# Patient Record
Sex: MALE | Race: BLACK OR AFRICAN AMERICAN | NOT HISPANIC OR LATINO | Employment: OTHER | URBAN - METROPOLITAN AREA
[De-identification: names, ages, dates, MRNs, and addresses within clinical notes are randomized per-mention and may not be internally consistent; named-entity substitution may affect disease eponyms.]

---

## 2017-09-09 ENCOUNTER — HOSPITAL ENCOUNTER (INPATIENT)
Facility: HOSPITAL | Age: 72
LOS: 3 days | Discharge: HOME | DRG: 690 | End: 2017-09-13
Attending: SURGERY | Admitting: FAMILY MEDICINE
Payer: MEDICARE

## 2017-09-09 DIAGNOSIS — R10.9 ABDOMINAL PAIN: ICD-10-CM

## 2017-09-09 DIAGNOSIS — N12 PYELONEPHRITIS: Primary | ICD-10-CM

## 2017-09-09 LAB
ALBUMIN SERPL BCP-MCNC: 3 G/DL
ALP SERPL-CCNC: 73 U/L
ALT SERPL W/O P-5'-P-CCNC: 8 U/L
ANION GAP SERPL CALC-SCNC: 9 MMOL/L
AST SERPL-CCNC: 10 U/L
BACTERIA #/AREA URNS HPF: ABNORMAL /HPF
BASOPHILS # BLD AUTO: 0.02 K/UL
BASOPHILS NFR BLD: 0.2 %
BILIRUB SERPL-MCNC: 0.5 MG/DL
BILIRUB UR QL STRIP: NEGATIVE
BNP SERPL-MCNC: 29 PG/ML
BUN SERPL-MCNC: 11 MG/DL
CALCIUM SERPL-MCNC: 8.6 MG/DL
CHLORIDE SERPL-SCNC: 101 MMOL/L
CK MB SERPL-MCNC: 0.3 NG/ML
CK MB SERPL-RTO: 0.5 %
CK SERPL-CCNC: 64 U/L
CK SERPL-CCNC: 64 U/L
CLARITY UR: ABNORMAL
CO2 SERPL-SCNC: 27 MMOL/L
COLOR UR: YELLOW
CREAT SERPL-MCNC: 1.1 MG/DL
DIFFERENTIAL METHOD: ABNORMAL
EOSINOPHIL # BLD AUTO: 0 K/UL
EOSINOPHIL NFR BLD: 0.1 %
ERYTHROCYTE [DISTWIDTH] IN BLOOD BY AUTOMATED COUNT: 14.9 %
EST. GFR  (AFRICAN AMERICAN): >60 ML/MIN/1.73 M^2
EST. GFR  (NON AFRICAN AMERICAN): >60 ML/MIN/1.73 M^2
GLUCOSE SERPL-MCNC: 136 MG/DL
GLUCOSE UR QL STRIP: NEGATIVE
HCT VFR BLD AUTO: 41.5 %
HGB BLD-MCNC: 13.8 G/DL
HGB UR QL STRIP: ABNORMAL
HYALINE CASTS #/AREA URNS LPF: 0 /LPF
KETONES UR QL STRIP: NEGATIVE
LACTATE SERPL-SCNC: 1.6 MMOL/L
LEUKOCYTE ESTERASE UR QL STRIP: ABNORMAL
LIPASE SERPL-CCNC: 30 U/L
LYMPHOCYTES # BLD AUTO: 1 K/UL
LYMPHOCYTES NFR BLD: 9.1 %
MCH RBC QN AUTO: 27.1 PG
MCHC RBC AUTO-ENTMCNC: 33.3 G/DL
MCV RBC AUTO: 81 FL
MICROSCOPIC COMMENT: ABNORMAL
MONOCYTES # BLD AUTO: 1.1 K/UL
MONOCYTES NFR BLD: 9.5 %
NEUTROPHILS # BLD AUTO: 9.3 K/UL
NEUTROPHILS NFR BLD: 81.1 %
NITRITE UR QL STRIP: POSITIVE
PH UR STRIP: 6 [PH] (ref 5–8)
PLATELET # BLD AUTO: 170 K/UL
PMV BLD AUTO: 8.9 FL
POTASSIUM SERPL-SCNC: 4.2 MMOL/L
PROT SERPL-MCNC: 7.1 G/DL
PROT UR QL STRIP: ABNORMAL
RBC # BLD AUTO: 5.1 M/UL
RBC #/AREA URNS HPF: 9 /HPF (ref 0–4)
SODIUM SERPL-SCNC: 137 MMOL/L
SP GR UR STRIP: 1.02 (ref 1–1.03)
SQUAMOUS #/AREA URNS HPF: 1 /HPF
TROPONIN I SERPL DL<=0.01 NG/ML-MCNC: <0.006 NG/ML
URN SPEC COLLECT METH UR: ABNORMAL
UROBILINOGEN UR STRIP-ACNC: 1 EU/DL
WBC # BLD AUTO: 11.48 K/UL
WBC #/AREA URNS HPF: >100 /HPF (ref 0–5)

## 2017-09-09 PROCEDURE — 25000003 PHARM REV CODE 250: Performed by: SURGERY

## 2017-09-09 PROCEDURE — 63600175 PHARM REV CODE 636 W HCPCS: Performed by: SURGERY

## 2017-09-09 PROCEDURE — 84484 ASSAY OF TROPONIN QUANT: CPT

## 2017-09-09 PROCEDURE — G0378 HOSPITAL OBSERVATION PER HR: HCPCS

## 2017-09-09 PROCEDURE — 83605 ASSAY OF LACTIC ACID: CPT

## 2017-09-09 PROCEDURE — 25500020 PHARM REV CODE 255: Performed by: SURGERY

## 2017-09-09 PROCEDURE — 99285 EMERGENCY DEPT VISIT HI MDM: CPT | Mod: 25

## 2017-09-09 PROCEDURE — 80053 COMPREHEN METABOLIC PANEL: CPT

## 2017-09-09 PROCEDURE — 96361 HYDRATE IV INFUSION ADD-ON: CPT

## 2017-09-09 PROCEDURE — 81000 URINALYSIS NONAUTO W/SCOPE: CPT

## 2017-09-09 PROCEDURE — 82553 CREATINE MB FRACTION: CPT

## 2017-09-09 PROCEDURE — 27000339 *HC DAILY SUPPLY KIT

## 2017-09-09 PROCEDURE — 36415 COLL VENOUS BLD VENIPUNCTURE: CPT

## 2017-09-09 PROCEDURE — 93010 ELECTROCARDIOGRAM REPORT: CPT | Mod: ,,, | Performed by: INTERNAL MEDICINE

## 2017-09-09 PROCEDURE — 93005 ELECTROCARDIOGRAM TRACING: CPT

## 2017-09-09 PROCEDURE — 83690 ASSAY OF LIPASE: CPT

## 2017-09-09 PROCEDURE — 87040 BLOOD CULTURE FOR BACTERIA: CPT | Mod: 59

## 2017-09-09 PROCEDURE — 83880 ASSAY OF NATRIURETIC PEPTIDE: CPT

## 2017-09-09 PROCEDURE — 96365 THER/PROPH/DIAG IV INF INIT: CPT

## 2017-09-09 PROCEDURE — 85025 COMPLETE CBC W/AUTO DIFF WBC: CPT

## 2017-09-09 RX ORDER — IBUPROFEN 800 MG/1
800 TABLET ORAL
Status: COMPLETED | OUTPATIENT
Start: 2017-09-09 | End: 2017-09-09

## 2017-09-09 RX ORDER — PHENYTOIN SODIUM 100 MG/1
100 CAPSULE, EXTENDED RELEASE ORAL 2 TIMES DAILY
Status: DISCONTINUED | OUTPATIENT
Start: 2017-09-10 | End: 2017-09-10

## 2017-09-09 RX ORDER — ONDANSETRON 2 MG/ML
4 INJECTION INTRAMUSCULAR; INTRAVENOUS EVERY 8 HOURS PRN
Status: DISCONTINUED | OUTPATIENT
Start: 2017-09-09 | End: 2017-09-13 | Stop reason: HOSPADM

## 2017-09-09 RX ORDER — DONEPEZIL HYDROCHLORIDE 5 MG/1
10 TABLET, FILM COATED ORAL NIGHTLY
Status: DISCONTINUED | OUTPATIENT
Start: 2017-09-10 | End: 2017-09-10

## 2017-09-09 RX ORDER — CIPROFLOXACIN 2 MG/ML
400 INJECTION, SOLUTION INTRAVENOUS
Status: DISCONTINUED | OUTPATIENT
Start: 2017-09-10 | End: 2017-09-10

## 2017-09-09 RX ORDER — ACETAMINOPHEN 500 MG
1000 TABLET ORAL EVERY 6 HOURS PRN
Status: DISCONTINUED | OUTPATIENT
Start: 2017-09-09 | End: 2017-09-10

## 2017-09-09 RX ORDER — PANTOPRAZOLE SODIUM 40 MG/1
40 TABLET, DELAYED RELEASE ORAL DAILY
Status: DISCONTINUED | OUTPATIENT
Start: 2017-09-10 | End: 2017-09-13 | Stop reason: HOSPADM

## 2017-09-09 RX ORDER — ACETAMINOPHEN 500 MG
1000 TABLET ORAL
Status: COMPLETED | OUTPATIENT
Start: 2017-09-09 | End: 2017-09-09

## 2017-09-09 RX ORDER — VALSARTAN 40 MG/1
40 TABLET ORAL DAILY
Status: DISCONTINUED | OUTPATIENT
Start: 2017-09-10 | End: 2017-09-10

## 2017-09-09 RX ORDER — RISPERIDONE 3 MG/1
3 TABLET ORAL NIGHTLY
Status: DISCONTINUED | OUTPATIENT
Start: 2017-09-10 | End: 2017-09-10

## 2017-09-09 RX ORDER — CIPROFLOXACIN 2 MG/ML
400 INJECTION, SOLUTION INTRAVENOUS
Status: COMPLETED | OUTPATIENT
Start: 2017-09-09 | End: 2017-09-10

## 2017-09-09 RX ORDER — DIVALPROEX SODIUM 250 MG/1
250 TABLET, DELAYED RELEASE ORAL 3 TIMES DAILY
Status: DISCONTINUED | OUTPATIENT
Start: 2017-09-10 | End: 2017-09-10

## 2017-09-09 RX ORDER — TAMSULOSIN HYDROCHLORIDE 0.4 MG/1
1 CAPSULE ORAL DAILY
Status: DISCONTINUED | OUTPATIENT
Start: 2017-09-10 | End: 2017-09-10

## 2017-09-09 RX ORDER — SUCRALFATE 1 G/1
1 TABLET ORAL 4 TIMES DAILY
Status: DISCONTINUED | OUTPATIENT
Start: 2017-09-10 | End: 2017-09-13 | Stop reason: HOSPADM

## 2017-09-09 RX ADMIN — CIPROFLOXACIN 400 MG: 2 INJECTION, SOLUTION INTRAVENOUS at 11:09

## 2017-09-09 RX ADMIN — SODIUM CHLORIDE 500 ML: 0.9 INJECTION, SOLUTION INTRAVENOUS at 08:09

## 2017-09-09 RX ADMIN — IBUPROFEN 800 MG: 800 TABLET ORAL at 08:09

## 2017-09-09 RX ADMIN — IOHEXOL 30 ML: 350 INJECTION, SOLUTION INTRAVENOUS at 09:09

## 2017-09-09 RX ADMIN — IOHEXOL 75 ML: 350 INJECTION, SOLUTION INTRAVENOUS at 10:09

## 2017-09-09 RX ADMIN — ACETAMINOPHEN 1000 MG: 500 TABLET ORAL at 08:09

## 2017-09-10 PROBLEM — I47.20 VENTRICULAR TACHYCARDIA: Status: ACTIVE | Noted: 2017-09-10

## 2017-09-10 PROBLEM — R19.7 DIARRHEA OF PRESUMED INFECTIOUS ORIGIN: Status: ACTIVE | Noted: 2017-09-10

## 2017-09-10 LAB
ALBUMIN SERPL BCP-MCNC: 2.6 G/DL
ALP SERPL-CCNC: 59 U/L
ALT SERPL W/O P-5'-P-CCNC: 8 U/L
ANION GAP SERPL CALC-SCNC: 8 MMOL/L
AST SERPL-CCNC: 10 U/L
BASOPHILS # BLD AUTO: 0.02 K/UL
BASOPHILS NFR BLD: 0.2 %
BILIRUB SERPL-MCNC: 0.6 MG/DL
BUN SERPL-MCNC: 12 MG/DL
CALCIUM SERPL-MCNC: 8 MG/DL
CHLORIDE SERPL-SCNC: 99 MMOL/L
CO2 SERPL-SCNC: 27 MMOL/L
CREAT SERPL-MCNC: 1.2 MG/DL
DIFFERENTIAL METHOD: ABNORMAL
EOSINOPHIL # BLD AUTO: 0.2 K/UL
EOSINOPHIL NFR BLD: 1.9 %
ERYTHROCYTE [DISTWIDTH] IN BLOOD BY AUTOMATED COUNT: 14.6 %
EST. GFR  (AFRICAN AMERICAN): >60 ML/MIN/1.73 M^2
EST. GFR  (NON AFRICAN AMERICAN): >60 ML/MIN/1.73 M^2
GLUCOSE SERPL-MCNC: 95 MG/DL
HCT VFR BLD AUTO: 36.4 %
HGB BLD-MCNC: 12 G/DL
LYMPHOCYTES # BLD AUTO: 1.5 K/UL
LYMPHOCYTES NFR BLD: 14.5 %
MAGNESIUM SERPL-MCNC: 1.7 MG/DL
MCH RBC QN AUTO: 27 PG
MCHC RBC AUTO-ENTMCNC: 33 G/DL
MCV RBC AUTO: 82 FL
MONOCYTES # BLD AUTO: 1 K/UL
MONOCYTES NFR BLD: 9.5 %
NEUTROPHILS # BLD AUTO: 7.4 K/UL
NEUTROPHILS NFR BLD: 73.9 %
PLATELET # BLD AUTO: 161 K/UL
PMV BLD AUTO: 9.1 FL
POTASSIUM SERPL-SCNC: 3.6 MMOL/L
PROT SERPL-MCNC: 6.2 G/DL
RBC # BLD AUTO: 4.44 M/UL
SODIUM SERPL-SCNC: 134 MMOL/L
WBC # BLD AUTO: 10.03 K/UL

## 2017-09-10 PROCEDURE — 25000003 PHARM REV CODE 250: Performed by: SURGERY

## 2017-09-10 PROCEDURE — 20000000 HC ICU ROOM

## 2017-09-10 PROCEDURE — 27200120 HC KIT IV START (RUSH ONLY)

## 2017-09-10 PROCEDURE — 80053 COMPREHEN METABOLIC PANEL: CPT

## 2017-09-10 PROCEDURE — S5010 5% DEXTROSE AND 0.45% SALINE: HCPCS | Performed by: FAMILY MEDICINE

## 2017-09-10 PROCEDURE — 63600175 PHARM REV CODE 636 W HCPCS: Performed by: FAMILY MEDICINE

## 2017-09-10 PROCEDURE — 87186 SC STD MICRODIL/AGAR DIL: CPT

## 2017-09-10 PROCEDURE — 96367 TX/PROPH/DG ADDL SEQ IV INF: CPT

## 2017-09-10 PROCEDURE — 85025 COMPLETE CBC W/AUTO DIFF WBC: CPT

## 2017-09-10 PROCEDURE — 99220 PR INITIAL OBSERVATION CARE,LEVL III: CPT | Mod: ,,, | Performed by: FAMILY MEDICINE

## 2017-09-10 PROCEDURE — 87077 CULTURE AEROBIC IDENTIFY: CPT

## 2017-09-10 PROCEDURE — 96361 HYDRATE IV INFUSION ADD-ON: CPT

## 2017-09-10 PROCEDURE — 36415 COLL VENOUS BLD VENIPUNCTURE: CPT

## 2017-09-10 PROCEDURE — 87088 URINE BACTERIA CULTURE: CPT

## 2017-09-10 PROCEDURE — 25000003 PHARM REV CODE 250: Performed by: FAMILY MEDICINE

## 2017-09-10 PROCEDURE — 87086 URINE CULTURE/COLONY COUNT: CPT

## 2017-09-10 PROCEDURE — 25000003 PHARM REV CODE 250: Performed by: NURSE PRACTITIONER

## 2017-09-10 PROCEDURE — 96366 THER/PROPH/DIAG IV INF ADDON: CPT

## 2017-09-10 PROCEDURE — 63600175 PHARM REV CODE 636 W HCPCS: Performed by: NURSE PRACTITIONER

## 2017-09-10 PROCEDURE — 83735 ASSAY OF MAGNESIUM: CPT

## 2017-09-10 PROCEDURE — 94761 N-INVAS EAR/PLS OXIMETRY MLT: CPT

## 2017-09-10 PROCEDURE — 27000339 *HC DAILY SUPPLY KIT

## 2017-09-10 RX ORDER — PHENYTOIN SODIUM 100 MG/1
100 CAPSULE, EXTENDED RELEASE ORAL 2 TIMES DAILY
Status: DISCONTINUED | OUTPATIENT
Start: 2017-09-10 | End: 2017-09-13 | Stop reason: HOSPADM

## 2017-09-10 RX ORDER — DIVALPROEX SODIUM 250 MG/1
250 TABLET, DELAYED RELEASE ORAL 3 TIMES DAILY
Status: DISCONTINUED | OUTPATIENT
Start: 2017-09-10 | End: 2017-09-10

## 2017-09-10 RX ORDER — ACETAMINOPHEN 500 MG
1000 TABLET ORAL EVERY 6 HOURS PRN
Status: DISCONTINUED | OUTPATIENT
Start: 2017-09-10 | End: 2017-09-13 | Stop reason: HOSPADM

## 2017-09-10 RX ORDER — METOPROLOL TARTRATE 25 MG/1
25 TABLET, FILM COATED ORAL 2 TIMES DAILY
Status: DISCONTINUED | OUTPATIENT
Start: 2017-09-10 | End: 2017-09-10

## 2017-09-10 RX ORDER — RISPERIDONE 3 MG/1
3 TABLET ORAL NIGHTLY
Status: DISCONTINUED | OUTPATIENT
Start: 2017-09-10 | End: 2017-09-13 | Stop reason: HOSPADM

## 2017-09-10 RX ORDER — POTASSIUM CHLORIDE 20 MEQ/1
40 TABLET, EXTENDED RELEASE ORAL ONCE
Status: COMPLETED | OUTPATIENT
Start: 2017-09-10 | End: 2017-09-10

## 2017-09-10 RX ORDER — CIPROFLOXACIN 2 MG/ML
400 INJECTION, SOLUTION INTRAVENOUS
Status: DISCONTINUED | OUTPATIENT
Start: 2017-09-10 | End: 2017-09-13 | Stop reason: HOSPADM

## 2017-09-10 RX ORDER — DIVALPROEX SODIUM 250 MG/1
250 TABLET, DELAYED RELEASE ORAL 3 TIMES DAILY
Status: DISCONTINUED | OUTPATIENT
Start: 2017-09-10 | End: 2017-09-13 | Stop reason: HOSPADM

## 2017-09-10 RX ORDER — DEXTROSE MONOHYDRATE AND SODIUM CHLORIDE 5; .45 G/100ML; G/100ML
INJECTION, SOLUTION INTRAVENOUS CONTINUOUS
Status: DISCONTINUED | OUTPATIENT
Start: 2017-09-10 | End: 2017-09-11

## 2017-09-10 RX ORDER — DONEPEZIL HYDROCHLORIDE 5 MG/1
10 TABLET, FILM COATED ORAL NIGHTLY
Status: DISCONTINUED | OUTPATIENT
Start: 2017-09-10 | End: 2017-09-13 | Stop reason: HOSPADM

## 2017-09-10 RX ADMIN — PHENYTOIN SODIUM 100 MG: 100 CAPSULE ORAL at 09:09

## 2017-09-10 RX ADMIN — SUCRALFATE 1 G: 1 TABLET ORAL at 06:09

## 2017-09-10 RX ADMIN — PHENYTOIN SODIUM 100 MG: 100 CAPSULE ORAL at 12:09

## 2017-09-10 RX ADMIN — DIVALPROEX SODIUM 250 MG: 250 TABLET, DELAYED RELEASE ORAL at 09:09

## 2017-09-10 RX ADMIN — RISPERIDONE 3 MG: 3 TABLET, FILM COATED ORAL at 09:09

## 2017-09-10 RX ADMIN — ACETAMINOPHEN 1000 MG: 500 TABLET ORAL at 02:09

## 2017-09-10 RX ADMIN — POTASSIUM CHLORIDE 40 MEQ: 1500 TABLET, EXTENDED RELEASE ORAL at 04:09

## 2017-09-10 RX ADMIN — CIPROFLOXACIN 400 MG: 2 INJECTION, SOLUTION INTRAVENOUS at 12:09

## 2017-09-10 RX ADMIN — SUCRALFATE 1 G: 1 TABLET ORAL at 12:09

## 2017-09-10 RX ADMIN — DONEPEZIL HYDROCHLORIDE 10 MG: 5 TABLET, FILM COATED ORAL at 12:09

## 2017-09-10 RX ADMIN — VALSARTAN 40 MG: 40 TABLET ORAL at 09:09

## 2017-09-10 RX ADMIN — DIVALPROEX SODIUM 250 MG: 250 TABLET, DELAYED RELEASE ORAL at 02:09

## 2017-09-10 RX ADMIN — METOPROLOL TARTRATE 25 MG: 25 TABLET ORAL at 01:09

## 2017-09-10 RX ADMIN — CIPROFLOXACIN 400 MG: 2 INJECTION, SOLUTION INTRAVENOUS at 10:09

## 2017-09-10 RX ADMIN — SODIUM CHLORIDE 500 ML: 0.9 INJECTION, SOLUTION INTRAVENOUS at 08:09

## 2017-09-10 RX ADMIN — DIVALPROEX SODIUM 250 MG: 250 TABLET, DELAYED RELEASE ORAL at 12:09

## 2017-09-10 RX ADMIN — PANTOPRAZOLE SODIUM 40 MG: 40 TABLET, DELAYED RELEASE ORAL at 09:09

## 2017-09-10 RX ADMIN — TAMSULOSIN HYDROCHLORIDE 0.4 MG: 0.4 CAPSULE ORAL at 09:09

## 2017-09-10 RX ADMIN — MAGNESIUM SULFATE HEPTAHYDRATE 3 G: 500 INJECTION, SOLUTION INTRAMUSCULAR; INTRAVENOUS at 04:09

## 2017-09-10 RX ADMIN — RISPERIDONE 3 MG: 3 TABLET, FILM COATED ORAL at 12:09

## 2017-09-10 RX ADMIN — SUCRALFATE 1 G: 1 TABLET ORAL at 11:09

## 2017-09-10 RX ADMIN — DEXTROSE AND SODIUM CHLORIDE: 5; .45 INJECTION, SOLUTION INTRAVENOUS at 02:09

## 2017-09-10 NOTE — NURSING
Bedside report received from Sneha.  Pt resting in bed.  Daughter at bedside.  Call bell in reach.  Will continue to monitor.  Bed alarm on.  telemtry box placed on pt.
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
## 2017-09-10 NOTE — PROGRESS NOTES
CIS and Dr. Nova notified of BP of 74/42. IV fluids rate changed to 150 mL/hr. Considering transferring patient to another hospital. Will continue to monitor BP and patient.

## 2017-09-10 NOTE — ASSESSMENT & PLAN NOTE
[facility] consulted  CIS feels this is abberancy  Got IV metoprolol night of 9/10 and became hypotensive. Sent to ICU  BP improved off meds  No more IVF  No more BB

## 2017-09-10 NOTE — HOSPITAL COURSE
73 y/o W M admitted for an occult infection, noted on CT to be pyleonephritis; he is a resident of [facility]; hx of prostate Ca; his daughter says he has been having some loose stools, so I will get C Diff on her diarrhea. Since admit, he has had several bouts of V tach and [facility] has been consulted(distancely involved only) and made some recs; he flakita been persistenly hypotensive, so will be transferred at approx 8:00pm to ICU for closer monitoring and IV bolusing IV Mag    9/11- concern for VT overnight. Given metoprolol and became hypotensive after IV metoprolol. Transferred to ICU. Started on IVF. BP improved. Still with low grade fevers. No leukocytosis. Remains on cipro.     9/12/17  Presently in icu . Still on IVF Orders done more ectopy ; presently in sinus rhythm.  Afebrile . Urine culture growing e coli ; presently on IV cipro     9/13  Pressure is stable, no arrhythmias afebrile, urine sensitive to cipro
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
## 2017-09-10 NOTE — PLAN OF CARE
Problem: Patient Care Overview  Goal: Plan of Care Review  Outcome: Ongoing (interventions implemented as appropriate)  Pt agrees with plan of care.  No complaints of pain noted.  Pt incontinent at times of stool.  Pt has diarrhea times 2 this shift.  Call bell in reach.  Bed alarm on.  Will continue to monitor.

## 2017-09-10 NOTE — PROGRESS NOTES
MD notified of possible 11-beat run of V-tach. Cardiology consulted. Patient stable; denies any chest pain or changes.

## 2017-09-10 NOTE — ED TRIAGE NOTES
Pt c/o LLQ abd pain starting about 2 weeks ago. Daughter states dx last week with UTI & diverticulitis

## 2017-09-10 NOTE — ASSESSMENT & PLAN NOTE
C diff titer and precautions, since he has been on mx ABX friom Dr Diallo and also some Rx for his kidney infection

## 2017-09-10 NOTE — HPI
73 y/o W M admitted for a pyelonephritis infection from [facility], who has become medically unstable with some V Tach runs and hypotension bouts.

## 2017-09-10 NOTE — ED PROVIDER NOTES
[facility]                                     September 9, 2017                   Chief Complaint  72 y.o. male with Abdominal Pain    History of Present Illness  Anjel Soria presents to the emergency room with dysuria and abdominal cramps  Patient has had abdominal pain and burning with urination for the last 2-3 days now  The nursing home states that the patient has had fever the last 24 hours prior to arrival  Pt is a poor historian due to his dementia and stroke history with debility chronically  Patient on CT has evidence of pyelonephritis, urinalysis correlates with that diagnosis    The history is provided by the patient    Past Medical History   -- Bipolar 1 disorder    -- BPH (benign prostatic hyperplasia)    -- Cancer    -- Convulsion    -- Dementia    -- Esophageal reflux    -- Glaucoma    -- Hypertension    -- Macular degeneration    -- Reflux    -- Seizures    -- Stroke      Past Surgical History   -- HERNIA REPAIR     -- PROSTATE SURGERY     -- SPINE SURGERY     -- STOMACH SURGERY        ALLERGIES: Tubersol  No family history on file.    Review of Systems and Physical Exam     Review of Systems  -- Constitution - fever, denies fatigue, no weakness, no chills  -- Eyes - no tearing or redness, no visual disturbance  -- Ear, Nose - no tinnitus or earache, no nasal congestion or discharge  -- Mouth,Throat - no sore throat, no toothache, normal voice, normal swallowing  -- Respiratory - denies cough and congestion, no shortness of breath, no HASSAN  -- Cardiovascular - denies chest pain, no palpitations, denies claudication  -- Gastrointestinal - denies abdominal pain, nausea, vomiting, or diarrhea  -- Genitourinary - dysuria, flank pain, no hematuria or frequency   -- Musculoskeletal - denies back pain, negative for myalgias and arthralgias   -- Neurological - no headache, denies weakness or seizure; no LOC  -- Skin - denies pallor, rash, or changes in skin. no hives or welts  noted    Vital Signs  -- Temperature is 99.8 °F (37.7 °C).   -- His blood pressure is 133/71 and his pulse is 83.   -- His respiration is 20 and oxygen saturation is 93%     Physical Exam  -- Nursing note and vitals reviewed  -- Head: Atraumatic. Normocephalic. No obvious abnormality  -- Eyes: Pupils are equal and reactive to light. Normal conjunctiva and lids  -- Cardiac: Normal rate, regular rhythm and normal heart sounds  -- Pulmonary: Normal respiratory effort, breath sounds clear to auscultation  -- Abdominal: Soft, no tenderness. Normal bowel sounds. Normal liver edge  -- Genitourinary: no flank pain on exam, no suprapubic pain by palpation   -- Musculoskeletal: Normal range of motion, no effusions. Joints stable   -- Neurological: No focal deficits. Showed good interaction with staff  -- Vascular: Posterior tibial, dorsalis pedis and radial pulses 2+ bilaterally      Emergency Room Course     Treatment and Evaluation  -- The EKG findings today were without concerning findings from baseline   -- CT stone study shows pyelonephritis  -- Blood cultures have also been drawn, results are pending   -- The urine today has been sent for lab culture, results pending   -- Saline lock started in the ER per protocol  --  ml NS given in today in the ER  --  mg Cipro given today in the ER    Urinalysis  -- Appearance, UA Cloudy (*)   -- Protein, UA 2+ (*)   -- Occult Blood UA 2+ (*)   -- Nitrite, UA Positive (*)   -- Leukocytes, UA 2+ (*)   -- RBC, UA 9 (*)   -- WBC, UA >100 (*)   -- Bacteria, UA Many (*)     Lab Results   --     -- K 4.2    --     -- CO2 27    -- BUN 11    -- CREATININE 1.1    --  (H)    -- ALKPHOS 73    -- AST 10    -- ALT 8 (L)    -- BILITOT 0.5    -- ALBUMIN 3.0 (L)    -- PROT 7.1    -- WBC 11.48    -- HGB 13.8 (L)    -- HCT 41.5    --     -- CPK 64    -- CPK 64    -- CPKMB 0.3    -- TROPONINI <0.006    -- BNP 29    -- LACTATE 1.6      Diagnosis  -- The primary  encounter diagnosis was Pyelonephritis.   -- A diagnosis of Abdominal pain was also pertinent to this visit.    Disposition and Plan  -- Disposition: observation  -- Condition: stable  -- Telemetry monitoring  -- Morning labs  -- SCD hoses  -- Home medications  -- Nausea medication when necessary  -- Pain medication when necessary  -- Hep-Lock IV  -- Routine monitoring  -- Bed rest until otherwise stated  -- Low salt diet  -- Protonix for GERD prophylaxis  -- IV antibiotics  -- Blood cultures pending  -- Urine culture pending    This note is dictated on Dragon Natural Speaking word recognition program.  There are word recognition mistakes that are occasionally missed on review.           Girma Li MD  09/09/17 6916

## 2017-09-10 NOTE — ED NOTES
Pt admitted to room 305. Pt transferred via wheel chair by nurse in no distress. VSS. Bedside report given to KENNY Baker.

## 2017-09-11 PROBLEM — R19.7 DIARRHEA OF PRESUMED INFECTIOUS ORIGIN: Status: RESOLVED | Noted: 2017-09-10 | Resolved: 2017-09-11

## 2017-09-11 PROBLEM — F03.918 DEMENTIA WITH BEHAVIORAL DISTURBANCE: Status: ACTIVE | Noted: 2017-09-11

## 2017-09-11 LAB
ANION GAP SERPL CALC-SCNC: 6 MMOL/L
BASOPHILS # BLD AUTO: 0.01 K/UL
BASOPHILS NFR BLD: 0.1 %
BUN SERPL-MCNC: 8 MG/DL
CALCIUM SERPL-MCNC: 8 MG/DL
CHLORIDE SERPL-SCNC: 101 MMOL/L
CO2 SERPL-SCNC: 25 MMOL/L
CREAT SERPL-MCNC: 0.9 MG/DL
DIFFERENTIAL METHOD: ABNORMAL
EOSINOPHIL # BLD AUTO: 0.1 K/UL
EOSINOPHIL NFR BLD: 0.7 %
ERYTHROCYTE [DISTWIDTH] IN BLOOD BY AUTOMATED COUNT: 14.4 %
EST. GFR  (AFRICAN AMERICAN): >60 ML/MIN/1.73 M^2
EST. GFR  (NON AFRICAN AMERICAN): >60 ML/MIN/1.73 M^2
GLUCOSE SERPL-MCNC: 137 MG/DL
HCT VFR BLD AUTO: 37.3 %
HGB BLD-MCNC: 12.7 G/DL
LYMPHOCYTES # BLD AUTO: 1.2 K/UL
LYMPHOCYTES NFR BLD: 14.5 %
MAGNESIUM SERPL-MCNC: 2.4 MG/DL
MCH RBC QN AUTO: 27 PG
MCHC RBC AUTO-ENTMCNC: 34 G/DL
MCV RBC AUTO: 79 FL
MONOCYTES # BLD AUTO: 1 K/UL
MONOCYTES NFR BLD: 12.8 %
NEUTROPHILS # BLD AUTO: 5.9 K/UL
NEUTROPHILS NFR BLD: 71.9 %
PLATELET # BLD AUTO: 146 K/UL
PMV BLD AUTO: 9.2 FL
POTASSIUM SERPL-SCNC: 4.2 MMOL/L
RBC # BLD AUTO: 4.71 M/UL
SODIUM SERPL-SCNC: 132 MMOL/L
WBC # BLD AUTO: 8.14 K/UL

## 2017-09-11 PROCEDURE — 85025 COMPLETE CBC W/AUTO DIFF WBC: CPT

## 2017-09-11 PROCEDURE — 97530 THERAPEUTIC ACTIVITIES: CPT

## 2017-09-11 PROCEDURE — 36415 COLL VENOUS BLD VENIPUNCTURE: CPT

## 2017-09-11 PROCEDURE — 94761 N-INVAS EAR/PLS OXIMETRY MLT: CPT

## 2017-09-11 PROCEDURE — 25000003 PHARM REV CODE 250: Performed by: INTERNAL MEDICINE

## 2017-09-11 PROCEDURE — G8979 MOBILITY GOAL STATUS: HCPCS | Mod: CK

## 2017-09-11 PROCEDURE — 20000000 HC ICU ROOM

## 2017-09-11 PROCEDURE — 25000003 PHARM REV CODE 250: Performed by: SURGERY

## 2017-09-11 PROCEDURE — 94760 N-INVAS EAR/PLS OXIMETRY 1: CPT

## 2017-09-11 PROCEDURE — 63600175 PHARM REV CODE 636 W HCPCS: Performed by: FAMILY MEDICINE

## 2017-09-11 PROCEDURE — 27000339 *HC DAILY SUPPLY KIT

## 2017-09-11 PROCEDURE — 97161 PT EVAL LOW COMPLEX 20 MIN: CPT

## 2017-09-11 PROCEDURE — 27200120 HC KIT IV START (RUSH ONLY)

## 2017-09-11 PROCEDURE — 25000003 PHARM REV CODE 250: Performed by: FAMILY MEDICINE

## 2017-09-11 PROCEDURE — 99233 SBSQ HOSP IP/OBS HIGH 50: CPT | Mod: ,,, | Performed by: INTERNAL MEDICINE

## 2017-09-11 PROCEDURE — G8978 MOBILITY CURRENT STATUS: HCPCS | Mod: CL

## 2017-09-11 PROCEDURE — 83735 ASSAY OF MAGNESIUM: CPT

## 2017-09-11 PROCEDURE — 80048 BASIC METABOLIC PNL TOTAL CA: CPT

## 2017-09-11 RX ORDER — FLUOXETINE HYDROCHLORIDE 20 MG/1
20 CAPSULE ORAL DAILY
Status: DISCONTINUED | OUTPATIENT
Start: 2017-09-11 | End: 2017-09-13 | Stop reason: HOSPADM

## 2017-09-11 RX ORDER — ASPIRIN 81 MG/1
81 TABLET ORAL DAILY
Status: DISCONTINUED | OUTPATIENT
Start: 2017-09-11 | End: 2017-09-13 | Stop reason: HOSPADM

## 2017-09-11 RX ORDER — TAMSULOSIN HYDROCHLORIDE 0.4 MG/1
0.4 CAPSULE ORAL DAILY
Status: DISCONTINUED | OUTPATIENT
Start: 2017-09-11 | End: 2017-09-13 | Stop reason: HOSPADM

## 2017-09-11 RX ORDER — ATORVASTATIN CALCIUM 20 MG/1
20 TABLET, FILM COATED ORAL DAILY
Status: DISCONTINUED | OUTPATIENT
Start: 2017-09-11 | End: 2017-09-13 | Stop reason: HOSPADM

## 2017-09-11 RX ORDER — SODIUM CHLORIDE 9 MG/ML
INJECTION, SOLUTION INTRAVENOUS CONTINUOUS
Status: DISCONTINUED | OUTPATIENT
Start: 2017-09-11 | End: 2017-09-13 | Stop reason: HOSPADM

## 2017-09-11 RX ORDER — CLOPIDOGREL BISULFATE 75 MG/1
75 TABLET ORAL DAILY
Status: DISCONTINUED | OUTPATIENT
Start: 2017-09-11 | End: 2017-09-13 | Stop reason: HOSPADM

## 2017-09-11 RX ORDER — METOPROLOL TARTRATE 25 MG/1
12.5 TABLET ORAL 2 TIMES DAILY
Status: DISCONTINUED | OUTPATIENT
Start: 2017-09-11 | End: 2017-09-13 | Stop reason: HOSPADM

## 2017-09-11 RX ORDER — SIMETHICONE 80 MG
1 TABLET,CHEWABLE ORAL EVERY 8 HOURS PRN
Status: DISCONTINUED | OUTPATIENT
Start: 2017-09-11 | End: 2017-09-13 | Stop reason: HOSPADM

## 2017-09-11 RX ADMIN — TAMSULOSIN HYDROCHLORIDE 0.4 MG: 0.4 CAPSULE ORAL at 08:09

## 2017-09-11 RX ADMIN — SODIUM CHLORIDE: 0.9 INJECTION, SOLUTION INTRAVENOUS at 11:09

## 2017-09-11 RX ADMIN — PHENYTOIN SODIUM 100 MG: 100 CAPSULE ORAL at 08:09

## 2017-09-11 RX ADMIN — DIVALPROEX SODIUM 250 MG: 250 TABLET, DELAYED RELEASE ORAL at 09:09

## 2017-09-11 RX ADMIN — CLOPIDOGREL BISULFATE 75 MG: 75 TABLET ORAL at 08:09

## 2017-09-11 RX ADMIN — PHENYTOIN SODIUM 100 MG: 100 CAPSULE ORAL at 09:09

## 2017-09-11 RX ADMIN — SUCRALFATE 1 G: 1 TABLET ORAL at 05:09

## 2017-09-11 RX ADMIN — ATORVASTATIN CALCIUM 20 MG: 20 TABLET, FILM COATED ORAL at 08:09

## 2017-09-11 RX ADMIN — SIMETHICONE CHEW TAB 80 MG 80 MG: 80 TABLET ORAL at 12:09

## 2017-09-11 RX ADMIN — PANTOPRAZOLE SODIUM 40 MG: 40 TABLET, DELAYED RELEASE ORAL at 08:09

## 2017-09-11 RX ADMIN — DIVALPROEX SODIUM 250 MG: 250 TABLET, DELAYED RELEASE ORAL at 02:09

## 2017-09-11 RX ADMIN — DONEPEZIL HYDROCHLORIDE 10 MG: 5 TABLET, FILM COATED ORAL at 09:09

## 2017-09-11 RX ADMIN — CIPROFLOXACIN 400 MG: 2 INJECTION, SOLUTION INTRAVENOUS at 11:09

## 2017-09-11 RX ADMIN — CIPROFLOXACIN 400 MG: 2 INJECTION, SOLUTION INTRAVENOUS at 10:09

## 2017-09-11 RX ADMIN — FLUOXETINE 20 MG: 20 CAPSULE ORAL at 08:09

## 2017-09-11 RX ADMIN — RISPERIDONE 3 MG: 3 TABLET, FILM COATED ORAL at 09:09

## 2017-09-11 RX ADMIN — ASPIRIN 81 MG: 81 TABLET, COATED ORAL at 08:09

## 2017-09-11 RX ADMIN — SUCRALFATE 1 G: 1 TABLET ORAL at 11:09

## 2017-09-11 RX ADMIN — DIVALPROEX SODIUM 250 MG: 250 TABLET, DELAYED RELEASE ORAL at 05:09

## 2017-09-11 NOTE — SUBJECTIVE & OBJECTIVE
Interval History: no acute events. BP has stabilized s/p IV metoprolol    Review of Systems   Constitutional: Negative for activity change, fatigue, fever and unexpected weight change.   HENT: Negative for congestion, ear pain, hearing loss, rhinorrhea and sore throat.    Eyes: Positive for visual disturbance (chronic). Negative for pain and redness.   Respiratory: Negative for cough, shortness of breath and wheezing.    Cardiovascular: Negative for chest pain, palpitations and leg swelling.   Gastrointestinal: Negative for abdominal pain, constipation, diarrhea, nausea and vomiting.   Genitourinary: Negative for decreased urine volume, dysuria, frequency and urgency.   Musculoskeletal: Negative for back pain, joint swelling and neck pain.   Skin: Negative for color change, rash and wound.   Neurological: Negative for dizziness, tremors, weakness, light-headedness and headaches.     Objective:     Vital Signs (Most Recent):  Temp: (!) 100.5 °F (38.1 °C) (09/11/17 0739)  Pulse: 65 (09/11/17 0600)  Resp: 18 (09/11/17 0600)  BP: (!) 115/57 (09/11/17 0600)  SpO2: 95 % (09/11/17 0600) Vital Signs (24h Range):  Temp:  [96.7 °F (35.9 °C)-100.5 °F (38.1 °C)] 100.5 °F (38.1 °C)  Pulse:  [56-73] 65  Resp:  [16-20] 18  SpO2:  [92 %-97 %] 95 %  BP: ()/(42-84) 115/57     Weight: 89.4 kg (197 lb)  Body mass index is 29.95 kg/m².    Intake/Output Summary (Last 24 hours) at 09/11/17 0800  Last data filed at 09/11/17 0050   Gross per 24 hour   Intake          1587.71 ml   Output              750 ml   Net           837.71 ml      Physical Exam   Constitutional: He is oriented to person, place, and time. He appears well-developed and well-nourished. No distress.   HENT:   Head: Normocephalic and atraumatic.   Right Ear: External ear normal.   Left Ear: External ear normal.   Eyes: Conjunctivae and EOM are normal. Pupils are equal, round, and reactive to light. Right eye exhibits no discharge. Left eye exhibits no discharge.    Neck: Neck supple. No tracheal deviation present.   Cardiovascular: Normal rate and regular rhythm.  Exam reveals no gallop and no friction rub.    No murmur heard.  Pulmonary/Chest: Effort normal and breath sounds normal. No respiratory distress. He has no wheezes. He has no rales.   Abdominal: Soft. Bowel sounds are normal. He exhibits no distension. There is no tenderness. There is no rebound and no guarding.   Musculoskeletal: He exhibits no edema.   Neurological: He is alert and oriented to person, place, and time. No cranial nerve deficit.   Skin: Skin is warm and dry.   Psychiatric: He has a normal mood and affect. His behavior is normal.   Nursing note and vitals reviewed.      Significant Labs:   Blood Culture:   Recent Labs  Lab 09/09/17 2000   LABBLOO No Growth to date  No Growth to date  No Growth to date  No Growth to date     BMP:   Recent Labs  Lab 09/11/17 0447   *   *   K 4.2      CO2 25   BUN 8   CREATININE 0.9   CALCIUM 8.0*   MG 2.4     CBC:   Recent Labs  Lab 09/09/17  2000 09/10/17  0438 09/11/17 0447   WBC 11.48 10.03 8.14   HGB 13.8* 12.0* 12.7*   HCT 41.5 36.4* 37.3*    161 146*     Urine Culture: No results for input(s): LABURIN in the last 48 hours.  Urine Studies:   Recent Labs  Lab 09/09/17  2143   COLORU Yellow   APPEARANCEUA Cloudy*   PHUR 6.0   SPECGRAV 1.020   PROTEINUA 2+*   GLUCUA Negative   KETONESU Negative   BILIRUBINUA Negative   OCCULTUA 2+*   NITRITE Positive*   UROBILINOGEN 1.0   LEUKOCYTESUR 2+*   RBCUA 9*   WBCUA >100*   BACTERIA Many*   SQUAMEPITHEL 1   HYALINECASTS 0     All pertinent labs within the past 24 hours have been reviewed.    Significant Imaging: I have reviewed all pertinent imaging results/findings within the past 24 hours.

## 2017-09-11 NOTE — PT/OT/SLP PROGRESS
Physical Therapy      Anjel Soria  MRN: [id number]    Patient not seen today secondary to Patient unwilling to participate. NSG just put pt back in bed secondary to c/o not feeling well with stomach pain. Pt refused to attempt to get OOB again. Will follow-up 09/12/2017.    Moriah Ny, PT

## 2017-09-11 NOTE — PROGRESS NOTES
Pt resting in bed comfortably. No acute distress noted. IV fluids maintained at 100 ML/hr. Vitals stable. Pt easily aroused to voiced. Pt disoriented to place, but is easily reoriented. Please see flow sheet for head to toe assessment.

## 2017-09-11 NOTE — PLAN OF CARE
Problem: Patient Care Overview  Goal: Plan of Care Review  Outcome: Ongoing (interventions implemented as appropriate)  Pt admitted 9/9/17 from ED for Pyelonephritis, transered from Med/Surg at the beginning of my shift for BP of 97/55 and a HR of 57.  Pt was asymptomatic throughout my shift with stable VS, latest /56 and HR 68.  Pt did not sleep much this evening and changed position often.  Antibiotics administered per MD order as well as D5 0.45% NS infusing at 100 mL/hr.  Pt voids per urinal typically but had one episode of incontinence as he could not make it to the urinal in time.  Also, pt is on contact plus precautions for suspected C. Diff.  There is an outstanding order for a stool sample to confirm but patient did not have a BM during my shift. 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  Pt safety maintained throughout shift, isolation precautions maintained.  Will continue to monitor through end of shift.

## 2017-09-11 NOTE — PROGRESS NOTES
Dr. Nova notified of BP of 100s/60s; IV fluids reduced to 100 mL/hr. Dinamap cycling every 15 minutes. Patient remains asymptomatic.

## 2017-09-11 NOTE — PLAN OF CARE
09/11/17 1057   Discharge Assessment   Assessment Type Discharge Planning Assessment   Confirmed/corrected address and phone number on facesheet? Yes   Assessment information obtained from? Patient;Caregiver;Medical Record   Expected Length of Stay (days) 2   Communicated expected length of stay with patient/caregiver yes   Prior to hospitilization cognitive status: Unable to Assess   Prior to hospitalization functional status: Partially Dependent   Current cognitive status: Unable to Assess   Current Functional Status: Needs Assistance;Assistive Equipment;Partially Dependent   Facility Arrived From: [facility]    Lives With facility resident   Able to Return to Prior Arrangements yes   Is patient able to care for self after discharge? No   Who are your caregiver(s) and their phone number(s)? Harriet Stone [phone]   Patient's perception of discharge disposition nursing home   Readmission Within The Last 30 Days no previous admission in last 30 days   Patient currently being followed by outpatient case management? No   Patient currently receives any other outside agency services? No   Equipment Currently Used at Home cane, straight   Do you have any problems affording any of your prescribed medications? No   Is the patient taking medications as prescribed? yes   Does the patient have transportation home? Yes   Does the patient receive services at [facility]? No   Discharge Plan A Return to nursing home   Discharge Plan B Return to Nursing Home   Patient/Family In Agreement With Plan yes     The pt is a 72 year old male who was admitted with pyelonephritis. The pt's family stated that the pt is a resident at [facility]. The pt will be transported back to [facility] at d/c. The pt has vision problems and uses a cane. No other SW needs noted at this time. SW will continue to follow and offer support as needed.    Loco Dubois LMSW

## 2017-09-11 NOTE — PROGRESS NOTES
Mag=1.7 reported to JOSE RAMON Perez. 3g magnesium sulfate rider and 40 mEq K PO ordered.     Patient not eating; MD notified. IV fluids and full liquid diet ordered.

## 2017-09-11 NOTE — PLAN OF CARE
Problem: Patient Care Overview  Goal: Plan of Care Review  Outcome: Outcome(s) achieved Date Met: 09/11/17  Vitals remain stable throughout the day. No acute distress noted. Pt Lung sounds remain clear and equal bilaterally. NSR on cardiac monitor. Pt encouraged to turn every 2 hours to prevent breakdown. PT is now working with pt and got pt to sit in the chair for a short period of time today. Pt has slept on and off throughout the day, and seem tired. Normal saline maintained at 100 ML/hr to a 22G right hand. Cipro maintained Q12 HR. Urine cultures growing E-Coli, MD aware. Pt states he has some abdominal discomfort, like gas pains, simethicone ordered and given 1 time this shift. Urinal at bedside, and pt assisted with urinal as needed. Voiding adequately. Daughter was at bedside most of the morning. Fall precautions maintained. Bed locked and low, call bell in reach, non-skid socks utilized. Pt and family in agreement with plan of care, questions answered.

## 2017-09-11 NOTE — PLAN OF CARE
09/11/17 1108   GODWIN Message   Medicare Outpatient and Observation Notification regarding financial responsibility Given to patient/caregiver;Explained to patient/caregiver;Signed/date by patient/caregiver   Date GODWIN was signed 09/11/17   Time GODWIN was signed 1104

## 2017-09-11 NOTE — PLAN OF CARE
Problem: Patient Care Overview  Goal: Plan of Care Review  Outcome: Ongoing (interventions implemented as appropriate)  Patient had possible run of V-tach today; [facility] notified; metoprolol started, causing BP to drop. With IV fluids, BP remains 90s-110s/50s-60s; Patient remains asymptomatic. HR also dropped from 60s-70s to 50s-60s with metoprolol. Other vitals stable. Complained of back pain; mostly relieved with Tylenol. Voiding spontaneously; up to bathroom or BSC. Watery diarrhea; C. diff precautions initiated. Fall precautions maintained; up with assistance. Patient reminded to change position frequently. IV antibiotics continued. Patient complained of no appetite; barely ate or drank; IV fluids initiated. Plan of care reviewed with patient and daughter; voiced understanding. Will continue to monitor.

## 2017-09-11 NOTE — PROGRESS NOTES
Upon making bedside rounds with KENNY Baker, BP ranging from 90s/50s to 110s/60s. IV is infiltrated; IV fluids and mag rider paused. JOSE RAMON Perez and Dr. Nova notified. Patient to be transferred to ICU. 500 mL bolus ordered and initiated with new IV. Patient remains asymptomatic and oriented, but BP continuing to drop to 90s/50s. Daughter notified of transfer.

## 2017-09-11 NOTE — PROGRESS NOTES
Lopressor re-ordered orally. MD notified that Pressure is stable at 117/64 and heart rate is in the 60's sometimes dipping into the 50's. She state to hold this dose. Will continue to monitor.
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
## 2017-09-11 NOTE — PROGRESS NOTES
JOSE RAMON Perez from [facility] called with new orders. Metoprolol 25 mg BID to be given. Mag level to be drawn.

## 2017-09-11 NOTE — ASSESSMENT & PLAN NOTE
Admitted for a kidney infection (seen on imaging) and is on Cipro at this time  No leukocytosis  Still with low grade fevers  UA + infection  Urine culture pending

## 2017-09-11 NOTE — PROGRESS NOTES
[facility] Hospital Medicine  Progress Note    Patient Name: Anjel Soria  MRN: [id number]  Patient Class: IP- Inpatient   Admission Date: 9/9/2017  Length of Stay: 1 days  Attending Physician: Eren Nova MD  Primary Care Provider: REID Blanco MD        Subjective:     Principal Problem:Pyelonephritis    HPI:  71 y/o W M admitted for a pyelonephritis infection from [facility], who has become medically unstable with some V Tach runs and hypotension bouts.    Hospital Course:  71 y/o W M admitted for an occult infection, noted on CT to be pyleonephritis; he is a resident of [facility]; hx of prostate Ca; his daughter says he has been having some loose stools, so I will get C Diff on her diarrhea. Since admit, he has had several bouts of V tach and [facility] has been consulted(distancely involved only) and made some recs; he flakita been persistenly hypotensive, so will be transferred at approx 8:00pm to ICU for closer monitoring and IV bolusing IV Mag    9/11- concern for VT overnight. Given metoprolol and became hypotensive after IV metoprolol. Transferred to ICU. Started on IVF. BP improved. Still with low grade fevers. No leukocytosis. Remains on cipro.     Interval History: no acute events. BP has stabilized s/p IV metoprolol    Review of Systems   Constitutional: Negative for activity change, fatigue, fever and unexpected weight change.   HENT: Negative for congestion, ear pain, hearing loss, rhinorrhea and sore throat.    Eyes: Positive for visual disturbance (chronic). Negative for pain and redness.   Respiratory: Negative for cough, shortness of breath and wheezing.    Cardiovascular: Negative for chest pain, palpitations and leg swelling.   Gastrointestinal: Negative for abdominal pain, constipation, diarrhea, nausea and vomiting.   Genitourinary: Negative for decreased urine volume, dysuria, frequency and urgency.   Musculoskeletal: Negative for back pain, joint swelling and neck  pain.   Skin: Negative for color change, rash and wound.   Neurological: Negative for dizziness, tremors, weakness, light-headedness and headaches.     Objective:     Vital Signs (Most Recent):  Temp: (!) 100.5 °F (38.1 °C) (09/11/17 0739)  Pulse: 65 (09/11/17 0600)  Resp: 18 (09/11/17 0600)  BP: (!) 115/57 (09/11/17 0600)  SpO2: 95 % (09/11/17 0600) Vital Signs (24h Range):  Temp:  [96.7 °F (35.9 °C)-100.5 °F (38.1 °C)] 100.5 °F (38.1 °C)  Pulse:  [56-73] 65  Resp:  [16-20] 18  SpO2:  [92 %-97 %] 95 %  BP: ()/(42-84) 115/57     Weight: 89.4 kg (197 lb)  Body mass index is 29.95 kg/m².    Intake/Output Summary (Last 24 hours) at 09/11/17 0800  Last data filed at 09/11/17 0050   Gross per 24 hour   Intake          1587.71 ml   Output              750 ml   Net           837.71 ml      Physical Exam   Constitutional: He is oriented to person, place, and time. He appears well-developed and well-nourished. No distress.   HENT:   Head: Normocephalic and atraumatic.   Right Ear: External ear normal.   Left Ear: External ear normal.   Eyes: Conjunctivae and EOM are normal. Pupils are equal, round, and reactive to light. Right eye exhibits no discharge. Left eye exhibits no discharge.   Neck: Neck supple. No tracheal deviation present.   Cardiovascular: Normal rate and regular rhythm.  Exam reveals no gallop and no friction rub.    No murmur heard.  Pulmonary/Chest: Effort normal and breath sounds normal. No respiratory distress. He has no wheezes. He has no rales.   Abdominal: Soft. Bowel sounds are normal. He exhibits no distension. There is no tenderness. There is no rebound and no guarding.   Musculoskeletal: He exhibits no edema.   Neurological: He is alert and oriented to person, place, and time. No cranial nerve deficit.   Skin: Skin is warm and dry.   Psychiatric: He has a normal mood and affect. His behavior is normal.   Nursing note and vitals reviewed.      Significant Labs:   Blood Culture:   Recent  Labs  Lab 09/09/17 2000   LABBLOO No Growth to date  No Growth to date  No Growth to date  No Growth to date     BMP:   Recent Labs  Lab 09/11/17  0447   *   *   K 4.2      CO2 25   BUN 8   CREATININE 0.9   CALCIUM 8.0*   MG 2.4     CBC:   Recent Labs  Lab 09/09/17  2000 09/10/17  0438 09/11/17  0447   WBC 11.48 10.03 8.14   HGB 13.8* 12.0* 12.7*   HCT 41.5 36.4* 37.3*    161 146*     Urine Culture: No results for input(s): LABURIN in the last 48 hours.  Urine Studies:   Recent Labs  Lab 09/09/17  2143   COLORU Yellow   APPEARANCEUA Cloudy*   PHUR 6.0   SPECGRAV 1.020   PROTEINUA 2+*   GLUCUA Negative   KETONESU Negative   BILIRUBINUA Negative   OCCULTUA 2+*   NITRITE Positive*   UROBILINOGEN 1.0   LEUKOCYTESUR 2+*   RBCUA 9*   WBCUA >100*   BACTERIA Many*   SQUAMEPITHEL 1   HYALINECASTS 0     All pertinent labs within the past 24 hours have been reviewed.    Significant Imaging: I have reviewed all pertinent imaging results/findings within the past 24 hours.    Assessment/Plan:      * Pyelonephritis    Admitted for a kidney infection (seen on imaging) and is on Cipro at this time  No leukocytosis  Still with low grade fevers  UA + infection  Urine culture pending            Dementia with behavioral disturbance    Cont aricept and risperdal from nursing home  Mental status at baseline          Ventricular tachycardia    [facility] consulted  [facility] feels this is abberancy  Got IV metoprolol night of 9/10 and became hypotensive. Sent to ICU  BP improved off meds  No more IVF  No more BB          BPH (benign prostatic hyperplasia)    Cont flomax          CAD (coronary artery disease)    Cont home ASA, plavix and atorvastatin          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    Cont dilantin and depakote from nursing home          HTN (hypertension)    On valsartan and Imdur at nursing home. On hold for now  BP is stable  Had hypotension evening of 9/10 s/p IV metoprolol; better  now            VTE Risk Mitigation         Ordered     Medium Risk of VTE  Once      09/09/17 5895          Critical care time spent on the evaluation and treatment of severe organ dysfunction, review of pertinent labs and imaging studies, discussions with consulting providers and discussions with patient/family: 32 minutes.    Lyn Stallworth MD  Department of Hospital Medicine   [facility]

## 2017-09-11 NOTE — ASSESSMENT & PLAN NOTE
On valsartan and Imdur at nursing home. On hold for now  BP is stable  Had hypotension evening of 9/10 s/p IV metoprolol; better now

## 2017-09-11 NOTE — PROGRESS NOTES
Called to room, family concerned about BP because it has slowly decreased since IB fluids were D/Vic. Current BP is 91/46 with HR at 68. Pt is stable otherwise, with no distress noted. MD notified, and new orders for Normal Saline at 100ML hr initiated.

## 2017-09-11 NOTE — PLAN OF CARE
Pt arrived in bed from Med/Surg unit with KENNY Baker and [facility] house supervisor.  Pt in NAD, resp e/u, VS stable.  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  Oriented patient, daughter, Nicolasa, and granddaughter to unit and room.  Also discussed and educated family and patient regarding eICU which seems to alleviate some of his daughter's concern for the patient overnight in the ICU.  Pt is on contact plus precautions for suspected C. Diff. There is an outstanding order for a stool sample to confirm which we will collect when pt has a bowel movement.  Will monitor closely throughout shift.

## 2017-09-11 NOTE — PLAN OF CARE
09/11/17 1020   Medicare Message   Important Message from Medicare regarding Discharge Appeal Rights Given to patient/caregiver;Explained to patient/caregiver;Signed/date by patient/caregiver   Date IMM was signed 09/11/17   Time IMM was signed 1020

## 2017-09-11 NOTE — PROGRESS NOTES
Pt states he feels bloated. Abdomen is slightly distended and slightly firm. MD notified, new order for simethicone Q8hr PRN initiated. MD also notified that urine culture is growing gram negative rods, no new orders noted at this time.
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
## 2017-09-11 NOTE — PROGRESS NOTES
Nursing Notes  Report received from KENNY Baker. Patient sleeping; stable. Telemetry in place. Will continue to monitor.

## 2017-09-12 LAB
ANION GAP SERPL CALC-SCNC: 7 MMOL/L
BACTERIA UR CULT: NORMAL
BASOPHILS # BLD AUTO: 0.02 K/UL
BASOPHILS NFR BLD: 0.3 %
BUN SERPL-MCNC: 6 MG/DL
CALCIUM SERPL-MCNC: 8.4 MG/DL
CHLORIDE SERPL-SCNC: 110 MMOL/L
CO2 SERPL-SCNC: 25 MMOL/L
CREAT SERPL-MCNC: 0.9 MG/DL
DIFFERENTIAL METHOD: ABNORMAL
EOSINOPHIL # BLD AUTO: 0.2 K/UL
EOSINOPHIL NFR BLD: 2.6 %
ERYTHROCYTE [DISTWIDTH] IN BLOOD BY AUTOMATED COUNT: 14.6 %
EST. GFR  (AFRICAN AMERICAN): >60 ML/MIN/1.73 M^2
EST. GFR  (NON AFRICAN AMERICAN): >60 ML/MIN/1.73 M^2
GLUCOSE SERPL-MCNC: 101 MG/DL
HCT VFR BLD AUTO: 37.6 %
HGB BLD-MCNC: 12.8 G/DL
LYMPHOCYTES # BLD AUTO: 1.8 K/UL
LYMPHOCYTES NFR BLD: 25.8 %
MAGNESIUM SERPL-MCNC: 2.5 MG/DL
MCH RBC QN AUTO: 27 PG
MCHC RBC AUTO-ENTMCNC: 34 G/DL
MCV RBC AUTO: 79 FL
MONOCYTES # BLD AUTO: 1.1 K/UL
MONOCYTES NFR BLD: 16.1 %
NEUTROPHILS # BLD AUTO: 3.9 K/UL
NEUTROPHILS NFR BLD: 55.2 %
PLATELET # BLD AUTO: 170 K/UL
PMV BLD AUTO: 8.9 FL
POTASSIUM SERPL-SCNC: 4.1 MMOL/L
RBC # BLD AUTO: 4.74 M/UL
SODIUM SERPL-SCNC: 142 MMOL/L
WBC # BLD AUTO: 6.97 K/UL

## 2017-09-12 PROCEDURE — 63600175 PHARM REV CODE 636 W HCPCS: Performed by: INTERNAL MEDICINE

## 2017-09-12 PROCEDURE — 27000339 *HC DAILY SUPPLY KIT

## 2017-09-12 PROCEDURE — 25000003 PHARM REV CODE 250: Performed by: SURGERY

## 2017-09-12 PROCEDURE — 63600175 PHARM REV CODE 636 W HCPCS: Performed by: FAMILY MEDICINE

## 2017-09-12 PROCEDURE — 97110 THERAPEUTIC EXERCISES: CPT

## 2017-09-12 PROCEDURE — 94761 N-INVAS EAR/PLS OXIMETRY MLT: CPT

## 2017-09-12 PROCEDURE — 83735 ASSAY OF MAGNESIUM: CPT

## 2017-09-12 PROCEDURE — 85025 COMPLETE CBC W/AUTO DIFF WBC: CPT

## 2017-09-12 PROCEDURE — 11000001 HC ACUTE MED/SURG PRIVATE ROOM

## 2017-09-12 PROCEDURE — 99233 SBSQ HOSP IP/OBS HIGH 50: CPT | Mod: ,,, | Performed by: INTERNAL MEDICINE

## 2017-09-12 PROCEDURE — 25000003 PHARM REV CODE 250: Performed by: FAMILY MEDICINE

## 2017-09-12 PROCEDURE — 25000003 PHARM REV CODE 250: Performed by: INTERNAL MEDICINE

## 2017-09-12 PROCEDURE — 80048 BASIC METABOLIC PNL TOTAL CA: CPT

## 2017-09-12 PROCEDURE — 25000003 PHARM REV CODE 250: Performed by: NURSE PRACTITIONER

## 2017-09-12 PROCEDURE — 36415 COLL VENOUS BLD VENIPUNCTURE: CPT

## 2017-09-12 PROCEDURE — 94760 N-INVAS EAR/PLS OXIMETRY 1: CPT

## 2017-09-12 RX ORDER — ENOXAPARIN SODIUM 100 MG/ML
30 INJECTION SUBCUTANEOUS EVERY 24 HOURS
Status: DISCONTINUED | OUTPATIENT
Start: 2017-09-12 | End: 2017-09-13 | Stop reason: HOSPADM

## 2017-09-12 RX ADMIN — PANTOPRAZOLE SODIUM 40 MG: 40 TABLET, DELAYED RELEASE ORAL at 08:09

## 2017-09-12 RX ADMIN — CIPROFLOXACIN 400 MG: 2 INJECTION, SOLUTION INTRAVENOUS at 11:09

## 2017-09-12 RX ADMIN — ASPIRIN 81 MG: 81 TABLET, COATED ORAL at 08:09

## 2017-09-12 RX ADMIN — PHENYTOIN SODIUM 100 MG: 100 CAPSULE ORAL at 09:09

## 2017-09-12 RX ADMIN — CLOPIDOGREL BISULFATE 75 MG: 75 TABLET ORAL at 08:09

## 2017-09-12 RX ADMIN — TAMSULOSIN HYDROCHLORIDE 0.4 MG: 0.4 CAPSULE ORAL at 08:09

## 2017-09-12 RX ADMIN — Medication 12.5 MG: at 09:09

## 2017-09-12 RX ADMIN — DIVALPROEX SODIUM 250 MG: 250 TABLET, DELAYED RELEASE ORAL at 09:09

## 2017-09-12 RX ADMIN — RISPERIDONE 3 MG: 3 TABLET, FILM COATED ORAL at 09:09

## 2017-09-12 RX ADMIN — SUCRALFATE 1 G: 1 TABLET ORAL at 12:09

## 2017-09-12 RX ADMIN — SODIUM CHLORIDE: 0.9 INJECTION, SOLUTION INTRAVENOUS at 12:09

## 2017-09-12 RX ADMIN — SUCRALFATE 1 G: 1 TABLET ORAL at 05:09

## 2017-09-12 RX ADMIN — ENOXAPARIN SODIUM 30 MG: 100 INJECTION SUBCUTANEOUS at 05:09

## 2017-09-12 RX ADMIN — FLUOXETINE 20 MG: 20 CAPSULE ORAL at 09:09

## 2017-09-12 RX ADMIN — DIVALPROEX SODIUM 250 MG: 250 TABLET, DELAYED RELEASE ORAL at 01:09

## 2017-09-12 RX ADMIN — DONEPEZIL HYDROCHLORIDE 10 MG: 5 TABLET, FILM COATED ORAL at 09:09

## 2017-09-12 RX ADMIN — DIVALPROEX SODIUM 250 MG: 250 TABLET, DELAYED RELEASE ORAL at 05:09

## 2017-09-12 RX ADMIN — ATORVASTATIN CALCIUM 20 MG: 20 TABLET, FILM COATED ORAL at 08:09

## 2017-09-12 RX ADMIN — CIPROFLOXACIN 400 MG: 2 INJECTION, SOLUTION INTRAVENOUS at 10:09

## 2017-09-12 NOTE — NURSING TRANSFER
Nursing Transfer Note      9/12/2017     Transfer To: Room 308 Med Surg from CCU 1     Transfer via bed    Transfer with cardiac monitoring    Transported by KENNY Rueda     Medicines sent: yes     Chart send with patient: Yes    Notified: family     Upon arrival to floor: cardiac monitor applied, patient oriented to room, call bell in reach and bed in lowest position    Patient transferred as noted above. Vitals stable. Patient appears in no distress and denies pain. Family aware of status. Belongings including cane, dentures, and clothing with patient to room. Report to KENNY Perez

## 2017-09-12 NOTE — PT/OT/SLP PROGRESS
Physical Therapy      Anjel Soria  MRN: [id number]    Patient not seen 9/12/2017 p.m. secondary to Nursing care. Will follow-up 9/13/2017.    Nicholas Finley, PT

## 2017-09-12 NOTE — PROGRESS NOTES
[facility]  Cardiology  Progress Note    Patient Name: Anjel Soria  MRN: [id number]  Admission Date: 9/9/2017  Hospital Length of Stay: 2 days  Code Status: Full Code   Attending Physician: Eren Nova MD   Primary Care Physician: REID Blanco MD  Expected Discharge Date:   Principal Problem:Pyelonephritis    Subjective:     Hospital Course: admit with pyelonephritis     Interval History: had WCT treated with beta blockers.  Developed hypotension.  Improving on meds.       ROS   Constitutional: Negative  HENT: Negative for congestion, ear pain, hearing loss, rhinorrhea and sore throat.    Respiratory: Negative for cough, shortness of breath and wheezing.    Cardiovascular: Negative for chest pain, palpitations and leg swelling.   Gastrointestinal: Negative   Genitourinary: NegativeMusculoskeletal: Negative for back pain, joint swelling and neck pain.   Skin: Negative for color change, rash and wound.   Neurological: Negative for dizziness, tremors, weakness, light-headedness and headaches.  Objective:     Vital Signs (Most Recent):  Temp: 100 °F (37.8 °C) (09/12/17 0715)  Pulse: 66 (09/12/17 0800)  Resp: 18 (09/12/17 0800)  BP: 125/62 (09/12/17 0800)  SpO2: (!) 93 % (09/12/17 0800) Vital Signs (24h Range):  Temp:  [98.2 °F (36.8 °C)-100 °F (37.8 °C)] 100 °F (37.8 °C)  Pulse:  [58-87] 66  Resp:  [14-24] 18  SpO2:  [91 %-96 %] 93 %  BP: ()/(46-82) 125/62     Weight: 89.4 kg (197 lb)  Body mass index is 29.95 kg/m².    SpO2: (!) 93 %  O2 Device (Oxygen Therapy): room air      Intake/Output Summary (Last 24 hours) at 09/12/17 0907  Last data filed at 09/12/17 0618   Gross per 24 hour   Intake          2298.33 ml   Output             3700 ml   Net         -1401.67 ml       Lines/Drains/Airways     Peripheral Intravenous Line                 Peripheral IV - Single Lumen 09/11/17 0015 Right Hand 1 day                Physical Exam  Constitutional: He is oriented to person, place, and  time. He appears well-developed and well-nourished. No distress.   Eyes: EOMI  Neck: Neck supple. No JVD.   Cardiovascular: Normal rate and regular rhythm. No murmur  Pulmonary/Chest: Effort normal and breath sounds normal. No respiratory distress. He has no wheezes. He has no rales.   Abdominal: Soft.   Musculoskeletal: He exhibits no edema.   Neurological: He is alert and oriented to person, place, and time. No cranial nerve deficit.   Skin: Skin is warm and dry.   Psychiatric: He has a normal mood and affect. His behavior is normal.   Nursing note and vitals reviewed.     Significant Labs:   BMP:   Recent Labs  Lab 09/11/17 0447 09/12/17  0430   * 101   * 142   K 4.2 4.1    110   CO2 25 25   BUN 8 6*   CREATININE 0.9 0.9   CALCIUM 8.0* 8.4*   MG 2.4 2.5   , CBC   Recent Labs  Lab 09/11/17 0447 09/12/17  0430   WBC 8.14 6.97   HGB 12.7* 12.8*   HCT 37.3* 37.6*   * 170    and Troponin No results for input(s): TROPONINI in the last 48 hours.    Significant Imaging: EKG NSRTTE 02/24/16 [facility] EF 60%.  pulmonary artery systolic pressure 41 mmHg  Assessment and Plan:     Brief HPI:     Pyelonephritis with no CV complaint  WCT-likely abberantly conducted ST, VT unlikely, non sustained, LVEF 60% 2/16  No stress ischemia 2014  HHD  Hyperlipidemia  Carotid artery disease  H/o CVA  H/o seziure  UTI  H/o prostate CA s/p prostatectomy        Plan:No further arrhythmia  Tele-stable.  NSR on EKG.  No ST/T wave for ischemia.  Repeat 12 lead in am  Ok for transfer.     Continue ASA, plavix, statin  Start low dose BB-watch for bradycardia and hypotension  Continue to hold other antihypertensives until BP improves/stablizes.     Active Diagnoses:    Diagnosis Date Noted POA    PRINCIPAL PROBLEM:  Pyelonephritis [N12] 09/09/2017 Yes    Dementia with behavioral disturbance [F03.91] 09/11/2017 Yes    Ventricular tachycardia [I47.2] 09/10/2017 No    CAD (coronary artery disease) [I25.10] 05/09/2016 Yes     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 [I69.398, G40.909] 05/09/2016 Not Applicable    BPH (benign prostatic hyperplasia) [N40.0] 05/09/2016 Yes    HTN (hypertension) [I10] 04/20/2015 Yes      Problems Resolved During this Admission:    Diagnosis Date Noted Date Resolved POA    Diarrhea of presumed infectious origin [A09] 09/10/2017 09/11/2017 Yes       VTE Risk Mitigation         Ordered     enoxaparin injection 30 mg  Daily     Route:  Subcutaneous        09/12/17 0722     Medium Risk of VTE  Once      09/09/17 7067          Grayc Jeffery NP  Cardiology  [facility]    I attest that I have personally seen and examined this patient. I have reviewed and discussed the management in detail as outlined above.

## 2017-09-12 NOTE — ASSESSMENT & PLAN NOTE
Admitted for a kidney infection (seen on CT scan ) and is on  IV Cipro at this time  No leukocytosis  Still with low grade fevers  UA + infection  Urine culture growing E coli ;sensitivity pending     Will transfer to floor on telemetry

## 2017-09-12 NOTE — NURSING TRANSFER
Nursing Transfer Note      9/12/2017     Transfer To: 308    Transfer via bed    Transfer with fluids infusing    Transported by KENNY Dorsey    Medicines sent: yes    Chart send with patient: Yes    Notified: NA    Patient reassessed at: 9/12/2017 at 1310    Upon arrival to floor: cardiac monitor applied, patient oriented to room, call bell in reach and bed in lowest position

## 2017-09-12 NOTE — PLAN OF CARE
Problem: Patient Care Overview  Goal: Plan of Care Review  Outcome: Ongoing (interventions implemented as appropriate)   Vitals stable, afebrile, no signs of distress noted. Pt denies any pain since being admitted to floor for CCU. Lung sounds are clear. IV fluids and ABX remain. Pt encouraged to turn every 2 hours to prevent breakdown. Pt is incontinent of urine at times due to urgency so diapers are worn and incontinence care done as needed. Pt uses urinal as well. Family involved in care, and was here with pt for a little while today. Lovenox initiated daily. Pt and family in agreement with plan of care, questions answered.

## 2017-09-12 NOTE — ASSESSMENT & PLAN NOTE
CIS consulted  CIS feels this is abberancy  Got IV metoprolol night of 9/10 and became hypotensive. Sent to ICU  BP improved off meds  Continue low dose betablocker

## 2017-09-12 NOTE — PHYSICIAN QUERY
PT Name: Anjel Soria  MR #: [id number]     Physician Query Form - Documentation Clarification      CDS/: MYRIAM North, RN, CDI               Contact information: [phone]    This form is a permanent document in the medical record.     Query Date: September 12, 2017    By submitting this query, we are merely seeking further clarification of documentation. Please utilize your independent clinical judgment when addressing the question(s) below.    The Medical record reflects the following:    Supporting Clinical Findings Location in Medical Record     Pyelonephritis    admitted for an occult infection, noted on CT to be pyleonephritis; he is a resident of [facility]; hx of prostate Ca    Pyelonephritis     Admitted for a kidney infection (seen on CT scan ) and is on  IV Cipro at this time  No leukocytosis  Still with low grade fevers  UA + infection  Urine culture growing E coli ;sensitivity pending             H & P: 9/10    Progress Note: 9/11          Progress Note: 9/12                                                                                      Doctor, Please specify diagnosis or diagnoses associated with above clinical findings.    Pyelonephritis:  [ x ] Acute  [  ] Chronic  [  ] Other: _____________________  [  ] Clinically undetermined

## 2017-09-12 NOTE — PLAN OF CARE
Problem: Patient Care Overview  Goal: Plan of Care Review  Outcome: Ongoing (interventions implemented as appropriate)  Pt admitted 9/9/17 from ED for Pyelonephritis, transered from Med/Surg to ICU yesterday.  Urine culture positive for E. Coli, awaiting C & S at this time.  Pt was asymptomatic throughout my shift with stable VS, afebrile.  Pt appeared to sleep well and changed position often.  Antibiotics administered per MD order as well as NS infusing at 100 mL/hr.  Pt voids per urinal, urine output as of now is 1800 mL. 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  Pt safety maintained throughout shift.  Will continue to monitor through end of shift.

## 2017-09-12 NOTE — NURSING
Received report from KENNY Hendricks. Patient laying in bed, appears asleep but arouses easily to verbal stimuli. Appears in no distress. Cardiac monitoring/eICU monitoring in progress. Oriented x 3. Vitals stable. Legally blind. Normal saline infusing at 100 mL/hr continuous per pump without difficulty, site healthy, wrapped with coban. Denies needs or pain. Urinal/call bell and personal belongings within reach. Patient easily visible from nurses station. Safety precautions maintained. Will continue to monitor

## 2017-09-12 NOTE — SUBJECTIVE & OBJECTIVE
Review of Systems   Constitutional: Negative for activity change, fatigue, fever and unexpected weight change.   HENT: Negative for congestion, ear pain, hearing loss, rhinorrhea and sore throat.    Eyes: Positive for visual disturbance (chronic). Negative for pain and redness.   Respiratory: Negative for cough, shortness of breath and wheezing.    Cardiovascular: Negative for chest pain, palpitations and leg swelling.   Gastrointestinal: Negative for abdominal pain, constipation, diarrhea, nausea and vomiting.   Genitourinary: Negative for decreased urine volume, dysuria, frequency and urgency.   Musculoskeletal: Negative for back pain, joint swelling and neck pain.   Skin: Negative for color change, rash and wound.   Neurological: Negative for dizziness, tremors, weakness, light-headedness and headaches.     Objective:     Vital Signs (Most Recent):  Temp: 98.6 °F (37 °C) (09/12/17 0400)  Pulse: 87 (09/12/17 0600)  Resp: 16 (09/12/17 0600)  BP: 120/82 (09/12/17 0600)  SpO2: 96 % (09/12/17 0600) Vital Signs (24h Range):  Temp:  [98.2 °F (36.8 °C)-100.5 °F (38.1 °C)] 98.6 °F (37 °C)  Pulse:  [58-87] 87  Resp:  [14-25] 16  SpO2:  [91 %-96 %] 96 %  BP: ()/(46-82) 120/82     Weight: 89.4 kg (197 lb)  Body mass index is 29.95 kg/m².    Intake/Output Summary (Last 24 hours) at 09/12/17 0713  Last data filed at 09/12/17 0618   Gross per 24 hour   Intake          3883.33 ml   Output             3800 ml   Net            83.33 ml      Physical Exam   Constitutional: He is oriented to person, place, and time. He appears well-developed and well-nourished. No distress.   HENT:   Head: Normocephalic and atraumatic.   Right Ear: External ear normal.   Left Ear: External ear normal.   Eyes: Conjunctivae and EOM are normal. Pupils are equal, round, and reactive to light. Right eye exhibits no discharge. Left eye exhibits no discharge.   Neck: Neck supple. No tracheal deviation present.   Cardiovascular: Normal rate and  regular rhythm.  Exam reveals no gallop and no friction rub.    No murmur heard.  Pulmonary/Chest: Effort normal and breath sounds normal. No respiratory distress. He has no wheezes. He has no rales.   Abdominal: Soft. Bowel sounds are normal. He exhibits no distension. There is no tenderness. There is no rebound and no guarding.   Musculoskeletal: He exhibits no edema.   Neurological: He is alert and oriented to person, place, and time. No cranial nerve deficit.   Skin: Skin is warm and dry.   Psychiatric: He has a normal mood and affect. His behavior is normal.   Nursing note and vitals reviewed.      Significant Labs:   CBC:   Recent Labs  Lab 09/11/17 0447 09/12/17  0430   WBC 8.14 6.97   HGB 12.7* 12.8*   HCT 37.3* 37.6*   * 170     CMP:   Recent Labs  Lab 09/11/17 0447 09/12/17  0430   * 142   K 4.2 4.1    110   CO2 25 25   * 101   BUN 8 6*   CREATININE 0.9 0.9   CALCIUM 8.0* 8.4*   ANIONGAP 6* 7*   EGFRNONAA >60 >60       Significant Imaging: CT: I have reviewed all pertinent results/findings within the past 24 hours and my personal findings are:  see below     Prior prostatectomy. Thickening of the bladder wall may be secondary to cystitis. There is mild enlargement and inflammatory changes of the left kidney with pyelonephritis suspected. No stone or hydronephrosis is noted. Mild bibasal atelectasis.

## 2017-09-12 NOTE — PT/OT/SLP PROGRESS
Physical Therapy  Treatment    Anjel Soria   MRN: [id number]   Admitting Diagnosis: Pyelonephritis    PT Received On: 09/12/17  PT Start Time: 1102     PT Stop Time: 1120    PT Total Time (min): 18 min       Billable Minutes:  Therapeutic Exercise 18 minutes    Treatment Type: Treatment  PT/PTA: PT             General Precautions: Standard, fall, vision impaired  Orthopedic Precautions: N/A   Braces: N/A         Subjective:  Communicated with patient prior to session.  Pt. requested no OOB activity.    Pain/Comfort  Pain Rating 1: 0/10  Pain Rating Post-Intervention 1: 0/10    Objective:   Patient found with: blood pressure cuff, peripheral IV, telemetry    Functional Mobility:  Bed Mobility:   Rolling/Turning to Left: Minimum assistance  Rolling/Turning Right: Minimum assistance  Scooting/Bridging: Minimum Assistance    Transfers:  Sit <> Stand Assistance: Activity did not occur  Bed <> Chair Assistance: Activity did not occur         Therapeutic Activities and Exercises:  There. Ex.:  Pt. Performed gentle A/A exercises of BLE's while supine in bed, for N.M. Tone and strength and fluid dynamics. 1-on-1 BLE HC stretches performed with pt.  BLE: ankle pumps, heel slides, hip abd/add (3x10) performed with pt.  Bed mobility exercises performed, with assistance as noted, to improve pt. function.     AM-PAC 6 CLICK MOBILITY  How much help from another person does this patient currently need?   1 = Unable, Total/Dependent Assistance  2 = A lot, Maximum/Moderate Assistance  3 = A little, Minimum/Contact Guard/Supervision  4 = None, Modified [place]/Independent    Turning over in bed (including adjusting bedclothes, sheets and blankets)?: 3  Sitting down on and standing up from a chair with arms (e.g., wheelchair, bedside commode, etc.): 3  Moving from lying on back to sitting on the side of the bed?: 3  Moving to and from a bed to a chair (including a wheelchair)?: 3  Need to walk in hospital room?: 1  Climbing 3-5  steps with a railing?: 1  Total Score: 14    AM-PAC Raw Score CMS G-Code Modifier Level of Impairment Assistance   6 % Total / Unable   7 - 9 CM 80 - 100% Maximal Assist   10 - 14 CL 60 - 80% Moderate Assist   15 - 19 CK 40 - 60% Moderate Assist   20 - 22 CJ 20 - 40% Minimal Assist   23 CI 1-20% SBA / CGA   24 CH 0% Independent/ Mod I     Patient left HOB elevated with all lines intact, call button in reach and RN notified.    Assessment:  Anjel Soria is a 72 y.o. male with a medical diagnosis of Pyelonephritis and presents with decreased endurance.  Pt. Is progressing towards PT POC goals.    Rehab identified problem list/impairments: Rehab identified problem list/impairments: weakness, impaired endurance, gait instability, impaired self care skills, impaired functional mobilty, decreased lower extremity function, impaired balance    Rehab potential is fair.    Activity tolerance: Fair    Discharge recommendations: Discharge Facility/Level Of Care Needs: nursing facility, basic     Barriers to discharge: Barriers to Discharge: None    Equipment recommendations: Equipment Needed After Discharge: none     GOALS:    Physical Therapy Goals        Problem: Physical Therapy Goal    Goal Priority Disciplines Outcome Goal Variances Interventions   Physical Therapy Goal     PT/OT, PT      Description:  Goals to be met by: upon D/C from facility     Patient will increase functional independence with mobility by performin. Supine to sit with Stand-by Assistance  2. Sit to supine with Stand-by Assistance  3. Sit to stand transfer with Stand-by Assistance  4. Bed to chair transfer with Stand-by Assistance using Quad Cane  5. Gait  x 100 feet with Contact Guard Assistance using Quad Cane.                       PLAN:    Patient to be seen  (1-2x/day(M-F); PRN(Sat.,Sun.))  to address the above listed problems via gait training, therapeutic activities, therapeutic exercises  Plan of Care expires:  (Upon d/c from  facility)  Plan of Care reviewed with: patient, family         Nicholas Finley, PT  09/12/2017

## 2017-09-12 NOTE — PROGRESS NOTES
[facility] Hospital Medicine  Progress Note    Patient Name: Anjel Soria  MRN: [id number]  Patient Class: IP- Inpatient   Admission Date: 9/9/2017  Length of Stay: 2 days  Attending Physician: Eren Nova MD  Primary Care Provider: REID Blanco MD        Subjective:     Principal Problem:Pyelonephritis    HPI:  73 y/o W M admitted for a pyelonephritis infection from [facility], who has become medically unstable with some V Tach runs and hypotension bouts.    Hospital Course:  73 y/o W M admitted for an occult infection, noted on CT to be pyleonephritis; he is a resident of [facility]; hx of prostate Ca; his daughter says he has been having some loose stools, so I will get C Diff on her diarrhea. Since admit, he has had several bouts of V tach and [facility] has been consulted(distancely involved only) and made some recs; he flakita been persistenly hypotensive, so will be transferred at approx 8:00pm to ICU for closer monitoring and IV bolusing IV Mag    9/11- concern for VT overnight. Given metoprolol and became hypotensive after IV metoprolol. Transferred to ICU. Started on IVF. BP improved. Still with low grade fevers. No leukocytosis. Remains on cipro.     9/12/17  Presently in icu . Still on IVF Orders done more ectopy ; presently in sinus rhythm.  Afebrile . Urine culture growing e coli ; presently on IV cipro           Review of Systems   Constitutional: Negative for activity change, fatigue, fever and unexpected weight change.   HENT: Negative for congestion, ear pain, hearing loss, rhinorrhea and sore throat.    Eyes: Positive for visual disturbance (chronic). Negative for pain and redness.   Respiratory: Negative for cough, shortness of breath and wheezing.    Cardiovascular: Negative for chest pain, palpitations and leg swelling.   Gastrointestinal: Negative for abdominal pain, constipation, diarrhea, nausea and vomiting.   Genitourinary: Negative for decreased urine volume,  dysuria, frequency and urgency.   Musculoskeletal: Negative for back pain, joint swelling and neck pain.   Skin: Negative for color change, rash and wound.   Neurological: Negative for dizziness, tremors, weakness, light-headedness and headaches.     Objective:     Vital Signs (Most Recent):  Temp: 98.6 °F (37 °C) (09/12/17 0400)  Pulse: 87 (09/12/17 0600)  Resp: 16 (09/12/17 0600)  BP: 120/82 (09/12/17 0600)  SpO2: 96 % (09/12/17 0600) Vital Signs (24h Range):  Temp:  [98.2 °F (36.8 °C)-100.5 °F (38.1 °C)] 98.6 °F (37 °C)  Pulse:  [58-87] 87  Resp:  [14-25] 16  SpO2:  [91 %-96 %] 96 %  BP: ()/(46-82) 120/82     Weight: 89.4 kg (197 lb)  Body mass index is 29.95 kg/m².    Intake/Output Summary (Last 24 hours) at 09/12/17 0713  Last data filed at 09/12/17 0618   Gross per 24 hour   Intake          3883.33 ml   Output             3800 ml   Net            83.33 ml      Physical Exam   Constitutional: He is oriented to person, place, and time. He appears well-developed and well-nourished. No distress.   HENT:   Head: Normocephalic and atraumatic.   Right Ear: External ear normal.   Left Ear: External ear normal.   Eyes: Conjunctivae and EOM are normal. Pupils are equal, round, and reactive to light. Right eye exhibits no discharge. Left eye exhibits no discharge.   Neck: Neck supple. No tracheal deviation present.   Cardiovascular: Normal rate and regular rhythm.  Exam reveals no gallop and no friction rub.    No murmur heard.  Pulmonary/Chest: Effort normal and breath sounds normal. No respiratory distress. He has no wheezes. He has no rales.   Abdominal: Soft. Bowel sounds are normal. He exhibits no distension. There is no tenderness. There is no rebound and no guarding.   Musculoskeletal: He exhibits no edema.   Neurological: He is alert and oriented to person, place, and time. No cranial nerve deficit.   Skin: Skin is warm and dry.   Psychiatric: He has a normal mood and affect. His behavior is normal.    Nursing note and vitals reviewed.      Significant Labs:   CBC:   Recent Labs  Lab 09/11/17  0447 09/12/17  0430   WBC 8.14 6.97   HGB 12.7* 12.8*   HCT 37.3* 37.6*   * 170     CMP:   Recent Labs  Lab 09/11/17  0447 09/12/17  0430   * 142   K 4.2 4.1    110   CO2 25 25   * 101   BUN 8 6*   CREATININE 0.9 0.9   CALCIUM 8.0* 8.4*   ANIONGAP 6* 7*   EGFRNONAA >60 >60       Significant Imaging: CT: I have reviewed all pertinent results/findings within the past 24 hours and my personal findings are:  see below     Prior prostatectomy. Thickening of the bladder wall may be secondary to cystitis. There is mild enlargement and inflammatory changes of the left kidney with pyelonephritis suspected. No stone or hydronephrosis is noted. Mild bibasal atelectasis.    Assessment/Plan:      * Pyelonephritis    Admitted for a kidney infection (seen on CT scan ) and is on  IV Cipro at this time  No leukocytosis  Still with low grade fevers  UA + infection  Urine culture growing E coli ;sensitivity pending     Will transfer to floor on telemetry           Dementia with behavioral disturbance    Cont aricept and risperdal from nursing home  Mental status at baseline          Ventricular tachycardia    [facility] consulted  CIS feels this is abberancy  Got IV metoprolol night of 9/10 and became hypotensive. Sent to ICU  BP improved off meds  Continue low dose betablocker        BPH (benign prostatic hyperplasia)    Continue  flomax          CAD (coronary artery disease)    Cont home ASA, plavix and atorvastatin          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    Continue dilantin and depakote from nursing home          HTN (hypertension)    On valsartan and Imdur at nursing home. On hold for now  BP is stable  Still on IVF         UTI (urinary tract infection)                VTE Risk Mitigation         Ordered     Medium Risk of VTE  Once      09/09/17 3107          Critical care time spent on the  evaluation and treatment of severe organ dysfunction, review of pertinent labs and imaging studies, discussions with consulting providers and discussions with patient/family: 20 minutes.    Leigh Padgett MD  Department of Hospital Medicine   [facility]

## 2017-09-13 VITALS
DIASTOLIC BLOOD PRESSURE: 69 MMHG | HEART RATE: 56 BPM | WEIGHT: 197 LBS | BODY MASS INDEX: 29.86 KG/M2 | SYSTOLIC BLOOD PRESSURE: 131 MMHG | RESPIRATION RATE: 16 BRPM | TEMPERATURE: 99 F | OXYGEN SATURATION: 94 % | HEIGHT: 68 IN

## 2017-09-13 LAB
ANION GAP SERPL CALC-SCNC: 8 MMOL/L
BASOPHILS # BLD AUTO: 0.03 K/UL
BASOPHILS # BLD AUTO: 0.03 K/UL
BASOPHILS NFR BLD: 0.4 %
BASOPHILS NFR BLD: 0.4 %
BUN SERPL-MCNC: 6 MG/DL
CALCIUM SERPL-MCNC: 8.6 MG/DL
CHLORIDE SERPL-SCNC: 111 MMOL/L
CO2 SERPL-SCNC: 24 MMOL/L
CREAT SERPL-MCNC: 0.8 MG/DL
DIFFERENTIAL METHOD: ABNORMAL
DIFFERENTIAL METHOD: ABNORMAL
EOSINOPHIL # BLD AUTO: 0.4 K/UL
EOSINOPHIL # BLD AUTO: 0.4 K/UL
EOSINOPHIL NFR BLD: 5.4 %
EOSINOPHIL NFR BLD: 5.4 %
ERYTHROCYTE [DISTWIDTH] IN BLOOD BY AUTOMATED COUNT: 14.8 %
ERYTHROCYTE [DISTWIDTH] IN BLOOD BY AUTOMATED COUNT: 14.8 %
EST. GFR  (AFRICAN AMERICAN): >60 ML/MIN/1.73 M^2
EST. GFR  (NON AFRICAN AMERICAN): >60 ML/MIN/1.73 M^2
GLUCOSE SERPL-MCNC: 86 MG/DL
HCT VFR BLD AUTO: 38.8 %
HCT VFR BLD AUTO: 38.8 %
HGB BLD-MCNC: 13.3 G/DL
HGB BLD-MCNC: 13.3 G/DL
LYMPHOCYTES # BLD AUTO: 1.8 K/UL
LYMPHOCYTES # BLD AUTO: 1.8 K/UL
LYMPHOCYTES NFR BLD: 22.6 %
LYMPHOCYTES NFR BLD: 22.6 %
MAGNESIUM SERPL-MCNC: 2.4 MG/DL
MCH RBC QN AUTO: 27.3 PG
MCH RBC QN AUTO: 27.3 PG
MCHC RBC AUTO-ENTMCNC: 34.3 G/DL
MCHC RBC AUTO-ENTMCNC: 34.3 G/DL
MCV RBC AUTO: 80 FL
MCV RBC AUTO: 80 FL
MONOCYTES # BLD AUTO: 0.9 K/UL
MONOCYTES # BLD AUTO: 0.9 K/UL
MONOCYTES NFR BLD: 11.2 %
MONOCYTES NFR BLD: 11.2 %
NEUTROPHILS # BLD AUTO: 4.9 K/UL
NEUTROPHILS # BLD AUTO: 4.9 K/UL
NEUTROPHILS NFR BLD: 60.4 %
NEUTROPHILS NFR BLD: 60.4 %
PLATELET # BLD AUTO: 147 K/UL
PLATELET # BLD AUTO: 147 K/UL
PMV BLD AUTO: 9.4 FL
PMV BLD AUTO: 9.4 FL
POTASSIUM SERPL-SCNC: 4.4 MMOL/L
RBC # BLD AUTO: 4.88 M/UL
RBC # BLD AUTO: 4.88 M/UL
SODIUM SERPL-SCNC: 143 MMOL/L
WBC # BLD AUTO: 8.13 K/UL
WBC # BLD AUTO: 8.13 K/UL

## 2017-09-13 PROCEDURE — 99239 HOSP IP/OBS DSCHRG MGMT >30: CPT | Mod: ,,, | Performed by: FAMILY MEDICINE

## 2017-09-13 PROCEDURE — 36415 COLL VENOUS BLD VENIPUNCTURE: CPT

## 2017-09-13 PROCEDURE — G8979 MOBILITY GOAL STATUS: HCPCS | Mod: CK

## 2017-09-13 PROCEDURE — 83735 ASSAY OF MAGNESIUM: CPT

## 2017-09-13 PROCEDURE — G8980 MOBILITY D/C STATUS: HCPCS | Mod: CL

## 2017-09-13 PROCEDURE — 80048 BASIC METABOLIC PNL TOTAL CA: CPT

## 2017-09-13 PROCEDURE — 25000003 PHARM REV CODE 250: Performed by: FAMILY MEDICINE

## 2017-09-13 PROCEDURE — 85025 COMPLETE CBC W/AUTO DIFF WBC: CPT

## 2017-09-13 PROCEDURE — 25000003 PHARM REV CODE 250: Performed by: SURGERY

## 2017-09-13 PROCEDURE — 25000003 PHARM REV CODE 250: Performed by: INTERNAL MEDICINE

## 2017-09-13 RX ORDER — METOPROLOL TARTRATE 25 MG/1
12.5 TABLET, FILM COATED ORAL 2 TIMES DAILY
Qty: 30 TABLET | Refills: 11 | Status: ON HOLD | OUTPATIENT
Start: 2017-09-13 | End: 2020-06-08 | Stop reason: HOSPADM

## 2017-09-13 RX ORDER — VALSARTAN 80 MG/1
80 TABLET ORAL DAILY
Status: DISCONTINUED | OUTPATIENT
Start: 2017-09-13 | End: 2017-09-13 | Stop reason: HOSPADM

## 2017-09-13 RX ORDER — CIPROFLOXACIN 500 MG/1
500 TABLET ORAL 2 TIMES DAILY
Qty: 14 TABLET | Refills: 0 | Status: SHIPPED | OUTPATIENT
Start: 2017-09-13 | End: 2017-09-20

## 2017-09-13 RX ADMIN — PHENYTOIN SODIUM 100 MG: 100 CAPSULE ORAL at 08:09

## 2017-09-13 RX ADMIN — ASPIRIN 81 MG: 81 TABLET, COATED ORAL at 08:09

## 2017-09-13 RX ADMIN — CLOPIDOGREL BISULFATE 75 MG: 75 TABLET ORAL at 08:09

## 2017-09-13 RX ADMIN — TAMSULOSIN HYDROCHLORIDE 0.4 MG: 0.4 CAPSULE ORAL at 08:09

## 2017-09-13 RX ADMIN — DIVALPROEX SODIUM 250 MG: 250 TABLET, DELAYED RELEASE ORAL at 01:09

## 2017-09-13 RX ADMIN — FLUOXETINE 20 MG: 20 CAPSULE ORAL at 08:09

## 2017-09-13 RX ADMIN — SUCRALFATE 1 G: 1 TABLET ORAL at 01:09

## 2017-09-13 RX ADMIN — SUCRALFATE 1 G: 1 TABLET ORAL at 08:09

## 2017-09-13 RX ADMIN — DIVALPROEX SODIUM 250 MG: 250 TABLET, DELAYED RELEASE ORAL at 08:09

## 2017-09-13 RX ADMIN — ATORVASTATIN CALCIUM 20 MG: 20 TABLET, FILM COATED ORAL at 08:09

## 2017-09-13 RX ADMIN — PANTOPRAZOLE SODIUM 40 MG: 40 TABLET, DELAYED RELEASE ORAL at 08:09

## 2017-09-13 NOTE — PLAN OF CARE
09/13/17 1033   Medicare Message   Important Message from Medicare regarding Discharge Appeal Rights Given to patient/caregiver;Explained to patient/caregiver;Signed/date by patient/caregiver   Date IMM was signed 09/13/17   Time IMM was signed 1033

## 2017-09-13 NOTE — PLAN OF CARE
Problem: Patient Care Overview  Goal: Plan of Care Review  Outcome: Outcome(s) achieved Date Met: 09/13/17  BP slightly elevated; bradycardic; metoprolol held. Other vitals stable. No complaints of pain. Voiding spontaneously; up to bathroom, urinal; diarrhea. Fall precautions maintained; up with standby assistance and cane. Reminded to reposition frequently. IV fluids and antibiotics continued until IV infiltrated; IV not restarted per MD. Discharge instructions reviewed with patient, daughter, and [facility] nurse; voiced understanding. Discharged to [facility] via wheelchair; transported by daughter. Will follow up with PCP.

## 2017-09-13 NOTE — PROGRESS NOTES
Nursing Notes  Report received from KENNY Fay. Patient resting in bed; stable. IV fluids infusing. Telemetry in place. Denies needs at this time.

## 2017-09-13 NOTE — ASSESSMENT & PLAN NOTE
Admitted for a kidney infection (seen on CT scan ) and is on  IV Cipro at this time  No leukocytosis  afebrileUA + infection  Urine culture growing E coli ;sensitivity to cipro     Will transfer to nursing home, skilled

## 2017-09-13 NOTE — PROGRESS NOTES
[facility] Hospital Medicine  Progress Note    Patient Name: Anjel Soria  MRN: [id number]  Patient Class: IP- Inpatient   Admission Date: 9/9/2017  Length of Stay: 3 days  Attending Physician: Eren Nova MD  Primary Care Provider: REID Blanco MD        Subjective:     Principal Problem:Pyelonephritis    HPI:  71 y/o W M admitted for a pyelonephritis infection from [facility], who has become medically unstable with some V Tach runs and hypotension bouts.    Hospital Course:  71 y/o W M admitted for an occult infection, noted on CT to be pyleonephritis; he is a resident of [facility]; hx of prostate Ca; his daughter says he has been having some loose stools, so I will get C Diff on her diarrhea. Since admit, he has had several bouts of V tach and [facility] has been consulted(distancely involved only) and made some recs; he flakita been persistenly hypotensive, so will be transferred at approx 8:00pm to ICU for closer monitoring and IV bolusing IV Mag    9/11- concern for VT overnight. Given metoprolol and became hypotensive after IV metoprolol. Transferred to ICU. Started on IVF. BP improved. Still with low grade fevers. No leukocytosis. Remains on cipro.     9/12/17  Presently in icu . Still on IVF Orders done more ectopy ; presently in sinus rhythm.  Afebrile . Urine culture growing e coli ; presently on IV cipro     9/13  Pressure is stable, no arrhythmias afebrile, urine sensitive to cipro     Interval History: see HC     Review of Systems   Constitutional: Negative for activity change, fatigue, fever and unexpected weight change.   HENT: Negative for congestion, ear pain, hearing loss, rhinorrhea and sore throat.    Eyes: Positive for visual disturbance (chronic). Negative for pain and redness.   Respiratory: Negative for cough, shortness of breath and wheezing.    Cardiovascular: Negative for chest pain, palpitations and leg swelling.   Gastrointestinal: Negative for abdominal pain,  constipation, diarrhea, nausea and vomiting.   Genitourinary: Negative for decreased urine volume, dysuria, frequency and urgency.   Musculoskeletal: Negative for back pain, joint swelling and neck pain.   Skin: Negative for color change, rash and wound.   Neurological: Negative for dizziness, tremors, weakness, light-headedness and headaches.     Objective:     Vital Signs (Most Recent):  Temp: 98.9 °F (37.2 °C) (09/13/17 0806)  Pulse: 64 (09/13/17 1004)  Resp: 16 (09/13/17 0806)  BP: (!) 168/82 (09/13/17 0806)  SpO2: 96 % (09/13/17 0806) Vital Signs (24h Range):  Temp:  [96.4 °F (35.8 °C)-98.9 °F (37.2 °C)] 98.9 °F (37.2 °C)  Pulse:  [53-65] 64  Resp:  [14-18] 16  SpO2:  [94 %-97 %] 96 %  BP: (115-168)/(57-85) 168/82     Weight: 89.4 kg (197 lb)  Body mass index is 29.95 kg/m².    Intake/Output Summary (Last 24 hours) at 09/13/17 1043  Last data filed at 09/13/17 0620   Gross per 24 hour   Intake              440 ml   Output             1626 ml   Net            -1186 ml      Physical Exam   Constitutional: He is oriented to person, place, and time. He appears well-developed and well-nourished. No distress.   HENT:   Head: Normocephalic and atraumatic.   Right Ear: External ear normal.   Left Ear: External ear normal.   Eyes: Conjunctivae are normal. Right eye exhibits no discharge. Left eye exhibits no discharge.   Neck: Neck supple. No tracheal deviation present.   Cardiovascular: Normal rate and regular rhythm.  Exam reveals no gallop and no friction rub.    No murmur heard.  Pulmonary/Chest: Effort normal and breath sounds normal. No respiratory distress. He has no wheezes. He has no rales.   Abdominal: Soft. Bowel sounds are normal. He exhibits no distension. There is no tenderness. There is no rebound and no guarding.   Musculoskeletal: He exhibits no edema.   Right hand swelling/IV infiltration    Neurological: He is alert and oriented to person, place, and time. No cranial nerve deficit.   Skin: Skin is  warm and dry.   Psychiatric: He has a normal mood and affect. His behavior is normal.   Nursing note and vitals reviewed.      Significant Labs: All pertinent labs within the past 24 hours have been reviewed.    Significant Imaging: I have reviewed and interpreted all pertinent imaging results/findings within the past 24 hours.    Assessment/Plan:      * Pyelonephritis    Admitted for a kidney infection (seen on CT scan ) and is on  IV Cipro at this time  No leukocytosis  afebrileUA + infection  Urine culture growing E coli ;sensitivity to cipro     Will transfer to nursing home, skilled         Dementia with behavioral disturbance    Cont aricept and risperdal from nursing home  Mental status at baseline          Ventricular tachycardia    [facility]IS consulted  CIS feels this is abberancy  Got IV metoprolol night of 9/10 and became hypotensive. Sent to ICU  BP improved off meds  Continue low dose betablocker        BPH (benign prostatic hyperplasia)    Continue  flomax          CAD (coronary artery disease)    Cont home ASA, plavix and atorvastatin          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    Continue dilantin and depakote from nursing home          HTN (hypertension)    On valsartan and Imdur at nursing home. On hold for now  BP is stable  Still on IVF         UTI (urinary tract infection)                VTE Risk Mitigation         Ordered     enoxaparin injection 30 mg  Daily     Route:  Subcutaneous        09/12/17 0722     Medium Risk of VTE  Once      09/09/17 6106              Jaswinder Lomeli MD  Department of Hospital Medicine   [facility]

## 2017-09-13 NOTE — PROGRESS NOTES
[facility]  Cardiology  Progress Note    Patient Name: Anjel Soria  MRN: [id number]  Admission Date: 9/9/2017  Hospital Length of Stay: 3 days  Code Status: Full Code   Attending Physician: Eren Nova MD   Primary Care Physician: REID Blanco MD  Expected Discharge Date:   Principal Problem:Pyelonephritis    Subjective:     Hospital Course: pyelonephritis.   Had WCT-consistent with abberancy -unlikely VT    Interval History: Improving.       ROS   Constitutional: Negative  HENT: Negative for congestion, ear pain, hearing loss, rhinorrhea and sore throat.    Respiratory: Negative for cough, shortness of breath and wheezing.    Cardiovascular: Negative for chest pain, palpitations and leg swelling.   Gastrointestinal: Negative   Genitourinary: NegativeMusculoskeletal: Negative for back pain, joint swelling and neck pain.   Skin: Negative for color change, rash and wound.   Neurological: Negative for dizziness, tremors, weakness, light-headedness and headaches      Objective:     Vital Signs (Most Recent):  Temp: 98.9 °F (37.2 °C) (09/13/17 0806)  Pulse: (!) 55 (09/13/17 0806)  Resp: 16 (09/13/17 0806)  BP: (!) 168/82 (09/13/17 0806)  SpO2: 96 % (09/13/17 0806) Vital Signs (24h Range):  Temp:  [96.4 °F (35.8 °C)-98.9 °F (37.2 °C)] 98.9 °F (37.2 °C)  Pulse:  [53-65] 55  Resp:  [14-19] 16  SpO2:  [92 %-97 %] 96 %  BP: (106-168)/(53-85) 168/82     Weight: 89.4 kg (197 lb)  Body mass index is 29.95 kg/m².    SpO2: 96 %  O2 Device (Oxygen Therapy): room air      Intake/Output Summary (Last 24 hours) at 09/13/17 0917  Last data filed at 09/13/17 0620   Gross per 24 hour   Intake              540 ml   Output             1626 ml   Net            -1086 ml       Lines/Drains/Airways     Peripheral Intravenous Line                 Peripheral IV - Single Lumen 09/11/17 0015 Right Hand 2 days                Physical Exam  Constitutional: He is oriented to person, place, and time. He appears  well-developed and well-nourished. No distress.   Eyes: EOMI  Neck: Neck supple. No JVD.   Cardiovascular: Normal rate and regular rhythm. No murmur  Pulmonary/Chest: Effort normal and breath sounds normal. No respiratory distress. He has no wheezes. He has no rales.   Abdominal: Soft.   Musculoskeletal: He exhibits no edema.   Neurological: He is alert and oriented to person, place, and time. No cranial nerve deficit.   Skin: Skin is warm and dry.   Psychiatric: He has a normal mood and affect. His behavior is normal.   Nursing note and vitals reviewed  Significant Labs:   BMP:   Recent Labs  Lab 09/12/17  0430 09/13/17  0815    86    143   K 4.1 4.4    111*   CO2 25 24   BUN 6* 6*   CREATININE 0.9 0.8   CALCIUM 8.4* 8.6*   MG 2.5 2.4    and CBC   Recent Labs  Lab 09/12/17 0430 09/13/17  0815   WBC 6.97 8.13  8.13   HGB 12.8* 13.3*  13.3*   HCT 37.6* 38.8*  38.8*    147*  147*       Significant Imaging: reviewed  Assessment and Plan:     Brief HPI:   Pyelonephritis with no CV complaint  WCT-likely abberantly conducted ST, VT unlikely, non sustained, LVEF 60% 2/16  No stress ischemia 2014  HHD  Hyperlipidemia  Carotid artery disease  H/o CVA  H/o seziure  UTI  H/o prostate CA s/p prostatectomy        Plan:No further arrhythmia  Tele-stable.  NSR on EKG.  No ST/T wave for ischemia.  Repeat 12 lead in am  Ok for transfer.     Continue ASA, plavix, statin  Start low dose BB-watch for bradycardia and hypotension  Restart valsartan 80 and check bmp  .  Current Facility-Administered Medications   Medication    0.9%  NaCl infusion    acetaminophen tablet 1,000 mg    aspirin EC tablet 81 mg    atorvastatin tablet 20 mg    ciprofloxacin (CIPRO)400mg/200ml D5W IVPB 400 mg    clopidogrel tablet 75 mg    divalproex EC tablet 250 mg    donepezil tablet 10 mg    enoxaparin injection 30 mg    fluoxetine capsule 20 mg    metoprolol tartrate (LOPRESSOR) split tablet 12.5 mg    ondansetron  injection 4 mg    pantoprazole EC tablet 40 mg    phenytoin (DILANTIN) ER capsule 100 mg    risperidone tablet 3 mg    simethicone chewable tablet 80 mg    sucralfate tablet 1 g    tamsulosin 24 hr capsule 0.4 mg       Active Diagnoses:    Diagnosis Date Noted POA    PRINCIPAL PROBLEM:  Pyelonephritis [N12] 09/09/2017 Yes    Dementia with behavioral disturbance [F03.91] 09/11/2017 Yes    Ventricular tachycardia [I47.2] 09/10/2017 No    CAD (coronary artery disease) [I25.10] 05/09/2016 Yes    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 [I69.398, G40.909] 05/09/2016 Not Applicable    BPH (benign prostatic hyperplasia) [N40.0] 05/09/2016 Yes    HTN (hypertension) [I10] 04/20/2015 Yes      Problems Resolved During this Admission:    Diagnosis Date Noted Date Resolved POA    Diarrhea of presumed infectious origin [A09] 09/10/2017 09/11/2017 Yes       VTE Risk Mitigation         Ordered     enoxaparin injection 30 mg  Daily     Route:  Subcutaneous        09/12/17 0722     Medium Risk of VTE  Once      09/09/17 2322          Gracy Jeffery NP  Cardiology  [facility]    I attest that I have personally seen and examined this patient. I have reviewed and discussed the management in detail as outlined above.

## 2017-09-13 NOTE — SUBJECTIVE & OBJECTIVE
Interval History: see      Review of Systems   Constitutional: Negative for activity change, fatigue, fever and unexpected weight change.   HENT: Negative for congestion, ear pain, hearing loss, rhinorrhea and sore throat.    Eyes: Positive for visual disturbance (chronic). Negative for pain and redness.   Respiratory: Negative for cough, shortness of breath and wheezing.    Cardiovascular: Negative for chest pain, palpitations and leg swelling.   Gastrointestinal: Negative for abdominal pain, constipation, diarrhea, nausea and vomiting.   Genitourinary: Negative for decreased urine volume, dysuria, frequency and urgency.   Musculoskeletal: Negative for back pain, joint swelling and neck pain.   Skin: Negative for color change, rash and wound.   Neurological: Negative for dizziness, tremors, weakness, light-headedness and headaches.     Objective:     Vital Signs (Most Recent):  Temp: 98.9 °F (37.2 °C) (09/13/17 0806)  Pulse: 64 (09/13/17 1004)  Resp: 16 (09/13/17 0806)  BP: (!) 168/82 (09/13/17 0806)  SpO2: 96 % (09/13/17 0806) Vital Signs (24h Range):  Temp:  [96.4 °F (35.8 °C)-98.9 °F (37.2 °C)] 98.9 °F (37.2 °C)  Pulse:  [53-65] 64  Resp:  [14-18] 16  SpO2:  [94 %-97 %] 96 %  BP: (115-168)/(57-85) 168/82     Weight: 89.4 kg (197 lb)  Body mass index is 29.95 kg/m².    Intake/Output Summary (Last 24 hours) at 09/13/17 1043  Last data filed at 09/13/17 0620   Gross per 24 hour   Intake              440 ml   Output             1626 ml   Net            -1186 ml      Physical Exam   Constitutional: He is oriented to person, place, and time. He appears well-developed and well-nourished. No distress.   HENT:   Head: Normocephalic and atraumatic.   Right Ear: External ear normal.   Left Ear: External ear normal.   Eyes: Conjunctivae are normal. Right eye exhibits no discharge. Left eye exhibits no discharge.   Neck: Neck supple. No tracheal deviation present.   Cardiovascular: Normal rate and regular rhythm.  Exam  reveals no gallop and no friction rub.    No murmur heard.  Pulmonary/Chest: Effort normal and breath sounds normal. No respiratory distress. He has no wheezes. He has no rales.   Abdominal: Soft. Bowel sounds are normal. He exhibits no distension. There is no tenderness. There is no rebound and no guarding.   Musculoskeletal: He exhibits no edema.   Right hand swelling/IV infiltration    Neurological: He is alert and oriented to person, place, and time. No cranial nerve deficit.   Skin: Skin is warm and dry.   Psychiatric: He has a normal mood and affect. His behavior is normal.   Nursing note and vitals reviewed.      Significant Labs: All pertinent labs within the past 24 hours have been reviewed.    Significant Imaging: I have reviewed and interpreted all pertinent imaging results/findings within the past 24 hours.

## 2017-09-13 NOTE — DISCHARGE SUMMARY
[facility] Hospital Medicine  Discharge Summary      Patient Name: Anjel Soria  MRN: [id number]  Admission Date: 9/9/2017  Hospital Length of Stay: 3 days  Discharge Date and Time:  09/13/2017 10:54 AM  Attending Physician: Eren Cheung MD   Discharging Provider: Ofe Rodriguez MD  Primary Care Provider: REID Blanco MD      HPI:   73 y/o W M admitted for a pyelonephritis infection from [facility], who has become medically unstable with some V Tach runs and hypotension bouts.    * No surgery found *      Indwelling Lines/Drains at time of discharge:   Lines/Drains/Airways          No matching active lines, drains, or airways        Hospital Course:   73 y/o W M admitted for an occult infection, noted on CT to be pyleonephritis; he is a resident of [facility]; hx of prostate Ca; his daughter says he has been having some loose stools, so I will get C Diff on her diarrhea. Since admit, he has had several bouts of V tach and [facility] has been consulted(distancely involved only) and made some recs; he flakita been persistenly hypotensive, so will be transferred at approx 8:00pm to ICU for closer monitoring and IV bolusing IV Mag    9/11- concern for VT overnight. Given metoprolol and became hypotensive after IV metoprolol. Transferred to ICU. Started on IVF. BP improved. Still with low grade fevers. No leukocytosis. Remains on cipro.     9/12/17  Presently in icu . Still on IVF Orders done more ectopy ; presently in sinus rhythm.  Afebrile . Urine culture growing e coli ; presently on IV cipro     9/13  Pressure is stable, no arrhythmias afebrile, urine sensitive to cipro      Consults:   Consults         Status Ordering Provider     Inpatient consult to Cardiology-[facility]  Once     Provider:  Raymundo Vaughan MD    Completed EREN CHEUNG     Inpatient consult to Dietary  Once     Provider:  (Not yet assigned)    Acknowledged OFE RODRIGUEZ          Significant Diagnostic Studies:  Labs:   CMP   Recent Labs  Lab 09/12/17  0430 09/13/17  0815    143   K 4.1 4.4    111*   CO2 25 24    86   BUN 6* 6*   CREATININE 0.9 0.8   CALCIUM 8.4* 8.6*   ANIONGAP 7* 8   ESTGFRAFRICA >60 >60   EGFRNONAA >60 >60    and CBC   Recent Labs  Lab 09/12/17  0430 09/13/17  0815   WBC 6.97 8.13  8.13   HGB 12.8* 13.3*  13.3*   HCT 37.6* 38.8*  38.8*    147*  147*       Pending Diagnostic Studies:     None        Final Active Diagnoses:    Diagnosis Date Noted POA    PRINCIPAL PROBLEM:  Pyelonephritis [N12] 09/09/2017 Yes    Dementia with behavioral disturbance [F03.91] 09/11/2017 Yes    Ventricular tachycardia [I47.2] 09/10/2017 No    CAD (coronary artery disease) [I25.10] 05/09/2016 Yes    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 [I69.398, G40.909] 05/09/2016 Not Applicable    BPH (benign prostatic hyperplasia) [N40.0] 05/09/2016 Yes    HTN (hypertension) [I10] 04/20/2015 Yes      Problems Resolved During this Admission:    Diagnosis Date Noted Date Resolved POA    Diarrhea of presumed infectious origin [A09] 09/10/2017 09/11/2017 Yes      * Pyelonephritis    Admitted for a kidney infection (seen on CT scan ) and is on  IV Cipro at this time  No leukocytosis  afebrileUA + infection  Urine culture growing E coli ;sensitivity to cipro     Will transfer to nursing home, skilled         Dementia with behavioral disturbance    Cont aricept and risperdal from nursing home  Mental status at baseline          Ventricular tachycardia    [facility] consulted  [facility] feels this is abberancy  Got IV metoprolol night of 9/10 and became hypotensive. Sent to ICU  BP improved off meds  Continue low dose betablocker        BPH (benign prostatic hyperplasia)    Continue  flomax          CAD (coronary artery disease)    Cont home ASA, plavix and atorvastatin          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    Continue dilantin and depakote from nursing home          HTN  (hypertension)    On valsartan and Imdur at nursing home. On hold for now  BP is stable  Still on IVF         UTI (urinary tract infection)-resolved as of 9/13/2017                  Discharged Condition: good    Disposition: Admit to SNF, gene     Follow Up:  Follow-up Information     REID Blanco MD.    Specialty:  General Surgery  Why:  outpatient services  Contact information:  [place] [id number] [phone]                 Patient Instructions:     Diet general     Activity as tolerated       Medications:  Reconciled Home Medications:   Current Discharge Medication List      START taking these medications    Details   ciprofloxacin HCl (CIPRO) 500 MG tablet Take 1 tablet (500 mg total) by mouth 2 (two) times daily.  Qty: 14 tablet, Refills: 0      metoprolol tartrate (LOPRESSOR) 25 MG tablet Take 0.5 tablets (12.5 mg total) by mouth 2 (two) times daily.  Qty: 30 tablet, Refills: 11         CONTINUE these medications which have NOT CHANGED    Details   divalproex (DEPAKOTE) 250 MG EC tablet Take 500 mg by mouth nightly.       aspirin (ECOTRIN) 81 MG EC tablet Take 1 tablet (81 mg total) by mouth once daily.  Refills: 0      atorvastatin (LIPITOR) 20 MG tablet Take 20 mg by mouth every evening.      clopidogrel (PLAVIX) 75 mg tablet Take 1 tablet (75 mg total) by mouth once daily.  Qty: 30 tablet, Refills: 11      cyanocobalamin (VITAMIN B-12) 1,000 mcg/mL injection 1000mcg IM daily for 7 days, then weekly for 1 month, then monthly. Dispense 27 gauge half inch needles and 3 cc syringes. Dispense Quantity Sufficient.  Qty: 1 mL, Refills: 12      donepezil (ARICEPT) 10 MG tablet Take 1 tablet by mouth Daily.      dorzolamide (TRUSOPT) 2 % ophthalmic solution Place 1 drop into both eyes 2 (two) times daily.      fluoxetine (PROZAC) 20 MG capsule Take 20 mg by mouth once daily.      isosorbide mononitrate (IMDUR) 60 MG 24 hr tablet Take 1 tablet (60 mg total) by mouth once daily.  Qty: 30  tablet, Refills: 11      latanoprost 0.005 % ophthalmic solution Place 1 drop into both eyes Daily.      omeprazole (PRILOSEC) 20 MG capsule Take 1 capsule by mouth Daily.      phenytoin (DILANTIN) 100 MG ER capsule Take 1 capsule by mouth 2 (two) times daily.       risperidone (RISPERDAL) 3 MG Tab Take 3 mg by mouth every evening.      sucralfate (CARAFATE) 1 gram tablet Take 1 g by mouth 4 (four) times daily.      tamsulosin (FLOMAX) 0.4 mg Cp24 Take 1 capsule by mouth Daily.      trazodone (DESYREL) 100 MG tablet Take 100 mg by mouth every evening.      valsartan (DIOVAN) 40 MG tablet Take 40 mg by mouth once daily.           Time spent on the discharge of patient: 35 minutes    HOS POC IP DISCHARGE SUMMARY    Jaswinder Lomlei MD  Department of Hospital Medicine  [facility]

## 2017-09-13 NOTE — NURSING
Report received. Patient in bed. Stable. Addressed needs/concerns. Bed locked and low. Call bell within reach. Patient instructed to call with needs/concerns.

## 2017-09-13 NOTE — PT/OT/SLP DISCHARGE
Physical Therapy Discharge Summary    Anjel Soria  MRN: [id number]   Pyelonephritis   Patient Discharged from acute Physical Therapy on 2017.  Please refer to prior PT noted date on 2017 for functional status.     Assessment:   Patient has not met goals.  GOALS:    Physical Therapy Goals     Not on file          Multidisciplinary Problems (Resolved)        Problem: Physical Therapy Goal    Goal Priority Disciplines Outcome Goal Variances Interventions   Physical Therapy Goal   (Resolved)     PT/OT, PT Outcome(s) achieved     Description:  Goals to be met by: upon D/C from facility     Patient will increase functional independence with mobility by performin. Supine to sit with Stand-by Assistance -- NOT MET  2. Sit to supine with Stand-by Assistance -- NOT MET  3. Sit to stand transfer with Stand-by Assistance -- NOT MET  4. Bed to chair transfer with Stand-by Assistance using Quad Cane -- NOT MET  5. Gait  x 100 feet with Contact Guard Assistance using Quad Cane. -- NOT MET                     Reasons for Discontinuation of Therapy Services  Transfer to alternate level of care.      Plan:  Patient Discharged to: [facility].

## 2017-09-14 NOTE — PLAN OF CARE
09/14/17 1118   Final Note   Assessment Type Final Discharge Note   Discharge Disposition SNF   What phone number can be called within the next 1-3 days to see how you are doing after discharge? [phone]   Hospital Follow Up  Appt(s) scheduled? Yes   Discharge plans and expectations educations in teach back method with documentation complete? Yes   Right Care Referral Info   Post Acute Recommendation Home-care   Referral Type SNF   [place] [address]

## 2017-09-15 LAB
BACTERIA BLD CULT: NORMAL
BACTERIA BLD CULT: NORMAL

## 2018-03-12 PROBLEM — H40.153: Status: ACTIVE | Noted: 2018-03-12

## 2018-07-18 ENCOUNTER — HOSPITAL ENCOUNTER (EMERGENCY)
Facility: HOSPITAL | Age: 73
Discharge: HOME | End: 2018-07-18
Attending: SURGERY
Payer: MEDICARE

## 2018-07-18 VITALS
SYSTOLIC BLOOD PRESSURE: 141 MMHG | RESPIRATION RATE: 18 BRPM | HEART RATE: 52 BPM | TEMPERATURE: 98 F | BODY MASS INDEX: 28 KG/M2 | WEIGHT: 200 LBS | HEIGHT: 71 IN | OXYGEN SATURATION: 97 % | DIASTOLIC BLOOD PRESSURE: 72 MMHG

## 2018-07-18 DIAGNOSIS — R07.9 CHEST PAIN: ICD-10-CM

## 2018-07-18 DIAGNOSIS — I21.9 ACUTE MYOCARDIAL INFARCTION, UNSPECIFIED MI TYPE, UNSPECIFIED ARTERY: Primary | ICD-10-CM

## 2018-07-18 LAB
ALBUMIN SERPL BCP-MCNC: 3.3 G/DL
ALP SERPL-CCNC: 74 U/L
ALT SERPL W/O P-5'-P-CCNC: 10 U/L
ANION GAP SERPL CALC-SCNC: 7 MMOL/L
APTT BLDCRRT: 31.2 SEC
AST SERPL-CCNC: 22 U/L
BASOPHILS # BLD AUTO: 0.02 K/UL
BASOPHILS NFR BLD: 0.2 %
BILIRUB SERPL-MCNC: 0.4 MG/DL
BUN SERPL-MCNC: 8 MG/DL
CALCIUM SERPL-MCNC: 9.2 MG/DL
CHLORIDE SERPL-SCNC: 103 MMOL/L
CK MB SERPL-MCNC: 27.2 NG/ML
CK MB SERPL-RTO: 12 %
CK SERPL-CCNC: 226 U/L
CO2 SERPL-SCNC: 31 MMOL/L
CREAT SERPL-MCNC: 0.9 MG/DL
DIFFERENTIAL METHOD: ABNORMAL
EOSINOPHIL # BLD AUTO: 0.1 K/UL
EOSINOPHIL NFR BLD: 1.6 %
ERYTHROCYTE [DISTWIDTH] IN BLOOD BY AUTOMATED COUNT: 14.6 %
EST. GFR  (AFRICAN AMERICAN): >60 ML/MIN/1.73 M^2
EST. GFR  (NON AFRICAN AMERICAN): >60 ML/MIN/1.73 M^2
GLUCOSE SERPL-MCNC: 104 MG/DL
HCT VFR BLD AUTO: 44.3 %
HGB BLD-MCNC: 14.5 G/DL
INR PPP: 1.1
LYMPHOCYTES # BLD AUTO: 2 K/UL
LYMPHOCYTES NFR BLD: 23.3 %
MCH RBC QN AUTO: 27.2 PG
MCHC RBC AUTO-ENTMCNC: 32.7 G/DL
MCV RBC AUTO: 83 FL
MONOCYTES # BLD AUTO: 0.5 K/UL
MONOCYTES NFR BLD: 6.1 %
NEUTROPHILS # BLD AUTO: 5.8 K/UL
NEUTROPHILS NFR BLD: 68.8 %
PLATELET # BLD AUTO: 178 K/UL
PMV BLD AUTO: 9.2 FL
POTASSIUM SERPL-SCNC: 4.4 MMOL/L
PROT SERPL-MCNC: 7.2 G/DL
PROTHROMBIN TIME: 11.4 SEC
RBC # BLD AUTO: 5.34 M/UL
SODIUM SERPL-SCNC: 141 MMOL/L
TROPONIN I SERPL DL<=0.01 NG/ML-MCNC: 2.61 NG/ML
WBC # BLD AUTO: 8.36 K/UL

## 2018-07-18 PROCEDURE — 82553 CREATINE MB FRACTION: CPT

## 2018-07-18 PROCEDURE — 84484 ASSAY OF TROPONIN QUANT: CPT

## 2018-07-18 PROCEDURE — 63600175 PHARM REV CODE 636 W HCPCS: Performed by: NURSE PRACTITIONER

## 2018-07-18 PROCEDURE — 25000003 PHARM REV CODE 250: Performed by: SURGERY

## 2018-07-18 PROCEDURE — 82550 ASSAY OF CK (CPK): CPT

## 2018-07-18 PROCEDURE — 93005 ELECTROCARDIOGRAM TRACING: CPT

## 2018-07-18 PROCEDURE — 85610 PROTHROMBIN TIME: CPT

## 2018-07-18 PROCEDURE — 25000003 PHARM REV CODE 250: Performed by: NURSE PRACTITIONER

## 2018-07-18 PROCEDURE — 36415 COLL VENOUS BLD VENIPUNCTURE: CPT

## 2018-07-18 PROCEDURE — 99285 EMERGENCY DEPT VISIT HI MDM: CPT | Mod: 25

## 2018-07-18 PROCEDURE — 85730 THROMBOPLASTIN TIME PARTIAL: CPT

## 2018-07-18 PROCEDURE — 93010 ELECTROCARDIOGRAM REPORT: CPT | Mod: ,,, | Performed by: INTERNAL MEDICINE

## 2018-07-18 PROCEDURE — 96374 THER/PROPH/DIAG INJ IV PUSH: CPT

## 2018-07-18 PROCEDURE — 85025 COMPLETE CBC W/AUTO DIFF WBC: CPT

## 2018-07-18 PROCEDURE — 80053 COMPREHEN METABOLIC PANEL: CPT

## 2018-07-18 PROCEDURE — 63600175 PHARM REV CODE 636 W HCPCS: Performed by: SURGERY

## 2018-07-18 RX ORDER — HEPARIN SODIUM 1000 [USP'U]/ML
3000 INJECTION, SOLUTION INTRAVENOUS; SUBCUTANEOUS ONCE
Status: COMPLETED | OUTPATIENT
Start: 2018-07-18 | End: 2018-07-18

## 2018-07-18 RX ORDER — HYDROCODONE BITARTRATE AND ACETAMINOPHEN 5; 325 MG/1; MG/1
1 TABLET ORAL EVERY 8 HOURS PRN
Status: ON HOLD | COMMUNITY
End: 2020-06-08 | Stop reason: HOSPADM

## 2018-07-18 RX ORDER — HEPARIN SODIUM,PORCINE/D5W 25000/250
17 INTRAVENOUS SOLUTION INTRAVENOUS CONTINUOUS
Status: DISCONTINUED | OUTPATIENT
Start: 2018-07-18 | End: 2018-07-18 | Stop reason: HOSPADM

## 2018-07-18 RX ORDER — NAPROXEN SODIUM 220 MG/1
81 TABLET, FILM COATED ORAL
Status: DISCONTINUED | OUTPATIENT
Start: 2018-07-18 | End: 2018-07-18 | Stop reason: HOSPADM

## 2018-07-18 RX ORDER — HEPARIN SODIUM 10000 [USP'U]/100ML
800 INJECTION, SOLUTION INTRAVENOUS CONTINUOUS
Status: DISCONTINUED | OUTPATIENT
Start: 2018-07-18 | End: 2018-07-18 | Stop reason: SDUPTHER

## 2018-07-18 RX ORDER — HEPARIN SODIUM 10000 [USP'U]/100ML
800 INJECTION, SOLUTION INTRAVENOUS CONTINUOUS
Status: DISCONTINUED | OUTPATIENT
Start: 2018-07-18 | End: 2018-07-18 | Stop reason: HOSPADM

## 2018-07-18 RX ADMIN — HEPARIN SODIUM 800 UNITS: 10000 INJECTION, SOLUTION INTRAVENOUS at 10:07

## 2018-07-18 RX ADMIN — NITROGLYCERIN 1 INCH: 20 OINTMENT TOPICAL at 09:07

## 2018-07-18 RX ADMIN — HEPARIN SODIUM 3000 UNITS: 1000 INJECTION, SOLUTION INTRAVENOUS; SUBCUTANEOUS at 10:07

## 2018-07-18 RX ADMIN — LIDOCAINE HYDROCHLORIDE 50 ML: 20 SOLUTION ORAL; TOPICAL at 08:07
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
## 2019-05-23 ENCOUNTER — APPOINTMENT (OUTPATIENT)
Dept: LAB | Facility: HOSPITAL | Age: 74
End: 2019-05-23
Attending: FAMILY MEDICINE
Payer: MEDICARE

## 2019-05-23 DIAGNOSIS — R30.0 DYSURIA: Primary | ICD-10-CM

## 2019-05-23 LAB
BILIRUB UR QL STRIP: NEGATIVE
CLARITY UR: CLEAR
COLOR UR: YELLOW
GLUCOSE UR QL STRIP: NEGATIVE
HGB UR QL STRIP: NEGATIVE
KETONES UR QL STRIP: NEGATIVE
LEUKOCYTE ESTERASE UR QL STRIP: NEGATIVE
NITRITE UR QL STRIP: NEGATIVE
PH UR STRIP: 7 [PH] (ref 5–8)
PROT UR QL STRIP: NEGATIVE
SP GR UR STRIP: 1.02 (ref 1–1.03)
URN SPEC COLLECT METH UR: NORMAL
UROBILINOGEN UR STRIP-ACNC: NEGATIVE EU/DL

## 2019-05-23 PROCEDURE — 87077 CULTURE AEROBIC IDENTIFY: CPT

## 2019-05-23 PROCEDURE — 87088 URINE BACTERIA CULTURE: CPT

## 2019-05-23 PROCEDURE — 81003 URINALYSIS AUTO W/O SCOPE: CPT

## 2019-05-23 PROCEDURE — 87186 SC STD MICRODIL/AGAR DIL: CPT

## 2019-05-23 PROCEDURE — 87086 URINE CULTURE/COLONY COUNT: CPT

## 2019-05-26 LAB — BACTERIA UR CULT: NORMAL

## 2019-07-27 ENCOUNTER — HOSPITAL ENCOUNTER (EMERGENCY)
Facility: HOSPITAL | Age: 74
Discharge: HOME | End: 2019-07-27
Attending: SURGERY
Payer: MEDICARE

## 2019-07-27 VITALS
SYSTOLIC BLOOD PRESSURE: 157 MMHG | HEART RATE: 55 BPM | WEIGHT: 201 LBS | DIASTOLIC BLOOD PRESSURE: 82 MMHG | OXYGEN SATURATION: 99 % | HEIGHT: 73 IN | BODY MASS INDEX: 26.64 KG/M2 | TEMPERATURE: 97 F | RESPIRATION RATE: 19 BRPM

## 2019-07-27 DIAGNOSIS — K59.00 CONSTIPATED: ICD-10-CM

## 2019-07-27 DIAGNOSIS — R07.9 CHEST PAIN: Primary | ICD-10-CM

## 2019-07-27 LAB
ALBUMIN SERPL BCP-MCNC: 3.4 G/DL (ref 3.5–5.2)
ALP SERPL-CCNC: 69 U/L (ref 55–135)
ALT SERPL W/O P-5'-P-CCNC: 16 U/L (ref 10–44)
ANION GAP SERPL CALC-SCNC: 10 MMOL/L (ref 8–16)
ANISOCYTOSIS BLD QL SMEAR: SLIGHT
AST SERPL-CCNC: 17 U/L (ref 10–40)
BASOPHILS # BLD AUTO: 0.02 K/UL (ref 0–0.2)
BASOPHILS NFR BLD: 0.3 % (ref 0–1.9)
BILIRUB SERPL-MCNC: 0.2 MG/DL (ref 0.1–1)
BNP SERPL-MCNC: 83 PG/ML (ref 0–99)
BUN SERPL-MCNC: 10 MG/DL (ref 8–23)
CALCIUM SERPL-MCNC: 9.1 MG/DL (ref 8.7–10.5)
CHLORIDE SERPL-SCNC: 103 MMOL/L (ref 95–110)
CK MB SERPL-MCNC: 0.8 NG/ML (ref 0.1–6.5)
CK MB SERPL-RTO: 1.1 % (ref 0–5)
CK SERPL-CCNC: 73 U/L (ref 20–200)
CK SERPL-CCNC: 73 U/L (ref 20–200)
CO2 SERPL-SCNC: 27 MMOL/L (ref 23–29)
CREAT SERPL-MCNC: 0.8 MG/DL (ref 0.5–1.4)
DIFFERENTIAL METHOD: ABNORMAL
EOSINOPHIL # BLD AUTO: 0.2 K/UL (ref 0–0.5)
EOSINOPHIL NFR BLD: 2.7 % (ref 0–8)
ERYTHROCYTE [DISTWIDTH] IN BLOOD BY AUTOMATED COUNT: 14.9 % (ref 11.5–14.5)
EST. GFR  (AFRICAN AMERICAN): >60 ML/MIN/1.73 M^2
EST. GFR  (NON AFRICAN AMERICAN): >60 ML/MIN/1.73 M^2
GLUCOSE SERPL-MCNC: 92 MG/DL (ref 70–110)
HCT VFR BLD AUTO: 34 % (ref 40–54)
HGB BLD-MCNC: 10.4 G/DL (ref 14–18)
IMM GRANULOCYTES # BLD AUTO: 0.01 K/UL (ref 0–0.04)
IMM GRANULOCYTES NFR BLD AUTO: 0.2 % (ref 0–0.5)
LYMPHOCYTES # BLD AUTO: 1.8 K/UL (ref 1–4.8)
LYMPHOCYTES NFR BLD: 30.7 % (ref 18–48)
MCH RBC QN AUTO: 23 PG (ref 27–31)
MCHC RBC AUTO-ENTMCNC: 30.6 G/DL (ref 32–36)
MCV RBC AUTO: 75 FL (ref 82–98)
MONOCYTES # BLD AUTO: 0.5 K/UL (ref 0.3–1)
MONOCYTES NFR BLD: 8 % (ref 4–15)
NEUTROPHILS # BLD AUTO: 3.4 K/UL (ref 1.8–7.7)
NEUTROPHILS NFR BLD: 58.1 % (ref 38–73)
NRBC BLD-RTO: 0 /100 WBC
OVALOCYTES BLD QL SMEAR: ABNORMAL
PLATELET # BLD AUTO: 148 K/UL (ref 150–350)
PLATELET BLD QL SMEAR: ABNORMAL
PMV BLD AUTO: 9.8 FL (ref 9.2–12.9)
POTASSIUM SERPL-SCNC: 4 MMOL/L (ref 3.5–5.1)
PROT SERPL-MCNC: 7 G/DL (ref 6–8.4)
RBC # BLD AUTO: 4.53 M/UL (ref 4.6–6.2)
SODIUM SERPL-SCNC: 140 MMOL/L (ref 136–145)
TROPONIN I SERPL DL<=0.01 NG/ML-MCNC: 0.06 NG/ML (ref 0–0.03)
TROPONIN I SERPL DL<=0.01 NG/ML-MCNC: 0.08 NG/ML (ref 0–0.03)
WBC # BLD AUTO: 5.84 K/UL (ref 3.9–12.7)

## 2019-07-27 PROCEDURE — 96361 HYDRATE IV INFUSION ADD-ON: CPT

## 2019-07-27 PROCEDURE — 84484 ASSAY OF TROPONIN QUANT: CPT | Mod: 91

## 2019-07-27 PROCEDURE — 99285 EMERGENCY DEPT VISIT HI MDM: CPT | Mod: 25

## 2019-07-27 PROCEDURE — 25000003 PHARM REV CODE 250: Performed by: SURGERY

## 2019-07-27 PROCEDURE — 82550 ASSAY OF CK (CPK): CPT

## 2019-07-27 PROCEDURE — 96360 HYDRATION IV INFUSION INIT: CPT

## 2019-07-27 PROCEDURE — 80053 COMPREHEN METABOLIC PANEL: CPT

## 2019-07-27 PROCEDURE — 93010 ELECTROCARDIOGRAM REPORT: CPT | Mod: ,,, | Performed by: INTERNAL MEDICINE

## 2019-07-27 PROCEDURE — 93005 ELECTROCARDIOGRAM TRACING: CPT

## 2019-07-27 PROCEDURE — 83880 ASSAY OF NATRIURETIC PEPTIDE: CPT

## 2019-07-27 PROCEDURE — 63600175 PHARM REV CODE 636 W HCPCS: Performed by: SURGERY

## 2019-07-27 PROCEDURE — 85025 COMPLETE CBC W/AUTO DIFF WBC: CPT

## 2019-07-27 PROCEDURE — 36415 COLL VENOUS BLD VENIPUNCTURE: CPT

## 2019-07-27 PROCEDURE — 93010 EKG 12-LEAD: ICD-10-PCS | Mod: ,,, | Performed by: INTERNAL MEDICINE

## 2019-07-27 PROCEDURE — 82553 CREATINE MB FRACTION: CPT

## 2019-07-27 RX ORDER — SODIUM CHLORIDE 9 MG/ML
1000 INJECTION, SOLUTION INTRAVENOUS
Status: DISCONTINUED | OUTPATIENT
Start: 2019-07-27 | End: 2019-07-27

## 2019-07-27 RX ORDER — SODIUM CHLORIDE 9 MG/ML
1000 INJECTION, SOLUTION INTRAVENOUS
Status: COMPLETED | OUTPATIENT
Start: 2019-07-27 | End: 2019-07-27

## 2019-07-27 RX ORDER — ASPIRIN 325 MG
325 TABLET, DELAYED RELEASE (ENTERIC COATED) ORAL
Status: COMPLETED | OUTPATIENT
Start: 2019-07-27 | End: 2019-07-27

## 2019-07-27 RX ADMIN — ASPIRIN 325 MG: 325 TABLET, DELAYED RELEASE ORAL at 06:07

## 2019-07-27 RX ADMIN — SODIUM CHLORIDE 1000 ML: 0.9 INJECTION, SOLUTION INTRAVENOUS at 01:07
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
## 2019-08-14 ENCOUNTER — HOSPITAL ENCOUNTER (EMERGENCY)
Facility: HOSPITAL | Age: 74
Discharge: HOME | End: 2019-08-15
Attending: SURGERY
Payer: MEDICARE

## 2019-08-14 DIAGNOSIS — R07.89 NON-CARDIAC CHEST PAIN: Primary | ICD-10-CM

## 2019-08-14 DIAGNOSIS — N30.00 ACUTE CYSTITIS WITHOUT HEMATURIA: ICD-10-CM

## 2019-08-14 LAB
ALBUMIN SERPL BCP-MCNC: 3.2 G/DL (ref 3.5–5.2)
ALP SERPL-CCNC: 62 U/L (ref 55–135)
ALT SERPL W/O P-5'-P-CCNC: 10 U/L (ref 10–44)
ANION GAP SERPL CALC-SCNC: 7 MMOL/L (ref 8–16)
APTT BLDCRRT: 30.4 SEC (ref 21–32)
AST SERPL-CCNC: 14 U/L (ref 10–40)
BACTERIA #/AREA URNS HPF: ABNORMAL /HPF
BASOPHILS # BLD AUTO: 0.02 K/UL (ref 0–0.2)
BASOPHILS NFR BLD: 0.3 % (ref 0–1.9)
BILIRUB SERPL-MCNC: 0.2 MG/DL (ref 0.1–1)
BILIRUB UR QL STRIP: NEGATIVE
BNP SERPL-MCNC: 70 PG/ML (ref 0–99)
BUN SERPL-MCNC: 10 MG/DL (ref 8–23)
CALCIUM SERPL-MCNC: 8.7 MG/DL (ref 8.7–10.5)
CHLORIDE SERPL-SCNC: 104 MMOL/L (ref 95–110)
CK MB SERPL-MCNC: 0.6 NG/ML (ref 0.1–6.5)
CK MB SERPL-RTO: 1.1 % (ref 0–5)
CK SERPL-CCNC: 57 U/L (ref 20–200)
CK SERPL-CCNC: 57 U/L (ref 20–200)
CLARITY UR: ABNORMAL
CO2 SERPL-SCNC: 29 MMOL/L (ref 23–29)
COLOR UR: YELLOW
CREAT SERPL-MCNC: 0.8 MG/DL (ref 0.5–1.4)
D DIMER PPP IA.FEU-MCNC: 0.77 MG/L FEU
DIFFERENTIAL METHOD: ABNORMAL
EOSINOPHIL # BLD AUTO: 0.2 K/UL (ref 0–0.5)
EOSINOPHIL NFR BLD: 2.6 % (ref 0–8)
ERYTHROCYTE [DISTWIDTH] IN BLOOD BY AUTOMATED COUNT: 15.9 % (ref 11.5–14.5)
EST. GFR  (AFRICAN AMERICAN): >60 ML/MIN/1.73 M^2
EST. GFR  (NON AFRICAN AMERICAN): >60 ML/MIN/1.73 M^2
GLUCOSE SERPL-MCNC: 106 MG/DL (ref 70–110)
GLUCOSE UR QL STRIP: NEGATIVE
HCT VFR BLD AUTO: 31.7 % (ref 40–54)
HGB BLD-MCNC: 9.4 G/DL (ref 14–18)
HGB UR QL STRIP: NEGATIVE
IMM GRANULOCYTES # BLD AUTO: 0.01 K/UL (ref 0–0.04)
IMM GRANULOCYTES NFR BLD AUTO: 0.2 % (ref 0–0.5)
INR PPP: 1.1 (ref 0.8–1.2)
KETONES UR QL STRIP: ABNORMAL
LEUKOCYTE ESTERASE UR QL STRIP: ABNORMAL
LYMPHOCYTES # BLD AUTO: 2 K/UL (ref 1–4.8)
LYMPHOCYTES NFR BLD: 33.9 % (ref 18–48)
MCH RBC QN AUTO: 22.3 PG (ref 27–31)
MCHC RBC AUTO-ENTMCNC: 29.7 G/DL (ref 32–36)
MCV RBC AUTO: 75 FL (ref 82–98)
MICROSCOPIC COMMENT: ABNORMAL
MONOCYTES # BLD AUTO: 0.4 K/UL (ref 0.3–1)
MONOCYTES NFR BLD: 7.5 % (ref 4–15)
NEUTROPHILS # BLD AUTO: 3.3 K/UL (ref 1.8–7.7)
NEUTROPHILS NFR BLD: 55.5 % (ref 38–73)
NITRITE UR QL STRIP: POSITIVE
NRBC BLD-RTO: 0 /100 WBC
PH UR STRIP: 7 [PH] (ref 5–8)
PLATELET # BLD AUTO: 229 K/UL (ref 150–350)
PMV BLD AUTO: 8.9 FL (ref 9.2–12.9)
POTASSIUM SERPL-SCNC: 4.2 MMOL/L (ref 3.5–5.1)
PROT SERPL-MCNC: 6.7 G/DL (ref 6–8.4)
PROT UR QL STRIP: NEGATIVE
PROTHROMBIN TIME: 11.2 SEC (ref 9–12.5)
RBC # BLD AUTO: 4.21 M/UL (ref 4.6–6.2)
SODIUM SERPL-SCNC: 140 MMOL/L (ref 136–145)
SP GR UR STRIP: 1.02 (ref 1–1.03)
TROPONIN I SERPL DL<=0.01 NG/ML-MCNC: 0.01 NG/ML (ref 0–0.03)
URN SPEC COLLECT METH UR: ABNORMAL
UROBILINOGEN UR STRIP-ACNC: 1 EU/DL
WBC # BLD AUTO: 5.87 K/UL (ref 3.9–12.7)
WBC #/AREA URNS HPF: 2 /HPF (ref 0–5)

## 2019-08-14 PROCEDURE — 25000003 PHARM REV CODE 250: Performed by: NURSE PRACTITIONER

## 2019-08-14 PROCEDURE — 82550 ASSAY OF CK (CPK): CPT | Mod: 91

## 2019-08-14 PROCEDURE — 99285 EMERGENCY DEPT VISIT HI MDM: CPT | Mod: 25

## 2019-08-14 PROCEDURE — 36415 COLL VENOUS BLD VENIPUNCTURE: CPT

## 2019-08-14 PROCEDURE — 25000003 PHARM REV CODE 250: Performed by: SURGERY

## 2019-08-14 PROCEDURE — 96365 THER/PROPH/DIAG IV INF INIT: CPT

## 2019-08-14 PROCEDURE — 82550 ASSAY OF CK (CPK): CPT

## 2019-08-14 PROCEDURE — 84484 ASSAY OF TROPONIN QUANT: CPT | Mod: 91

## 2019-08-14 PROCEDURE — 81000 URINALYSIS NONAUTO W/SCOPE: CPT | Mod: 59

## 2019-08-14 PROCEDURE — 84484 ASSAY OF TROPONIN QUANT: CPT

## 2019-08-14 PROCEDURE — 93010 EKG 12-LEAD: ICD-10-PCS | Mod: ,,, | Performed by: INTERNAL MEDICINE

## 2019-08-14 PROCEDURE — 93010 ELECTROCARDIOGRAM REPORT: CPT | Mod: ,,, | Performed by: INTERNAL MEDICINE

## 2019-08-14 PROCEDURE — 93005 ELECTROCARDIOGRAM TRACING: CPT

## 2019-08-14 PROCEDURE — 94760 N-INVAS EAR/PLS OXIMETRY 1: CPT

## 2019-08-14 PROCEDURE — 80053 COMPREHEN METABOLIC PANEL: CPT

## 2019-08-14 PROCEDURE — 63600175 PHARM REV CODE 636 W HCPCS: Performed by: NURSE PRACTITIONER

## 2019-08-14 PROCEDURE — 82553 CREATINE MB FRACTION: CPT

## 2019-08-14 PROCEDURE — 85730 THROMBOPLASTIN TIME PARTIAL: CPT

## 2019-08-14 PROCEDURE — 85610 PROTHROMBIN TIME: CPT

## 2019-08-14 PROCEDURE — 85025 COMPLETE CBC W/AUTO DIFF WBC: CPT

## 2019-08-14 PROCEDURE — 82553 CREATINE MB FRACTION: CPT | Mod: 91

## 2019-08-14 PROCEDURE — 80307 DRUG TEST PRSMV CHEM ANLYZR: CPT

## 2019-08-14 PROCEDURE — 83880 ASSAY OF NATRIURETIC PEPTIDE: CPT

## 2019-08-14 PROCEDURE — 85379 FIBRIN DEGRADATION QUANT: CPT

## 2019-08-14 RX ORDER — ASPIRIN 325 MG
325 TABLET ORAL
Status: COMPLETED | OUTPATIENT
Start: 2019-08-14 | End: 2019-08-14

## 2019-08-14 RX ORDER — LIDOCAINE HYDROCHLORIDE 10 MG/ML
5 INJECTION, SOLUTION EPIDURAL; INFILTRATION; INTRACAUDAL; PERINEURAL
Status: COMPLETED | OUTPATIENT
Start: 2019-08-14 | End: 2019-08-14

## 2019-08-14 RX ADMIN — CEFTRIAXONE SODIUM 2 G: 2 INJECTION, POWDER, FOR SOLUTION INTRAMUSCULAR; INTRAVENOUS at 07:08

## 2019-08-14 RX ADMIN — LIDOCAINE HYDROCHLORIDE 50 MG: 10 INJECTION, SOLUTION EPIDURAL; INFILTRATION; INTRACAUDAL; PERINEURAL at 06:08

## 2019-08-14 RX ADMIN — ASPIRIN 325 MG ORAL TABLET 325 MG: 325 PILL ORAL at 06:08

## 2019-08-14 NOTE — ED NOTES
Patient's diaper soiled. Patient cleaned. New diaper applied. Patient was able to provide urine sample via voiding in cup.
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
## 2019-08-14 NOTE — ED NOTES
Multiple attempts to obtain IV access unsuccessful. House supervisor notified to call out anesthesia.

## 2019-08-14 NOTE — ED TRIAGE NOTES
Arrives per [facility] from the [facility]. Presents with complaints of chest pain. Seen in ED on 7/27/19 for same complaint

## 2019-08-15 VITALS
TEMPERATURE: 96 F | BODY MASS INDEX: 27.44 KG/M2 | RESPIRATION RATE: 18 BRPM | HEART RATE: 54 BPM | DIASTOLIC BLOOD PRESSURE: 68 MMHG | WEIGHT: 208 LBS | OXYGEN SATURATION: 100 % | SYSTOLIC BLOOD PRESSURE: 140 MMHG

## 2019-08-15 LAB
AMPHET+METHAMPHET UR QL: NEGATIVE
BARBITURATES UR QL SCN>200 NG/ML: NEGATIVE
BENZODIAZ UR QL SCN>200 NG/ML: NEGATIVE
BZE UR QL SCN: NEGATIVE
CANNABINOIDS UR QL SCN: NEGATIVE
CK MB SERPL-MCNC: 0.7 NG/ML (ref 0.1–6.5)
CK MB SERPL-RTO: 1.2 % (ref 0–5)
CK SERPL-CCNC: 59 U/L (ref 20–200)
CK SERPL-CCNC: 59 U/L (ref 20–200)
CREAT UR-MCNC: 113.7 MG/DL (ref 23–375)
METHADONE UR QL SCN>300 NG/ML: NEGATIVE
OPIATES UR QL SCN: NEGATIVE
PCP UR QL SCN>25 NG/ML: NEGATIVE
TOXICOLOGY INFORMATION: NORMAL
TROPONIN I SERPL DL<=0.01 NG/ML-MCNC: 0.01 NG/ML (ref 0–0.03)

## 2019-08-15 RX ORDER — NITROFURANTOIN 25; 75 MG/1; MG/1
100 CAPSULE ORAL 2 TIMES DAILY
Qty: 14 CAPSULE | Refills: 0 | Status: SHIPPED | OUTPATIENT
Start: 2019-08-15 | End: 2019-08-22

## 2019-08-15 NOTE — ED NOTES
Spoke with Addie from [facility] ([phone]). She states she will try to get a ride for the patient.

## 2020-04-09 ENCOUNTER — HOSPITAL ENCOUNTER (EMERGENCY)
Facility: HOSPITAL | Age: 75
Discharge: HOME | End: 2020-04-10
Attending: SURGERY
Payer: MEDICARE

## 2020-04-09 DIAGNOSIS — D64.9 ANEMIA, UNSPECIFIED TYPE: Primary | ICD-10-CM

## 2020-04-09 DIAGNOSIS — R07.9 INTERMITTENT CHEST PAIN: ICD-10-CM

## 2020-04-09 LAB
ABO + RH BLD: NORMAL
ACANTHOCYTES BLD QL SMEAR: PRESENT
ALBUMIN SERPL BCP-MCNC: 3.4 G/DL (ref 3.5–5.2)
ALP SERPL-CCNC: 65 U/L (ref 55–135)
ALT SERPL W/O P-5'-P-CCNC: 12 U/L (ref 10–44)
ANION GAP SERPL CALC-SCNC: 10 MMOL/L (ref 8–16)
ANISOCYTOSIS BLD QL SMEAR: ABNORMAL
APTT BLDCRRT: 25.8 SEC (ref 21–32)
AST SERPL-CCNC: 18 U/L (ref 10–40)
BASOPHILS # BLD AUTO: 0.04 K/UL (ref 0–0.2)
BASOPHILS NFR BLD: 0.6 % (ref 0–1.9)
BILIRUB SERPL-MCNC: 0.2 MG/DL (ref 0.1–1)
BLD GP AB SCN CELLS X3 SERPL QL: NORMAL
BLD PROD TYP BPU: NORMAL
BLD PROD TYP BPU: NORMAL
BLOOD UNIT EXPIRATION DATE: NORMAL
BLOOD UNIT EXPIRATION DATE: NORMAL
BLOOD UNIT TYPE CODE: 5100
BLOOD UNIT TYPE CODE: 5100
BLOOD UNIT TYPE: NORMAL
BLOOD UNIT TYPE: NORMAL
BNP SERPL-MCNC: 48 PG/ML (ref 0–99)
BUN SERPL-MCNC: 10 MG/DL (ref 8–23)
CALCIUM SERPL-MCNC: 8.7 MG/DL (ref 8.7–10.5)
CHLORIDE SERPL-SCNC: 106 MMOL/L (ref 95–110)
CK MB SERPL-MCNC: 0.6 NG/ML (ref 0.1–6.5)
CK MB SERPL-RTO: 1.2 % (ref 0–5)
CK SERPL-CCNC: 49 U/L (ref 20–200)
CK SERPL-CCNC: 49 U/L (ref 20–200)
CO2 SERPL-SCNC: 23 MMOL/L (ref 23–29)
CODING SYSTEM: NORMAL
CODING SYSTEM: NORMAL
CREAT SERPL-MCNC: 1 MG/DL (ref 0.5–1.4)
D DIMER PPP IA.FEU-MCNC: 1.74 MG/L FEU
DACRYOCYTES BLD QL SMEAR: ABNORMAL
DIFFERENTIAL METHOD: ABNORMAL
DISPENSE STATUS: NORMAL
DISPENSE STATUS: NORMAL
EOSINOPHIL # BLD AUTO: 0.2 K/UL (ref 0–0.5)
EOSINOPHIL NFR BLD: 2.5 % (ref 0–8)
ERYTHROCYTE [DISTWIDTH] IN BLOOD BY AUTOMATED COUNT: 24.3 % (ref 11.5–14.5)
EST. GFR  (AFRICAN AMERICAN): >60 ML/MIN/1.73 M^2
EST. GFR  (NON AFRICAN AMERICAN): >60 ML/MIN/1.73 M^2
GLUCOSE SERPL-MCNC: 118 MG/DL (ref 70–110)
HCT VFR BLD AUTO: 25.8 % (ref 40–54)
HGB BLD-MCNC: 7 G/DL (ref 14–18)
HYPOCHROMIA BLD QL SMEAR: ABNORMAL
IMM GRANULOCYTES # BLD AUTO: 0.05 K/UL (ref 0–0.04)
IMM GRANULOCYTES NFR BLD AUTO: 0.8 % (ref 0–0.5)
INR PPP: 1.1 (ref 0.8–1.2)
IRON SERPL-MCNC: 18 UG/DL (ref 45–160)
LYMPHOCYTES # BLD AUTO: 1.1 K/UL (ref 1–4.8)
LYMPHOCYTES NFR BLD: 17.6 % (ref 18–48)
MCH RBC QN AUTO: 15.8 PG (ref 27–31)
MCHC RBC AUTO-ENTMCNC: 27.1 G/DL (ref 32–36)
MCV RBC AUTO: 58 FL (ref 82–98)
MONOCYTES # BLD AUTO: 0.6 K/UL (ref 0.3–1)
MONOCYTES NFR BLD: 8.7 % (ref 4–15)
NEUTROPHILS # BLD AUTO: 4.5 K/UL (ref 1.8–7.7)
NEUTROPHILS NFR BLD: 69.8 % (ref 38–73)
NRBC BLD-RTO: 0 /100 WBC
OB PNL STL: POSITIVE
OVALOCYTES BLD QL SMEAR: ABNORMAL
PLATELET # BLD AUTO: 251 K/UL (ref 150–350)
PLATELET BLD QL SMEAR: ABNORMAL
PMV BLD AUTO: 8.4 FL (ref 9.2–12.9)
POIKILOCYTOSIS BLD QL SMEAR: SLIGHT
POLYCHROMASIA BLD QL SMEAR: ABNORMAL
POTASSIUM SERPL-SCNC: 4.5 MMOL/L (ref 3.5–5.1)
PROT SERPL-MCNC: 7.3 G/DL (ref 6–8.4)
PROTHROMBIN TIME: 11.4 SEC (ref 9–12.5)
RBC # BLD AUTO: 4.43 M/UL (ref 4.6–6.2)
RETICS/RBC NFR AUTO: 0.9 % (ref 0.4–2)
SARS-COV-2 RDRP RESP QL NAA+PROBE: NEGATIVE
SATURATED IRON: 5 % (ref 20–50)
SODIUM SERPL-SCNC: 139 MMOL/L (ref 136–145)
TOTAL IRON BINDING CAPACITY: 358 UG/DL (ref 250–450)
TRANS ERYTHROCYTES VOL PATIENT: NORMAL ML
TRANS ERYTHROCYTES VOL PATIENT: NORMAL ML
TRANSFERRIN SERPL-MCNC: 242 MG/DL (ref 200–375)
TROPONIN I SERPL DL<=0.01 NG/ML-MCNC: <0.006 NG/ML (ref 0–0.03)
WBC # BLD AUTO: 6.47 K/UL (ref 3.9–12.7)

## 2020-04-09 PROCEDURE — 85025 COMPLETE CBC W/AUTO DIFF WBC: CPT

## 2020-04-09 PROCEDURE — 25000003 PHARM REV CODE 250: Performed by: SURGERY

## 2020-04-09 PROCEDURE — 36430 TRANSFUSION BLD/BLD COMPNT: CPT

## 2020-04-09 PROCEDURE — 93010 ELECTROCARDIOGRAM REPORT: CPT | Mod: ,,, | Performed by: INTERNAL MEDICINE

## 2020-04-09 PROCEDURE — 93005 ELECTROCARDIOGRAM TRACING: CPT

## 2020-04-09 PROCEDURE — 85730 THROMBOPLASTIN TIME PARTIAL: CPT

## 2020-04-09 PROCEDURE — 85610 PROTHROMBIN TIME: CPT

## 2020-04-09 PROCEDURE — U0002 COVID-19 LAB TEST NON-CDC: HCPCS

## 2020-04-09 PROCEDURE — 82272 OCCULT BLD FECES 1-3 TESTS: CPT

## 2020-04-09 PROCEDURE — 84484 ASSAY OF TROPONIN QUANT: CPT

## 2020-04-09 PROCEDURE — 63600175 PHARM REV CODE 636 W HCPCS: Performed by: SURGERY

## 2020-04-09 PROCEDURE — 93010 EKG 12-LEAD: ICD-10-PCS | Mod: ,,, | Performed by: INTERNAL MEDICINE

## 2020-04-09 PROCEDURE — 85379 FIBRIN DEGRADATION QUANT: CPT

## 2020-04-09 PROCEDURE — 85045 AUTOMATED RETICULOCYTE COUNT: CPT

## 2020-04-09 PROCEDURE — 96374 THER/PROPH/DIAG INJ IV PUSH: CPT

## 2020-04-09 PROCEDURE — 82728 ASSAY OF FERRITIN: CPT

## 2020-04-09 PROCEDURE — 82553 CREATINE MB FRACTION: CPT

## 2020-04-09 PROCEDURE — P9021 RED BLOOD CELLS UNIT: HCPCS

## 2020-04-09 PROCEDURE — 82550 ASSAY OF CK (CPK): CPT

## 2020-04-09 PROCEDURE — 86920 COMPATIBILITY TEST SPIN: CPT

## 2020-04-09 PROCEDURE — 86850 RBC ANTIBODY SCREEN: CPT

## 2020-04-09 PROCEDURE — 80053 COMPREHEN METABOLIC PANEL: CPT

## 2020-04-09 PROCEDURE — 82607 VITAMIN B-12: CPT

## 2020-04-09 PROCEDURE — 82746 ASSAY OF FOLIC ACID SERUM: CPT

## 2020-04-09 PROCEDURE — 83540 ASSAY OF IRON: CPT

## 2020-04-09 PROCEDURE — 99285 EMERGENCY DEPT VISIT HI MDM: CPT | Mod: 25

## 2020-04-09 PROCEDURE — 36415 COLL VENOUS BLD VENIPUNCTURE: CPT

## 2020-04-09 PROCEDURE — 83880 ASSAY OF NATRIURETIC PEPTIDE: CPT

## 2020-04-09 RX ORDER — ACETAMINOPHEN 500 MG
1000 TABLET ORAL
Status: COMPLETED | OUTPATIENT
Start: 2020-04-09 | End: 2020-04-09

## 2020-04-09 RX ORDER — HYDROCODONE BITARTRATE AND ACETAMINOPHEN 500; 5 MG/1; MG/1
TABLET ORAL
Status: DISCONTINUED | OUTPATIENT
Start: 2020-04-09 | End: 2020-04-10 | Stop reason: HOSPADM

## 2020-04-09 RX ORDER — IBUPROFEN 800 MG/1
800 TABLET ORAL
Status: COMPLETED | OUTPATIENT
Start: 2020-04-09 | End: 2020-04-09

## 2020-04-09 RX ORDER — DIPHENHYDRAMINE HYDROCHLORIDE 50 MG/ML
25 INJECTION INTRAMUSCULAR; INTRAVENOUS
Status: COMPLETED | OUTPATIENT
Start: 2020-04-09 | End: 2020-04-09

## 2020-04-09 RX ADMIN — DIPHENHYDRAMINE HYDROCHLORIDE 25 MG: 50 INJECTION INTRAMUSCULAR; INTRAVENOUS at 05:04

## 2020-04-09 RX ADMIN — IBUPROFEN 800 MG: 800 TABLET, FILM COATED ORAL at 05:04

## 2020-04-09 RX ADMIN — SODIUM CHLORIDE: 0.9 INJECTION, SOLUTION INTRAVENOUS at 04:04

## 2020-04-09 RX ADMIN — ACETAMINOPHEN 1000 MG: 500 TABLET, FILM COATED ORAL at 05:04

## 2020-04-09 NOTE — ED NOTES
2 loc gtz, keenan NP and this writer tried several times to obtain blood for ordered testing.  Only IV sites could be placed with not being able to utilize for blood samples.  Will have another RN try.
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
## 2020-04-09 NOTE — ED NOTES
MD made aware of this writer unable to obtain new IV site to continue blood infusion.  Provider calling for Anesthesia to place line.

## 2020-04-09 NOTE — ED NOTES
The granddaughter called and was redirected to emergency contact on record for Pt information.  Daughter called to confirm if we area available to speak with her we may do so.  Heriberto Tate [phone].  Daughter was up dated on poc for Pt at this time.

## 2020-04-09 NOTE — ED NOTES
Lab samples partially were obtained by [facility] and will be processed.  Pt gave verbal permission to speak with daughter via the phone at [phone].  POC updated with both Pt and daughter, Harriet Stone.

## 2020-04-09 NOTE — ED TRIAGE NOTES
Pt with facility paper work indicating abnormal lab values for HGB 6.3 and HCT 21.9.  Pt sent for eval and tx.

## 2020-04-09 NOTE — ED NOTES
Rate of infusion increased to 125.  Pt tolerating well.  IV site line moved from right hand to left wrist per Pt request.  IV site flushed well with NS 10cc.

## 2020-04-09 NOTE — ED NOTES
One time medications given for temp 100.3 per MD and to continue transfusion. Monitoring Pt at bedside.

## 2020-04-09 NOTE — ED NOTES
Phone consent to transfuse blood products with risks and benefits obtained from daughter with provider speaking to Pt and daughter.  Another family member was on the line and is added to the list that information may be given too per daughter  Tg Jara.  Pt is awaiting results of T&S.

## 2020-04-10 VITALS
HEIGHT: 73 IN | RESPIRATION RATE: 18 BRPM | BODY MASS INDEX: 28.11 KG/M2 | HEART RATE: 54 BPM | OXYGEN SATURATION: 97 % | DIASTOLIC BLOOD PRESSURE: 69 MMHG | WEIGHT: 212.06 LBS | SYSTOLIC BLOOD PRESSURE: 154 MMHG | TEMPERATURE: 98 F

## 2020-04-10 LAB
ALBUMIN SERPL BCP-MCNC: 3.2 G/DL (ref 3.5–5.2)
ALP SERPL-CCNC: 56 U/L (ref 55–135)
ALT SERPL W/O P-5'-P-CCNC: 10 U/L (ref 10–44)
ANION GAP SERPL CALC-SCNC: 8 MMOL/L (ref 8–16)
AST SERPL-CCNC: 18 U/L (ref 10–40)
BASOPHILS # BLD AUTO: 0.04 K/UL (ref 0–0.2)
BASOPHILS NFR BLD: 0.6 % (ref 0–1.9)
BILIRUB SERPL-MCNC: 1 MG/DL (ref 0.1–1)
BUN SERPL-MCNC: 10 MG/DL (ref 8–23)
CALCIUM SERPL-MCNC: 8.5 MG/DL (ref 8.7–10.5)
CHLORIDE SERPL-SCNC: 107 MMOL/L (ref 95–110)
CO2 SERPL-SCNC: 24 MMOL/L (ref 23–29)
CREAT SERPL-MCNC: 0.8 MG/DL (ref 0.5–1.4)
DIFFERENTIAL METHOD: ABNORMAL
EOSINOPHIL # BLD AUTO: 0.2 K/UL (ref 0–0.5)
EOSINOPHIL NFR BLD: 2.6 % (ref 0–8)
ERYTHROCYTE [DISTWIDTH] IN BLOOD BY AUTOMATED COUNT: 28.9 % (ref 11.5–14.5)
EST. GFR  (AFRICAN AMERICAN): >60 ML/MIN/1.73 M^2
EST. GFR  (NON AFRICAN AMERICAN): >60 ML/MIN/1.73 M^2
FERRITIN SERPL-MCNC: 3 NG/ML (ref 20–300)
FOLATE SERPL-MCNC: 7.6 NG/ML (ref 4–24)
GLUCOSE SERPL-MCNC: 90 MG/DL (ref 70–110)
HCT VFR BLD AUTO: 31.9 % (ref 40–54)
HGB BLD-MCNC: 9.2 G/DL (ref 14–18)
IMM GRANULOCYTES # BLD AUTO: 0.02 K/UL (ref 0–0.04)
IMM GRANULOCYTES NFR BLD AUTO: 0.3 % (ref 0–0.5)
LYMPHOCYTES # BLD AUTO: 1.3 K/UL (ref 1–4.8)
LYMPHOCYTES NFR BLD: 20 % (ref 18–48)
MCH RBC QN AUTO: 18.4 PG (ref 27–31)
MCHC RBC AUTO-ENTMCNC: 28.8 G/DL (ref 32–36)
MCV RBC AUTO: 64 FL (ref 82–98)
MONOCYTES # BLD AUTO: 0.8 K/UL (ref 0.3–1)
MONOCYTES NFR BLD: 12 % (ref 4–15)
NEUTROPHILS # BLD AUTO: 4 K/UL (ref 1.8–7.7)
NEUTROPHILS NFR BLD: 64.5 % (ref 38–73)
NRBC BLD-RTO: 0 /100 WBC
PLATELET # BLD AUTO: 247 K/UL (ref 150–350)
PMV BLD AUTO: 8.4 FL (ref 9.2–12.9)
POTASSIUM SERPL-SCNC: 4.3 MMOL/L (ref 3.5–5.1)
PROT SERPL-MCNC: 6.8 G/DL (ref 6–8.4)
RBC # BLD AUTO: 4.99 M/UL (ref 4.6–6.2)
SODIUM SERPL-SCNC: 139 MMOL/L (ref 136–145)
VIT B12 SERPL-MCNC: 763 PG/ML (ref 210–950)
WBC # BLD AUTO: 6.26 K/UL (ref 3.9–12.7)

## 2020-04-10 PROCEDURE — 85025 COMPLETE CBC W/AUTO DIFF WBC: CPT

## 2020-04-10 PROCEDURE — 36415 COLL VENOUS BLD VENIPUNCTURE: CPT

## 2020-04-10 PROCEDURE — 80053 COMPREHEN METABOLIC PANEL: CPT

## 2020-04-10 NOTE — ED NOTES
Received verbal report from KENNY Forbes.  Pt in ED room ED 05/ED 05 lying in stretcher, HOB 30 degrees. Stretcher is in low, locked position, side rails up x2.  Pt previously connected to continuous cardiac monitor, continuous pulse ox, and automatic BP cuff; alarms set and turned on for monitor, pulse ox. The patient is sleeping quietly.  Airway is open and patent, respirations are spontaneous, normal respiratory effort and rate noted, skin warm and dry, full ROM in all extremities, appearance: NAD noted, resting comfortably.Call bell within reach of pt, pt instructed on use, not able to determine if pt understands.  Hourly rounding explained and white board updated.  Plan of care:  observe and reassure, position of comfort, respirations even and unlabored, patient offers no complaints at this time, awaiting additional orders, will continue to monitor

## 2020-04-10 NOTE — ED NOTES
Nursing home called and transportation requested and instructions given to YAJAIRA Devine at the [facility], she VU and states  a van will be sent to  patient

## 2020-05-21 ENCOUNTER — HOSPITAL ENCOUNTER (INPATIENT)
Facility: OTHER | Age: 75
LOS: 18 days | Discharge: NURSING, REHAB OR LONG-TERM CARE | DRG: 329 | End: 2020-06-09
Attending: INTERNAL MEDICINE | Admitting: INTERNAL MEDICINE
Payer: MEDICARE

## 2020-05-21 ENCOUNTER — HOSPITAL ENCOUNTER (EMERGENCY)
Facility: HOSPITAL | Age: 75
Discharge: ANOTHER HOSPITAL | End: 2020-05-21
Attending: SURGERY
Payer: MEDICARE

## 2020-05-21 VITALS
HEART RATE: 48 BPM | SYSTOLIC BLOOD PRESSURE: 119 MMHG | DIASTOLIC BLOOD PRESSURE: 63 MMHG | OXYGEN SATURATION: 100 % | RESPIRATION RATE: 15 BRPM | TEMPERATURE: 98 F | BODY MASS INDEX: 27.97 KG/M2 | WEIGHT: 212 LBS

## 2020-05-21 DIAGNOSIS — R00.1 PRE-OPERATIVE CARDIOVASCULAR EXAMINATION, BRADYCARDIA: ICD-10-CM

## 2020-05-21 DIAGNOSIS — D64.9 ANEMIA, UNSPECIFIED TYPE: Primary | ICD-10-CM

## 2020-05-21 DIAGNOSIS — C18.2 MALIGNANT NEOPLASM OF ASCENDING COLON: ICD-10-CM

## 2020-05-21 DIAGNOSIS — F31.70 BIPOLAR AFFECTIVE DISORDER IN REMISSION: ICD-10-CM

## 2020-05-21 DIAGNOSIS — K44.9 HIATAL HERNIA: ICD-10-CM

## 2020-05-21 DIAGNOSIS — N40.0 BENIGN PROSTATIC HYPERPLASIA WITHOUT LOWER URINARY TRACT SYMPTOMS: ICD-10-CM

## 2020-05-21 DIAGNOSIS — I42.8 OBSTRUCTIVE CARDIOMYOPATHY: ICD-10-CM

## 2020-05-21 DIAGNOSIS — Z01.810 PRE-OPERATIVE CARDIOVASCULAR EXAMINATION, BRADYCARDIA: ICD-10-CM

## 2020-05-21 DIAGNOSIS — G40.909 SEIZURE DISORDER AS SEQUELA OF CEREBROVASCULAR ACCIDENT: ICD-10-CM

## 2020-05-21 DIAGNOSIS — I10 ESSENTIAL HYPERTENSION: ICD-10-CM

## 2020-05-21 DIAGNOSIS — D62 ACUTE BLOOD LOSS ANEMIA: ICD-10-CM

## 2020-05-21 DIAGNOSIS — K21.9 GASTROESOPHAGEAL REFLUX DISEASE WITHOUT ESOPHAGITIS: ICD-10-CM

## 2020-05-21 DIAGNOSIS — K92.1 GASTROINTESTINAL HEMORRHAGE WITH MELENA: ICD-10-CM

## 2020-05-21 DIAGNOSIS — R00.1 BRADYCARDIA: ICD-10-CM

## 2020-05-21 DIAGNOSIS — I25.10 CORONARY ARTERY DISEASE INVOLVING NATIVE CORONARY ARTERY OF NATIVE HEART WITHOUT ANGINA PECTORIS: ICD-10-CM

## 2020-05-21 DIAGNOSIS — K63.89 MASS OF COLON: ICD-10-CM

## 2020-05-21 DIAGNOSIS — I69.398 SEIZURE DISORDER AS SEQUELA OF CEREBROVASCULAR ACCIDENT: ICD-10-CM

## 2020-05-21 DIAGNOSIS — K21.9 GASTROESOPHAGEAL REFLUX DISEASE, ESOPHAGITIS PRESENCE NOT SPECIFIED: ICD-10-CM

## 2020-05-21 DIAGNOSIS — F01.50: ICD-10-CM

## 2020-05-21 DIAGNOSIS — H40.9 END-STAGE GLAUCOMA: ICD-10-CM

## 2020-05-21 DIAGNOSIS — I69.90 LATE EFFECTS OF CVA (CEREBROVASCULAR ACCIDENT): ICD-10-CM

## 2020-05-21 PROBLEM — F03.918 DEMENTIA WITH BEHAVIORAL DISTURBANCE: Status: RESOLVED | Noted: 2017-09-11 | Resolved: 2020-05-21

## 2020-05-21 PROBLEM — H35.30 MACULAR DEGENERATION: Status: ACTIVE | Noted: 2020-05-21

## 2020-05-21 PROBLEM — N12 PYELONEPHRITIS: Status: RESOLVED | Noted: 2017-09-09 | Resolved: 2020-05-21

## 2020-05-21 PROBLEM — I63.50 CEREBROVASCULAR ACCIDENT (CVA) DUE TO OCCLUSION OF CEREBRAL ARTERY: Status: RESOLVED | Noted: 2020-05-21 | Resolved: 2020-05-21

## 2020-05-21 PROBLEM — I47.20 VENTRICULAR TACHYCARDIA: Status: RESOLVED | Noted: 2017-09-10 | Resolved: 2020-05-21

## 2020-05-21 PROBLEM — E78.5 HYPERLIPIDEMIA: Status: ACTIVE | Noted: 2020-05-21

## 2020-05-21 PROBLEM — J30.9 ALLERGIC RHINITIS: Status: ACTIVE | Noted: 2020-05-21

## 2020-05-21 PROBLEM — I63.50 CEREBROVASCULAR ACCIDENT (CVA) DUE TO OCCLUSION OF CEREBRAL ARTERY: Status: ACTIVE | Noted: 2020-05-21

## 2020-05-21 LAB
ABO + RH BLD: NORMAL
ALBUMIN SERPL BCP-MCNC: 3.2 G/DL (ref 3.5–5.2)
ALP SERPL-CCNC: 60 U/L (ref 55–135)
ALT SERPL W/O P-5'-P-CCNC: 10 U/L (ref 10–44)
ANION GAP SERPL CALC-SCNC: 10 MMOL/L (ref 8–16)
ANISOCYTOSIS BLD QL SMEAR: ABNORMAL
AST SERPL-CCNC: 12 U/L (ref 10–40)
BASOPHILS # BLD AUTO: 0.04 K/UL (ref 0–0.2)
BASOPHILS # BLD AUTO: 0.04 K/UL (ref 0–0.2)
BASOPHILS # BLD AUTO: 0.05 K/UL (ref 0–0.2)
BASOPHILS NFR BLD: 0.5 % (ref 0–1.9)
BASOPHILS NFR BLD: 0.6 % (ref 0–1.9)
BASOPHILS NFR BLD: 0.6 % (ref 0–1.9)
BILIRUB SERPL-MCNC: 0.3 MG/DL (ref 0.1–1)
BLD GP AB SCN CELLS X3 SERPL QL: NORMAL
BUN SERPL-MCNC: 13 MG/DL (ref 8–23)
CALCIUM SERPL-MCNC: 8.8 MG/DL (ref 8.7–10.5)
CHLORIDE SERPL-SCNC: 105 MMOL/L (ref 95–110)
CK MB SERPL-MCNC: 0.8 NG/ML (ref 0.1–6.5)
CK MB SERPL-RTO: 1.9 % (ref 0–5)
CK SERPL-CCNC: 43 U/L (ref 20–200)
CO2 SERPL-SCNC: 26 MMOL/L (ref 23–29)
CREAT SERPL-MCNC: 0.9 MG/DL (ref 0.5–1.4)
DACRYOCYTES BLD QL SMEAR: ABNORMAL
DIFFERENTIAL METHOD: ABNORMAL
EOSINOPHIL # BLD AUTO: 0.2 K/UL (ref 0–0.5)
EOSINOPHIL # BLD AUTO: 0.2 K/UL (ref 0–0.5)
EOSINOPHIL # BLD AUTO: 0.3 K/UL (ref 0–0.5)
EOSINOPHIL NFR BLD: 2.7 % (ref 0–8)
EOSINOPHIL NFR BLD: 3.1 % (ref 0–8)
EOSINOPHIL NFR BLD: 3.5 % (ref 0–8)
ERYTHROCYTE [DISTWIDTH] IN BLOOD BY AUTOMATED COUNT: ABNORMAL % (ref 11.5–14.5)
EST. GFR  (AFRICAN AMERICAN): >60 ML/MIN/1.73 M^2
EST. GFR  (NON AFRICAN AMERICAN): >60 ML/MIN/1.73 M^2
GLUCOSE SERPL-MCNC: 99 MG/DL (ref 70–110)
HCT VFR BLD AUTO: 36.7 % (ref 40–54)
HCT VFR BLD AUTO: 39.2 % (ref 40–54)
HCT VFR BLD AUTO: 40.4 % (ref 40–54)
HGB BLD-MCNC: 10.6 G/DL (ref 14–18)
HGB BLD-MCNC: 10.9 G/DL (ref 14–18)
HGB BLD-MCNC: 12 G/DL (ref 14–18)
HYPOCHROMIA BLD QL SMEAR: ABNORMAL
IMM GRANULOCYTES # BLD AUTO: 0.01 K/UL (ref 0–0.04)
IMM GRANULOCYTES # BLD AUTO: 0.02 K/UL (ref 0–0.04)
IMM GRANULOCYTES # BLD AUTO: 0.02 K/UL (ref 0–0.04)
IMM GRANULOCYTES NFR BLD AUTO: 0.1 % (ref 0–0.5)
IMM GRANULOCYTES NFR BLD AUTO: 0.2 % (ref 0–0.5)
IMM GRANULOCYTES NFR BLD AUTO: 0.3 % (ref 0–0.5)
INR PPP: 1.1 (ref 0.8–1.2)
LYMPHOCYTES # BLD AUTO: 1.7 K/UL (ref 1–4.8)
LYMPHOCYTES # BLD AUTO: 2.4 K/UL (ref 1–4.8)
LYMPHOCYTES # BLD AUTO: 2.9 K/UL (ref 1–4.8)
LYMPHOCYTES NFR BLD: 25.6 % (ref 18–48)
LYMPHOCYTES NFR BLD: 29.1 % (ref 18–48)
LYMPHOCYTES NFR BLD: 33.6 % (ref 18–48)
MCH RBC QN AUTO: 22.8 PG (ref 27–31)
MCH RBC QN AUTO: 23 PG (ref 27–31)
MCH RBC QN AUTO: 23.2 PG (ref 27–31)
MCHC RBC AUTO-ENTMCNC: 27.8 G/DL (ref 32–36)
MCHC RBC AUTO-ENTMCNC: 28.9 G/DL (ref 32–36)
MCHC RBC AUTO-ENTMCNC: 29.7 G/DL (ref 32–36)
MCV RBC AUTO: 78 FL (ref 82–98)
MCV RBC AUTO: 80 FL (ref 82–98)
MCV RBC AUTO: 82 FL (ref 82–98)
MONOCYTES # BLD AUTO: 0.5 K/UL (ref 0.3–1)
MONOCYTES NFR BLD: 6 % (ref 4–15)
MONOCYTES NFR BLD: 6.4 % (ref 4–15)
MONOCYTES NFR BLD: 6.8 % (ref 4–15)
NEUTROPHILS # BLD AUTO: 4.3 K/UL (ref 1.8–7.7)
NEUTROPHILS # BLD AUTO: 4.9 K/UL (ref 1.8–7.7)
NEUTROPHILS # BLD AUTO: 5.1 K/UL (ref 1.8–7.7)
NEUTROPHILS NFR BLD: 56.5 % (ref 38–73)
NEUTROPHILS NFR BLD: 61.2 % (ref 38–73)
NEUTROPHILS NFR BLD: 63.2 % (ref 38–73)
NRBC BLD-RTO: 0 /100 WBC
OB PNL STL: POSITIVE
OVALOCYTES BLD QL SMEAR: ABNORMAL
PLATELET # BLD AUTO: 172 K/UL (ref 150–350)
PLATELET # BLD AUTO: 185 K/UL (ref 150–350)
PLATELET # BLD AUTO: 186 K/UL (ref 150–350)
PLATELET BLD QL SMEAR: ABNORMAL
PMV BLD AUTO: 9.3 FL (ref 9.2–12.9)
PMV BLD AUTO: ABNORMAL FL (ref 9.2–12.9)
PMV BLD AUTO: ABNORMAL FL (ref 9.2–12.9)
POIKILOCYTOSIS BLD QL SMEAR: ABNORMAL
POTASSIUM SERPL-SCNC: 4.9 MMOL/L (ref 3.5–5.1)
PROT SERPL-MCNC: 7.2 G/DL (ref 6–8.4)
PROTHROMBIN TIME: 11.3 SEC (ref 9–12.5)
RBC # BLD AUTO: 4.61 M/UL (ref 4.6–6.2)
RBC # BLD AUTO: 4.78 M/UL (ref 4.6–6.2)
RBC # BLD AUTO: 5.18 M/UL (ref 4.6–6.2)
SARS-COV-2 RDRP RESP QL NAA+PROBE: NEGATIVE
SCHISTOCYTES BLD QL SMEAR: ABNORMAL
SCHISTOCYTES BLD QL SMEAR: ABNORMAL
SCHISTOCYTES BLD QL SMEAR: PRESENT
SCHISTOCYTES BLD QL SMEAR: PRESENT
SODIUM SERPL-SCNC: 141 MMOL/L (ref 136–145)
SPHEROCYTES BLD QL SMEAR: ABNORMAL
SPHEROCYTES BLD QL SMEAR: ABNORMAL
TARGETS BLD QL SMEAR: ABNORMAL
TARGETS BLD QL SMEAR: ABNORMAL
TROPONIN I SERPL DL<=0.01 NG/ML-MCNC: <0.006 NG/ML (ref 0–0.03)
WBC # BLD AUTO: 6.79 K/UL (ref 3.9–12.7)
WBC # BLD AUTO: 8.25 K/UL (ref 3.9–12.7)
WBC # BLD AUTO: 8.64 K/UL (ref 3.9–12.7)

## 2020-05-21 PROCEDURE — 82272 OCCULT BLD FECES 1-3 TESTS: CPT

## 2020-05-21 PROCEDURE — 93010 EKG 12-LEAD: ICD-10-PCS | Mod: ,,, | Performed by: INTERNAL MEDICINE

## 2020-05-21 PROCEDURE — G0379 DIRECT REFER HOSPITAL OBSERV: HCPCS

## 2020-05-21 PROCEDURE — 93005 ELECTROCARDIOGRAM TRACING: CPT

## 2020-05-21 PROCEDURE — 86850 RBC ANTIBODY SCREEN: CPT

## 2020-05-21 PROCEDURE — 85025 COMPLETE CBC W/AUTO DIFF WBC: CPT

## 2020-05-21 PROCEDURE — U0002 COVID-19 LAB TEST NON-CDC: HCPCS

## 2020-05-21 PROCEDURE — 96376 TX/PRO/DX INJ SAME DRUG ADON: CPT

## 2020-05-21 PROCEDURE — 85610 PROTHROMBIN TIME: CPT

## 2020-05-21 PROCEDURE — 96361 HYDRATE IV INFUSION ADD-ON: CPT

## 2020-05-21 PROCEDURE — 93010 ELECTROCARDIOGRAM REPORT: CPT | Mod: ,,, | Performed by: INTERNAL MEDICINE

## 2020-05-21 PROCEDURE — 82550 ASSAY OF CK (CPK): CPT

## 2020-05-21 PROCEDURE — 96360 HYDRATION IV INFUSION INIT: CPT

## 2020-05-21 PROCEDURE — 96374 THER/PROPH/DIAG INJ IV PUSH: CPT

## 2020-05-21 PROCEDURE — 84484 ASSAY OF TROPONIN QUANT: CPT

## 2020-05-21 PROCEDURE — 99285 EMERGENCY DEPT VISIT HI MDM: CPT | Mod: 25

## 2020-05-21 PROCEDURE — 82553 CREATINE MB FRACTION: CPT

## 2020-05-21 PROCEDURE — 80053 COMPREHEN METABOLIC PANEL: CPT

## 2020-05-21 PROCEDURE — 25000003 PHARM REV CODE 250: Performed by: SURGERY

## 2020-05-21 PROCEDURE — C9113 INJ PANTOPRAZOLE SODIUM, VIA: HCPCS | Performed by: INTERNAL MEDICINE

## 2020-05-21 PROCEDURE — 25000003 PHARM REV CODE 250: Performed by: NURSE PRACTITIONER

## 2020-05-21 PROCEDURE — 99223 PR INITIAL HOSPITAL CARE,LEVL III: ICD-10-PCS | Mod: ,,, | Performed by: NURSE PRACTITIONER

## 2020-05-21 PROCEDURE — 85025 COMPLETE CBC W/AUTO DIFF WBC: CPT | Mod: 91

## 2020-05-21 PROCEDURE — 36415 COLL VENOUS BLD VENIPUNCTURE: CPT

## 2020-05-21 PROCEDURE — 99223 1ST HOSP IP/OBS HIGH 75: CPT | Mod: ,,, | Performed by: NURSE PRACTITIONER

## 2020-05-21 PROCEDURE — 63600175 PHARM REV CODE 636 W HCPCS: Performed by: INTERNAL MEDICINE

## 2020-05-21 PROCEDURE — G0378 HOSPITAL OBSERVATION PER HR: HCPCS

## 2020-05-21 RX ORDER — FERROUS SULFATE 325(65) MG
325 TABLET, DELAYED RELEASE (ENTERIC COATED) ORAL 2 TIMES DAILY
Status: ON HOLD | COMMUNITY
End: 2020-06-08 | Stop reason: HOSPADM

## 2020-05-21 RX ORDER — BRIMONIDINE TARTRATE AND TIMOLOL MALEATE 2; 5 MG/ML; MG/ML
1 SOLUTION OPHTHALMIC 2 TIMES DAILY
Status: DISCONTINUED | OUTPATIENT
Start: 2020-05-21 | End: 2020-05-22

## 2020-05-21 RX ORDER — PANTOPRAZOLE SODIUM 40 MG/10ML
40 INJECTION, POWDER, LYOPHILIZED, FOR SOLUTION INTRAVENOUS 2 TIMES DAILY
Status: DISCONTINUED | OUTPATIENT
Start: 2020-05-21 | End: 2020-05-26

## 2020-05-21 RX ORDER — DIVALPROEX SODIUM 250 MG/1
500 TABLET, DELAYED RELEASE ORAL NIGHTLY
Status: DISCONTINUED | OUTPATIENT
Start: 2020-05-21 | End: 2020-06-09 | Stop reason: HOSPADM

## 2020-05-21 RX ORDER — TRAVOPROST OPHTHALMIC SOLUTION 0.04 MG/ML
1 SOLUTION OPHTHALMIC NIGHTLY
Status: DISCONTINUED | OUTPATIENT
Start: 2020-05-21 | End: 2020-06-09 | Stop reason: HOSPADM

## 2020-05-21 RX ORDER — TRAZODONE HYDROCHLORIDE 100 MG/1
100 TABLET ORAL NIGHTLY
Status: DISCONTINUED | OUTPATIENT
Start: 2020-05-21 | End: 2020-05-29

## 2020-05-21 RX ORDER — FLUOXETINE HYDROCHLORIDE 20 MG/1
20 CAPSULE ORAL DAILY
Status: DISCONTINUED | OUTPATIENT
Start: 2020-05-22 | End: 2020-06-09 | Stop reason: HOSPADM

## 2020-05-21 RX ORDER — TAMSULOSIN HYDROCHLORIDE 0.4 MG/1
1 CAPSULE ORAL DAILY
Status: DISCONTINUED | OUTPATIENT
Start: 2020-05-22 | End: 2020-06-09 | Stop reason: HOSPADM

## 2020-05-21 RX ORDER — ATORVASTATIN CALCIUM 20 MG/1
40 TABLET, FILM COATED ORAL NIGHTLY
Status: DISCONTINUED | OUTPATIENT
Start: 2020-05-21 | End: 2020-06-09 | Stop reason: HOSPADM

## 2020-05-21 RX ORDER — DORZOLAMIDE HCL 20 MG/ML
1 SOLUTION/ DROPS OPHTHALMIC 2 TIMES DAILY
Status: DISCONTINUED | OUTPATIENT
Start: 2020-05-21 | End: 2020-06-09 | Stop reason: HOSPADM

## 2020-05-21 RX ORDER — ONDANSETRON 2 MG/ML
4 INJECTION INTRAMUSCULAR; INTRAVENOUS EVERY 8 HOURS PRN
Status: DISCONTINUED | OUTPATIENT
Start: 2020-05-21 | End: 2020-06-09 | Stop reason: HOSPADM

## 2020-05-21 RX ORDER — METOPROLOL TARTRATE 25 MG/1
12.5 TABLET ORAL 2 TIMES DAILY
Status: DISCONTINUED | OUTPATIENT
Start: 2020-05-21 | End: 2020-05-26

## 2020-05-21 RX ORDER — SODIUM CHLORIDE 9 MG/ML
500 INJECTION, SOLUTION INTRAVENOUS
Status: COMPLETED | OUTPATIENT
Start: 2020-05-21 | End: 2020-05-21

## 2020-05-21 RX ORDER — ATORVASTATIN CALCIUM 20 MG/1
20 TABLET, FILM COATED ORAL NIGHTLY
Status: DISCONTINUED | OUTPATIENT
Start: 2020-05-21 | End: 2020-05-21

## 2020-05-21 RX ORDER — PANTOPRAZOLE SODIUM 40 MG/1
40 TABLET, DELAYED RELEASE ORAL DAILY
COMMUNITY

## 2020-05-21 RX ORDER — PANTOPRAZOLE SODIUM 40 MG/10ML
40 INJECTION, POWDER, LYOPHILIZED, FOR SOLUTION INTRAVENOUS 2 TIMES DAILY
Status: DISCONTINUED | OUTPATIENT
Start: 2020-05-21 | End: 2020-05-21

## 2020-05-21 RX ORDER — ACETAMINOPHEN 325 MG/1
650 TABLET ORAL EVERY 4 HOURS PRN
Status: DISCONTINUED | OUTPATIENT
Start: 2020-05-21 | End: 2020-06-02

## 2020-05-21 RX ORDER — PHENYTOIN SODIUM 100 MG/1
100 CAPSULE, EXTENDED RELEASE ORAL 2 TIMES DAILY
Status: DISCONTINUED | OUTPATIENT
Start: 2020-05-21 | End: 2020-06-09 | Stop reason: HOSPADM

## 2020-05-21 RX ORDER — PHENYTOIN SODIUM 100 MG/1
100 CAPSULE, EXTENDED RELEASE ORAL 2 TIMES DAILY
COMMUNITY

## 2020-05-21 RX ORDER — SODIUM CHLORIDE 9 MG/ML
1000 INJECTION, SOLUTION INTRAVENOUS
Status: COMPLETED | OUTPATIENT
Start: 2020-05-21 | End: 2020-05-21

## 2020-05-21 RX ORDER — SODIUM CHLORIDE 0.9 % (FLUSH) 0.9 %
10 SYRINGE (ML) INJECTION
Status: DISCONTINUED | OUTPATIENT
Start: 2020-05-21 | End: 2020-06-02

## 2020-05-21 RX ORDER — DONEPEZIL HYDROCHLORIDE 5 MG/1
10 TABLET, FILM COATED ORAL DAILY
Status: DISCONTINUED | OUTPATIENT
Start: 2020-05-22 | End: 2020-06-02

## 2020-05-21 RX ADMIN — TRAZODONE HYDROCHLORIDE 100 MG: 100 TABLET ORAL at 08:05

## 2020-05-21 RX ADMIN — METOPROLOL TARTRATE 12.5 MG: 25 TABLET, FILM COATED ORAL at 08:05

## 2020-05-21 RX ADMIN — PANTOPRAZOLE SODIUM 40 MG: 40 INJECTION, POWDER, LYOPHILIZED, FOR SOLUTION INTRAVENOUS at 05:05

## 2020-05-21 RX ADMIN — PHENYTOIN SODIUM 100 MG: 100 CAPSULE ORAL at 08:05

## 2020-05-21 RX ADMIN — DORZOLAMIDE HYDROCHLORIDE 1 DROP: 20 SOLUTION/ DROPS OPHTHALMIC at 08:05

## 2020-05-21 RX ADMIN — ATORVASTATIN CALCIUM 40 MG: 20 TABLET, FILM COATED ORAL at 08:05

## 2020-05-21 RX ADMIN — SODIUM CHLORIDE 1000 ML: 0.9 INJECTION, SOLUTION INTRAVENOUS at 02:05

## 2020-05-21 RX ADMIN — SODIUM CHLORIDE 500 ML: 0.9 INJECTION, SOLUTION INTRAVENOUS at 10:05

## 2020-05-21 RX ADMIN — DIVALPROEX SODIUM 500 MG: 250 TABLET, DELAYED RELEASE ORAL at 08:05

## 2020-05-21 RX ADMIN — TRAVOPROST 1 DROP: 0.04 SOLUTION/ DROPS OPHTHALMIC at 08:05

## 2020-05-21 NOTE — ASSESSMENT & PLAN NOTE
- Per medical record  - Will request prior Cardiology records for further information  - Appears stable

## 2020-05-21 NOTE — ASSESSMENT & PLAN NOTE
- Hemoglobin/hematocrit on admission 12.0/40.4    - Start Protonix 40 mg IV BID for now    - GI consulted, appreciate assistance  - Clear liquids for now and NPO at midnight for possible EGD  - Type and screen, CBCq 8h and Transfuse for Hemoglobin <7
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
## 2020-05-21 NOTE — ASSESSMENT & PLAN NOTE
- Per medical record, had previously been on aspirin and Plavix, but NH record does not indicate that he is currently on any anticoagulants  - Appears stable  - Continue metoprolol tartrate 12.5 mg BID and atorvastatin 40 mg daily

## 2020-05-21 NOTE — ASSESSMENT & PLAN NOTE
- Known episodes of melena since admission  - H/H on admit stable in ED 12.0/40.4 with trend down on admission 10.9/39.2  - Trend H/H q8 hours and transfuse Hgb < 7  - GI consulted for evaluations of GI bleeding
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
## 2020-05-21 NOTE — HPI
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  He is currently a resident of the [facility].   Per medical record, the patient with recent prior history of severe anemia requiring transfusion on April 9, 2020.  During that time, he was evaluated  At [facility] and found to be hemoccult positive and CT scan of abdomen was unremarkable.  He was subsequently discharged back to his nursing home with plan for outpatient follow up with GI.  However, the patient was unable to see GI as outpatient.   Today, patient presented to the [facility] ED where initial work up revealed stable hemoglobin/hematocrit 12.0/40.4. Otherwise, labs were unremarkable and COVID screen was negative. Of note, EKG in ED today shows sinus bradycardia and asymtpomatic with heart rate in upper 40's and low 50's.   He was to be discharged back to the nursing home, but patient had episode of melena and decision to transfer to [facility] for evaluation.     On arrival to the [facility], the patient had one small melanotic stool.  Given dementia, the patient is unable to provide detailed history.  Per nursing home medication reconciliation, the patient has not been on aspirin or Plavix recently and is not currently on any other type of blood thinners.  GI will be consulted and roge continue to trend hemoglobin/hematocrit."  "

## 2020-05-21 NOTE — ED TRIAGE NOTES
"PT presents via [facility] with C/O Melena. Staff at [facility] states pt had " blood in his diaper this morning"  "

## 2020-05-21 NOTE — ASSESSMENT & PLAN NOTE
- Per medical record  - On dilantin 100 mg PO twice daily  - Level monthly at NH and in normal limits

## 2020-05-21 NOTE — ASSESSMENT & PLAN NOTE
- Per medical record, known sinus bradycardia without symptoms or recent history of falls  - On metoprolol znlorznp79.5 mg daily and will continue for now  - Monitor on telemetry

## 2020-05-21 NOTE — PROVIDER TRANSFER
(Physician in Lead of Transfers)/Regional Referral Center Note    Patients name/MRN: Anjel Soria/MRN [id number]    Referring Physician or Mid-Level provider giving report: Dr. Marcelo Polanco (Emergency Medicine)  Contact number: [phone]    Referral Facility: [facility] in [place], [place]    Date/Time of Acceptance: 5/21/2020 11:32 AM    : Dr. Joi Cook (Hospital Medicine); Case discussed with Dr. Jero Bower who is accepting to Hospital Medicine at [facility]     Accepting facility [facility]/Tele (Observation)    Consulting Physicians from [facility] involved in case:    Reason for transfer request: GI bleed in NH resident who failed to follow-up on previous ED visit with GI with melena. No GI at [facility].    Report from Physician/Mid-Level Provider/HPI: 75 y/o male who is legally blind from [facility] with CAD, BPH, Bipolar disorder, dementia, essential HTN, GERD, seizures and prior stroke sent to ED from NH after staff at NH noted blood in patients stool after BM. Patient was seen in ED at [facility] in early April for severe anemia with Hgb 7 and transfused 2 units of blood and CT scan of abdomen at that time was unremarkable and discharged back to NH with plan to have outpatient GI follow-up but patient apparently never had GI follow-up. Patient unable to provide history due to dementia. Patient on ASA and Plavix for CAD at NH but not on any other type of blood thinners. H/H in ED today was 12/40 and plan to return patient back to NH but then patient had large melanotic stool in ED that was Heme positive so concern and wishes patient to be admitted and no GI at [facility]. INR normal at 1.1 and platelet count 172,000. BUN normal at 13. LFTs normal. COVID screen was negative and patient not having any COVID type symptoms. Patient bradycardic in ED with HR in upper 40s but on reviewing patients chart patient has had numerous documented encounters with HR at  that level on previous EKGs. EKG in ED today shows sinus bradycardia.     VS: BP: 136/88  P: 47  R: 16  T: 98.2 F  Pulse Ox: 98% on room air     Lines/Tubes:  Peripheral line ? Type: 22-gauge Location: Right Upper arm     Past Medical/Surgical History: See EPIC    Meds: See EPIC    Labs: See EPIC    Imaging: See EPIC    To Do List upon arrival:     Place in observation   Serial H/H   Consult GI for melena    Avoid antiplatelet and anticoagulant medication/agents    Joi Cook MD  Senior Staff Physician  Department of Hospital Medicine  Patient Flow Center/   [phone]

## 2020-05-21 NOTE — ASSESSMENT & PLAN NOTE
- Per record.  Mood appears stable  - Continue Depakote 500 mg daily, Prozac 20 mg daily and trazodone 50 mg nightly

## 2020-05-22 ENCOUNTER — ANESTHESIA (OUTPATIENT)
Dept: ENDOSCOPY | Facility: OTHER | Age: 75
DRG: 329 | End: 2020-05-22
Payer: MEDICARE

## 2020-05-22 ENCOUNTER — ANESTHESIA EVENT (OUTPATIENT)
Dept: ENDOSCOPY | Facility: OTHER | Age: 75
DRG: 329 | End: 2020-05-22
Payer: MEDICARE

## 2020-05-22 PROBLEM — K44.9 HIATAL HERNIA: Status: ACTIVE | Noted: 2020-05-22

## 2020-05-22 LAB
ANION GAP SERPL CALC-SCNC: 8 MMOL/L (ref 8–16)
ANISOCYTOSIS BLD QL SMEAR: ABNORMAL
ANISOCYTOSIS BLD QL SMEAR: ABNORMAL
BASOPHILS # BLD AUTO: ABNORMAL K/UL (ref 0–0.2)
BASOPHILS # BLD AUTO: ABNORMAL K/UL (ref 0–0.2)
BASOPHILS NFR BLD: 0 % (ref 0–1.9)
BASOPHILS NFR BLD: 0 % (ref 0–1.9)
BUN SERPL-MCNC: 12 MG/DL (ref 8–23)
BURR CELLS BLD QL SMEAR: ABNORMAL
CALCIUM SERPL-MCNC: 8.9 MG/DL (ref 8.7–10.5)
CHLORIDE SERPL-SCNC: 107 MMOL/L (ref 95–110)
CO2 SERPL-SCNC: 24 MMOL/L (ref 23–29)
CREAT SERPL-MCNC: 0.9 MG/DL (ref 0.5–1.4)
DACRYOCYTES BLD QL SMEAR: ABNORMAL
DIFFERENTIAL METHOD: ABNORMAL
DIFFERENTIAL METHOD: ABNORMAL
EOSINOPHIL # BLD AUTO: ABNORMAL K/UL (ref 0–0.5)
EOSINOPHIL # BLD AUTO: ABNORMAL K/UL (ref 0–0.5)
EOSINOPHIL NFR BLD: 3 % (ref 0–8)
EOSINOPHIL NFR BLD: 4 % (ref 0–8)
ERYTHROCYTE [DISTWIDTH] IN BLOOD BY AUTOMATED COUNT: ABNORMAL % (ref 11.5–14.5)
ERYTHROCYTE [DISTWIDTH] IN BLOOD BY AUTOMATED COUNT: ABNORMAL % (ref 11.5–14.5)
EST. GFR  (AFRICAN AMERICAN): >60 ML/MIN/1.73 M^2
EST. GFR  (NON AFRICAN AMERICAN): >60 ML/MIN/1.73 M^2
GIANT PLATELETS BLD QL SMEAR: PRESENT
GIANT PLATELETS BLD QL SMEAR: PRESENT
GLUCOSE SERPL-MCNC: 108 MG/DL (ref 70–110)
HCT VFR BLD AUTO: 33.3 % (ref 40–54)
HCT VFR BLD AUTO: 37.2 % (ref 40–54)
HGB BLD-MCNC: 10 G/DL (ref 14–18)
HGB BLD-MCNC: 11 G/DL (ref 14–18)
HYPOCHROMIA BLD QL SMEAR: ABNORMAL
IMM GRANULOCYTES # BLD AUTO: ABNORMAL K/UL (ref 0–0.04)
IMM GRANULOCYTES # BLD AUTO: ABNORMAL K/UL (ref 0–0.04)
IMM GRANULOCYTES NFR BLD AUTO: ABNORMAL % (ref 0–0.5)
IMM GRANULOCYTES NFR BLD AUTO: ABNORMAL % (ref 0–0.5)
LYMPHOCYTES # BLD AUTO: ABNORMAL K/UL (ref 1–4.8)
LYMPHOCYTES # BLD AUTO: ABNORMAL K/UL (ref 1–4.8)
LYMPHOCYTES NFR BLD: 28 % (ref 18–48)
LYMPHOCYTES NFR BLD: 30 % (ref 18–48)
MCH RBC QN AUTO: 23.1 PG (ref 27–31)
MCH RBC QN AUTO: 23.1 PG (ref 27–31)
MCHC RBC AUTO-ENTMCNC: 29.6 G/DL (ref 32–36)
MCHC RBC AUTO-ENTMCNC: 30 G/DL (ref 32–36)
MCV RBC AUTO: 77 FL (ref 82–98)
MCV RBC AUTO: 78 FL (ref 82–98)
MONOCYTES # BLD AUTO: ABNORMAL K/UL (ref 0.3–1)
MONOCYTES # BLD AUTO: ABNORMAL K/UL (ref 0.3–1)
MONOCYTES NFR BLD: 6 % (ref 4–15)
MONOCYTES NFR BLD: 6 % (ref 4–15)
NEUTROPHILS NFR BLD: 60 % (ref 38–73)
NEUTROPHILS NFR BLD: 63 % (ref 38–73)
NRBC BLD-RTO: 0 /100 WBC
NRBC BLD-RTO: 0 /100 WBC
OVALOCYTES BLD QL SMEAR: ABNORMAL
OVALOCYTES BLD QL SMEAR: ABNORMAL
PHENYTOIN SERPL-MCNC: 15.1 UG/ML (ref 10–20)
PLATELET # BLD AUTO: 172 K/UL (ref 150–350)
PLATELET # BLD AUTO: 181 K/UL (ref 150–350)
PLATELET BLD QL SMEAR: ABNORMAL
PLATELET BLD QL SMEAR: ABNORMAL
PMV BLD AUTO: ABNORMAL FL (ref 9.2–12.9)
PMV BLD AUTO: ABNORMAL FL (ref 9.2–12.9)
POIKILOCYTOSIS BLD QL SMEAR: ABNORMAL
POIKILOCYTOSIS BLD QL SMEAR: ABNORMAL
POTASSIUM SERPL-SCNC: 4.6 MMOL/L (ref 3.5–5.1)
RBC # BLD AUTO: 4.33 M/UL (ref 4.6–6.2)
RBC # BLD AUTO: 4.76 M/UL (ref 4.6–6.2)
SCHISTOCYTES BLD QL SMEAR: ABNORMAL
SCHISTOCYTES BLD QL SMEAR: PRESENT
SCHISTOCYTES BLD QL SMEAR: PRESENT
SODIUM SERPL-SCNC: 139 MMOL/L (ref 136–145)
SPHEROCYTES BLD QL SMEAR: ABNORMAL
SPHEROCYTES BLD QL SMEAR: ABNORMAL
TARGETS BLD QL SMEAR: ABNORMAL
TOXIC GRANULES BLD QL SMEAR: PRESENT
VALPROATE SERPL-MCNC: 32.8 UG/ML (ref 50–100)
WBC # BLD AUTO: 7.16 K/UL (ref 3.9–12.7)
WBC # BLD AUTO: 8.77 K/UL (ref 3.9–12.7)

## 2020-05-22 PROCEDURE — 80164 ASSAY DIPROPYLACETIC ACD TOT: CPT

## 2020-05-22 PROCEDURE — 94761 N-INVAS EAR/PLS OXIMETRY MLT: CPT

## 2020-05-22 PROCEDURE — 11000001 HC ACUTE MED/SURG PRIVATE ROOM

## 2020-05-22 PROCEDURE — 85007 BL SMEAR W/DIFF WBC COUNT: CPT

## 2020-05-22 PROCEDURE — 80185 ASSAY OF PHENYTOIN TOTAL: CPT

## 2020-05-22 PROCEDURE — 99233 PR SUBSEQUENT HOSPITAL CARE,LEVL III: ICD-10-PCS | Mod: ,,, | Performed by: NURSE PRACTITIONER

## 2020-05-22 PROCEDURE — 43235 EGD DIAGNOSTIC BRUSH WASH: CPT | Performed by: INTERNAL MEDICINE

## 2020-05-22 PROCEDURE — 80048 BASIC METABOLIC PNL TOTAL CA: CPT

## 2020-05-22 PROCEDURE — 37000008 HC ANESTHESIA 1ST 15 MINUTES: Performed by: INTERNAL MEDICINE

## 2020-05-22 PROCEDURE — 63600175 PHARM REV CODE 636 W HCPCS: Performed by: NURSE ANESTHETIST, CERTIFIED REGISTERED

## 2020-05-22 PROCEDURE — C9113 INJ PANTOPRAZOLE SODIUM, VIA: HCPCS | Performed by: INTERNAL MEDICINE

## 2020-05-22 PROCEDURE — 85027 COMPLETE CBC AUTOMATED: CPT | Mod: 91

## 2020-05-22 PROCEDURE — 99900035 HC TECH TIME PER 15 MIN (STAT)

## 2020-05-22 PROCEDURE — 63600175 PHARM REV CODE 636 W HCPCS: Performed by: INTERNAL MEDICINE

## 2020-05-22 PROCEDURE — 36415 COLL VENOUS BLD VENIPUNCTURE: CPT

## 2020-05-22 PROCEDURE — 99233 SBSQ HOSP IP/OBS HIGH 50: CPT | Mod: ,,, | Performed by: NURSE PRACTITIONER

## 2020-05-22 PROCEDURE — 25000003 PHARM REV CODE 250: Performed by: NURSE PRACTITIONER

## 2020-05-22 PROCEDURE — 25000003 PHARM REV CODE 250: Performed by: INTERNAL MEDICINE

## 2020-05-22 PROCEDURE — 37000009 HC ANESTHESIA EA ADD 15 MINS: Performed by: INTERNAL MEDICINE

## 2020-05-22 RX ORDER — SODIUM CHLORIDE, SODIUM LACTATE, POTASSIUM CHLORIDE, CALCIUM CHLORIDE 600; 310; 30; 20 MG/100ML; MG/100ML; MG/100ML; MG/100ML
INJECTION, SOLUTION INTRAVENOUS CONTINUOUS PRN
Status: DISCONTINUED | OUTPATIENT
Start: 2020-05-22 | End: 2020-05-22

## 2020-05-22 RX ORDER — OXYCODONE HYDROCHLORIDE 5 MG/1
5 TABLET ORAL
Status: DISCONTINUED | OUTPATIENT
Start: 2020-05-22 | End: 2020-05-22

## 2020-05-22 RX ORDER — MEPERIDINE HYDROCHLORIDE 25 MG/ML
12.5 INJECTION INTRAMUSCULAR; INTRAVENOUS; SUBCUTANEOUS ONCE AS NEEDED
Status: DISCONTINUED | OUTPATIENT
Start: 2020-05-22 | End: 2020-05-22

## 2020-05-22 RX ORDER — HYDROMORPHONE HYDROCHLORIDE 2 MG/ML
0.4 INJECTION, SOLUTION INTRAMUSCULAR; INTRAVENOUS; SUBCUTANEOUS EVERY 5 MIN PRN
Status: DISCONTINUED | OUTPATIENT
Start: 2020-05-22 | End: 2020-05-26 | Stop reason: SDUPTHER

## 2020-05-22 RX ORDER — SODIUM CHLORIDE 0.9 % (FLUSH) 0.9 %
3 SYRINGE (ML) INJECTION
Status: DISCONTINUED | OUTPATIENT
Start: 2020-05-22 | End: 2020-05-22

## 2020-05-22 RX ORDER — PROPOFOL 10 MG/ML
VIAL (ML) INTRAVENOUS
Status: DISCONTINUED | OUTPATIENT
Start: 2020-05-22 | End: 2020-05-22

## 2020-05-22 RX ORDER — ONDANSETRON 2 MG/ML
4 INJECTION INTRAMUSCULAR; INTRAVENOUS DAILY PRN
Status: DISCONTINUED | OUTPATIENT
Start: 2020-05-22 | End: 2020-05-22

## 2020-05-22 RX ADMIN — TRAVOPROST 1 DROP: 0.04 SOLUTION/ DROPS OPHTHALMIC at 08:05

## 2020-05-22 RX ADMIN — PHENYTOIN SODIUM 100 MG: 100 CAPSULE ORAL at 10:05

## 2020-05-22 RX ADMIN — PROPOFOL 50 MG: 10 INJECTION, EMULSION INTRAVENOUS at 08:05

## 2020-05-22 RX ADMIN — ACETAMINOPHEN 650 MG: 325 TABLET ORAL at 03:05

## 2020-05-22 RX ADMIN — DORZOLAMIDE HYDROCHLORIDE 1 DROP: 20 SOLUTION/ DROPS OPHTHALMIC at 08:05

## 2020-05-22 RX ADMIN — PHENYTOIN SODIUM 100 MG: 100 CAPSULE ORAL at 08:05

## 2020-05-22 RX ADMIN — PANTOPRAZOLE SODIUM 40 MG: 40 INJECTION, POWDER, LYOPHILIZED, FOR SOLUTION INTRAVENOUS at 10:05

## 2020-05-22 RX ADMIN — FLUOXETINE 20 MG: 20 CAPSULE ORAL at 10:05

## 2020-05-22 RX ADMIN — PROPOFOL 50 MG: 10 INJECTION, EMULSION INTRAVENOUS at 09:05

## 2020-05-22 RX ADMIN — TRAZODONE HYDROCHLORIDE 100 MG: 100 TABLET ORAL at 08:05

## 2020-05-22 RX ADMIN — TAMSULOSIN HYDROCHLORIDE 0.4 MG: 0.4 CAPSULE ORAL at 10:05

## 2020-05-22 RX ADMIN — METOPROLOL TARTRATE 12.5 MG: 25 TABLET, FILM COATED ORAL at 10:05

## 2020-05-22 RX ADMIN — PANTOPRAZOLE SODIUM 40 MG: 40 INJECTION, POWDER, LYOPHILIZED, FOR SOLUTION INTRAVENOUS at 08:05

## 2020-05-22 RX ADMIN — SODIUM CHLORIDE, SODIUM LACTATE, POTASSIUM CHLORIDE, AND CALCIUM CHLORIDE: 600; 310; 30; 20 INJECTION, SOLUTION INTRAVENOUS at 09:05

## 2020-05-22 RX ADMIN — DIVALPROEX SODIUM 500 MG: 250 TABLET, DELAYED RELEASE ORAL at 08:05

## 2020-05-22 RX ADMIN — ATORVASTATIN CALCIUM 40 MG: 20 TABLET, FILM COATED ORAL at 08:05

## 2020-05-22 RX ADMIN — METOPROLOL TARTRATE 12.5 MG: 25 TABLET, FILM COATED ORAL at 08:05

## 2020-05-22 RX ADMIN — DORZOLAMIDE HYDROCHLORIDE 1 DROP: 20 SOLUTION/ DROPS OPHTHALMIC at 03:05

## 2020-05-22 RX ADMIN — DONEPEZIL HYDROCHLORIDE 10 MG: 5 TABLET, FILM COATED ORAL at 12:05

## 2020-05-22 NOTE — PROGRESS NOTES
EGD today.  See Provation for full report.    Findings:  -LE ring  -Moderate sized hiatal hernia  -Normal stomach  -Normal examined duodenum    No source of bleeding identified.    Plan:  -Advance diet  -Should bleeding continue, plan colonoscopy on Monday.  -Will continue to follow.

## 2020-05-22 NOTE — ASSESSMENT & PLAN NOTE
- Per medical record  - On dilantin 100 mg PO twice daily and depakote for mood disorder  - Per NH records, levels drawn q6 months and WNL  - However, reports of tremors after EGD and will get dilantin and Depakote levels  - Seizure precautions

## 2020-05-22 NOTE — ANESTHESIA PREPROCEDURE EVALUATION
05/22/2020  Anjel Soria is a 74 y.o., male.    Anesthesia Evaluation    I have reviewed the Patient Summary Reports.    I have reviewed the Nursing Notes.   I have reviewed the Medications.     Review of Systems  Anesthesia Hx:  No problems with previous Anesthesia  Denies Family Hx of Anesthesia complications.   Denies Personal Hx of Anesthesia complications.   Social:  Non-Smoker    Hematology/Oncology:  Hematology Normal       -- Cancer in past history:  Oncology Comments: prostate     EENT/Dental:EENT/Dental Normal   Cardiovascular:   Exercise tolerance: good Hypertension CAD      Pulmonary:  Pulmonary Normal    Renal/:  Renal/ Normal     Hepatic/GI:   GERD    Musculoskeletal:  Musculoskeletal Normal    Neurological:   CVA, residual symptoms    Endocrine:  Endocrine Normal    Dermatological:  Skin Normal    Psych:   depression  Psychotic Disorder and Bipolar disorder.          Physical Exam  General:  Well nourished    Airway/Jaw/Neck:  Airway Findings: Mouth Opening: Normal Tongue: Normal  General Airway Assessment: Adult  Mallampati: I  TM Distance: Normal, at least 6 cm  Jaw/Neck Findings:  Neck ROM: Normal ROM      Dental:  Dental Findings: In tact             Anesthesia Plan  Type of Anesthesia, risks & benefits discussed:  Anesthesia Type:  general  Patient's Preference:   Intra-op Monitoring Plan:   Intra-op Monitoring Plan Comments:   Post Op Pain Control Plan:   Post Op Pain Control Plan Comments:   Induction:    Beta Blocker:         Informed Consent: Patient representative understands risks and agrees with Anesthesia plan.  Questions answered. Anesthesia consent signed with patient representative.  ASA Score: 3     Day of Surgery Review of History & Physical:    H&P update referred to the surgeon.         Ready For Surgery From Anesthesia Perspective.

## 2020-05-22 NOTE — PROVATION PATIENT INSTRUCTIONS
Discharge Summary/Instructions after an Endoscopic Procedure  Patient Name: Anjel Soria  Patient MRN: [id number]  Patient YOB: 1945  Friday, May 22, 2020  Nikolai Sepulveda MD  RESTRICTIONS:  During your procedure today, you received medications for sedation.  These   medications may affect your judgment, balance and coordination.  Therefore,   for 24 hours, you have the following restrictions:   - DO NOT drive a car, operate machinery, make legal/financial decisions,   sign important papers or drink alcohol.    ACTIVITY:  Today: no heavy lifting, straining or running due to procedural   sedation/anesthesia.  The following day: return to full activity including work.  DIET:  Eat and drink normally unless instructed otherwise.     TREATMENT FOR COMMON SIDE EFFECTS:  - Mild abdominal pain, nausea, belching, bloating or excessive gas:  rest,   eat lightly and use a heating pad.  - Sore Throat: treat with throat lozenges and/or gargle with warm salt   water.  - Because air was used during the procedure, expelling large amounts of air   from your rectum or belching is normal.  - If a bowel prep was taken, you may not have a bowel movement for 1-3 days.    This is normal.  SYMPTOMS TO WATCH FOR AND REPORT TO YOUR PHYSICIAN:  1. Abdominal pain or bloating, other than gas cramps.  2. Chest pain.  3. Back pain.  4. Signs of infection such as: chills or fever occurring within 24 hours   after the procedure.  5. Rectal bleeding, which would show as bright red, maroon, or black stools.   (A tablespoon of blood from the rectum is not serious, especially if   hemorrhoids are present.)  6. Vomiting.  7. Weakness or dizziness.  GO DIRECTLY TO THE NEAREST EMERGENCY ROOM IF YOU HAVE ANY OF THE FOLLOWING:      Difficulty breathing              Chills and/or fever over 101 F   Persistent vomiting and/or vomiting blood   Severe abdominal pain   Severe chest pain   Black, tarry stools   Bleeding- more than one tablespoon   Any  other symptom or condition that you feel may need urgent attention  Your doctor recommends these additional instructions:  If any biopsies were taken, your doctors clinic will contact you in 1 to 2   weeks with any results.  - Return patient to [facility] for ongoing care.   - Advance diet as tolerated.   - Continue present medications.   - The findings and recommendations were discussed with the patient's primary   physician.  For questions, problems or results please call your physician - Nikolai Sepulveda MD at Work:  [phone].  [facility], EMERGENCY ROOM PHONE NUMBER: [phone], [facility]   [phone].  IF A COMPLICATION OR EMERGENCY SITUATION ARISES AND YOU ARE UNABLE TO REACH   YOUR PHYSICIAN - GO DIRECTLY TO THE EMERGENCY ROOM.  MD Nikolai No MD  5/22/2020 9:22:40 AM  This report has been verified and signed electronically.  [employer]

## 2020-05-22 NOTE — ANESTHESIA POSTPROCEDURE EVALUATION
Anesthesia Post Evaluation    Patient: Anjel Soria    Procedure(s) Performed: Procedure(s) (LRB):  EGD (ESOPHAGOGASTRODUODENOSCOPY) (N/A)    Final Anesthesia Type: general    Patient location during evaluation: PACU  Patient participation: Yes- Able to Participate  Level of consciousness: awake and alert  Post-procedure vital signs: reviewed and stable  Pain management: adequate  Airway patency: patent    PONV status at discharge: No PONV  Anesthetic complications: no      Cardiovascular status: blood pressure returned to baseline  Respiratory status: unassisted  Hydration status: euvolemic  Follow-up not needed.          Vitals Value Taken Time   /63 5/22/2020  9:44 AM   Temp 36.5 °C (97.7 °F) 5/22/2020  9:40 AM   Pulse 47 5/22/2020  9:56 AM   Resp 19 5/22/2020  9:40 AM   SpO2 100 % 5/22/2020  9:56 AM   Vitals shown include unvalidated device data.      No case tracking events are documented in the log.      Pain/Silvaon Score: Silvano Score: 10 (5/22/2020  9:40 AM)
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
## 2020-05-22 NOTE — ASSESSMENT & PLAN NOTE
- Known episodes of melena since admission  - H/H on admit stable in ED 12.0/40.4 with initial trend down on admission and stable with AM labs 11/37.2  - Follow  H/H and transfuse Hgb < 7  - GI consulted and EGD without source of bleeding; consider colonoscopy 5/26/2020 with further episodes of melena or decrease H/H

## 2020-05-22 NOTE — PLAN OF CARE
POC reviewed with patient and daughter via phone. NP at bedside to assess this shift. Lab sent several techs to draw labs, were unsuccessful at this time. Will try again this evening. Oncoming shift aware. Peripheral IV patent. Pt AAO x 2-3, pleasant. Noted with small BM with dark red blood this shift. Incontinence care provided. Clear liquid diet continues, tolerated well. NPO at midnight. Telemetry monitor in place, sinus jessica noted. Spoke with GI doctor this shift, will be here to assess tomorrow. All safety precautions in place, call bell in reach. Hourly rounding completed.

## 2020-05-22 NOTE — TRANSFER OF CARE
"Anesthesia Transfer of Care Note    Patient: Ajnel Soria    Procedure(s) Performed: Procedure(s) (LRB):  EGD (ESOPHAGOGASTRODUODENOSCOPY) (N/A)    Patient location: PACU    Anesthesia Type: general    Transport from OR: Transported from OR on 2-3 L/min O2 by NC with adequate spontaneous ventilation    Post pain: adequate analgesia    Post assessment: no apparent anesthetic complications    Post vital signs: stable    Level of consciousness: awake and responds to stimulation    Nausea/Vomiting: no nausea/vomiting    Complications: none          Last vitals:   Visit Vitals  BP (!) 150/72 (BP Location: Right arm, Patient Position: Lying)   Pulse (!) 47   Temp 36.4 °C (97.6 °F) (Oral)   Resp 18   Ht 6' 1" (1.854 m)   Wt 88.7 kg (195 lb 8.8 oz)   SpO2 97%   BMI 25.80 kg/m²     "

## 2020-05-22 NOTE — ASSESSMENT & PLAN NOTE
- Per medical record  - GI consulted and EGD with normal stomach, duodenum and noted LE ring  - Continue Protonix 40 mg IV BID

## 2020-05-22 NOTE — ASSESSMENT & PLAN NOTE
- Per record.  Mood appears stable  - Continue Depakote 500 mg daily, Prozac 20 mg daily and trazodone 50 mg nightly  - Depakote level pending

## 2020-05-22 NOTE — CONSULTS
Palliative Care Progress Note:      Pt does not have capacity to participate in complex medical discussions. ACP documents faxed to HIM for placement in EMR. Contacted son Miki Soria ([phone]) to discuss support services available. Discussed Palliative Care NP home visiting program. Miki asked if sister could call to discuss support service in further detail.

## 2020-05-22 NOTE — SUBJECTIVE & OBJECTIVE
Interval History: Report of bloody stool x2 overnight.  GI consulted and EGD today with moderate hiatal hernia, but no source of GI bleeding.  Advance diet and continue to monitor.  If melena continues then plan for colonoscopy 5/26/2020.  PT/OT consult.  Dilantin and depakote levels pending.     Review of Systems   Unable to perform ROS: Dementia (can provide some history)   Eyes: Positive for visual disturbance (glaucoma/macular degeneration and can see shadows).   Respiratory: Negative for chest tightness.    Cardiovascular: Negative for chest pain.   Gastrointestinal: Positive for blood in stool (per NH staff). Negative for abdominal pain, nausea and vomiting.   Genitourinary: Negative for dysuria.   Musculoskeletal: Positive for gait problem (walks with walker).   Skin: Negative.    Neurological: Negative for syncope, weakness and headaches.   Psychiatric/Behavioral: Negative.      Objective:     Vital Signs (Most Recent):  Temp: 97.7 °F (36.5 °C) (05/22/20 0940)  Pulse: (!) 45 (05/22/20 0940)  Resp: 19 (05/22/20 0940)  BP: 132/63 (05/22/20 0940)  SpO2: 95 % (05/22/20 0940) Vital Signs (24h Range):  Temp:  [96.4 °F (35.8 °C)-98.5 °F (36.9 °C)] 97.7 °F (36.5 °C)  Pulse:  [44-84] 45  Resp:  [12-21] 19  SpO2:  [94 %-100 %] 95 %  BP: (115-150)/(53-73) 132/63     Weight: 88.7 kg (195 lb 8.8 oz)  Body mass index is 25.8 kg/m².    Intake/Output Summary (Last 24 hours) at 5/22/2020 1015  Last data filed at 5/21/2020 2300  Gross per 24 hour   Intake 240 ml   Output 500 ml   Net -260 ml      Physical Exam   Constitutional: He appears well-developed and well-nourished. No distress.   HENT:   Head: Normocephalic and atraumatic.   Mouth/Throat: Oropharynx is clear and moist.   Eyes: Conjunctivae and EOM are normal.   Blindness bilaterally and can see shapes with peripheral vision   Neck: Normal range of motion. Neck supple. No thyromegaly present.   Cardiovascular: Normal rate and regular rhythm. Exam reveals no friction  rub.   No murmur heard.  Pulses:       Radial pulses are 2+ on the right side, and 2+ on the left side.        Dorsalis pedis pulses are 2+ on the right side, and 2+ on the left side.   Pulmonary/Chest: Effort normal. No respiratory distress. He has no decreased breath sounds. He has no wheezes.   Abdominal: Soft. Bowel sounds are increased. There is no tenderness. There is no rigidity.   Musculoskeletal: Normal range of motion. He exhibits no edema or tenderness.   Lymphadenopathy:     He has no cervical adenopathy.   Neurological: He is alert. He has normal strength and normal reflexes. He is disoriented. No cranial nerve deficit or sensory deficit.   Oriented to self, knows he's in a hospital   Skin: Skin is warm and dry.   Psychiatric: He has a normal mood and affect. His speech is normal and behavior is normal. Cognition and memory are impaired (oriented to self, knows he's in hospital ).       Significant Labs:   CBC:   Recent Labs   Lab 05/21/20  1645 05/21/20  2223 05/22/20  0447   WBC 8.64 8.25 7.16   HGB 10.9* 10.6* 11.0*   HCT 39.2* 36.7* 37.2*    185 181     CMP:   Recent Labs   Lab 05/21/20  0654 05/22/20  0447    139   K 4.9 4.6    107   CO2 26 24   GLU 99 108   BUN 13 12   CREATININE 0.9 0.9   CALCIUM 8.8 8.9   PROT 7.2  --    ALBUMIN 3.2*  --    BILITOT 0.3  --    ALKPHOS 60  --    AST 12  --    ALT 10  --    ANIONGAP 10 8   EGFRNONAA >60 >60         All pertinent labs within the past 24 hours have been reviewed.    Significant Imaging: I have reviewed all pertinent imaging results/findings within the past 24 hours.

## 2020-05-22 NOTE — CONSULTS
Palliative Care Progress Note:      Consult received. Discussed consult with Madiha Ann NP on unit.

## 2020-05-22 NOTE — PROGRESS NOTES
[facility] Hospital Medicine  Progress Note    Patient Name: Anjel Soria  MRN: [id number]  Patient Class: OP- Observation   Admission Date: 5/21/2020  Length of Stay: 0 days  Attending Physician: Jero Bower MD  Primary Care Provider: Michael Blanco MD        Subjective:     Principal Problem:Gastrointestinal hemorrhage with melena        HPI:  Anjel Soria is a 74 year old male who has medical history of anemia related to lower GI bleeding, coronary artery disease, vascular dementia, prior stroke with residual left sided weakness, 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  He is currently a resident of the [facility].   Per medical record, the patient with recent prior history of severe anemia requiring transfusion on April 9, 2020.  During that time, he was evaluated  At [facility] and found to be hemoccult positive and CT scan of abdomen was unremarkable.  He was subsequently discharged back to his nursing home with plan for outpatient follow up with GI.  However, the patient was unable to see GI as outpatient.   Today, patient presented to the [facility] ED where initial work up revealed stable hemoglobin/hematocrit 12.0/40.4. Otherwise, labs were unremarkable and COVID screen was negative. Of note, EKG in ED today shows sinus bradycardia and asymtpomatic with heart rate in upper 40's and low 50's.   He was to be discharged back to the nursing home, but patient had episode of melena and decision to transfer to [facility] for evaluation.     On arrival to the [facility], the patient had one small melanotic stool.  Given dementia, the patient is unable to provide detailed history.  Per nursing home medication reconciliation, the patient has not been on aspirin or Plavix recently and is not currently on any other type of blood thinners.  GI will be consulted and roge continue to trend hemoglobin/hematocrit.      Overview/Hospital Course:  After admission, Gastroenterology consulted and EGD without evidence for bleeding.  Stable hemoglobin/hematocrit and will follow.  Plan for colonoscopy if continues with melena or decrease in H/H.    Interval History: Report of bloody stool x2 overnight.  GI consulted and EGD today with moderate hiatal hernia, but no source of GI bleeding.  Advance diet and continue to monitor.  If melena continues then plan for colonoscopy 5/26/2020.  PT/OT consult.  Dilantin and depakote levels pending.     Review of Systems   Unable to perform ROS: Dementia (can provide some history)   Eyes: Positive for visual disturbance (glaucoma/macular degeneration and can see shadows).   Respiratory: Negative for chest tightness.    Cardiovascular: Negative for chest pain.   Gastrointestinal: Positive for blood in stool (per NH staff). Negative for abdominal pain, nausea and vomiting.   Genitourinary: Negative for dysuria.   Musculoskeletal: Positive for gait problem (walks with walker).   Skin: Negative.    Neurological: Negative for syncope, weakness and headaches.   Psychiatric/Behavioral: Negative.      Objective:     Vital Signs (Most Recent):  Temp: 97.7 °F (36.5 °C) (05/22/20 0940)  Pulse: (!) 45 (05/22/20 0940)  Resp: 19 (05/22/20 0940)  BP: 132/63 (05/22/20 0940)  SpO2: 95 % (05/22/20 0940) Vital Signs (24h Range):  Temp:  [96.4 °F (35.8 °C)-98.5 °F (36.9 °C)] 97.7 °F (36.5 °C)  Pulse:  [44-84] 45  Resp:  [12-21] 19  SpO2:  [94 %-100 %] 95 %  BP: (115-150)/(53-73) 132/63     Weight: 88.7 kg (195 lb 8.8 oz)  Body mass index is 25.8 kg/m².    Intake/Output Summary (Last 24 hours) at 5/22/2020 1015  Last data filed at 5/21/2020 2300  Gross per 24 hour   Intake 240 ml   Output 500 ml   Net -260 ml      Physical Exam   Constitutional: He appears well-developed and well-nourished. No distress.   HENT:   Head: Normocephalic and atraumatic.   Mouth/Throat: Oropharynx is clear and moist.   Eyes: Conjunctivae  and EOM are normal.   Blindness bilaterally and can see shapes with peripheral vision   Neck: Normal range of motion. Neck supple. No thyromegaly present.   Cardiovascular: Normal rate and regular rhythm. Exam reveals no friction rub.   No murmur heard.  Pulses:       Radial pulses are 2+ on the right side, and 2+ on the left side.        Dorsalis pedis pulses are 2+ on the right side, and 2+ on the left side.   Pulmonary/Chest: Effort normal. No respiratory distress. He has no decreased breath sounds. He has no wheezes.   Abdominal: Soft. Bowel sounds are increased. There is no tenderness. There is no rigidity.   Musculoskeletal: Normal range of motion. He exhibits no edema or tenderness.   Lymphadenopathy:     He has no cervical adenopathy.   Neurological: He is alert. He has normal strength and normal reflexes. He is disoriented. No cranial nerve deficit or sensory deficit.   Oriented to self, knows he's in a hospital   Skin: Skin is warm and dry.   Psychiatric: He has a normal mood and affect. His speech is normal and behavior is normal. Cognition and memory are impaired (oriented to self, knows he's in hospital ).       Significant Labs:   CBC:   Recent Labs   Lab 05/21/20  1645 05/21/20  2223 05/22/20  0447   WBC 8.64 8.25 7.16   HGB 10.9* 10.6* 11.0*   HCT 39.2* 36.7* 37.2*    185 181     CMP:   Recent Labs   Lab 05/21/20  0654 05/22/20  0447    139   K 4.9 4.6    107   CO2 26 24   GLU 99 108   BUN 13 12   CREATININE 0.9 0.9   CALCIUM 8.8 8.9   PROT 7.2  --    ALBUMIN 3.2*  --    BILITOT 0.3  --    ALKPHOS 60  --    AST 12  --    ALT 10  --    ANIONGAP 10 8   EGFRNONAA >60 >60         All pertinent labs within the past 24 hours have been reviewed.    Significant Imaging: I have reviewed all pertinent imaging results/findings within the past 24 hours.       Assessment/Plan:      * Gastrointestinal hemorrhage with melena  - Hemoglobin/hematocrit on admission 12.0/40.4 and mild decrease, but  stable with morning labs  11.0/37.2  - Continues with melena x2 episodes overnight  - GI consulted and EGD without evidence for source of bleeding; recommends colonoscopy 5/26/2020 if continues to report melena   - GI Protonix 40 mg IV BID for now    - Follow H/H and transfuse for  for Hemoglobin <7        Hiatal hernia  - Noted on EGD 5/22/2020  - Continue Protonix 40 mg BID for now      Acute blood loss anemia  - Known episodes of melena since admission  - H/H on admit stable in ED 12.0/40.4 with initial trend down on admission and stable with AM labs 11/37.2  - Follow  H/H and transfuse Hgb < 7  - GI consulted and EGD without source of bleeding; consider colonoscopy 5/26/2020 with further episodes of melena or decrease H/H      Bipolar affective disorder in remission  - Per record.  Mood appears stable  - Continue Depakote 500 mg daily, Prozac 20 mg daily and trazodone 50 mg nightly  - Depakote level pending      Obstructive cardiomyopathy  - Per medical record  - Will request prior Cardiology records for further information  - Appears stable      Bradycardia  - Per medical record, known sinus bradycardia without symptoms or recent history of falls  - On metoprolol [id number] mg daily and will continue for now  - Monitor on telemetry        Essential hypertension  - Appears reasonably controlled  - Continue metoprolol tartrate 12.5 mg daily      Gastroesophageal reflux disease  - Per medical record  - GI consulted and EGD with normal stomach, duodenum and noted LE ring  - Continue Protonix 40 mg IV BID      BPH (benign prostatic hyperplasia)  - No noted symptoms  - Continue home dose of Flomax      Coronary artery disease involving native coronary artery of native heart without angina pectoris  - Per medical record, had previously been on aspirin and Plavix, but NH record does not indicate that he is currently on any anticoagulants  - Appears stable  - Continue metoprolol tartrate 12.5 mg BID and atorvastatin  40 mg daily      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  - Per medical record  - On dilantin 100 mg PO twice daily and depakote for mood disorder  - Per NH records, levels drawn q6 months and WNL  - However, reports of tremors after EGD and will get dilantin and Depakote levels  - Seizure precautions      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  - Noted residual weakness and walks with walker at baseline  - Continue atorvastatin 40 mg daily for secondary stroke prevention; holding aspirin due to GIB  - PT/OT consult      End-stage glaucoma  - Per record, legally blind  - Provide assistance as needed  - Continue home medications      Subcortical vascular dementia without behavioral disturbance  - Appears stable  - Continue home dose of Aricept 10 mg daily  - Delirium precautions  - Palliative Care consult for symptom management and NP at home program referral  - PT/OT evaluation      VTE Risk Mitigation (From admission, onward)         Ordered     Reason for No Pharmacological VTE Prophylaxis  Once     Question:  Reasons:  Answer:  Active Bleeding    05/21/20 1613     IP VTE HIGH RISK PATIENT  Once      05/21/20 1613     Place sequential compression device  Until discontinued      05/21/20 1613     Place NICOLE hose  Until discontinued      05/21/20 1601                      Madiha Ann NP  Department of Hospital Medicine   [facility]

## 2020-05-22 NOTE — PLAN OF CARE
SW spoke to nurse Rachell at [facility] [phone].  Pt is a detention resident.  Pt is legally blind and doesn't feed self, can hold cup, transfers with assistance, transport w/ wheelchair.  Per nurse, pt is oriented and alert.  Pt will return to NH upon discharge.     05/22/20 1058   Discharge Assessment   Assessment Type Discharge Planning Assessment   Confirmed/corrected address and phone number on facesheet? Yes   Assessment information obtained from? Caregiver   Prior to hospitilization cognitive status: Not Oriented to Time   Prior to hospitalization functional status: Needs Assistance   Current cognitive status: Unable to Assess   Lives With facility resident   Able to Return to Prior Arrangements yes   Is patient able to care for self after discharge? No   Who are your caregiver(s) and their phone number(s)?   (NH staff)   Readmission Within the Last 30 Days no previous admission in last 30 days   Patient currently being followed by outpatient case management? No   Patient currently receives any other outside agency services? No   Equipment Currently Used at Home wheelchair;hospital bed   Do you have any problems affording any of your prescribed medications? No   Is the patient taking medications as prescribed? yes   Does the patient have transportation home? Yes   Does the patient receive services at the [facility]? No   Discharge Plan A Return to nursing home   DME Needed Upon Discharge  none

## 2020-05-22 NOTE — ASSESSMENT & PLAN NOTE
- Noted residual weakness and walks with walker at baseline  - Continue atorvastatin 40 mg daily for secondary stroke prevention; holding aspirin due to GIB  - PT/OT consult

## 2020-05-22 NOTE — ANESTHESIA PROCEDURE NOTES
Central Line    Diagnosis: GI bleed  Doctor requesting consult: Kareem  Patient location during procedure: done in OR  Procedure start time: 5/22/2020 9:03 AM    Staffing  Authorizing Provider: Doug Bowden MD  Performing Provider: Doug Bowden MD    Staffing  Anesthesiologist: Doug Bowden MD  Performed: anesthesiologist   Anesthesiologist was present at the time of the procedure.  Preanesthetic Checklist  Completed: patient identified, site marked, surgical consent, pre-op evaluation, timeout performed, IV checked, risks and benefits discussed, monitors and equipment checked and anesthesia consent given  Indication   Indication: vascular access, med administration     Anesthesia   local infiltration    Central Line   Skin Prep: skin prepped with ChloraPrep, skin prep agent completely dried prior to procedure  maximum sterile barriers used during central venous catheter insertion  hand hygiene performed prior to central venous catheter insertion  Location: right, internal jugular.   Catheter type: triple lumen  Catheter Size: 7.5 Fr  Inserted Catheter Length: 16 cm  Ultrasound: vascular probe with ultrasound  Vessel Caliber: medium, patent  Vascular Doppler:  not done, compressibility normal  Needle advanced into vessel with real time Ultrasound guidance.  Guidewire confirmed in vessel.  Manometry: none  Insertion Attempts: 1   Securement:line sutured, chlorhexidine patch, sterile dressing applied and blood return through all ports    Post-Procedure   Adverse Events:none    Guidewire Guidewire removed intact. Guidewire removed intact, verified with nurse.

## 2020-05-22 NOTE — ASSESSMENT & PLAN NOTE
- Appears stable  - Continue home dose of Aricept 10 mg daily  - Delirium precautions  - Palliative Care consult for symptom management and NP at home program referral  - PT/OT evaluation

## 2020-05-22 NOTE — ASSESSMENT & PLAN NOTE
- Hemoglobin/hematocrit on admission 12.0/40.4 and mild decrease, but stable with morning labs  11.0/37.2  - Continues with melena x2 episodes overnight  - GI consulted and EGD without evidence for source of bleeding; recommends colonoscopy 5/26/2020 if continues to report melena   - GI Protonix 40 mg IV BID for now    - Follow H/H and transfuse for  for Hemoglobin <7

## 2020-05-22 NOTE — PLAN OF CARE
Plan of care reviewed with patient. Denies any concerns. Npo after midnight. Had 2 bloody BMs. Bed in lowest position and locked. Call bell within reach. Will continue to monitor

## 2020-05-22 NOTE — HOSPITAL COURSE
Mr. Soria is a 74 year-old man with history of coronary artery disease, vascular dementia, stroke with residual left sided weakness, post-stroke seizures, benign prostatic hyperplasia, hypertension and gastroesophageal reflux disease who was admitted from his nursing home ([facility]) for evaluation of bradycardia and melena.  Gastroenterology service consulted and he underwent upper endoscopy on 5/22/2020 with no source of bleeding identified.  On 5/25/2020 he underwent colonoscopy which showed a fungating mass of ascending colon.  General Surgery was consulted and took him to operating room for partial right hemicolectomy performed on 5/26/2020, which was complicated by a post-op ileus.  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  Hematology-Oncology service consulted for prognosis and treatment recommendations and suggested f/u with [facility] 4 weeks after hopsital discharge - as family wants treatment.  On 6/2/2020 patient developed fevers and leukocytosis along worsening mental status, urinalysis was positive.  Cormier catheter was discontinued and he was started on IV Ceftriaxone.  Urine culture positive for > 100,000 cfu/mL of both Escherichia coli and Klebsiella pneumoniae.  Blood culture returned positive for Escherichia coli.  He was started on Ceftriaxone, and transitioned to PO Augmentin to complete a 14 day course, end date 6/16.  PT/OT was consulted, who suggest SNF.

## 2020-05-23 LAB
ANISOCYTOSIS BLD QL SMEAR: ABNORMAL
BASOPHILS # BLD AUTO: 0.03 K/UL (ref 0–0.2)
BASOPHILS NFR BLD: 0.5 % (ref 0–1.9)
BURR CELLS BLD QL SMEAR: ABNORMAL
DACRYOCYTES BLD QL SMEAR: ABNORMAL
DIFFERENTIAL METHOD: ABNORMAL
EOSINOPHIL # BLD AUTO: 0.3 K/UL (ref 0–0.5)
EOSINOPHIL NFR BLD: 5.3 % (ref 0–8)
ERYTHROCYTE [DISTWIDTH] IN BLOOD BY AUTOMATED COUNT: ABNORMAL % (ref 11.5–14.5)
GIANT PLATELETS BLD QL SMEAR: PRESENT
HCT VFR BLD AUTO: 32.4 % (ref 40–54)
HGB BLD-MCNC: 9.6 G/DL (ref 14–18)
HYPOCHROMIA BLD QL SMEAR: ABNORMAL
IMM GRANULOCYTES # BLD AUTO: 0.01 K/UL (ref 0–0.04)
IMM GRANULOCYTES NFR BLD AUTO: 0.2 % (ref 0–0.5)
LYMPHOCYTES # BLD AUTO: 1.8 K/UL (ref 1–4.8)
LYMPHOCYTES NFR BLD: 29 % (ref 18–48)
MCH RBC QN AUTO: 23.1 PG (ref 27–31)
MCHC RBC AUTO-ENTMCNC: 29.6 G/DL (ref 32–36)
MCV RBC AUTO: 78 FL (ref 82–98)
MONOCYTES # BLD AUTO: 0.4 K/UL (ref 0.3–1)
MONOCYTES NFR BLD: 5.6 % (ref 4–15)
NEUTROPHILS # BLD AUTO: 3.7 K/UL (ref 1.8–7.7)
NEUTROPHILS NFR BLD: 59.4 % (ref 38–73)
NRBC BLD-RTO: 0 /100 WBC
OVALOCYTES BLD QL SMEAR: ABNORMAL
PLATELET # BLD AUTO: 183 K/UL (ref 150–350)
PLATELET BLD QL SMEAR: ABNORMAL
PMV BLD AUTO: 8.7 FL (ref 9.2–12.9)
POIKILOCYTOSIS BLD QL SMEAR: ABNORMAL
RBC # BLD AUTO: 4.16 M/UL (ref 4.6–6.2)
SCHISTOCYTES BLD QL SMEAR: ABNORMAL
SCHISTOCYTES BLD QL SMEAR: PRESENT
SPHEROCYTES BLD QL SMEAR: ABNORMAL
TARGETS BLD QL SMEAR: ABNORMAL
TOXIC GRANULES BLD QL SMEAR: PRESENT
WBC # BLD AUTO: 6.21 K/UL (ref 3.9–12.7)

## 2020-05-23 PROCEDURE — U0003 INFECTIOUS AGENT DETECTION BY NUCLEIC ACID (DNA OR RNA); SEVERE ACUTE RESPIRATORY SYNDROME CORONAVIRUS 2 (SARS-COV-2) (CORONAVIRUS DISEASE [COVID-19]), AMPLIFIED PROBE TECHNIQUE, MAKING USE OF HIGH THROUGHPUT TECHNOLOGIES AS DESCRIBED BY CMS-2020-01-R: HCPCS

## 2020-05-23 PROCEDURE — 99900035 HC TECH TIME PER 15 MIN (STAT)

## 2020-05-23 PROCEDURE — C9113 INJ PANTOPRAZOLE SODIUM, VIA: HCPCS | Performed by: INTERNAL MEDICINE

## 2020-05-23 PROCEDURE — 99233 PR SUBSEQUENT HOSPITAL CARE,LEVL III: ICD-10-PCS | Mod: ,,, | Performed by: NURSE PRACTITIONER

## 2020-05-23 PROCEDURE — 25000003 PHARM REV CODE 250: Performed by: NURSE PRACTITIONER

## 2020-05-23 PROCEDURE — 11000001 HC ACUTE MED/SURG PRIVATE ROOM

## 2020-05-23 PROCEDURE — 85025 COMPLETE CBC W/AUTO DIFF WBC: CPT

## 2020-05-23 PROCEDURE — 63600175 PHARM REV CODE 636 W HCPCS: Performed by: INTERNAL MEDICINE

## 2020-05-23 PROCEDURE — 99233 SBSQ HOSP IP/OBS HIGH 50: CPT | Mod: ,,, | Performed by: NURSE PRACTITIONER

## 2020-05-23 RX ADMIN — ATORVASTATIN CALCIUM 40 MG: 20 TABLET, FILM COATED ORAL at 09:05

## 2020-05-23 RX ADMIN — PANTOPRAZOLE SODIUM 40 MG: 40 INJECTION, POWDER, LYOPHILIZED, FOR SOLUTION INTRAVENOUS at 09:05

## 2020-05-23 RX ADMIN — FLUOXETINE 20 MG: 20 CAPSULE ORAL at 09:05

## 2020-05-23 RX ADMIN — PHENYTOIN SODIUM 100 MG: 100 CAPSULE ORAL at 09:05

## 2020-05-23 RX ADMIN — METOPROLOL TARTRATE 12.5 MG: 25 TABLET, FILM COATED ORAL at 09:05

## 2020-05-23 RX ADMIN — TRAZODONE HYDROCHLORIDE 100 MG: 100 TABLET ORAL at 09:05

## 2020-05-23 RX ADMIN — TRAVOPROST 1 DROP: 0.04 SOLUTION/ DROPS OPHTHALMIC at 09:05

## 2020-05-23 RX ADMIN — TAMSULOSIN HYDROCHLORIDE 0.4 MG: 0.4 CAPSULE ORAL at 09:05

## 2020-05-23 RX ADMIN — DONEPEZIL HYDROCHLORIDE 10 MG: 5 TABLET, FILM COATED ORAL at 09:05

## 2020-05-23 RX ADMIN — DORZOLAMIDE HYDROCHLORIDE 1 DROP: 20 SOLUTION/ DROPS OPHTHALMIC at 09:05

## 2020-05-23 RX ADMIN — DIVALPROEX SODIUM 500 MG: 250 TABLET, DELAYED RELEASE ORAL at 09:05

## 2020-05-23 NOTE — PLAN OF CARE
POC reviewed with patient and daughter via phone. EGD this morning. No BM's or bleeding noted this shift. Right IJ central line patent, flushing and drawing back easily. No seizure activity noted this shift. PRN tylenol given for shoulder pain with positive relief. Incontinence care provided. Assist given with meals. Tolerated full liquid diet well today, no N/V. Upgraded to cardiac diet per MD order. All safety precautions in place, call bell in reach. Hourly rounding completed.

## 2020-05-23 NOTE — PLAN OF CARE
Patient resting in bed, assistance with eating provided. No complaints of pain at this time, reported on pts condition to daughter Nicolasa. No needs at this time , will continue to monitor.

## 2020-05-23 NOTE — PROGRESS NOTES
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  He is currently a resident of the [facility].   Per medical record, the patient with recent prior history of severe anemia requiring transfusion on April 9, 2020.  During that time, he was evaluated  At [facility] and found to be hemoccult positive and CT scan of abdomen was unremarkable.  He was subsequently discharged back to his nursing home with plan for outpatient follow up with GI.  However, the patient was unable to see GI as outpatient.   Today, patient presented to the [facility] ED where initial work up revealed stable hemoglobin/hematocrit 12.0/40.4. Otherwise, labs were unremarkable and COVID screen was negative. Of note, EKG in ED today shows sinus bradycardia and asymtpomatic with heart rate in upper 40's and low 50's.   He was to be discharged back to the nursing home, but patient had episode of melena and decision to transfer to [facility] for evaluation.     On arrival to the [facility], the patient had one small melanotic stool.  Given dementia, the patient is unable to provide detailed history.  Per nursing home medication reconciliation, the patient has not been on aspirin or Plavix recently and is not currently on any other type of blood thinners.  GI will be consulted and roge continue to trend hemoglobin/hematocrit.      Overview/Hospital Course:  After admission, Gastroenterology consulted and EGD without evidence for bleeding.  Stable hemoglobin/hematocrit and will follow.  Plan for colonoscopy if continues with melena or decrease in H/H.    Interval History:    GI following. Mild trend down of Hgb 10 --> 9 and will continue to monitor. Plan per GI for colonoscopy on Monday if continues to trend down or further episodes melena. Strict intake/output .  Dilantin level within normal limits.      Review of Systems   Unable to perform ROS: Dementia (can provide some history)   Eyes: Positive for visual disturbance (glaucoma/macular degeneration and can see shadows).   Respiratory: Negative for chest tightness.    Cardiovascular: Negative for chest pain.   Gastrointestinal: Positive for blood in stool (per NH staff). Negative for abdominal pain, nausea and vomiting.   Genitourinary: Negative for dysuria.   Musculoskeletal: Positive for gait problem (walks with walker).   Skin: Negative.    Neurological: Negative for syncope, weakness and headaches.   Psychiatric/Behavioral: Negative.      Objective:     Vital Signs (Most Recent):  Temp: 97.8 °F (36.6 °C) (05/23/20 0801)  Pulse: (!) 45 (05/23/20 0801)  Resp: 16 (05/23/20 0801)  BP: 134/67 (05/23/20 0801)  SpO2: 97 % (05/23/20 0801) Vital Signs (24h Range):  Temp:  [97.5 °F (36.4 °C)-98.6 °F (37 °C)] 97.8 °F (36.6 °C)  Pulse:  [43-55] 45  Resp:  [14-20] 16  SpO2:  [76 %-100 %] 97 %  BP: (102-140)/(52-70) 134/67     Weight: 88.7 kg (195 lb 8.8 oz)  Body mass index is 25.8 kg/m².    Intake/Output Summary (Last 24 hours) at 5/23/2020 0844  Last data filed at 5/23/2020 0600  Gross per 24 hour   Intake 360 ml   Output 0 ml   Net 360 ml      Physical Exam   Constitutional: He appears well-developed and well-nourished. No distress.   HENT:   Head: Normocephalic and atraumatic.   Mouth/Throat: Oropharynx is clear and moist.   Eyes: Conjunctivae and EOM are normal.   Blindness bilaterally and  can see shapes with peripheral vision   Neck: Normal range of motion. Neck supple. No thyromegaly present.   Cardiovascular: Normal rate and regular rhythm. Exam reveals no friction rub.   No murmur heard.  Pulses:       Radial pulses are 2+ on the right side, and 2+ on the left side.        Dorsalis pedis pulses are 2+ on the right side, and 2+ on the left side.   Pulmonary/Chest: Effort normal. No respiratory distress. He has no decreased breath sounds. He has no wheezes.   Abdominal: Soft. Bowel sounds are increased. There is no tenderness. There is no rigidity.   Musculoskeletal: Normal range of motion. He exhibits no edema or tenderness.   Lymphadenopathy:     He has no cervical adenopathy.   Neurological: He is alert. He has normal strength and normal reflexes. He is disoriented. No cranial nerve deficit or sensory deficit.   Oriented to self, knows he's in a hospital   Skin: Skin is warm and dry.   Psychiatric: He has a normal mood and affect. His speech is normal and behavior is normal. Cognition and memory are impaired (oriented to self, knows he's in hospital ).       Significant Labs:   CBC:   Recent Labs   Lab 05/22/20  0447 05/22/20  1725 05/23/20  0404   WBC 7.16 8.77 6.21   HGB 11.0* 10.0* 9.6*   HCT 37.2* 33.3* 32.4*    172 183     CMP:   Recent Labs   Lab 05/22/20  0447      K 4.6      CO2 24      BUN 12   CREATININE 0.9   CALCIUM 8.9   ANIONGAP 8   EGFRNONAA >60         All pertinent labs within the past 24 hours have been reviewed.    Significant Imaging: I have reviewed all pertinent imaging results/findings within the past 24 hours.       Assessment/Plan:      * Gastrointestinal hemorrhage with melena  - Hemoglobin/hematocrit on admission 12.0/40.4 and mild decrease, but stable with morning labs  11.0/37.2  - Continues with melena x2 episodes overnight  - GI consulted and EGD without evidence for source of bleeding; recommends colonoscopy 5/26/2020 if continues to  report melena   - GI Protonix 40 mg IV BID for now    - Follow H/H and transfuse for  for Hemoglobin <7        Hiatal hernia  - Noted on EGD 5/22/2020  - Continue Protonix 40 mg BID for now      Acute blood loss anemia  - Known episodes of melena since admission  - H/H on admit stable in ED 12.0/40.4 with initial trend down on admission and stable with AM labs 11/37.2  - Follow  H/H and transfuse Hgb < 7  - GI consulted and EGD without source of bleeding; consider colonoscopy 5/26/2020 with further episodes of melena or decrease H/H      Bipolar affective disorder in remission  - Per record.  Mood appears stable  - Continue Depakote 500 mg daily, Prozac 20 mg daily and trazodone 50 mg nightly  - Depakote level pending      Obstructive cardiomyopathy  - Per medical record  - Will request prior Cardiology records for further information  - Appears stable      Bradycardia  - Per medical record, known sinus bradycardia without symptoms or recent history of falls  - On metoprolol [id number] mg daily and will continue for now  - Monitor on telemetry        Essential hypertension  - Appears reasonably controlled  - Continue metoprolol tartrate 12.5 mg daily      Gastroesophageal reflux disease  - Per medical record  - GI consulted and EGD with normal stomach, duodenum and noted LE ring  - Continue Protonix 40 mg IV BID      BPH (benign prostatic hyperplasia)  - No noted symptoms  - Continue home dose of Flomax      Coronary artery disease involving native coronary artery of native heart without angina pectoris  - Per medical record, had previously been on aspirin and Plavix, but NH record does not indicate that he is currently on any anticoagulants  - Appears stable  - Continue metoprolol tartrate 12.5 mg BID and atorvastatin 40 mg daily      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  - Per medical record  - On dilantin 100 mg PO twice daily and depakote for mood disorder  - Per NH records, levels drawn q6  months and WNL  - However, reports of tremors after EGD and will get dilantin and Depakote levels  - Seizure precautions      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  - Noted residual weakness and walks with walker at baseline  - Continue atorvastatin 40 mg daily for secondary stroke prevention; holding aspirin due to GIB  - PT/OT consult      End-stage glaucoma  - Per record, legally blind  - Provide assistance as needed  - Continue home medications      Subcortical vascular dementia without behavioral disturbance  - Appears stable  - Continue home dose of Aricept 10 mg daily  - Delirium precautions  - Palliative Care consult for symptom management and NP at home program referral  - PT/OT evaluation        VTE Risk Mitigation (From admission, onward)         Ordered     Reason for No Pharmacological VTE Prophylaxis  Once     Question:  Reasons:  Answer:  Active Bleeding    05/21/20 1613     IP VTE HIGH RISK PATIENT  Once      05/21/20 1613     Place sequential compression device  Until discontinued      05/21/20 1613     Place NICOLE hose  Until discontinued      05/21/20 1601                      Madiha Ann NP  Department of Hospital Medicine   [facility]

## 2020-05-23 NOTE — SUBJECTIVE & OBJECTIVE
Interval History:    GI following. Mild trend down of Hgb 10 --> 9 and will continue to monitor. Plan per GI for colonoscopy on Monday if continues to trend down or further episodes melena. Strict intake/output .  Dilantin level within normal limits.      Review of Systems   Unable to perform ROS: Dementia (can provide some history)   Eyes: Positive for visual disturbance (glaucoma/macular degeneration and can see shadows).   Respiratory: Negative for chest tightness.    Cardiovascular: Negative for chest pain.   Gastrointestinal: Positive for blood in stool (per NH staff). Negative for abdominal pain, nausea and vomiting.   Genitourinary: Negative for dysuria.   Musculoskeletal: Positive for gait problem (walks with walker).   Skin: Negative.    Neurological: Negative for syncope, weakness and headaches.   Psychiatric/Behavioral: Negative.      Objective:     Vital Signs (Most Recent):  Temp: 97.8 °F (36.6 °C) (05/23/20 0801)  Pulse: (!) 45 (05/23/20 0801)  Resp: 16 (05/23/20 0801)  BP: 134/67 (05/23/20 0801)  SpO2: 97 % (05/23/20 0801) Vital Signs (24h Range):  Temp:  [97.5 °F (36.4 °C)-98.6 °F (37 °C)] 97.8 °F (36.6 °C)  Pulse:  [43-55] 45  Resp:  [14-20] 16  SpO2:  [76 %-100 %] 97 %  BP: (102-140)/(52-70) 134/67     Weight: 88.7 kg (195 lb 8.8 oz)  Body mass index is 25.8 kg/m².    Intake/Output Summary (Last 24 hours) at 5/23/2020 0844  Last data filed at 5/23/2020 0600  Gross per 24 hour   Intake 360 ml   Output 0 ml   Net 360 ml      Physical Exam   Constitutional: He appears well-developed and well-nourished. No distress.   HENT:   Head: Normocephalic and atraumatic.   Mouth/Throat: Oropharynx is clear and moist.   Eyes: Conjunctivae and EOM are normal.   Blindness bilaterally and can see shapes with peripheral vision   Neck: Normal range of motion. Neck supple. No thyromegaly present.   Cardiovascular: Normal rate and regular rhythm. Exam reveals no friction rub.   No murmur heard.  Pulses:       Radial  pulses are 2+ on the right side, and 2+ on the left side.        Dorsalis pedis pulses are 2+ on the right side, and 2+ on the left side.   Pulmonary/Chest: Effort normal. No respiratory distress. He has no decreased breath sounds. He has no wheezes.   Abdominal: Soft. Bowel sounds are increased. There is no tenderness. There is no rigidity.   Musculoskeletal: Normal range of motion. He exhibits no edema or tenderness.   Lymphadenopathy:     He has no cervical adenopathy.   Neurological: He is alert. He has normal strength and normal reflexes. He is disoriented. No cranial nerve deficit or sensory deficit.   Oriented to self, knows he's in a hospital   Skin: Skin is warm and dry.   Psychiatric: He has a normal mood and affect. His speech is normal and behavior is normal. Cognition and memory are impaired (oriented to self, knows he's in hospital ).       Significant Labs:   CBC:   Recent Labs   Lab 05/22/20  0447 05/22/20  1725 05/23/20  0404   WBC 7.16 8.77 6.21   HGB 11.0* 10.0* 9.6*   HCT 37.2* 33.3* 32.4*    172 183     CMP:   Recent Labs   Lab 05/22/20  0447      K 4.6      CO2 24      BUN 12   CREATININE 0.9   CALCIUM 8.9   ANIONGAP 8   EGFRNONAA >60         All pertinent labs within the past 24 hours have been reviewed.    Significant Imaging: I have reviewed all pertinent imaging results/findings within the past 24 hours.

## 2020-05-24 LAB
ANION GAP SERPL CALC-SCNC: 10 MMOL/L (ref 8–16)
ANISOCYTOSIS BLD QL SMEAR: ABNORMAL
BASOPHILS # BLD AUTO: 0.03 K/UL (ref 0–0.2)
BASOPHILS NFR BLD: 0.4 % (ref 0–1.9)
BUN SERPL-MCNC: 10 MG/DL (ref 8–23)
BURR CELLS BLD QL SMEAR: ABNORMAL
CALCIUM SERPL-MCNC: 8.4 MG/DL (ref 8.7–10.5)
CHLORIDE SERPL-SCNC: 106 MMOL/L (ref 95–110)
CO2 SERPL-SCNC: 24 MMOL/L (ref 23–29)
CREAT SERPL-MCNC: 1 MG/DL (ref 0.5–1.4)
DACRYOCYTES BLD QL SMEAR: ABNORMAL
DIFFERENTIAL METHOD: ABNORMAL
EOSINOPHIL # BLD AUTO: 0.3 K/UL (ref 0–0.5)
EOSINOPHIL NFR BLD: 4.3 % (ref 0–8)
ERYTHROCYTE [DISTWIDTH] IN BLOOD BY AUTOMATED COUNT: ABNORMAL % (ref 11.5–14.5)
EST. GFR  (AFRICAN AMERICAN): >60 ML/MIN/1.73 M^2
EST. GFR  (NON AFRICAN AMERICAN): >60 ML/MIN/1.73 M^2
GIANT PLATELETS BLD QL SMEAR: PRESENT
GLUCOSE SERPL-MCNC: 124 MG/DL (ref 70–110)
HCT VFR BLD AUTO: 32 % (ref 40–54)
HGB BLD-MCNC: 9.7 G/DL (ref 14–18)
HYPOCHROMIA BLD QL SMEAR: ABNORMAL
IMM GRANULOCYTES # BLD AUTO: 0.03 K/UL (ref 0–0.04)
IMM GRANULOCYTES NFR BLD AUTO: 0.4 % (ref 0–0.5)
LYMPHOCYTES # BLD AUTO: 2.2 K/UL (ref 1–4.8)
LYMPHOCYTES NFR BLD: 29.9 % (ref 18–48)
MCH RBC QN AUTO: 23.5 PG (ref 27–31)
MCHC RBC AUTO-ENTMCNC: 30.3 G/DL (ref 32–36)
MCV RBC AUTO: 78 FL (ref 82–98)
MONOCYTES # BLD AUTO: 0.5 K/UL (ref 0.3–1)
MONOCYTES NFR BLD: 6.8 % (ref 4–15)
NEUTROPHILS # BLD AUTO: 4.4 K/UL (ref 1.8–7.7)
NEUTROPHILS NFR BLD: 58.2 % (ref 38–73)
NRBC BLD-RTO: 0 /100 WBC
OVALOCYTES BLD QL SMEAR: ABNORMAL
PLATELET # BLD AUTO: 187 K/UL (ref 150–350)
PLATELET BLD QL SMEAR: ABNORMAL
PMV BLD AUTO: 8.7 FL (ref 9.2–12.9)
POCT GLUCOSE: 143 MG/DL (ref 70–110)
POIKILOCYTOSIS BLD QL SMEAR: SLIGHT
POTASSIUM SERPL-SCNC: 3.9 MMOL/L (ref 3.5–5.1)
RBC # BLD AUTO: 4.12 M/UL (ref 4.6–6.2)
SARS-COV-2 RNA RESP QL NAA+PROBE: NOT DETECTED
SODIUM SERPL-SCNC: 140 MMOL/L (ref 136–145)
SPHEROCYTES BLD QL SMEAR: ABNORMAL
TARGETS BLD QL SMEAR: ABNORMAL
TOXIC GRANULES BLD QL SMEAR: PRESENT
WBC # BLD AUTO: 7.5 K/UL (ref 3.9–12.7)

## 2020-05-24 PROCEDURE — 80048 BASIC METABOLIC PNL TOTAL CA: CPT

## 2020-05-24 PROCEDURE — 25000003 PHARM REV CODE 250: Performed by: NURSE PRACTITIONER

## 2020-05-24 PROCEDURE — C9113 INJ PANTOPRAZOLE SODIUM, VIA: HCPCS | Performed by: INTERNAL MEDICINE

## 2020-05-24 PROCEDURE — 99233 PR SUBSEQUENT HOSPITAL CARE,LEVL III: ICD-10-PCS | Mod: ,,, | Performed by: NURSE PRACTITIONER

## 2020-05-24 PROCEDURE — 99233 SBSQ HOSP IP/OBS HIGH 50: CPT | Mod: ,,, | Performed by: NURSE PRACTITIONER

## 2020-05-24 PROCEDURE — 11000001 HC ACUTE MED/SURG PRIVATE ROOM

## 2020-05-24 PROCEDURE — 85025 COMPLETE CBC W/AUTO DIFF WBC: CPT

## 2020-05-24 PROCEDURE — 94761 N-INVAS EAR/PLS OXIMETRY MLT: CPT

## 2020-05-24 PROCEDURE — 63600175 PHARM REV CODE 636 W HCPCS: Performed by: INTERNAL MEDICINE

## 2020-05-24 PROCEDURE — 25000003 PHARM REV CODE 250: Performed by: INTERNAL MEDICINE

## 2020-05-24 RX ORDER — POLYETHYLENE GLYCOL 3350, SODIUM SULFATE ANHYDROUS, SODIUM BICARBONATE, SODIUM CHLORIDE, POTASSIUM CHLORIDE 236; 22.74; 6.74; 5.86; 2.97 G/4L; G/4L; G/4L; G/4L; G/4L
4000 POWDER, FOR SOLUTION ORAL ONCE
Status: COMPLETED | OUTPATIENT
Start: 2020-05-24 | End: 2020-05-24

## 2020-05-24 RX ADMIN — PHENYTOIN SODIUM 100 MG: 100 CAPSULE ORAL at 08:05

## 2020-05-24 RX ADMIN — POLYETHYLENE GLYCOL 3350, SODIUM SULFATE ANHYDROUS, SODIUM BICARBONATE, SODIUM CHLORIDE, POTASSIUM CHLORIDE 4000 ML: 236; 22.74; 6.74; 5.86; 2.97 POWDER, FOR SOLUTION ORAL at 08:05

## 2020-05-24 RX ADMIN — DORZOLAMIDE HYDROCHLORIDE 1 DROP: 20 SOLUTION/ DROPS OPHTHALMIC at 08:05

## 2020-05-24 RX ADMIN — METOPROLOL TARTRATE 12.5 MG: 25 TABLET, FILM COATED ORAL at 08:05

## 2020-05-24 RX ADMIN — DONEPEZIL HYDROCHLORIDE 10 MG: 5 TABLET, FILM COATED ORAL at 08:05

## 2020-05-24 RX ADMIN — TAMSULOSIN HYDROCHLORIDE 0.4 MG: 0.4 CAPSULE ORAL at 08:05

## 2020-05-24 RX ADMIN — PANTOPRAZOLE SODIUM 40 MG: 40 INJECTION, POWDER, LYOPHILIZED, FOR SOLUTION INTRAVENOUS at 08:05

## 2020-05-24 RX ADMIN — TRAVOPROST 1 DROP: 0.04 SOLUTION/ DROPS OPHTHALMIC at 08:05

## 2020-05-24 RX ADMIN — FLUOXETINE 20 MG: 20 CAPSULE ORAL at 08:05

## 2020-05-24 RX ADMIN — TRAZODONE HYDROCHLORIDE 100 MG: 100 TABLET ORAL at 08:05

## 2020-05-24 RX ADMIN — ATORVASTATIN CALCIUM 40 MG: 20 TABLET, FILM COATED ORAL at 08:05

## 2020-05-24 RX ADMIN — DIVALPROEX SODIUM 500 MG: 250 TABLET, DELAYED RELEASE ORAL at 08:05

## 2020-05-24 NOTE — SUBJECTIVE & OBJECTIVE
Interval History:    GI following. Mild trend down of Hgb 10 --> 9 now stable will continue to monitor. Discussed with GI and plan for colonoscopy 5/25/2020 if patient can tolerate prep.  Repeat COVID-19 pending. NPO pm  Review of Systems   Unable to perform ROS: Dementia (can provide some history)   Eyes: Positive for visual disturbance (glaucoma/macular degeneration and can see shadows).   Respiratory: Negative for chest tightness.    Cardiovascular: Negative for chest pain.   Gastrointestinal: Positive for blood in stool (per NH staff). Negative for abdominal pain, nausea and vomiting.   Genitourinary: Negative for dysuria.   Musculoskeletal: Positive for gait problem (walks with walker).   Skin: Negative.    Neurological: Negative for syncope, weakness and headaches.   Psychiatric/Behavioral: Negative.      Objective:     Vital Signs (Most Recent):  Temp: 98.1 °F (36.7 °C) (05/24/20 0752)  Pulse: (!) 50 (05/24/20 0800)  Resp: 16 (05/24/20 0752)  BP: 130/63 (05/24/20 0752)  SpO2: (!) 93 % (05/24/20 0752) Vital Signs (24h Range):  Temp:  [97.6 °F (36.4 °C)-98.8 °F (37.1 °C)] 98.1 °F (36.7 °C)  Pulse:  [44-55] 50  Resp:  [16-18] 16  SpO2:  [93 %-99 %] 93 %  BP: (110-130)/(56-71) 130/63     Weight: 88.7 kg (195 lb 8.8 oz)  Body mass index is 25.8 kg/m².    Intake/Output Summary (Last 24 hours) at 5/24/2020 1008  Last data filed at 5/24/2020 0000  Gross per 24 hour   Intake 90 ml   Output --   Net 90 ml      Physical Exam   Constitutional: He appears well-developed and well-nourished. No distress.   HENT:   Head: Normocephalic and atraumatic.   Mouth/Throat: Oropharynx is clear and moist.   Eyes: Conjunctivae and EOM are normal.   Blindness bilaterally and can see shapes with peripheral vision   Neck: Normal range of motion. Neck supple. No thyromegaly present.   Cardiovascular: Normal rate and regular rhythm. Exam reveals no friction rub.   No murmur heard.  Pulses:       Radial pulses are 2+ on the right side, and  2+ on the left side.        Dorsalis pedis pulses are 2+ on the right side, and 2+ on the left side.   Pulmonary/Chest: Effort normal. No respiratory distress. He has no decreased breath sounds. He has no wheezes.   Abdominal: Soft. Bowel sounds are increased. There is no tenderness. There is no rigidity.   Musculoskeletal: Normal range of motion. He exhibits no edema or tenderness.   Lymphadenopathy:     He has no cervical adenopathy.   Neurological: He is alert. He has normal strength and normal reflexes. He is disoriented. No cranial nerve deficit or sensory deficit.   Oriented to self, knows he's in a hospital   Skin: Skin is warm and dry.   Psychiatric: He has a normal mood and affect. His speech is normal and behavior is normal. Cognition and memory are impaired (oriented to self, knows he's in hospital ).       Significant Labs:   CBC:   Recent Labs   Lab 05/22/20  1725 05/23/20  0404 05/24/20  0408   WBC 8.77 6.21 7.50   HGB 10.0* 9.6* 9.7*   HCT 33.3* 32.4* 32.0*    183 187     CMP:   Recent Labs   Lab 05/24/20  1127      K 3.9      CO2 24   *   BUN 10   CREATININE 1.0   CALCIUM 8.4*   ANIONGAP 10   EGFRNONAA >60         All pertinent labs within the past 24 hours have been reviewed.    Significant Imaging: I have reviewed all pertinent imaging results/findings within the past 24 hours.

## 2020-05-24 NOTE — PLAN OF CARE
POC reviewed with pt, pt verbalized understanding. Pt oriented to self and place, intermittent confusion, reorientation provided.  Safety maintained throughout shift, bed locked and in lowest position, call light in reach, Side rails up X 2. Pt remained free of fall/trauma. Pt denies pain throughout shift.  VSS, pt remained afebrile this shift. New orders for PT/OT this shift. Pt tolerating meals well, no reports of nausea and vomiting. Diet change to Clear liquid pending Colonoscopy in the AM, Bowel Prep to begin at 8pm.  Telemetry in place, Sinus Bradycardia.  Will continue to monitor.

## 2020-05-24 NOTE — PLAN OF CARE
Pt remained free of falls and injuries throughout shift. AAO x self, disoriented to time, place, and situation. Purposeful hourly rounding performed. Pt swallows meds whole. IV flushed and saline locked. RIJ TLC complete dsg changed, lumens flushed and saline locked. Telemetry maintained asymptomatic sinus jessica in the upper 40s and 50s. Patient bedfast, wedge in place and turned q2h. Pt on room air, resting comfortably, no apparent distress at this time. Bed low and locked, bed alarm on, call light in reach. Side rails up x2. Will continue to monitor.

## 2020-05-24 NOTE — ASSESSMENT & PLAN NOTE
- Hemoglobin/hematocrit on admission 12.0/40.4 and mild decrease, but stable with morning labs 9.7/32.0  - No recent melena  - Follow H/H and transfuse for  for Hemoglobin <7  - GI consulted and EGD without evidence for source of bleeding  - GI Protonix 40 mg IV BID for now    - Discussed with GI and plan for colonoscopy 5/25/2020 if patient can tolerate prep  - Repeat COVID-19 pending

## 2020-05-24 NOTE — ASSESSMENT & PLAN NOTE
- Known episodes of melena since admission  - H/H on admit stable in ED 12.0/40.4 with initial trend down on admission with gradual trend down now 9.7/32.0, but stable.  - Follow  H/H and transfuse Hgb < 7  - GI consulted and EGD without source of bleeding;  - Plan for colonoscopy 5/25/2020 if can tolerate prep

## 2020-05-24 NOTE — NURSING
PT having intermittent confusion, Right  IJ removed by patient. Pressure dressing applied, Madiha GUEVARA NP made aware that the patient has no IV access at this time. NP will put in a consult to Line team for access.

## 2020-05-24 NOTE — PT/OT/SLP PROGRESS
Occupational Therapy  Not seen Note    Patient Name:  Anjel Soria   MRN:  [id number]    Patient not seen today secondary to Nursing care. Will follow-up Monday 5/25/20..    Bryce Pisano, LOTR  5/24/2020

## 2020-05-24 NOTE — PROGRESS NOTES
[facility] Hospital Medicine  Progress Note    Patient Name: Anjel Soria  MRN: [id number]  Patient Class: IP- Inpatient   Admission Date: 5/21/2020  Length of Stay: 2 days  Attending Physician: Leticia Marvin MD  Primary Care Provider: Michael Blanco MD        Subjective:     Principal Problem:Gastrointestinal hemorrhage with melena        HPI:  Anjel Soria is a 74 year old male who has medical history of anemia related to lower GI bleeding, coronary artery disease, vascular dementia, prior stroke with residual left sided weakness, 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  He is currently a resident of the [facility].   Per medical record, the patient with recent prior history of severe anemia requiring transfusion on April 9, 2020.  During that time, he was evaluated  At [facility] and found to be hemoccult positive and CT scan of abdomen was unremarkable.  He was subsequently discharged back to his nursing home with plan for outpatient follow up with GI.  However, the patient was unable to see GI as outpatient.   Today, patient presented to the [facility] ED where initial work up revealed stable hemoglobin/hematocrit 12.0/40.4. Otherwise, labs were unremarkable and COVID screen was negative. Of note, EKG in ED today shows sinus bradycardia and asymtpomatic with heart rate in upper 40's and low 50's.   He was to be discharged back to the nursing home, but patient had episode of melena and decision to transfer to [facility] for evaluation.     On arrival to the [facility], the patient had one small melanotic stool.  Given dementia, the patient is unable to provide detailed history.  Per nursing home medication reconciliation, the patient has not been on aspirin or Plavix recently and is not currently on any other type of blood thinners.  GI will be consulted and roge continue to trend hemoglobin/hematocrit.  Reviewed discharge information with patient. Was discharged to [facility].       Overview/Hospital Course:  After admission, Gastroenterology consulted and EGD without evidence for bleeding.  Stable hemoglobin/hematocrit and will follow.  Plan for colonoscopy if continues with melena or decrease in H/H.    Interval History:    GI following. Mild trend down of Hgb 10 --> 9 now stable will continue to monitor. Discussed with GI and plan for colonoscopy 5/25/2020 if patient can tolerate prep.  Repeat COVID-19 pending. NPO pm  Review of Systems   Unable to perform ROS: Dementia (can provide some history)   Eyes: Positive for visual disturbance (glaucoma/macular degeneration and can see shadows).   Respiratory: Negative for chest tightness.    Cardiovascular: Negative for chest pain.   Gastrointestinal: Positive for blood in stool (per NH staff). Negative for abdominal pain, nausea and vomiting.   Genitourinary: Negative for dysuria.   Musculoskeletal: Positive for gait problem (walks with walker).   Skin: Negative.    Neurological: Negative for syncope, weakness and headaches.   Psychiatric/Behavioral: Negative.      Objective:     Vital Signs (Most Recent):  Temp: 98.1 °F (36.7 °C) (05/24/20 0752)  Pulse: (!) 50 (05/24/20 0800)  Resp: 16 (05/24/20 0752)  BP: 130/63 (05/24/20 0752)  SpO2: (!) 93 % (05/24/20 0752) Vital Signs (24h Range):  Temp:  [97.6 °F (36.4 °C)-98.8 °F (37.1 °C)] 98.1 °F (36.7 °C)  Pulse:  [44-55] 50  Resp:  [16-18] 16  SpO2:  [93 %-99 %] 93 %  BP: (110-130)/(56-71) 130/63     Weight: 88.7 kg (195 lb 8.8 oz)  Body mass index is 25.8 kg/m².    Intake/Output Summary (Last 24 hours) at 5/24/2020 1008  Last data filed at 5/24/2020 0000  Gross per 24 hour   Intake 90 ml   Output --   Net 90 ml      Physical Exam   Constitutional: He appears well-developed and well-nourished. No distress.   HENT:   Head: Normocephalic and atraumatic.   Mouth/Throat: Oropharynx is clear and moist.   Eyes: Conjunctivae and EOM are normal.   Blindness bilaterally and can see shapes with peripheral  vision   Neck: Normal range of motion. Neck supple. No thyromegaly present.   Cardiovascular: Normal rate and regular rhythm. Exam reveals no friction rub.   No murmur heard.  Pulses:       Radial pulses are 2+ on the right side, and 2+ on the left side.        Dorsalis pedis pulses are 2+ on the right side, and 2+ on the left side.   Pulmonary/Chest: Effort normal. No respiratory distress. He has no decreased breath sounds. He has no wheezes.   Abdominal: Soft. Bowel sounds are increased. There is no tenderness. There is no rigidity.   Musculoskeletal: Normal range of motion. He exhibits no edema or tenderness.   Lymphadenopathy:     He has no cervical adenopathy.   Neurological: He is alert. He has normal strength and normal reflexes. He is disoriented. No cranial nerve deficit or sensory deficit.   Oriented to self, knows he's in a hospital   Skin: Skin is warm and dry.   Psychiatric: He has a normal mood and affect. His speech is normal and behavior is normal. Cognition and memory are impaired (oriented to self, knows he's in hospital ).       Significant Labs:   CBC:   Recent Labs   Lab 05/22/20  1725 05/23/20  0404 05/24/20  0408   WBC 8.77 6.21 7.50   HGB 10.0* 9.6* 9.7*   HCT 33.3* 32.4* 32.0*    183 187     CMP:   Recent Labs   Lab 05/24/20  1127      K 3.9      CO2 24   *   BUN 10   CREATININE 1.0   CALCIUM 8.4*   ANIONGAP 10   EGFRNONAA >60         All pertinent labs within the past 24 hours have been reviewed.    Significant Imaging: I have reviewed all pertinent imaging results/findings within the past 24 hours.       Assessment/Plan:      * Gastrointestinal hemorrhage with melena  - Hemoglobin/hematocrit on admission 12.0/40.4 and mild decrease, but stable with morning labs 9.7/32.0  - No recent melena  - Follow H/H and transfuse for  for Hemoglobin <7  - GI consulted and EGD without evidence for source of bleeding  - GI Protonix 40 mg IV BID for now    - Discussed with GI and  plan for colonoscopy 5/25/2020 if patient can tolerate prep  - Repeat COVID-19 pending            Hiatal hernia  - Noted on EGD 5/22/2020  - Continue Protonix 40 mg BID for now      Acute blood loss anemia  - Known episodes of melena since admission  - H/H on admit stable in ED 12.0/40.4 with initial trend down on admission with gradual trend down now 9.7/32.0, but stable.  - Follow  H/H and transfuse Hgb < 7  - GI consulted and EGD without source of bleeding;  - Plan for colonoscopy 5/25/2020 if can tolerate prep      Bipolar affective disorder in remission  - Per record.  Mood appears stable  - Continue Depakote 500 mg daily, Prozac 20 mg daily and trazodone 50 mg nightly  - Depakote level pending      Obstructive cardiomyopathy  - Per medical record  - Will request prior Cardiology records for further information  - Appears stable      Bradycardia  - Per medical record, known sinus bradycardia without symptoms or recent history of falls  - On metoprolol [id number] mg daily and will continue for now  - Monitor on telemetry        Essential hypertension  - Appears reasonably controlled  - Continue metoprolol tartrate 12.5 mg daily      Gastroesophageal reflux disease  - Per medical record  - GI consulted and EGD with normal stomach, duodenum and noted LE ring  - Continue Protonix 40 mg IV BID      BPH (benign prostatic hyperplasia)  - No noted symptoms  - Continue home dose of Flomax      Coronary artery disease involving native coronary artery of native heart without angina pectoris  - Per medical record, had previously been on aspirin and Plavix, but NH record does not indicate that he is currently on any anticoagulants  - Appears stable  - Continue metoprolol tartrate 12.5 mg BID and atorvastatin 40 mg daily      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  - Per medical record  - On dilantin 100 mg PO twice daily and depakote for mood disorder  - Per NH records, levels drawn q6 months and WNL  -  However, reports of tremors after EGD and will get dilantin and Depakote levels  - Seizure precautions      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  - Noted residual weakness and walks with walker at baseline  - Continue atorvastatin 40 mg daily for secondary stroke prevention; holding aspirin due to GIB  - PT/OT consult      End-stage glaucoma  - Per record, legally blind  - Provide assistance as needed  - Continue home medications      Subcortical vascular dementia without behavioral disturbance  - Appears stable  - Continue home dose of Aricept 10 mg daily  - Delirium precautions  - Palliative Care consult for symptom management and NP at home program referral  - PT/OT evaluation      VTE Risk Mitigation (From admission, onward)         Ordered     Reason for No Pharmacological VTE Prophylaxis  Once     Question:  Reasons:  Answer:  Active Bleeding    05/21/20 1613     IP VTE HIGH RISK PATIENT  Once      05/21/20 1613     Place sequential compression device  Until discontinued      05/21/20 1613     Place NICOLE hose  Until discontinued      05/21/20 1601                      Madiha Ann NP  Department of Hospital Medicine   [facility]

## 2020-05-25 ENCOUNTER — ANESTHESIA (OUTPATIENT)
Dept: ENDOSCOPY | Facility: OTHER | Age: 75
DRG: 329 | End: 2020-05-25
Payer: MEDICARE

## 2020-05-25 ENCOUNTER — ANESTHESIA EVENT (OUTPATIENT)
Dept: ENDOSCOPY | Facility: OTHER | Age: 75
DRG: 329 | End: 2020-05-25
Payer: MEDICARE

## 2020-05-25 PROBLEM — D62 ACUTE BLOOD LOSS ANEMIA: Status: RESOLVED | Noted: 2020-05-21 | Resolved: 2020-05-25

## 2020-05-25 PROBLEM — K63.89 COLONIC MASS: Status: ACTIVE | Noted: 2020-05-25

## 2020-05-25 LAB
ANION GAP SERPL CALC-SCNC: 11 MMOL/L (ref 8–16)
ANISOCYTOSIS BLD QL SMEAR: ABNORMAL
BASOPHILS # BLD AUTO: 0.03 K/UL (ref 0–0.2)
BASOPHILS NFR BLD: 0.4 % (ref 0–1.9)
BUN SERPL-MCNC: 10 MG/DL (ref 8–23)
BURR CELLS BLD QL SMEAR: ABNORMAL
CALCIUM SERPL-MCNC: 8.6 MG/DL (ref 8.7–10.5)
CHLORIDE SERPL-SCNC: 106 MMOL/L (ref 95–110)
CO2 SERPL-SCNC: 23 MMOL/L (ref 23–29)
CREAT SERPL-MCNC: 0.9 MG/DL (ref 0.5–1.4)
DACRYOCYTES BLD QL SMEAR: ABNORMAL
DIFFERENTIAL METHOD: ABNORMAL
EOSINOPHIL # BLD AUTO: 0.5 K/UL (ref 0–0.5)
EOSINOPHIL NFR BLD: 6.1 % (ref 0–8)
ERYTHROCYTE [DISTWIDTH] IN BLOOD BY AUTOMATED COUNT: ABNORMAL % (ref 11.5–14.5)
EST. GFR  (AFRICAN AMERICAN): >60 ML/MIN/1.73 M^2
EST. GFR  (NON AFRICAN AMERICAN): >60 ML/MIN/1.73 M^2
GIANT PLATELETS BLD QL SMEAR: PRESENT
GLUCOSE SERPL-MCNC: 120 MG/DL (ref 70–110)
HCT VFR BLD AUTO: 33.9 % (ref 40–54)
HGB BLD-MCNC: 10.1 G/DL (ref 14–18)
HYPOCHROMIA BLD QL SMEAR: ABNORMAL
IMM GRANULOCYTES # BLD AUTO: 0.02 K/UL (ref 0–0.04)
IMM GRANULOCYTES NFR BLD AUTO: 0.3 % (ref 0–0.5)
LYMPHOCYTES # BLD AUTO: 2 K/UL (ref 1–4.8)
LYMPHOCYTES NFR BLD: 27 % (ref 18–48)
MCH RBC QN AUTO: 23.5 PG (ref 27–31)
MCHC RBC AUTO-ENTMCNC: 29.8 G/DL (ref 32–36)
MCV RBC AUTO: 79 FL (ref 82–98)
MONOCYTES # BLD AUTO: 0.5 K/UL (ref 0.3–1)
MONOCYTES NFR BLD: 6.1 % (ref 4–15)
NEUTROPHILS # BLD AUTO: 4.4 K/UL (ref 1.8–7.7)
NEUTROPHILS NFR BLD: 60.1 % (ref 38–73)
NRBC BLD-RTO: 0 /100 WBC
OVALOCYTES BLD QL SMEAR: ABNORMAL
PLATELET # BLD AUTO: 190 K/UL (ref 150–350)
PLATELET BLD QL SMEAR: ABNORMAL
PMV BLD AUTO: 9.1 FL (ref 9.2–12.9)
POIKILOCYTOSIS BLD QL SMEAR: SLIGHT
POTASSIUM SERPL-SCNC: 4.2 MMOL/L (ref 3.5–5.1)
RBC # BLD AUTO: 4.3 M/UL (ref 4.6–6.2)
SCHISTOCYTES BLD QL SMEAR: ABNORMAL
SCHISTOCYTES BLD QL SMEAR: PRESENT
SODIUM SERPL-SCNC: 140 MMOL/L (ref 136–145)
SPHEROCYTES BLD QL SMEAR: ABNORMAL
TARGETS BLD QL SMEAR: ABNORMAL
TOXIC GRANULES BLD QL SMEAR: PRESENT
WBC # BLD AUTO: 7.36 K/UL (ref 3.9–12.7)

## 2020-05-25 PROCEDURE — 63600175 PHARM REV CODE 636 W HCPCS: Performed by: NURSE ANESTHETIST, CERTIFIED REGISTERED

## 2020-05-25 PROCEDURE — 88342 IMHCHEM/IMCYTCHM 1ST ANTB: CPT | Performed by: PATHOLOGY

## 2020-05-25 PROCEDURE — 37000008 HC ANESTHESIA 1ST 15 MINUTES: Performed by: INTERNAL MEDICINE

## 2020-05-25 PROCEDURE — 88341 IMHCHEM/IMCYTCHM EA ADD ANTB: CPT | Mod: 26,,, | Performed by: PATHOLOGY

## 2020-05-25 PROCEDURE — 88342 CHG IMMUNOCYTOCHEMISTRY: ICD-10-PCS | Mod: 26,,, | Performed by: PATHOLOGY

## 2020-05-25 PROCEDURE — 25000003 PHARM REV CODE 250: Performed by: NURSE ANESTHETIST, CERTIFIED REGISTERED

## 2020-05-25 PROCEDURE — C9113 INJ PANTOPRAZOLE SODIUM, VIA: HCPCS | Performed by: INTERNAL MEDICINE

## 2020-05-25 PROCEDURE — 37000009 HC ANESTHESIA EA ADD 15 MINS: Performed by: INTERNAL MEDICINE

## 2020-05-25 PROCEDURE — 76937 US GUIDE VASCULAR ACCESS: CPT

## 2020-05-25 PROCEDURE — 97165 OT EVAL LOW COMPLEX 30 MIN: CPT

## 2020-05-25 PROCEDURE — 97116 GAIT TRAINING THERAPY: CPT

## 2020-05-25 PROCEDURE — 97162 PT EVAL MOD COMPLEX 30 MIN: CPT

## 2020-05-25 PROCEDURE — 99233 PR SUBSEQUENT HOSPITAL CARE,LEVL III: ICD-10-PCS | Mod: ,,, | Performed by: NURSE PRACTITIONER

## 2020-05-25 PROCEDURE — 27201012 HC FORCEPS, HOT/COLD, DISP: Performed by: INTERNAL MEDICINE

## 2020-05-25 PROCEDURE — 88342 IMHCHEM/IMCYTCHM 1ST ANTB: CPT | Mod: 26,,, | Performed by: PATHOLOGY

## 2020-05-25 PROCEDURE — C1751 CATH, INF, PER/CENT/MIDLINE: HCPCS

## 2020-05-25 PROCEDURE — 80048 BASIC METABOLIC PNL TOTAL CA: CPT

## 2020-05-25 PROCEDURE — 45380 COLONOSCOPY AND BIOPSY: CPT | Performed by: INTERNAL MEDICINE

## 2020-05-25 PROCEDURE — 88305 TISSUE EXAM BY PATHOLOGIST: CPT | Performed by: PATHOLOGY

## 2020-05-25 PROCEDURE — 88305 TISSUE EXAM BY PATHOLOGIST: CPT | Mod: 26,,, | Performed by: PATHOLOGY

## 2020-05-25 PROCEDURE — 63600175 PHARM REV CODE 636 W HCPCS: Performed by: INTERNAL MEDICINE

## 2020-05-25 PROCEDURE — 85025 COMPLETE CBC W/AUTO DIFF WBC: CPT

## 2020-05-25 PROCEDURE — 36415 COLL VENOUS BLD VENIPUNCTURE: CPT

## 2020-05-25 PROCEDURE — 45381 COLONOSCOPY SUBMUCOUS NJX: CPT | Performed by: INTERNAL MEDICINE

## 2020-05-25 PROCEDURE — 88341 PR IHC OR ICC EACH ADD'L SINGLE ANTIBODY  STAINPR: ICD-10-PCS | Mod: 26,,, | Performed by: PATHOLOGY

## 2020-05-25 PROCEDURE — 99233 SBSQ HOSP IP/OBS HIGH 50: CPT | Mod: ,,, | Performed by: NURSE PRACTITIONER

## 2020-05-25 PROCEDURE — 36410 VNPNXR 3YR/> PHY/QHP DX/THER: CPT

## 2020-05-25 PROCEDURE — 11000001 HC ACUTE MED/SURG PRIVATE ROOM

## 2020-05-25 PROCEDURE — 94761 N-INVAS EAR/PLS OXIMETRY MLT: CPT

## 2020-05-25 PROCEDURE — 88305 TISSUE EXAM BY PATHOLOGIST: ICD-10-PCS | Mod: 26,,, | Performed by: PATHOLOGY

## 2020-05-25 PROCEDURE — 97535 SELF CARE MNGMENT TRAINING: CPT

## 2020-05-25 PROCEDURE — 88341 IMHCHEM/IMCYTCHM EA ADD ANTB: CPT | Mod: 59 | Performed by: PATHOLOGY

## 2020-05-25 PROCEDURE — 25000003 PHARM REV CODE 250: Performed by: NURSE PRACTITIONER

## 2020-05-25 PROCEDURE — 27201028 HC NEEDLE, SCLERO: Performed by: INTERNAL MEDICINE

## 2020-05-25 RX ORDER — LIDOCAINE HYDROCHLORIDE 20 MG/ML
INJECTION INTRAVENOUS
Status: DISCONTINUED | OUTPATIENT
Start: 2020-05-25 | End: 2020-05-25

## 2020-05-25 RX ORDER — PROPOFOL 10 MG/ML
VIAL (ML) INTRAVENOUS
Status: DISCONTINUED | OUTPATIENT
Start: 2020-05-25 | End: 2020-05-25

## 2020-05-25 RX ORDER — GLYCOPYRROLATE 0.2 MG/ML
INJECTION INTRAMUSCULAR; INTRAVENOUS
Status: DISCONTINUED | OUTPATIENT
Start: 2020-05-25 | End: 2020-05-25

## 2020-05-25 RX ORDER — SODIUM CHLORIDE, SODIUM LACTATE, POTASSIUM CHLORIDE, CALCIUM CHLORIDE 600; 310; 30; 20 MG/100ML; MG/100ML; MG/100ML; MG/100ML
INJECTION, SOLUTION INTRAVENOUS CONTINUOUS PRN
Status: DISCONTINUED | OUTPATIENT
Start: 2020-05-25 | End: 2020-05-25

## 2020-05-25 RX ADMIN — PHENYTOIN SODIUM 100 MG: 100 CAPSULE ORAL at 09:05

## 2020-05-25 RX ADMIN — PHENYTOIN SODIUM 100 MG: 100 CAPSULE ORAL at 10:05

## 2020-05-25 RX ADMIN — TRAVOPROST 1 DROP: 0.04 SOLUTION/ DROPS OPHTHALMIC at 10:05

## 2020-05-25 RX ADMIN — GLYCOPYRROLATE 0.2 MG: 0.2 INJECTION, SOLUTION INTRAMUSCULAR; INTRAVENOUS at 10:05

## 2020-05-25 RX ADMIN — PROPOFOL 10 MG: 10 INJECTION, EMULSION INTRAVENOUS at 10:05

## 2020-05-25 RX ADMIN — PANTOPRAZOLE SODIUM 40 MG: 40 INJECTION, POWDER, LYOPHILIZED, FOR SOLUTION INTRAVENOUS at 10:05

## 2020-05-25 RX ADMIN — LIDOCAINE HYDROCHLORIDE 50 MG: 20 INJECTION, SOLUTION INTRAVENOUS at 10:05

## 2020-05-25 RX ADMIN — FLUOXETINE 20 MG: 20 CAPSULE ORAL at 09:05

## 2020-05-25 RX ADMIN — DORZOLAMIDE HYDROCHLORIDE 1 DROP: 20 SOLUTION/ DROPS OPHTHALMIC at 09:05

## 2020-05-25 RX ADMIN — TRAZODONE HYDROCHLORIDE 100 MG: 100 TABLET ORAL at 10:05

## 2020-05-25 RX ADMIN — DONEPEZIL HYDROCHLORIDE 10 MG: 5 TABLET, FILM COATED ORAL at 09:05

## 2020-05-25 RX ADMIN — PROPOFOL 20 MG: 10 INJECTION, EMULSION INTRAVENOUS at 10:05

## 2020-05-25 RX ADMIN — ATORVASTATIN CALCIUM 40 MG: 20 TABLET, FILM COATED ORAL at 10:05

## 2020-05-25 RX ADMIN — PROPOFOL 50 MG: 10 INJECTION, EMULSION INTRAVENOUS at 10:05

## 2020-05-25 RX ADMIN — SODIUM CHLORIDE, SODIUM LACTATE, POTASSIUM CHLORIDE, AND CALCIUM CHLORIDE: .6; .31; .03; .02 INJECTION, SOLUTION INTRAVENOUS at 10:05

## 2020-05-25 RX ADMIN — TAMSULOSIN HYDROCHLORIDE 0.4 MG: 0.4 CAPSULE ORAL at 09:05

## 2020-05-25 RX ADMIN — DORZOLAMIDE HYDROCHLORIDE 1 DROP: 20 SOLUTION/ DROPS OPHTHALMIC at 10:05

## 2020-05-25 RX ADMIN — DIVALPROEX SODIUM 500 MG: 250 TABLET, DELAYED RELEASE ORAL at 10:05

## 2020-05-25 RX ADMIN — PANTOPRAZOLE SODIUM 40 MG: 40 INJECTION, POWDER, LYOPHILIZED, FOR SOLUTION INTRAVENOUS at 09:05

## 2020-05-25 NOTE — OR NURSING
Pt resting with eyes closed, awakens easily to verbal stimuli. No c/o pain or nausea and VSS, ready to transfer to inpatient room. Report called to KENNY Baker and pt placed on tranport list
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
## 2020-05-25 NOTE — ANESTHESIA POSTPROCEDURE EVALUATION
Anesthesia Post Evaluation    Patient: Anjel Soria    Procedure(s) Performed: Procedure(s) (LRB):  COLONOSCOPY (N/A)    Final Anesthesia Type: general    Patient location during evaluation: PACU  Patient participation: Yes- Able to Participate  Level of consciousness: awake and alert and oriented  Post-procedure vital signs: reviewed and stable  Pain management: adequate  Airway patency: patent    PONV status at discharge: No PONV  Anesthetic complications: no      Cardiovascular status: blood pressure returned to baseline and hemodynamically stable  Respiratory status: unassisted, spontaneous ventilation and room air  Hydration status: euvolemic  Follow-up not needed.          Vitals Value Taken Time   /67 5/25/2020 11:11 AM   Temp 36.2 °C (97.2 °F) 5/25/2020 11:11 AM   Pulse 45 5/25/2020 11:11 AM   Resp 16 5/25/2020 11:11 AM   SpO2 100 % 5/25/2020 11:11 AM         No case tracking events are documented in the log.      Pain/Silvano Score: Silvano Score: 8 (5/25/2020 11:11 AM)

## 2020-05-25 NOTE — ASSESSMENT & PLAN NOTE
- Colonoscopy 5/25/2020 with fungating mass to ascending colon and diverticulosis  - General Surgery consulted for evaluation of likely malignant tumor colon  - Repeat COVID-19 negative 5/23/2020

## 2020-05-25 NOTE — SUBJECTIVE & OBJECTIVE
Interval History:  Colonoscopy with fungating mass ascending colon and diverticulosis.  No further episodes of bleeding with stable H/H.  General Surgery consulted.      Review of Systems   Unable to perform ROS: Dementia (can provide some history)   Constitutional: Positive for chills.   Eyes: Positive for visual disturbance (glaucoma/macular degeneration and can see shadows).   Respiratory: Negative for chest tightness.    Cardiovascular: Negative for chest pain.   Gastrointestinal: Negative for abdominal pain, blood in stool (no episodes overnight), nausea and vomiting.   Genitourinary: Negative for dysuria.   Musculoskeletal: Positive for gait problem (walks with walker).   Skin: Negative.    Neurological: Negative for weakness and headaches.     Objective:     Vital Signs (Most Recent):  Temp: 97.2 °F (36.2 °C) (05/25/20 1111)  Pulse: (!) 45 (05/25/20 1111)  Resp: 16 (05/25/20 1111)  BP: 136/67 (05/25/20 1111)  SpO2: 100 % (05/25/20 1111) Vital Signs (24h Range):  Temp:  [97.2 °F (36.2 °C)-98.4 °F (36.9 °C)] 97.2 °F (36.2 °C)  Pulse:  [45-60] 45  Resp:  [16-18] 16  SpO2:  [96 %-100 %] 100 %  BP: (120-136)/(57-71) 136/67     Weight: 88.7 kg (195 lb 8.8 oz)  Body mass index is 25.8 kg/m².    Intake/Output Summary (Last 24 hours) at 5/25/2020 1129  Last data filed at 5/25/2020 1107  Gross per 24 hour   Intake 840 ml   Output --   Net 840 ml      Physical Exam   Constitutional: He appears well-developed and well-nourished. No distress.   HENT:   Head: Normocephalic and atraumatic.   Mouth/Throat: Oropharynx is clear and moist.   Eyes: Conjunctivae and EOM are normal.   Blindness bilaterally and can see shapes with peripheral vision   Neck: Normal range of motion. Neck supple. No thyromegaly present.   Cardiovascular: Normal rate and regular rhythm. Exam reveals no friction rub.   No murmur heard.  Pulses:       Radial pulses are 2+ on the right side, and 2+ on the left side.        Dorsalis pedis pulses are 2+ on  the right side, and 2+ on the left side.   Pulmonary/Chest: Effort normal. No respiratory distress. He has no decreased breath sounds. He has no wheezes.   Abdominal: Soft. Bowel sounds are increased. There is no tenderness. There is no rigidity.   Musculoskeletal: Normal range of motion. He exhibits no edema or tenderness.   Lymphadenopathy:     He has no cervical adenopathy.   Neurological: He is alert. He has normal strength and normal reflexes. He is disoriented. No cranial nerve deficit or sensory deficit.   Oriented to self, knows he's in a hospital   Skin: Skin is warm and dry.   Psychiatric: He has a normal mood and affect. His speech is normal and behavior is normal. Cognition and memory are impaired.       Significant Labs:   CBC:   Recent Labs   Lab 05/24/20  0408 05/25/20  0515   WBC 7.50 7.36   HGB 9.7* 10.1*   HCT 32.0* 33.9*    190     CMP:   Recent Labs   Lab 05/24/20  1127 05/25/20  0515    140   K 3.9 4.2    106   CO2 24 23   * 120*   BUN 10 10   CREATININE 1.0 0.9   CALCIUM 8.4* 8.6*   ANIONGAP 10 11   EGFRNONAA >60 >60         All pertinent labs within the past 24 hours have been reviewed.    Significant Imaging: I have reviewed all pertinent imaging results/findings within the past 24 hours.     Colonscopy 5/25/2020  A fungating non-obstructing medium-sized mass was found in the ascending colon. The mass was partially circumferential (involving one-half of the lumen circumference). No bleeding was present. This was biopsied with a cold forceps for histology.         A few small-mouthed diverticula were found in the sigmoid colon and descending colon.  Mihaela Morejon MD  5/25/2020 11:05:36 AM

## 2020-05-25 NOTE — PLAN OF CARE
Plan of care reviewed with patient. Denies any concerns. VSS on RA. Pt finished all of bowel prep. BMs currently clearish dark red. Running sinus jessica on the monitor. Bed in lowest position and locked with bed alarm on. Call bell within reach. Will continue to monitor
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
## 2020-05-25 NOTE — NURSING
Spoke with daughter Sabina Stone and she stated she spoke with her mom and brother and they would like to give consent to Dr. Jo to perform surgery tomorrow. Stated she will notify him in am when he calls back.

## 2020-05-25 NOTE — ASSESSMENT & PLAN NOTE
- No known episodes of melenahematochezia since 5/22/2020    - H/H on admit stable in ED 12.0/40.4 with initial trend down and now stable    - Follow  H/H and transfuse Hgb < 7  - GI consulted and EGD 5/22/2020 without source of bleeding; colonoscopy with fungating mass likely source of bleeding

## 2020-05-25 NOTE — NURSING
spoke with Tg Zimmer, patient's neice his daughter and patient allowed her to recieve information about his care and current issues. she verbalized understanding.

## 2020-05-25 NOTE — [employer] PATIENT INSTRUCTIONS
Discharge Summary/Instructions after an Endoscopic Procedure  Patient Name: Anjel Soria  Patient MRN: [id number]  Patient YOB: 1945  Monday, May 25, 2020  Mihaela Morejon MD  RESTRICTIONS:  During your procedure today, you received medications for sedation.  These   medications may affect your judgment, balance and coordination.  Therefore,   for 24 hours, you have the following restrictions:   - DO NOT drive a car, operate machinery, make legal/financial decisions,   sign important papers or drink alcohol.    ACTIVITY:  Today: no heavy lifting, straining or running due to procedural   sedation/anesthesia.  The following day: return to full activity including work.  DIET:  Eat and drink normally unless instructed otherwise.     TREATMENT FOR COMMON SIDE EFFECTS:  - Mild abdominal pain, nausea, belching, bloating or excessive gas:  rest,   eat lightly and use a heating pad.  - Sore Throat: treat with throat lozenges and/or gargle with warm salt   water.  - Because air was used during the procedure, expelling large amounts of air   from your rectum or belching is normal.  - If a bowel prep was taken, you may not have a bowel movement for 1-3 days.    This is normal.  SYMPTOMS TO WATCH FOR AND REPORT TO YOUR PHYSICIAN:  1. Abdominal pain or bloating, other than gas cramps.  2. Chest pain.  3. Back pain.  4. Signs of infection such as: chills or fever occurring within 24 hours   after the procedure.  5. Rectal bleeding, which would show as bright red, maroon, or black stools.   (A tablespoon of blood from the rectum is not serious, especially if   hemorrhoids are present.)  6. Vomiting.  7. Weakness or dizziness.  GO DIRECTLY TO THE NEAREST EMERGENCY ROOM IF YOU HAVE ANY OF THE FOLLOWING:      Difficulty breathing              Chills and/or fever over 101 F   Persistent vomiting and/or vomiting blood   Severe abdominal pain   Severe chest pain   Black, tarry stools   Bleeding- more than one tablespoon   Any  other symptom or condition that you feel may need urgent attention  Your doctor recommends these additional instructions:  If any biopsies were taken, your doctors clinic will contact you in 1 to 2   weeks with any results.  - Return patient to [facility] for [facility] care.   - Full liquid diet.   - Await pathology results.   - Refer to a surgeon.  For questions, problems or results please call your physician - Mihaela Morejon MD at Work:  [phone].  [facility], EMERGENCY ROOM PHONE NUMBER: [phone], [facility]   [phone].  IF A COMPLICATION OR EMERGENCY SITUATION ARISES AND YOU ARE UNABLE TO REACH   YOUR PHYSICIAN - GO DIRECTLY TO THE EMERGENCY ROOM.  Mihaela Morejon MD  5/25/2020 11:05:36 AM  This report has been verified and signed electronically.  [employer]

## 2020-05-25 NOTE — PLAN OF CARE
Problem: Occupational Therapy Goal  Goal: Occupational Therapy Goal  Description  Goals to be met by: 6/1/2020    Patient will increase functional independence with ADLs by performing:    Feeding with Set-up Assistance.  UE Dressing with Supervision.  Grooming while seated with Supervision.  Toileting from toilet with Supervision for hygiene and clothing management.   Supine to sit with Supervision.  Toilet transfer to toilet with Contact Guard Assistance.     Outcome: Ongoing, Progressing   OT eval completed with appropriate goals and POC developed. Pt NH resident, blind, and requires assist for ADLS & mobility at baseline. Pt requiring increased assist for all tasks d/t visual deficits at this time. Recommend therapy 3x/week for improved OOB mobility and performance of self-care tasks with 24 hour supervision upon return to NH.

## 2020-05-25 NOTE — NURSING
awake and alert, denies current needs. states he had a long night and is tired, no bm at this time however PM nurse reported multiple clear liquid stools throughout the night. denies pain.

## 2020-05-25 NOTE — NURSING
notified balwinder ALBRIGHT of Dr. Dumont's request for PICC placement, she notified Eugenia to place line at this time. Seda mcmanus.

## 2020-05-25 NOTE — PLAN OF CARE
Problem: Physical Therapy Goal  Goal: Physical Therapy Goal  Description  Goals to be met by: 6/25/2020    Patient will perform the following to increase strength, improve mobility, and return to prior level of function:    1. Supine <> sit with SBA.  2. Sit<>stand with SBA with least restrictive assistive device.  3. Gait x 150 feet with SBA with least restrictive assistive device.   Outcome: Ongoing, Progressing     PT orders received. PT evaluation completed and goals/POC established. Pt tolerated evaluation well with no adverse reactions. Pt performed supine <> sit with min A, sit <> stand with min A, and ambulated 10 ft x 2 trials with min A. PT will continue to follow and progress goals as tolerated. Recommend discharge back to nursing home.

## 2020-05-25 NOTE — NURSING
spoke with daughter Sabina Stone [phone] about procedure and abnormal findings and notified her of surgery consult, she verbalized understanding and stated to please call if need any consents or any new findings.
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
## 2020-05-25 NOTE — TRANSFER OF CARE
"Anesthesia Transfer of Care Note    Patient: Anjel Soria    Procedure(s) Performed: Procedure(s) (LRB):  COLONOSCOPY (N/A)    Patient location: PACU    Anesthesia Type: general    Transport from OR: Transported from OR on 6-10 L/min O2 by face mask with adequate spontaneous ventilation    Post pain: adequate analgesia    Post assessment: no apparent anesthetic complications    Post vital signs: stable    Level of consciousness: awake    Nausea/Vomiting: no nausea/vomiting    Complications: none    Transfer of care protocol was followed      Last vitals:   Visit Vitals  /71 (BP Location: Left arm, Patient Position: Lying)   Pulse (!) 56   Temp 36.6 °C (97.8 °F) (Oral)   Resp 18   Ht 6' 1" (1.854 m)   Wt 88.7 kg (195 lb 8.8 oz)   SpO2 99%   BMI 25.80 kg/m²     "

## 2020-05-25 NOTE — ASSESSMENT & PLAN NOTE
- Hemoglobin/hematocrit on admission 12.0/40.4 and mild decrease, but stable    - No recent melena/hematochezia  - Follow H/H and transfuse for  for Hemoglobin <7  - GI Protonix 40 mg IV BID for now    - GI consulted and EGD 5/22/2020 without evidence for source of bleeding  - Colonoscopy 5/25/2020 with fungating mass to ascending colon and diverticulosis  - General Surgery consulted for evaluation of likely malignant tumor colon  - Repeat COVID-19 negative 5/23/2020

## 2020-05-25 NOTE — PROGRESS NOTES
[facility] Hospital Medicine  Progress Note    Patient Name: Anjel Soria  MRN: [id number]  Patient Class: IP- Inpatient   Admission Date: 5/21/2020  Length of Stay: 3 days  Attending Physician: Jero Bower MD  Primary Care Provider: Michael Blanco MD        Subjective:     Principal Problem:Gastrointestinal hemorrhage with melena        HPI:  Anjel Soria is a 74 year old male who has medical history of anemia related to lower GI bleeding, coronary artery disease, vascular dementia, prior stroke with residual left sided weakness, 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  He is currently a resident of the [facility].   Per medical record, the patient with recent prior history of severe anemia requiring transfusion on April 9, 2020.  During that time, he was evaluated  At [facility] and found to be hemoccult positive and CT scan of abdomen was unremarkable.  He was subsequently discharged back to his nursing home with plan for outpatient follow up with GI.  However, the patient was unable to see GI as outpatient.   Today, patient presented to the [facility] ED where initial work up revealed stable hemoglobin/hematocrit 12.0/40.4. Otherwise, labs were unremarkable and COVID screen was negative. Of note, EKG in ED today shows sinus bradycardia and asymtpomatic with heart rate in upper 40's and low 50's.   He was to be discharged back to the nursing home, but patient had episode of melena and decision to transfer to [facility] for evaluation.     On arrival to the [facility], the patient had one small melanotic stool.  Given dementia, the patient is unable to provide detailed history.  Per nursing home medication reconciliation, the patient has not been on aspirin or Plavix recently and is not currently on any other type of blood thinners.  GI will be consulted and roge continue to trend hemoglobin/hematocrit.      Overview/Hospital Course:  After admission, Gastroenterology consulted and EGD without evidence for bleeding.  Colonoscopy 5/25/2020 noted fungating mass ascending colon and diverticulosis in the sigmoid colon and in the descending colon. Pathology pending.   No further episodes of hematochezia/melena and stable hemoglobin/hematocrit.  General Surgery consulted for evaluation.     Interval History:  Colonoscopy with fungating mass ascending colon and diverticulosis.  No further episodes of bleeding with stable H/H.  General Surgery consulted.      Review of Systems   Unable to perform ROS: Dementia (can provide some history)   Constitutional: Positive for chills.   Eyes: Positive for visual disturbance (glaucoma/macular degeneration and can see shadows).   Respiratory: Negative for chest tightness.    Cardiovascular: Negative for chest pain.   Gastrointestinal: Negative for abdominal pain, blood in stool (no episodes overnight), nausea and vomiting.   Genitourinary: Negative for dysuria.   Musculoskeletal: Positive for gait problem (walks with walker).   Skin: Negative.    Neurological: Negative for weakness and headaches.     Objective:     Vital Signs (Most Recent):  Temp: 97.2 °F (36.2 °C) (05/25/20 1111)  Pulse: (!) 45 (05/25/20 1111)  Resp: 16 (05/25/20 1111)  BP: 136/67 (05/25/20 1111)  SpO2: 100 % (05/25/20 1111) Vital Signs (24h Range):  Temp:  [97.2 °F (36.2 °C)-98.4 °F (36.9 °C)] 97.2 °F (36.2 °C)  Pulse:  [45-60] 45  Resp:  [16-18] 16  SpO2:  [96 %-100 %] 100 %  BP: (120-136)/(57-71) 136/67     Weight: 88.7 kg (195 lb 8.8 oz)  Body mass index is 25.8 kg/m².    Intake/Output Summary (Last 24 hours) at 5/25/2020 1129  Last data filed at 5/25/2020 1107  Gross per 24 hour   Intake 840 ml   Output --   Net 840 ml      Physical Exam   Constitutional: He appears well-developed and well-nourished. No distress.   HENT:   Head: Normocephalic and atraumatic.   Mouth/Throat: Oropharynx is clear and moist.   Eyes:  Conjunctivae and EOM are normal.   Blindness bilaterally and can see shapes with peripheral vision   Neck: Normal range of motion. Neck supple. No thyromegaly present.   Cardiovascular: Normal rate and regular rhythm. Exam reveals no friction rub.   No murmur heard.  Pulses:       Radial pulses are 2+ on the right side, and 2+ on the left side.        Dorsalis pedis pulses are 2+ on the right side, and 2+ on the left side.   Pulmonary/Chest: Effort normal. No respiratory distress. He has no decreased breath sounds. He has no wheezes.   Abdominal: Soft. Bowel sounds are increased. There is no tenderness. There is no rigidity.   Musculoskeletal: Normal range of motion. He exhibits no edema or tenderness.   Lymphadenopathy:     He has no cervical adenopathy.   Neurological: He is alert. He has normal strength and normal reflexes. He is disoriented. No cranial nerve deficit or sensory deficit.   Oriented to self, knows he's in a hospital   Skin: Skin is warm and dry.   Psychiatric: He has a normal mood and affect. His speech is normal and behavior is normal. Cognition and memory are impaired.       Significant Labs:   CBC:   Recent Labs   Lab 05/24/20  0408 05/25/20  0515   WBC 7.50 7.36   HGB 9.7* 10.1*   HCT 32.0* 33.9*    190     CMP:   Recent Labs   Lab 05/24/20  1127 05/25/20  0515    140   K 3.9 4.2    106   CO2 24 23   * 120*   BUN 10 10   CREATININE 1.0 0.9   CALCIUM 8.4* 8.6*   ANIONGAP 10 11   EGFRNONAA >60 >60         All pertinent labs within the past 24 hours have been reviewed.    Significant Imaging: I have reviewed all pertinent imaging results/findings within the past 24 hours.     Colonscopy 5/25/2020  A fungating non-obstructing medium-sized mass was found in the ascending colon. The mass was partially circumferential (involving one-half of the lumen circumference). No bleeding was present. This was biopsied with a cold forceps for histology.         A few small-mouthed  diverticula were found in the sigmoid colon and descending colon.  Mihaela Morejon MD  5/25/2020 11:05:36 AM      Assessment/Plan:      * Gastrointestinal hemorrhage with melena  - Hemoglobin/hematocrit on admission 12.0/40.4 and mild decrease, but stable    - No recent melena/hematochezia  - Follow H/H and transfuse for  for Hemoglobin <7  - GI Protonix 40 mg IV BID for now    - GI consulted and EGD 5/22/2020 without evidence for source of bleeding  - Colonoscopy 5/25/2020 with fungating mass to ascending colon and diverticulosis  - General Surgery consulted for evaluation of likely malignant tumor colon  - Repeat COVID-19 negative 5/23/2020            Mass of colon  - Colonoscopy 5/25/2020 with fungating mass to ascending colon and diverticulosis  - General Surgery consulted for evaluation of likely malignant tumor colon  - Repeat COVID-19 negative 5/23/2020          Acute blood loss anemia  - No known episodes of melenahematochezia since 5/22/2020    - H/H on admit stable in ED 12.0/40.4 with initial trend down and now stable    - Follow  H/H and transfuse Hgb < 7  - GI consulted and EGD 5/22/2020 without source of bleeding; colonoscopy with fungating mass likely source of bleeding         Hiatal hernia  - Noted on EGD 5/22/2020  - Continue Protonix 40 mg BID for now      Bipolar affective disorder in remission  - Per record.  Mood appears stable  - Continue Depakote 500 mg daily, Prozac 20 mg daily and trazodone 50 mg nightly  - Depakote level pending      Obstructive cardiomyopathy  - Per medical record  - Will request prior Cardiology records for further information  - Appears stable      Bradycardia  - Per medical record, known sinus bradycardia without symptoms or recent history of falls  - On metoprolol [id number] mg daily and will continue for now  - Monitor on telemetry        Essential hypertension  - Appears reasonably controlled  - Continue metoprolol tartrate 12.5 mg daily      Gastroesophageal  reflux disease  - Per medical record  - GI consulted and EGD with normal stomach, duodenum and noted LE ring  - Continue Protonix 40 mg IV BID      BPH (benign prostatic hyperplasia)  - No noted symptoms  - Continue home dose of Flomax      Coronary artery disease involving native coronary artery of native heart without angina pectoris  - Per medical record, had previously been on aspirin and Plavix, but NH record does not indicate that he is currently on any anticoagulants  - Appears stable  - Continue metoprolol tartrate 12.5 mg BID and atorvastatin 40 mg daily      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  - Per medical record  - On dilantin 100 mg PO twice daily and depakote for mood disorder  - Per NH records, levels drawn q6 months and WNL  - However, reports of tremors after EGD and will get dilantin and Depakote levels  - Seizure precautions      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  - Noted residual weakness and walks with walker at baseline  - Continue atorvastatin 40 mg daily for secondary stroke prevention; holding aspirin due to GIB  - PT/OT consult      End-stage glaucoma  - Per record, legally blind  - Provide assistance as needed  - Continue home medications      Subcortical vascular dementia without behavioral disturbance  - Appears stable  - Continue home dose of Aricept 10 mg daily  - Delirium precautions  - Palliative Care consult for symptom management and NP at home program referral  - PT/OT evaluation      VTE Risk Mitigation (From admission, onward)         Ordered     Reason for No Pharmacological VTE Prophylaxis  Once     Question:  Reasons:  Answer:  Active Bleeding    05/21/20 1613     IP VTE HIGH RISK PATIENT  Once      05/21/20 1613     Place sequential compression device  Until discontinued      05/21/20 1613     Place NICOLE hose  Until discontinued      05/21/20 1601                      Madiha Ann NP  Department of Hospital Medicine   [facility]

## 2020-05-26 ENCOUNTER — ANESTHESIA (OUTPATIENT)
Dept: SURGERY | Facility: OTHER | Age: 75
DRG: 329 | End: 2020-05-26
Payer: MEDICARE

## 2020-05-26 ENCOUNTER — ANESTHESIA EVENT (OUTPATIENT)
Dept: SURGERY | Facility: OTHER | Age: 75
DRG: 329 | End: 2020-05-26
Payer: MEDICARE

## 2020-05-26 LAB
ANION GAP SERPL CALC-SCNC: 9 MMOL/L (ref 8–16)
ANISOCYTOSIS BLD QL SMEAR: ABNORMAL
ASCENDING AORTA: 2.96 CM
AV INDEX (PROSTH): 0.72
AV MEAN GRADIENT: 4 MMHG
AV PEAK GRADIENT: 8 MMHG
AV VALVE AREA: 2.28 CM2
AV VELOCITY RATIO: 0.67
BASOPHILS # BLD AUTO: 0.04 K/UL (ref 0–0.2)
BASOPHILS NFR BLD: 0.5 % (ref 0–1.9)
BSA FOR ECHO PROCEDURE: 2.14 M2
BUN SERPL-MCNC: 9 MG/DL (ref 8–23)
CALCIUM SERPL-MCNC: 8.4 MG/DL (ref 8.7–10.5)
CHLORIDE SERPL-SCNC: 106 MMOL/L (ref 95–110)
CO2 SERPL-SCNC: 21 MMOL/L (ref 23–29)
CREAT SERPL-MCNC: 1 MG/DL (ref 0.5–1.4)
CV ECHO LV RWT: 0.37 CM
DIFFERENTIAL METHOD: ABNORMAL
DOP CALC AO PEAK VEL: 1.41 M/S
DOP CALC AO VTI: 27.5 CM
DOP CALC LVOT AREA: 3.2 CM2
DOP CALC LVOT DIAMETER: 2.01 CM
DOP CALC LVOT PEAK VEL: 0.94 M/S
DOP CALC LVOT STROKE VOLUME: 62.83 CM3
DOP CALCLVOT PEAK VEL VTI: 19.81 CM
E WAVE DECELERATION TIME: 366.91 MSEC
E/A RATIO: 0.55
E/E' RATIO: 5.05 M/S
ECHO LV POSTERIOR WALL: 0.9 CM (ref 0.6–1.1)
EOSINOPHIL # BLD AUTO: 0.5 K/UL (ref 0–0.5)
EOSINOPHIL NFR BLD: 7.1 % (ref 0–8)
ERYTHROCYTE [DISTWIDTH] IN BLOOD BY AUTOMATED COUNT: ABNORMAL % (ref 11.5–14.5)
EST. GFR  (AFRICAN AMERICAN): >60 ML/MIN/1.73 M^2
EST. GFR  (NON AFRICAN AMERICAN): >60 ML/MIN/1.73 M^2
FRACTIONAL SHORTENING: 44 % (ref 28–44)
GIANT PLATELETS BLD QL SMEAR: PRESENT
GLUCOSE SERPL-MCNC: 115 MG/DL (ref 70–110)
HCT VFR BLD AUTO: 33.4 % (ref 40–54)
HGB BLD-MCNC: 10.3 G/DL (ref 14–18)
HYPOCHROMIA BLD QL SMEAR: ABNORMAL
IMM GRANULOCYTES # BLD AUTO: 0.01 K/UL (ref 0–0.04)
IMM GRANULOCYTES NFR BLD AUTO: 0.1 % (ref 0–0.5)
INTERVENTRICULAR SEPTUM: 0.94 CM (ref 0.6–1.1)
LA MAJOR: 5.03 CM
LA MINOR: 4.69 CM
LA WIDTH: 3.59 CM
LEFT ATRIUM SIZE: 3.27 CM
LEFT ATRIUM VOLUME INDEX MOD: 19.7 ML/M2
LEFT ATRIUM VOLUME INDEX: 22.7 ML/M2
LEFT ATRIUM VOLUME MOD: 42 CM3
LEFT ATRIUM VOLUME: 48.44 CM3
LEFT INTERNAL DIMENSION IN SYSTOLE: 2.75 CM (ref 2.1–4)
LEFT VENTRICLE DIASTOLIC VOLUME INDEX: 52.32 ML/M2
LEFT VENTRICLE DIASTOLIC VOLUME: 111.52 ML
LEFT VENTRICLE MASS INDEX: 73 G/M2
LEFT VENTRICLE SYSTOLIC VOLUME INDEX: 13.2 ML/M2
LEFT VENTRICLE SYSTOLIC VOLUME: 28.21 ML
LEFT VENTRICULAR INTERNAL DIMENSION IN DIASTOLE: 4.88 CM (ref 3.5–6)
LEFT VENTRICULAR MASS: 156.39 G
LV LATERAL E/E' RATIO: 4 M/S
LV SEPTAL E/E' RATIO: 6.86 M/S
LYMPHOCYTES # BLD AUTO: 1.8 K/UL (ref 1–4.8)
LYMPHOCYTES NFR BLD: 24.4 % (ref 18–48)
MCH RBC QN AUTO: 24 PG (ref 27–31)
MCHC RBC AUTO-ENTMCNC: 30.8 G/DL (ref 32–36)
MCV RBC AUTO: 78 FL (ref 82–98)
MONOCYTES # BLD AUTO: 0.5 K/UL (ref 0.3–1)
MONOCYTES NFR BLD: 7.4 % (ref 4–15)
MV PEAK A VEL: 0.87 M/S
MV PEAK E VEL: 0.48 M/S
MV STENOSIS PRESSURE HALF TIME: 106 MS
MV VALVE AREA P 1/2 METHOD: 2.08 CM2
NEUTROPHILS # BLD AUTO: 4.4 K/UL (ref 1.8–7.7)
NEUTROPHILS NFR BLD: 60.5 % (ref 38–73)
NRBC BLD-RTO: 0 /100 WBC
OVALOCYTES BLD QL SMEAR: ABNORMAL
PISA TR MAX VEL: 1.19 M/S
PLATELET # BLD AUTO: 167 K/UL (ref 150–350)
PLATELET BLD QL SMEAR: ABNORMAL
PMV BLD AUTO: ABNORMAL FL (ref 9.2–12.9)
POIKILOCYTOSIS BLD QL SMEAR: SLIGHT
POLYCHROMASIA BLD QL SMEAR: ABNORMAL
POTASSIUM SERPL-SCNC: 4.1 MMOL/L (ref 3.5–5.1)
PULM VEIN S/D RATIO: 1.79
PV PEAK D VEL: 0.39 M/S
PV PEAK S VEL: 0.7 M/S
PV PEAK VELOCITY: 1.22 CM/S
RA MAJOR: 5.04 CM
RA PRESSURE: 3 MMHG
RBC # BLD AUTO: 4.3 M/UL (ref 4.6–6.2)
SARS-COV-2 RDRP RESP QL NAA+PROBE: NEGATIVE
SCHISTOCYTES BLD QL SMEAR: ABNORMAL
SCHISTOCYTES BLD QL SMEAR: PRESENT
SINUS: 3.26 CM
SODIUM SERPL-SCNC: 136 MMOL/L (ref 136–145)
STJ: 2.96 CM
TARGETS BLD QL SMEAR: ABNORMAL
TDI LATERAL: 0.12 M/S
TDI SEPTAL: 0.07 M/S
TDI: 0.1 M/S
TR MAX PG: 6 MMHG
TRICUSPID ANNULAR PLANE SYSTOLIC EXCURSION: 2.14 CM
TV REST PULMONARY ARTERY PRESSURE: 9 MMHG
WBC # BLD AUTO: 7.3 K/UL (ref 3.9–12.7)

## 2020-05-26 PROCEDURE — 11000001 HC ACUTE MED/SURG PRIVATE ROOM

## 2020-05-26 PROCEDURE — 37000008 HC ANESTHESIA 1ST 15 MINUTES: Performed by: SPECIALIST

## 2020-05-26 PROCEDURE — 94761 N-INVAS EAR/PLS OXIMETRY MLT: CPT

## 2020-05-26 PROCEDURE — 27000221 HC OXYGEN, UP TO 24 HOURS

## 2020-05-26 PROCEDURE — 25000003 PHARM REV CODE 250: Performed by: INTERNAL MEDICINE

## 2020-05-26 PROCEDURE — 88309 TISSUE EXAM BY PATHOLOGIST: CPT | Performed by: PATHOLOGY

## 2020-05-26 PROCEDURE — 25000003 PHARM REV CODE 250: Performed by: NURSE ANESTHETIST, CERTIFIED REGISTERED

## 2020-05-26 PROCEDURE — 88309 PR  SURG PATH,LEVEL VI: ICD-10-PCS | Mod: 26,,, | Performed by: PATHOLOGY

## 2020-05-26 PROCEDURE — 63600175 PHARM REV CODE 636 W HCPCS: Performed by: SPECIALIST

## 2020-05-26 PROCEDURE — 63600175 PHARM REV CODE 636 W HCPCS: Performed by: INTERNAL MEDICINE

## 2020-05-26 PROCEDURE — 99233 SBSQ HOSP IP/OBS HIGH 50: CPT | Mod: ,,, | Performed by: PHYSICIAN ASSISTANT

## 2020-05-26 PROCEDURE — C9290 INJ, BUPIVACAINE LIPOSOME: HCPCS | Performed by: SPECIALIST

## 2020-05-26 PROCEDURE — 85025 COMPLETE CBC W/AUTO DIFF WBC: CPT

## 2020-05-26 PROCEDURE — 27201423 OPTIME MED/SURG SUP & DEVICES STERILE SUPPLY: Performed by: SPECIALIST

## 2020-05-26 PROCEDURE — C9113 INJ PANTOPRAZOLE SODIUM, VIA: HCPCS | Performed by: INTERNAL MEDICINE

## 2020-05-26 PROCEDURE — U0002 COVID-19 LAB TEST NON-CDC: HCPCS

## 2020-05-26 PROCEDURE — 71000039 HC RECOVERY, EACH ADD'L HOUR: Performed by: SPECIALIST

## 2020-05-26 PROCEDURE — 99223 PR INITIAL HOSPITAL CARE,LEVL III: ICD-10-PCS | Mod: 25,,, | Performed by: INTERNAL MEDICINE

## 2020-05-26 PROCEDURE — 99233 PR SUBSEQUENT HOSPITAL CARE,LEVL III: ICD-10-PCS | Mod: ,,, | Performed by: PHYSICIAN ASSISTANT

## 2020-05-26 PROCEDURE — 71000033 HC RECOVERY, INTIAL HOUR: Performed by: SPECIALIST

## 2020-05-26 PROCEDURE — 99223 1ST HOSP IP/OBS HIGH 75: CPT | Mod: 25,,, | Performed by: INTERNAL MEDICINE

## 2020-05-26 PROCEDURE — 37000009 HC ANESTHESIA EA ADD 15 MINS: Performed by: SPECIALIST

## 2020-05-26 PROCEDURE — 25000003 PHARM REV CODE 250: Performed by: NURSE PRACTITIONER

## 2020-05-26 PROCEDURE — 36415 COLL VENOUS BLD VENIPUNCTURE: CPT

## 2020-05-26 PROCEDURE — 36000708 HC OR TIME LEV III 1ST 15 MIN: Performed by: SPECIALIST

## 2020-05-26 PROCEDURE — 63600175 PHARM REV CODE 636 W HCPCS: Performed by: NURSE ANESTHETIST, CERTIFIED REGISTERED

## 2020-05-26 PROCEDURE — P9045 ALBUMIN (HUMAN), 5%, 250 ML: HCPCS | Mod: JG | Performed by: NURSE ANESTHETIST, CERTIFIED REGISTERED

## 2020-05-26 PROCEDURE — 36620 INSERTION CATHETER ARTERY: CPT | Performed by: ANESTHESIOLOGY

## 2020-05-26 PROCEDURE — 36000709 HC OR TIME LEV III EA ADD 15 MIN: Performed by: SPECIALIST

## 2020-05-26 PROCEDURE — 88309 TISSUE EXAM BY PATHOLOGIST: CPT | Mod: 26,,, | Performed by: PATHOLOGY

## 2020-05-26 PROCEDURE — 80048 BASIC METABOLIC PNL TOTAL CA: CPT

## 2020-05-26 RX ORDER — LIDOCAINE HYDROCHLORIDE 20 MG/ML
INJECTION INTRAVENOUS
Status: DISCONTINUED | OUTPATIENT
Start: 2020-05-26 | End: 2020-05-26

## 2020-05-26 RX ORDER — ONDANSETRON 2 MG/ML
INJECTION INTRAMUSCULAR; INTRAVENOUS
Status: DISCONTINUED | OUTPATIENT
Start: 2020-05-26 | End: 2020-05-26

## 2020-05-26 RX ORDER — FENTANYL CITRATE 50 UG/ML
INJECTION, SOLUTION INTRAMUSCULAR; INTRAVENOUS
Status: DISCONTINUED | OUTPATIENT
Start: 2020-05-26 | End: 2020-05-26

## 2020-05-26 RX ORDER — PANTOPRAZOLE SODIUM 40 MG/10ML
40 INJECTION, POWDER, LYOPHILIZED, FOR SOLUTION INTRAVENOUS 2 TIMES DAILY
Status: DISCONTINUED | OUTPATIENT
Start: 2020-05-26 | End: 2020-05-27

## 2020-05-26 RX ORDER — SODIUM CHLORIDE 0.9 % (FLUSH) 0.9 %
3 SYRINGE (ML) INJECTION
Status: DISCONTINUED | OUTPATIENT
Start: 2020-05-26 | End: 2020-06-02

## 2020-05-26 RX ORDER — PANTOPRAZOLE SODIUM 40 MG/1
40 TABLET, DELAYED RELEASE ORAL DAILY
Status: DISCONTINUED | OUTPATIENT
Start: 2020-05-27 | End: 2020-05-26

## 2020-05-26 RX ORDER — HYDROMORPHONE HYDROCHLORIDE 2 MG/ML
0.4 INJECTION, SOLUTION INTRAMUSCULAR; INTRAVENOUS; SUBCUTANEOUS EVERY 5 MIN PRN
Status: DISCONTINUED | OUTPATIENT
Start: 2020-05-26 | End: 2020-05-26 | Stop reason: HOSPADM

## 2020-05-26 RX ORDER — OXYCODONE HYDROCHLORIDE 5 MG/1
5 TABLET ORAL
Status: DISCONTINUED | OUTPATIENT
Start: 2020-05-26 | End: 2020-05-26 | Stop reason: HOSPADM

## 2020-05-26 RX ORDER — ALBUMIN HUMAN 50 G/1000ML
SOLUTION INTRAVENOUS CONTINUOUS PRN
Status: DISCONTINUED | OUTPATIENT
Start: 2020-05-26 | End: 2020-05-26

## 2020-05-26 RX ORDER — SODIUM CHLORIDE, SODIUM LACTATE, POTASSIUM CHLORIDE, CALCIUM CHLORIDE 600; 310; 30; 20 MG/100ML; MG/100ML; MG/100ML; MG/100ML
INJECTION, SOLUTION INTRAVENOUS CONTINUOUS PRN
Status: DISCONTINUED | OUTPATIENT
Start: 2020-05-26 | End: 2020-05-26

## 2020-05-26 RX ORDER — NEOSTIGMINE METHYLSULFATE 1 MG/ML
INJECTION, SOLUTION INTRAVENOUS
Status: DISCONTINUED | OUTPATIENT
Start: 2020-05-26 | End: 2020-05-26

## 2020-05-26 RX ORDER — DIPHENHYDRAMINE HYDROCHLORIDE 50 MG/ML
25 INJECTION INTRAMUSCULAR; INTRAVENOUS EVERY 6 HOURS PRN
Status: DISCONTINUED | OUTPATIENT
Start: 2020-05-26 | End: 2020-05-26 | Stop reason: HOSPADM

## 2020-05-26 RX ORDER — ROCURONIUM BROMIDE 10 MG/ML
INJECTION, SOLUTION INTRAVENOUS
Status: DISCONTINUED | OUTPATIENT
Start: 2020-05-26 | End: 2020-05-26

## 2020-05-26 RX ORDER — GLYCOPYRROLATE 0.2 MG/ML
INJECTION INTRAMUSCULAR; INTRAVENOUS
Status: DISCONTINUED | OUTPATIENT
Start: 2020-05-26 | End: 2020-05-26

## 2020-05-26 RX ORDER — EPHEDRINE SULFATE 50 MG/ML
INJECTION, SOLUTION INTRAVENOUS
Status: DISCONTINUED | OUTPATIENT
Start: 2020-05-26 | End: 2020-05-26

## 2020-05-26 RX ORDER — PROPOFOL 10 MG/ML
VIAL (ML) INTRAVENOUS
Status: DISCONTINUED | OUTPATIENT
Start: 2020-05-26 | End: 2020-05-26

## 2020-05-26 RX ORDER — HYDROMORPHONE HYDROCHLORIDE 1 MG/ML
0.5 INJECTION, SOLUTION INTRAMUSCULAR; INTRAVENOUS; SUBCUTANEOUS
Status: DISCONTINUED | OUTPATIENT
Start: 2020-05-26 | End: 2020-05-29

## 2020-05-26 RX ORDER — MEPERIDINE HYDROCHLORIDE 25 MG/ML
12.5 INJECTION INTRAMUSCULAR; INTRAVENOUS; SUBCUTANEOUS ONCE AS NEEDED
Status: DISCONTINUED | OUTPATIENT
Start: 2020-05-26 | End: 2020-05-26 | Stop reason: HOSPADM

## 2020-05-26 RX ADMIN — SODIUM CHLORIDE, SODIUM LACTATE, POTASSIUM CHLORIDE, AND CALCIUM CHLORIDE: 600; 310; 30; 20 INJECTION, SOLUTION INTRAVENOUS at 10:05

## 2020-05-26 RX ADMIN — PANTOPRAZOLE SODIUM 40 MG: 40 INJECTION, POWDER, LYOPHILIZED, FOR SOLUTION INTRAVENOUS at 09:05

## 2020-05-26 RX ADMIN — ATORVASTATIN CALCIUM 40 MG: 20 TABLET, FILM COATED ORAL at 09:05

## 2020-05-26 RX ADMIN — EPHEDRINE SULFATE 10 MG: 50 INJECTION INTRAMUSCULAR; INTRAVENOUS; SUBCUTANEOUS at 12:05

## 2020-05-26 RX ADMIN — EPHEDRINE SULFATE 5 MG: 50 INJECTION INTRAMUSCULAR; INTRAVENOUS; SUBCUTANEOUS at 12:05

## 2020-05-26 RX ADMIN — CARBOXYMETHYLCELLULOSE SODIUM 2 DROP: 2.5 SOLUTION/ DROPS OPHTHALMIC at 11:05

## 2020-05-26 RX ADMIN — PHENYTOIN SODIUM 100 MG: 100 CAPSULE ORAL at 09:05

## 2020-05-26 RX ADMIN — HYDROMORPHONE HYDROCHLORIDE 0.5 MG: 1 INJECTION, SOLUTION INTRAMUSCULAR; INTRAVENOUS; SUBCUTANEOUS at 06:05

## 2020-05-26 RX ADMIN — TAMSULOSIN HYDROCHLORIDE 0.4 MG: 0.4 CAPSULE ORAL at 09:05

## 2020-05-26 RX ADMIN — PROPOFOL 130 MG: 10 INJECTION, EMULSION INTRAVENOUS at 11:05

## 2020-05-26 RX ADMIN — TRAVOPROST 1 DROP: 0.04 SOLUTION/ DROPS OPHTHALMIC at 09:05

## 2020-05-26 RX ADMIN — GLYCOPYRROLATE 0.8 MG: 0.2 INJECTION, SOLUTION INTRAMUSCULAR; INTRAVENOUS at 12:05

## 2020-05-26 RX ADMIN — ROCURONIUM BROMIDE 10 MG: 10 SOLUTION INTRAVENOUS at 12:05

## 2020-05-26 RX ADMIN — ONDANSETRON 4 MG: 2 INJECTION INTRAMUSCULAR; INTRAVENOUS at 12:05

## 2020-05-26 RX ADMIN — HYDROMORPHONE HYDROCHLORIDE 0.4 MG: 2 INJECTION, SOLUTION INTRAMUSCULAR; INTRAVENOUS; SUBCUTANEOUS at 01:05

## 2020-05-26 RX ADMIN — EPHEDRINE SULFATE 10 MG: 50 INJECTION INTRAMUSCULAR; INTRAVENOUS; SUBCUTANEOUS at 11:05

## 2020-05-26 RX ADMIN — LIDOCAINE HYDROCHLORIDE 30 MG: 20 INJECTION, SOLUTION INTRAVENOUS at 11:05

## 2020-05-26 RX ADMIN — FENTANYL CITRATE 50 MCG: 50 INJECTION, SOLUTION INTRAMUSCULAR; INTRAVENOUS at 11:05

## 2020-05-26 RX ADMIN — DORZOLAMIDE HYDROCHLORIDE 1 DROP: 20 SOLUTION/ DROPS OPHTHALMIC at 09:05

## 2020-05-26 RX ADMIN — DONEPEZIL HYDROCHLORIDE 10 MG: 5 TABLET, FILM COATED ORAL at 09:05

## 2020-05-26 RX ADMIN — ROCURONIUM BROMIDE 10 MG: 10 SOLUTION INTRAVENOUS at 11:05

## 2020-05-26 RX ADMIN — TRAZODONE HYDROCHLORIDE 100 MG: 100 TABLET ORAL at 09:05

## 2020-05-26 RX ADMIN — EPHEDRINE SULFATE 5 MG: 50 INJECTION INTRAMUSCULAR; INTRAVENOUS; SUBCUTANEOUS at 11:05

## 2020-05-26 RX ADMIN — ALBUMIN (HUMAN): 12.5 SOLUTION INTRAVENOUS at 12:05

## 2020-05-26 RX ADMIN — DIVALPROEX SODIUM 500 MG: 250 TABLET, DELAYED RELEASE ORAL at 09:05

## 2020-05-26 RX ADMIN — DEXTROSE 2 G: 50 INJECTION, SOLUTION INTRAVENOUS at 11:05

## 2020-05-26 RX ADMIN — NEOSTIGMINE METHYLSULFATE 5 MG: 1 INJECTION INTRAVENOUS at 12:05

## 2020-05-26 RX ADMIN — FLUOXETINE 20 MG: 20 CAPSULE ORAL at 09:05

## 2020-05-26 RX ADMIN — ROCURONIUM BROMIDE 40 MG: 10 SOLUTION INTRAVENOUS at 11:05

## 2020-05-26 NOTE — ASSESSMENT & PLAN NOTE
- Colonoscopy 5/25/2020 with fungating mass to ascending colon and diverticulosis  - Repeat COVID-19 negative 5/23/2020  - see GI hemorrhage

## 2020-05-26 NOTE — SUBJECTIVE & OBJECTIVE
Interval History: no overnight events/ for surg today    Review of Systems   Unable to perform ROS: Dementia (can provide some history)   Constitutional: Negative for fever.   Eyes: Positive for visual disturbance (glaucoma/macular degeneration and can see shadows).   Respiratory: Negative for shortness of breath.    Cardiovascular: Negative for chest pain.   Gastrointestinal: Negative for abdominal pain, blood in stool (no episodes overnight) and vomiting.   Neurological: Negative for headaches.     Objective:     Vital Signs (Most Recent):  Temp: 97.8 °F (36.6 °C) (05/26/20 0741)  Pulse: (!) 52 (05/26/20 1000)  Resp: 18 (05/26/20 0741)  BP: (!) 153/77 (05/26/20 0741)  SpO2: 99 % (05/26/20 0741) Vital Signs (24h Range):  Temp:  [97.1 °F (36.2 °C)-98.4 °F (36.9 °C)] 97.8 °F (36.6 °C)  Pulse:  [46-69] 52  Resp:  [16-20] 18  SpO2:  [95 %-99 %] 99 %  BP: (116-153)/(58-77) 153/77     Weight: 88.7 kg (195 lb 8.8 oz)  Body mass index is 25.8 kg/m².    Intake/Output Summary (Last 24 hours) at 5/26/2020 1111  Last data filed at 5/25/2020 2100  Gross per 24 hour   Intake 710 ml   Output --   Net 710 ml      Physical Exam   Constitutional: He appears well-developed and well-nourished. No distress.   HENT:   Head: Normocephalic and atraumatic.   Eyes: Conjunctivae are normal.   Blindness bilaterally and can see shapes with peripheral vision   Neck: Neck supple.   Cardiovascular: Normal rate and regular rhythm.   No murmur heard.  Pulmonary/Chest: Effort normal. No respiratory distress. He has no decreased breath sounds. He has no wheezes.   Abdominal: Soft. Bowel sounds are increased. There is no tenderness. There is no rigidity.   Musculoskeletal: Normal range of motion. He exhibits no edema.   Neurological: He is alert. He has normal strength and normal reflexes. He is disoriented.   Oriented to self, knows he's in a hospital   Skin: Skin is warm and dry.   Psychiatric: He has a normal mood and affect. His speech is normal  and behavior is normal. Cognition and memory are impaired.       Significant Labs:   CBC:   Recent Labs   Lab 05/25/20  0515 05/26/20  0452   WBC 7.36 7.30   HGB 10.1* 10.3*   HCT 33.9* 33.4*    167     CMP:   Recent Labs   Lab 05/24/20  1127 05/25/20  0515 05/26/20  0452    140 136   K 3.9 4.2 4.1    106 106   CO2 24 23 21*   * 120* 115*   BUN 10 10 9   CREATININE 1.0 0.9 1.0   CALCIUM 8.4* 8.6* 8.4*   ANIONGAP 10 11 9   EGFRNONAA >60 >60 >60     All pertinent labs within the past 24 hours have been reviewed.    Significant Imaging:     Narrative     EXAMINATION:  XR CHEST 1 VIEW    CLINICAL HISTORY:  TLC;    TECHNIQUE:  Single frontal view of the chest was performed.    COMPARISON:  August 2019    FINDINGS:  There is a right central venous catheter with tip in the lower superior vena cava.  There is no pneumothorax.    The heart size is normal.  There is no increase in pulmonary vascularity.  No large volume of pleural fluid is present although there is some blunting at the left costophrenic angle.  No focal consolidation is noted.  The osseous structures show degenerative changes.     Electronically signed by: Magy Malave MD  Date: 05/22/2020  Time: 09:57

## 2020-05-26 NOTE — OR NURSING
Telemetry monitor and dentures removed in OR.  Sending with chart once procedure is finished in OR

## 2020-05-26 NOTE — OP NOTE
[facility]  Surgery Department  Operative Note    SUMMARY     Patient: Anjel Soria    Medical Record: [id number]    Date of Procedure: 5/26/2020     Surgeon: Surgeon(s) and Role:     * Von Jo Jr., MD - Primary    Assisting Surgeon: None    Pre-Operative Diagnosis: Cancer of right colon [C18.2]    Post-Operative Diagnosis: Post-Op Diagnosis Codes:     * Cancer of right colon [C18.2]    Procedure: Procedure(s) (LRB):  LAPAROTOMY, EXPLORATORY (N/A)  COLECTOMY, PARTIAL - RIGHT COLECTOMY (Right)    Procedure in Detail:  The patient was brought to the operating room and placed in the supine position, the abdomen prepped and draped in sterile fashion.  The patient was opened through the old midline incision, using sharp dissection incision was carried down through the fascia.  Adhesions to the abdominal wall and small bowel were then taken down using blunt sharp dissection.  There was a napkin lesion present in the ascending colon.  There was no gross metastases or frankly enlarged lymph nodes.  The right colon was taken down along the white line of Toldt extending from the pelvis up across the hepatic flexure.  The gastrohepatic ligament was taken down using LigaSure.  The mesentery of the distal small bowel and the right and proximal transverse colon was taken down between clamps and hemostasis obtained with 2- 0 Vicryl ties and ligatures.  The distal ileum was transected between clamps as was the transverse colon at the junction of the proximal and middle 3rd.  A functional end-to-end anastomosis was then constructed using a linear stapler.  The mesentery of the small bowel was then approximated to the mesentery of the transverse colon with interrupted 3 0 Vicryl.  The peritoneal cavity was irrigated and then inspected.  The fascia closed with running 1.  PDS and the skin with a stapling device.  The patient tolerated the procedure well left the operating room in good condition.  At the end the  procedure all sponge lap and instrument counts were correct.  Estimated blood loss 300 cc

## 2020-05-26 NOTE — NURSING
Patient back from Partial R sided colectomy, vertical island dressing to abdomen with small amount bloody drainage. (+) BS all 4 quadrants. Patient is drowsy but easily aroused when name called. Cormier to gravity in place with stst lock anchor to R inner thigh.

## 2020-05-26 NOTE — NURSING
HR 54, /77, on metoprolol BID, surgery today. Dr. Brown notified and asked I hold this morning's dose.
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
## 2020-05-26 NOTE — PLAN OF CARE
VSS on RA, no complaints of pain/discomfort. Telemetry maintained, running sinus jessica. Provided incontinence care. Pt able to roll over independently. Free of falls, safety maintained. Pt fed dinner, tolerated well. NPO @ midnight. Will continue to monitor.     Problem: Adult Inpatient Plan of Care  Goal: Plan of Care Review  Outcome: Ongoing, Progressing  Flowsheets (Taken 5/26/2020 0455)  Plan of Care Reviewed With: patient     Problem: Adjustment to Illness (Gastrointestinal Bleeding)  Goal: Optimal Coping with Acute Illness  Intervention: Optimize Psychosocial Response to Unexpected Illness  Flowsheets (Taken 5/26/2020 0455)  Supportive Measures: relaxation techniques promoted     Problem: Fall Injury Risk  Goal: Absence of Fall and Fall-Related Injury  Intervention: Identify and Manage Contributors to Fall Injury Risk  Flowsheets (Taken 5/26/2020 0455)  Self-Care Promotion: independence encouraged; BADL personal objects within reach  Medication Review/Management: medications reviewed     Problem: Skin Injury Risk Increased  Goal: Skin Health and Integrity  Intervention: Optimize Skin Protection  Flowsheets (Taken 5/26/2020 0455)  Pressure Reduction Techniques: frequent weight shift encouraged; weight shift assistance provided  Skin Protection: incontinence pads utilized; tubing/devices free from skin contact

## 2020-05-26 NOTE — ASSESSMENT & PLAN NOTE
- Colonoscopy 5/25/2020 with fungating mass to ascending colon and diverticulosis  - General Surgery consulted for evaluation of likely malignant tumor colon, to OR today  - Hemoglobin/hematocrit stable  - No recent melena/hematochezia  - Follow H/H and transfuse for  for Hemoglobin <7  - GI consulted and EGD 5/22/2020 without evidence for source of bleeding  - Repeat COVID-19 negative 5/23/2020

## 2020-05-26 NOTE — PT/OT/SLP PROGRESS
Physical Therapy      Patient Name:  Anjel Soria   MRN:  [id number]    Patient not seen today secondary to pt in surgery. Will follow-up in AM.    Nella Richards, DELISA

## 2020-05-26 NOTE — ANESTHESIA PROCEDURE NOTES
Arterial    Diagnosis: cecal mass    Patient location during procedure: holding area  Procedure start time: 5/26/2020 10:46 AM  Timeout: 5/26/2020 10:45 AM  Procedure end time: 5/26/2020 10:50 AM    Staffing  Authorizing Provider: Garett Aguilar MD  Performing Provider: Garett Aguilar MD    Anesthesiologist was present at the time of the procedure.    Preanesthetic Checklist  Completed: patient identified, site marked, surgical consent, pre-op evaluation, timeout performed, IV checked, risks and benefits discussed, monitors and equipment checked and anesthesia consent givenArterial  Skin Prep: chlorhexidine gluconate  Local Infiltration: lidocaine  Orientation: left  Location: radial  Catheter Size: 20 G  Catheter placement by Ultrasound guidance. Heme positive aspiration all ports.  Vessel Caliber: medium, patent, compressibility normal  Needle advanced into vessel with real time Ultrasound guidance.Insertion Attempts: 1  Assessment  Dressing: secured with tape and tegaderm  Patient: Tolerated well

## 2020-05-26 NOTE — PT/OT/SLP PROGRESS
Occupational Therapy      Patient Name:  Anjel Soria   MRN:  [id number]    Patient not seen today secondary to pt in surgery. Will follow-up tomorrow as staffing/scheduling allows.    KRISTA Hernandez  5/26/2020

## 2020-05-26 NOTE — NURSING
I confirmed with both Markos Jo and Josué to keep patient NPO x Meds. Dr. Jo said he will reevaluate PO intake tomorrow and will be a day by day assessment.

## 2020-05-26 NOTE — ASSESSMENT & PLAN NOTE
- Per medical record, known sinus bradycardia without symptoms or recent history of falls  - Monitor on telemetry  - off corinne wolfe today

## 2020-05-26 NOTE — ANESTHESIA PROCEDURE NOTES
Intubation  Performed by: Yuli Bertrand CRNA  Authorized by: Festus Guo MD     Intubation:     Induction:  Intravenous    Intubated:  Postinduction    Mask Ventilation:  Easy mask    Attempts:  1    Attempted By:  CRNA    Method of Intubation:  Video laryngoscopy    Blade:  Thoams 3    Laryngeal View Grade: Grade I - full view of chords      Difficult Airway Encountered?: No      Complications:  None    Airway Device:  Oral endotracheal tube    Airway Device Size:  8.0    Style/Cuff Inflation:  Cuffed (inflated to minimal occlusive pressure)    Inflation Amount (mL):  5    Tube secured:  22    Secured at:  The lips    Placement Verified By:  Capnometry    Complicating Factors:  None    Findings Post-Intubation:  BS equal bilateral and atraumatic/condition of teeth unchanged

## 2020-05-26 NOTE — ASSESSMENT & PLAN NOTE
- Per medical record, had previously been on aspirin and Plavix, but NH record does not indicate that he is currently on any anticoagulants  - Appears stable  - cont atorvastatin 40 mg daily; metoprolol dc'd d/t sinus jessica, monitor

## 2020-05-26 NOTE — ANESTHESIA PREPROCEDURE EVALUATION
05/26/2020  Anjel Soria is a 74 y.o., male.    Pre-op Assessment    I have reviewed the Patient Summary Reports.     I have reviewed the Nursing Notes.   I have reviewed the Medications.     Review of Systems  Anesthesia Hx:  No problems with previous Anesthesia  Denies Family Hx of Anesthesia complications.   Denies Personal Hx of Anesthesia complications.   Social:  Non-Smoker    Hematology/Oncology:  Hematology Normal       -- Cancer in past history:  Oncology Comments: prostate     EENT/Dental:   chronic allergic rhinitis Glaucoma.   Cardiovascular:   Exercise tolerance: good Hypertension CAD      Pulmonary:  Pulmonary Normal    Renal/:   BPH    Hepatic/GI:   Hiatal Hernia, GERD    Musculoskeletal:  Musculoskeletal Normal    Neurological:   CVA, residual symptoms Seizures    Endocrine:  Endocrine Normal    Dermatological:  Skin Normal    Psych:   Psychiatric History depression Bipolar. Dementia. Psychotic Disorder and Bipolar disorder.          Physical Exam  General:  Well nourished    Airway/Jaw/Neck:  Airway Findings: Mouth Opening: Normal Tongue: Normal  General Airway Assessment: Adult  Mallampati: I  TM Distance: Normal, at least 6 cm  Jaw/Neck Findings:  Neck ROM: Normal ROM      Dental:  Dental Findings: Upper Dentures              Anesthesia Plan  Type of Anesthesia, risks & benefits discussed:  Anesthesia Type:  general  Patient's Preference:   Intra-op Monitoring Plan: standard ASA monitors  Intra-op Monitoring Plan Comments:   Post Op Pain Control Plan: per primary service following discharge from PACU  Post Op Pain Control Plan Comments:   Induction:    Beta Blocker:         Informed Consent: Patient representative understands risks and agrees with Anesthesia plan.  Questions answered. Anesthesia consent signed with patient representative.  ASA Score: 3  emergent   Day of Surgery Review of  History & Physical:    H&P update referred to the provider.     Anesthesia Plan Notes: Pt will us recently for EGD, and did very well. Returns today for colonoscopy.        Ready For Surgery From Anesthesia Perspective.                                                                                                                  05/26/2020  Anjel Soria is a 74 y.o., male.    Pre-op Assessment    I have reviewed the Patient Summary Reports.     I have reviewed the Nursing Notes.   I have reviewed the Medications.     Review of Systems  Anesthesia Hx:  No problems with previous Anesthesia  Denies Family Hx of Anesthesia complications.   Denies Personal Hx of Anesthesia complications.   Social:  Non-Smoker    Hematology/Oncology:  Hematology Normal       -- Cancer in past history:  Oncology Comments: prostate     EENT/Dental:   chronic allergic rhinitis Glaucoma.   Cardiovascular:   Exercise tolerance: good Hypertension CAD      Pulmonary:  Pulmonary Normal    Renal/:   BPH    Hepatic/GI:   Hiatal Hernia, GERD    Musculoskeletal:  Musculoskeletal Normal    Neurological:   CVA, residual symptoms Seizures    Endocrine:  Endocrine Normal    Dermatological:  Skin Normal    Psych:   Psychiatric History depression Bipolar. Dementia. Psychotic Disorder and Bipolar disorder.          Physical Exam  General:  Well nourished    Airway/Jaw/Neck:  Airway Findings: Mouth Opening: Normal Tongue: Normal  General Airway Assessment: Adult  Mallampati: I  TM Distance: Normal, at least 6 cm  Jaw/Neck Findings:  Neck ROM: Normal ROM      Dental:  Dental Findings: Upper Dentures              Anesthesia Plan  Type of Anesthesia, risks & benefits discussed:  Anesthesia Type:  general  Patient's Preference:   Intra-op Monitoring Plan: standard ASA monitors, arterial line and central line  Intra-op Monitoring Plan Comments:   Post Op Pain Control Plan: per primary service following discharge from PACU  Post Op Pain Control Plan Comments:    Induction:    Beta Blocker:         Informed Consent: Patient representative understands risks and agrees with Anesthesia plan.  Questions answered. Anesthesia consent signed with patient representative.  ASA Score: 3  emergent   Day of Surgery Review of History & Physical:    H&P update referred to the provider.     Anesthesia Plan Notes: Pt with us recently for EGD and colonoscopy, and did well with anesthesia. Returns today for exploratory laparotomy with R colon resection.        Ready For Surgery From Anesthesia Perspective.

## 2020-05-26 NOTE — ANESTHESIA POSTPROCEDURE EVALUATION
Anesthesia Post Evaluation    Patient: Anjel Soria    Procedure(s) Performed: Procedure(s) (LRB):  LAPAROTOMY, EXPLORATORY (N/A)  COLECTOMY, PARTIAL - RIGHT COLECTOMY (Right)    Final Anesthesia Type: general    Patient location during evaluation: PACU  Patient participation: Yes- Able to Participate  Level of consciousness: awake and alert  Post-procedure vital signs: reviewed and stable  Pain management: adequate  Airway patency: patent    PONV status at discharge: No PONV  Anesthetic complications: no      Cardiovascular status: blood pressure returned to baseline and hemodynamically stable  Respiratory status: unassisted, spontaneous ventilation and nasal cannula  Hydration status: euvolemic  Follow-up not needed.          Vitals Value Taken Time   /56 5/26/2020  2:53 PM   Temp 36.7 °C (98 °F) 5/26/2020  2:15 PM   Pulse 92 5/26/2020  2:54 PM   Resp 16 5/26/2020  2:15 PM   SpO2 100 % 5/26/2020  2:54 PM   Vitals shown include unvalidated device data.      No case tracking events are documented in the log.      Pain/Silvano Score: Pain Rating Prior to Med Admin: 7 (5/26/2020  1:25 PM)  Pain Rating Post Med Admin: 1 (5/26/2020  2:00 PM)  Silvano Score: 8 (5/26/2020  2:00 PM)

## 2020-05-26 NOTE — ASSESSMENT & PLAN NOTE
- Per medical record  - On dilantin 100 mg PO twice daily and depakote for mood disorder  - Per NH records, levels drawn q6 months and WNL  - dilantin and Depakote levels non-toxic  - Seizure precautions

## 2020-05-26 NOTE — TRANSFER OF CARE
"Anesthesia Transfer of Care Note    Patient: Anjel Soria    Procedure(s) Performed: Procedure(s) (LRB):  LAPAROTOMY, EXPLORATORY (N/A)  COLECTOMY, PARTIAL - RIGHT COLECTOMY (Right)    Patient location: PACU    Anesthesia Type: general    Transport from OR: Transported from OR on 2-3 L/min O2 by NC with adequate spontaneous ventilation    Post pain: adequate analgesia    Post assessment: no apparent anesthetic complications and tolerated procedure well    Post vital signs: stable    Level of consciousness: awake, alert and oriented    Nausea/Vomiting: no nausea/vomiting    Complications: none    Transfer of care protocol was followed      Last vitals:   Visit Vitals  BP (!) 153/77 (BP Location: Left arm, Patient Position: Lying)   Pulse (!) 52   Temp 36.6 °C (97.8 °F) (Oral)   Resp 18   Ht 6' 1" (1.854 m)   Wt 88.7 kg (195 lb 8.8 oz)   SpO2 99%   BMI 25.80 kg/m²     "

## 2020-05-26 NOTE — PROGRESS NOTES
[facility] Hospital Medicine  Progress Note    Patient Name: Anjel Soria  MRN: [id number]  Patient Class: IP- Inpatient   Admission Date: 5/21/2020  Length of Stay: 4 days  Attending Physician: Von Moses MD  Primary Care Provider: Michael Blanco MD        Subjective:     Principal Problem:Gastrointestinal hemorrhage with melena        HPI:  Anjel Soria is a 74 year old male who has medical history of anemia related to lower GI bleeding, coronary artery disease, vascular dementia, prior stroke with residual left sided weakness, 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  He is currently a resident of the [facility].   Per medical record, the patient with recent prior history of severe anemia requiring transfusion on April 9, 2020.  During that time, he was evaluated  At [facility] and found to be hemoccult positive and CT scan of abdomen was unremarkable.  He was subsequently discharged back to his nursing home with plan for outpatient follow up with GI.  However, the patient was unable to see GI as outpatient.   Today, patient presented to the [facility] ED where initial work up revealed stable hemoglobin/hematocrit 12.0/40.4. Otherwise, labs were unremarkable and COVID screen was negative. Of note, EKG in ED today shows sinus bradycardia and asymtpomatic with heart rate in upper 40's and low 50's.   He was to be discharged back to the nursing home, but patient had episode of melena and decision to transfer to [facility] for evaluation.     On arrival to the [facility], the patient had one small melanotic stool.  Given dementia, the patient is unable to provide detailed history.  Per nursing home medication reconciliation, the patient has not been on aspirin or Plavix recently and is not currently on any other type of blood thinners.  GI will be consulted and roge continue to trend hemoglobin/hematocrit.      Overview/Hospital Course:  After admission, Gastroenterology consulted and EGD without evidence for bleeding.  Colonoscopy 5/25/2020 noted fungating mass ascending colon and diverticulosis in the sigmoid colon and in the descending colon. Pathology pending.   No further episodes of hematochezia/melena and stable hemoglobin/hematocrit.  General Surgery consulted for evaluation.     Interval History: no overnight events/ for surg today    Review of Systems   Unable to perform ROS: Dementia (can provide some history)   Constitutional: Negative for fever.   Eyes: Positive for visual disturbance (glaucoma/macular degeneration and can see shadows).   Respiratory: Negative for shortness of breath.    Cardiovascular: Negative for chest pain.   Gastrointestinal: Negative for abdominal pain, blood in stool (no episodes overnight) and vomiting.   Neurological: Negative for headaches.     Objective:     Vital Signs (Most Recent):  Temp: 97.8 °F (36.6 °C) (05/26/20 0741)  Pulse: (!) 52 (05/26/20 1000)  Resp: 18 (05/26/20 0741)  BP: (!) 153/77 (05/26/20 0741)  SpO2: 99 % (05/26/20 0741) Vital Signs (24h Range):  Temp:  [97.1 °F (36.2 °C)-98.4 °F (36.9 °C)] 97.8 °F (36.6 °C)  Pulse:  [46-69] 52  Resp:  [16-20] 18  SpO2:  [95 %-99 %] 99 %  BP: (116-153)/(58-77) 153/77     Weight: 88.7 kg (195 lb 8.8 oz)  Body mass index is 25.8 kg/m².    Intake/Output Summary (Last 24 hours) at 5/26/2020 1111  Last data filed at 5/25/2020 2100  Gross per 24 hour   Intake 710 ml   Output --   Net 710 ml      Physical Exam   Constitutional: He appears well-developed and well-nourished. No distress.   HENT:   Head: Normocephalic and atraumatic.   Eyes: Conjunctivae are normal.   Blindness bilaterally and can see shapes with peripheral vision   Neck: Neck supple.   Cardiovascular: Normal rate and regular rhythm.   No murmur heard.  Pulmonary/Chest: Effort normal. No respiratory distress. He has no decreased breath sounds. He has no wheezes.    Abdominal: Soft. Bowel sounds are increased. There is no tenderness. There is no rigidity.   Musculoskeletal: Normal range of motion. He exhibits no edema.   Neurological: He is alert. He has normal strength and normal reflexes. He is disoriented.   Oriented to self, knows he's in a hospital   Skin: Skin is warm and dry.   Psychiatric: He has a normal mood and affect. His speech is normal and behavior is normal. Cognition and memory are impaired.       Significant Labs:   CBC:   Recent Labs   Lab 05/25/20  0515 05/26/20  0452   WBC 7.36 7.30   HGB 10.1* 10.3*   HCT 33.9* 33.4*    167     CMP:   Recent Labs   Lab 05/24/20  1127 05/25/20  0515 05/26/20  0452    140 136   K 3.9 4.2 4.1    106 106   CO2 24 23 21*   * 120* 115*   BUN 10 10 9   CREATININE 1.0 0.9 1.0   CALCIUM 8.4* 8.6* 8.4*   ANIONGAP 10 11 9   EGFRNONAA >60 >60 >60     All pertinent labs within the past 24 hours have been reviewed.    Significant Imaging:     Narrative     EXAMINATION:  XR CHEST 1 VIEW    CLINICAL HISTORY:  TLC;    TECHNIQUE:  Single frontal view of the chest was performed.    COMPARISON:  August 2019    FINDINGS:  There is a right central venous catheter with tip in the lower superior vena cava.  There is no pneumothorax.    The heart size is normal.  There is no increase in pulmonary vascularity.  No large volume of pleural fluid is present although there is some blunting at the left costophrenic angle.  No focal consolidation is noted.  The osseous structures show degenerative changes.     Electronically signed by: Magy Malave MD  Date: 05/22/2020  Time: 09:57        Assessment/Plan:      * Gastrointestinal hemorrhage with melena  - Colonoscopy 5/25/2020 with fungating mass to ascending colon and diverticulosis  - General Surgery consulted for evaluation of likely malignant tumor colon, to OR today  - Hemoglobin/hematocrit stable  - No recent melena/hematochezia  - Follow H/H and transfuse for  for  Hemoglobin <7  - GI consulted and EGD 5/22/2020 without evidence for source of bleeding  - Repeat COVID-19 negative 5/23/2020            Mass of colon  - Colonoscopy 5/25/2020 with fungating mass to ascending colon and diverticulosis  - Repeat COVID-19 negative 5/23/2020  - see GI hemorrhage            Hiatal hernia  - Noted on EGD 5/22/2020        Acute blood loss anemia  - No known episodes of melenahematochezia since 5/22/2020    - H/H on admit stable in ED 12.0/40.4 with initial trend down and now stable    - Follow  H/H and transfuse Hgb < 7  - GI consulted and EGD 5/22/2020 without source of bleeding; colonoscopy with fungating mass likely source of bleeding         Bipolar affective disorder in remission  - Per record.  Mood appears stable  - Continue Depakote 500 mg daily, Prozac 20 mg daily and trazodone 50 mg nightly        Obstructive cardiomyopathy  - Per medical record  - Appears stable      Bradycardia  - Per medical record, known sinus bradycardia without symptoms or recent history of falls  - Monitor on telemetry  - off bblocker dc'd today        Essential hypertension  - Appears reasonably controlled  - off metoprolol dt sinus jessica  - monitor        Gastroesophageal reflux disease  - Per medical record  - GI consulted and EGD with normal stomach, duodenum and noted LE ring  - PPI daily      BPH (benign prostatic hyperplasia)  - No noted symptoms  - Continue home dose of Flomax      Coronary artery disease involving native coronary artery of native heart without angina pectoris  - Per medical record, had previously been on aspirin and Plavix, but NH record does not indicate that he is currently on any anticoagulants  - Appears stable  - cont atorvastatin 40 mg daily; metoprolol dc'd d/t sinus jessica, monitor      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  - Per medical record  - On dilantin 100 mg PO twice daily and depakote for mood disorder  - Per NH records, levels drawn q6 months  and WNL  - dilantin and Depakote levels non-toxic  - Seizure precautions      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  - Noted residual weakness and walks with walker at baseline  - Continue atorvastatin 40 mg daily for secondary stroke prevention; holding aspirin due to GIB  - PT/OT consult      End-stage glaucoma  - Per record, legally blind  - Provide assistance as needed  - Continue home medications      Subcortical vascular dementia without behavioral disturbance  - Appears stable  - Continue home dose of Aricept 10 mg daily  - Delirium precautions  - Palliative Care consult for symptom management and NP at home program referral  - PT/OT evaluation      VTE Risk Mitigation (From admission, onward)         Ordered     Reason for No Pharmacological VTE Prophylaxis  Once     Question:  Reasons:  Answer:  Active Bleeding    05/21/20 1613     IP VTE HIGH RISK PATIENT  Once      05/21/20 1613     Place sequential compression device  Until discontinued      05/21/20 1613     Place NICOLE hose  Until discontinued      05/21/20 1601                      Angella Ortiz PA-C  Department of Hospital Medicine   [facility]

## 2020-05-27 PROBLEM — C18.2 MALIGNANT NEOPLASM OF ASCENDING COLON: Status: ACTIVE | Noted: 2020-05-27

## 2020-05-27 LAB
ANION GAP SERPL CALC-SCNC: 11 MMOL/L (ref 8–16)
ANISOCYTOSIS BLD QL SMEAR: SLIGHT
BASOPHILS # BLD AUTO: 0.02 K/UL (ref 0–0.2)
BASOPHILS NFR BLD: 0.2 % (ref 0–1.9)
BUN SERPL-MCNC: 9 MG/DL (ref 8–23)
CALCIUM SERPL-MCNC: 8.1 MG/DL (ref 8.7–10.5)
CHLORIDE SERPL-SCNC: 104 MMOL/L (ref 95–110)
CO2 SERPL-SCNC: 21 MMOL/L (ref 23–29)
COMMENT: ABNORMAL
CREAT SERPL-MCNC: 1 MG/DL (ref 0.5–1.4)
DACRYOCYTES BLD QL SMEAR: ABNORMAL
DIFFERENTIAL METHOD: ABNORMAL
EOSINOPHIL # BLD AUTO: 0 K/UL (ref 0–0.5)
EOSINOPHIL NFR BLD: 0 % (ref 0–8)
ERYTHROCYTE [DISTWIDTH] IN BLOOD BY AUTOMATED COUNT: 27.7 % (ref 11.5–14.5)
EST. GFR  (AFRICAN AMERICAN): >60 ML/MIN/1.73 M^2
EST. GFR  (NON AFRICAN AMERICAN): >60 ML/MIN/1.73 M^2
FINAL PATHOLOGIC DIAGNOSIS: ABNORMAL
GIANT PLATELETS BLD QL SMEAR: PRESENT
GLUCOSE SERPL-MCNC: 182 MG/DL (ref 70–110)
GROSS: ABNORMAL
HCT VFR BLD AUTO: 31.3 % (ref 40–54)
HGB BLD-MCNC: 9.7 G/DL (ref 14–18)
HYPOCHROMIA BLD QL SMEAR: ABNORMAL
IMM GRANULOCYTES # BLD AUTO: 0.05 K/UL (ref 0–0.04)
IMM GRANULOCYTES NFR BLD AUTO: 0.4 % (ref 0–0.5)
LYMPHOCYTES # BLD AUTO: 1.2 K/UL (ref 1–4.8)
LYMPHOCYTES NFR BLD: 9.1 % (ref 18–48)
MCH RBC QN AUTO: 24 PG (ref 27–31)
MCHC RBC AUTO-ENTMCNC: 31 G/DL (ref 32–36)
MCV RBC AUTO: 78 FL (ref 82–98)
MONOCYTES # BLD AUTO: 1 K/UL (ref 0.3–1)
MONOCYTES NFR BLD: 7.5 % (ref 4–15)
NEUTROPHILS # BLD AUTO: 10.9 K/UL (ref 1.8–7.7)
NEUTROPHILS NFR BLD: 82.8 % (ref 38–73)
NRBC BLD-RTO: 0 /100 WBC
PLATELET # BLD AUTO: 202 K/UL (ref 150–350)
PLATELET BLD QL SMEAR: ABNORMAL
PMV BLD AUTO: 8.9 FL (ref 9.2–12.9)
POIKILOCYTOSIS BLD QL SMEAR: SLIGHT
POLYCHROMASIA BLD QL SMEAR: ABNORMAL
POTASSIUM SERPL-SCNC: 4.3 MMOL/L (ref 3.5–5.1)
RBC # BLD AUTO: 4.04 M/UL (ref 4.6–6.2)
SCHISTOCYTES BLD QL SMEAR: ABNORMAL
SCHISTOCYTES BLD QL SMEAR: PRESENT
SODIUM SERPL-SCNC: 136 MMOL/L (ref 136–145)
SUPPLEMENTAL DIAGNOSIS: ABNORMAL
TARGETS BLD QL SMEAR: ABNORMAL
WBC # BLD AUTO: 13.15 K/UL (ref 3.9–12.7)

## 2020-05-27 PROCEDURE — 11000001 HC ACUTE MED/SURG PRIVATE ROOM

## 2020-05-27 PROCEDURE — 97530 THERAPEUTIC ACTIVITIES: CPT | Mod: CQ

## 2020-05-27 PROCEDURE — 97535 SELF CARE MNGMENT TRAINING: CPT

## 2020-05-27 PROCEDURE — 25000003 PHARM REV CODE 250: Performed by: HOSPITALIST

## 2020-05-27 PROCEDURE — 99233 PR SUBSEQUENT HOSPITAL CARE,LEVL III: ICD-10-PCS | Mod: ,,, | Performed by: HOSPITALIST

## 2020-05-27 PROCEDURE — 25000003 PHARM REV CODE 250: Performed by: NURSE PRACTITIONER

## 2020-05-27 PROCEDURE — 85025 COMPLETE CBC W/AUTO DIFF WBC: CPT

## 2020-05-27 PROCEDURE — 80048 BASIC METABOLIC PNL TOTAL CA: CPT

## 2020-05-27 PROCEDURE — 99233 SBSQ HOSP IP/OBS HIGH 50: CPT | Mod: ,,, | Performed by: INTERNAL MEDICINE

## 2020-05-27 PROCEDURE — 94761 N-INVAS EAR/PLS OXIMETRY MLT: CPT

## 2020-05-27 PROCEDURE — 63600175 PHARM REV CODE 636 W HCPCS: Performed by: INTERNAL MEDICINE

## 2020-05-27 PROCEDURE — 99233 SBSQ HOSP IP/OBS HIGH 50: CPT | Mod: ,,, | Performed by: HOSPITALIST

## 2020-05-27 PROCEDURE — C9113 INJ PANTOPRAZOLE SODIUM, VIA: HCPCS | Performed by: INTERNAL MEDICINE

## 2020-05-27 PROCEDURE — 99233 PR SUBSEQUENT HOSPITAL CARE,LEVL III: ICD-10-PCS | Mod: ,,, | Performed by: INTERNAL MEDICINE

## 2020-05-27 PROCEDURE — 63600175 PHARM REV CODE 636 W HCPCS: Performed by: SPECIALIST

## 2020-05-27 PROCEDURE — 25000003 PHARM REV CODE 250: Performed by: INTERNAL MEDICINE

## 2020-05-27 PROCEDURE — 99900035 HC TECH TIME PER 15 MIN (STAT)

## 2020-05-27 PROCEDURE — 36415 COLL VENOUS BLD VENIPUNCTURE: CPT

## 2020-05-27 RX ORDER — METOPROLOL TARTRATE 25 MG/1
12.5 TABLET ORAL 2 TIMES DAILY
Status: DISCONTINUED | OUTPATIENT
Start: 2020-05-27 | End: 2020-05-28

## 2020-05-27 RX ORDER — PANTOPRAZOLE SODIUM 40 MG/10ML
40 INJECTION, POWDER, LYOPHILIZED, FOR SOLUTION INTRAVENOUS DAILY
Status: DISCONTINUED | OUTPATIENT
Start: 2020-05-28 | End: 2020-06-08

## 2020-05-27 RX ADMIN — PANTOPRAZOLE SODIUM 40 MG: 40 INJECTION, POWDER, LYOPHILIZED, FOR SOLUTION INTRAVENOUS at 09:05

## 2020-05-27 RX ADMIN — DORZOLAMIDE HYDROCHLORIDE 1 DROP: 20 SOLUTION/ DROPS OPHTHALMIC at 09:05

## 2020-05-27 RX ADMIN — HYDROMORPHONE HYDROCHLORIDE 0.5 MG: 1 INJECTION, SOLUTION INTRAMUSCULAR; INTRAVENOUS; SUBCUTANEOUS at 02:05

## 2020-05-27 RX ADMIN — DORZOLAMIDE HYDROCHLORIDE 1 DROP: 20 SOLUTION/ DROPS OPHTHALMIC at 08:05

## 2020-05-27 RX ADMIN — FLUOXETINE 20 MG: 20 CAPSULE ORAL at 09:05

## 2020-05-27 RX ADMIN — TRAVOPROST 1 DROP: 0.04 SOLUTION/ DROPS OPHTHALMIC at 08:05

## 2020-05-27 RX ADMIN — PHENYTOIN SODIUM 100 MG: 100 CAPSULE ORAL at 08:05

## 2020-05-27 RX ADMIN — TRAZODONE HYDROCHLORIDE 100 MG: 100 TABLET ORAL at 08:05

## 2020-05-27 RX ADMIN — ATORVASTATIN CALCIUM 40 MG: 20 TABLET, FILM COATED ORAL at 08:05

## 2020-05-27 RX ADMIN — METOPROLOL TARTRATE 12.5 MG: 25 TABLET, FILM COATED ORAL at 08:05

## 2020-05-27 RX ADMIN — TAMSULOSIN HYDROCHLORIDE 0.4 MG: 0.4 CAPSULE ORAL at 09:05

## 2020-05-27 RX ADMIN — DONEPEZIL HYDROCHLORIDE 10 MG: 5 TABLET, FILM COATED ORAL at 09:05

## 2020-05-27 RX ADMIN — DIVALPROEX SODIUM 500 MG: 250 TABLET, DELAYED RELEASE ORAL at 08:05

## 2020-05-27 RX ADMIN — ACETAMINOPHEN 650 MG: 325 TABLET ORAL at 03:05

## 2020-05-27 RX ADMIN — PHENYTOIN SODIUM 100 MG: 100 CAPSULE ORAL at 09:05

## 2020-05-27 NOTE — PT/OT/SLP PROGRESS
Physical Therapy Treatment    Patient Name:  Anjel Soria   MRN:  [id number]    Recommendations:     Discharge Recommendations:  nursing facility, basic(return to NH)   Discharge Equipment Recommendations: none   Barriers to discharge: None    Assessment:     Anjel Soria is a 74 y.o. male admitted with a medical diagnosis of Gastrointestinal hemorrhage with melena.  He presents with the following impairments/functional limitations:  weakness, impaired endurance, impaired self care skills, impaired functional mobilty, gait instability, impaired balance, visual deficits, decreased coordination, decreased upper extremity function, decreased lower extremity function, decreased safety awareness, pain ; pt with fair mobility today, limited by inc abd pain today..    Rehab Prognosis: Good; patient would benefit from acute skilled PT services to address these deficits and reach maximum level of function.    Recent Surgery: Procedure(s) (LRB):  LAPAROTOMY, EXPLORATORY (N/A)  COLECTOMY, PARTIAL - RIGHT COLECTOMY (Right) 1 Day Post-Op    Plan:     During this hospitalization, patient to be seen 5 x/week to address the identified rehab impairments via gait training, therapeutic activities, therapeutic exercises and progress toward the following goals:    · Plan of Care Expires:  06/25/20    Subjective     Chief Complaint: pain  Patient/Family Comments/goals: pt agreeable to session, though wanting to return to lying down.  Pain/Comfort:  · Pain Rating 1: (pt c/o abd pain, though unable to rate)  · Location - Orientation 1: generalized  · Location 1: abdomen  · Pain Addressed 1: Reposition, Distraction, Cessation of Activity, Nurse notified  · Pain Rating Post-Intervention 1: (did not rate, though appeared to be very high, 8-10/10)      Objective:     Communicated with nurse prior to session.  Patient found supine with peripheral IV, bed alarm, SCD upon PT entry to room.     General Precautions: Standard, fall, blind   Orthopedic  Precautions:N/A   Braces: N/A     Functional Mobility:  · Bed Mobility:     · Supine to Sit: moderate assistance and maximal assistance  · Sit to Supine: maximal assistance  · Transfers:     · Sit to Stand:  moderate assistance with hand-held assist      AM-PAC 6 CLICK MOBILITY  Turning over in bed (including adjusting bedclothes, sheets and blankets)?: 2  Sitting down on and standing up from a chair with arms (e.g., wheelchair, bedside commode, etc.): 2  Moving from lying on back to sitting on the side of the bed?: 2  Moving to and from a bed to a chair (including a wheelchair)?: 2  Need to walk in hospital room?: 2  Climbing 3-5 steps with a railing?: 2  Basic Mobility Total Score: 12       Therapeutic Activities and Exercises:   pt sat up EOB with SBA to occ. Min.A for static sitting balance, pt moaning throughout session. Nsg. Arrived to fix linens while pt standing with mod A for ~:20-:30 sec., 2 very small side steps taken. Pt unable to cont sitting up 2/2 c/o's pain, ret'd to supine with max.A.    Patient left HOB elevated with all lines intact, call button in reach, bed alarm on, nurse notified and SCD's and heelprotectors on...    GOALS:   Multidisciplinary Problems     Physical Therapy Goals        Problem: Physical Therapy Goal    Goal Priority Disciplines Outcome Goal Variances Interventions   Physical Therapy Goal     PT, PT/OT Ongoing, Progressing     Description:  Goals to be met by: 6/25/2020    Patient will perform the following to increase strength, improve mobility, and return to prior level of function:    1. Supine <> sit with SBA.  2. Sit<>stand with SBA with least restrictive assistive device.  3. Gait x 150 feet with SBA with least restrictive assistive device.                    Time Tracking:     PT Received On: 05/27/20  PT Start Time: 1100     PT Stop Time: 1123  PT Total Time (min): 23 min     Billable Minutes: Therapeutic Activity 23    Treatment Type: Treatment  PT/PTA: PTA     PTA  Visit Number: 1     Nella Richards, PTA  05/27/2020

## 2020-05-27 NOTE — ASSESSMENT & PLAN NOTE
-Treatment as above.  -Await final path  -No gross metastatic lesions or enlarged lymph nodes observed during surgery on 5/26.

## 2020-05-27 NOTE — ASSESSMENT & PLAN NOTE
-Appears stable and moderate  -Continue home dose of Aricept 10 mg daily  -Continue delirium precautions  -Palliative Care consult for symptom management and NP at home program referral

## 2020-05-27 NOTE — ASSESSMENT & PLAN NOTE
-Believed to be due to colon cancer as above.  -No known episodes of melena or hematochezia since 5/22/2020    -On admit Hb 10.6 and it has trended down a bit but essentially stable at this time  -Follow  H/H and transfuse Hgb < 7

## 2020-05-27 NOTE — ASSESSMENT & PLAN NOTE
-Appears reasonably controlled  -Continue low dose (home dose) metoprolol with hold parameters for bradycardia

## 2020-05-27 NOTE — ASSESSMENT & PLAN NOTE
-History noted  -Mood appears stable  -Continue Depakote 500 mg daily, Prozac 20 mg daily and trazodone 50 mg nightly

## 2020-05-27 NOTE — SUBJECTIVE & OBJECTIVE
Interval History: No acute events overnight.  Underwent right partial hemicolectomy yesterday.  Notes some abdominal discomfort but states he is passing gas.  No cp or sob.    Review of Systems   Reason unable to perform ROS: can provide some history.   Constitutional: Negative for fever.   HENT: Negative for congestion, sinus pressure and sneezing.    Eyes: Positive for visual disturbance (glaucoma/macular degeneration and can see shadows).   Respiratory: Negative for shortness of breath.    Cardiovascular: Negative for chest pain.   Gastrointestinal: Positive for abdominal pain. Negative for blood in stool (no episodes overnight), diarrhea, nausea and vomiting.   Endocrine: Negative for cold intolerance and heat intolerance.   Genitourinary: Negative for difficulty urinating and dysuria.   Musculoskeletal: Negative for arthralgias and back pain.   Neurological: Negative for headaches.   Psychiatric/Behavioral: Negative for agitation and behavioral problems.     Objective:     Vital Signs (Most Recent):  Temp: 98.6 °F (37 °C) (05/27/20 1135)  Pulse: 94 (05/27/20 1200)  Resp: 18 (05/27/20 0800)  BP: 131/69 (05/27/20 1135)  SpO2: (!) 92 % (05/27/20 1135) Vital Signs (24h Range):  Temp:  [98 °F (36.7 °C)-99.8 °F (37.7 °C)] 98.6 °F (37 °C)  Pulse:  [] 94  Resp:  [14-20] 18  SpO2:  [91 %-100 %] 92 %  BP: (109-131)/(55-75) 131/69     Weight: 88.7 kg (195 lb 8.8 oz)  Body mass index is 25.8 kg/m².    Intake/Output Summary (Last 24 hours) at 5/27/2020 1251  Last data filed at 5/26/2020 1311  Gross per 24 hour   Intake 600 ml   Output 70 ml   Net 530 ml      Physical Exam   Constitutional: He appears well-developed and well-nourished. No distress.   HENT:   Head: Normocephalic and atraumatic.   Eyes: Conjunctivae are normal.   Blindness bilaterally and can see shapes with peripheral vision   Neck: Neck supple.   Cardiovascular: Normal rate and regular rhythm.   No murmur heard.  Pulmonary/Chest: Effort normal. No  respiratory distress. He has no decreased breath sounds. He has no wheezes.   Abdominal: Soft. Bowel sounds are increased. There is no tenderness. There is no rigidity.   Soft with a bit of distention, mild diffuse ttp, bowel sounds are present but diminished, incision is bandaged with small amount of dried blood.   Musculoskeletal: Normal range of motion. He exhibits no edema.   Neurological: He is alert. He has normal strength and normal reflexes. He is disoriented.   Oriented to self, knows he's in a hospital, fluent speech, moves all extremities   Skin: Skin is warm and dry.   Psychiatric: He has a normal mood and affect. His speech is normal and behavior is normal. Cognition and memory are impaired.       Significant Labs: All pertinent labs within the past 24 hours have been reviewed.    Significant Imaging: I have reviewed all pertinent imaging studies within the last 24 hours.

## 2020-05-27 NOTE — PROGRESS NOTES
[facility] Hospital Medicine  Progress Note    Patient Name: Anjel Soria  MRN: [id number]  Patient Class: IP- Inpatient   Admission Date: 5/21/2020  Length of Stay: 5 days  Attending Physician: Von Moses MD  Primary Care Provider: Michael Blanco MD        Subjective:     Principal Problem:Gastrointestinal hemorrhage with melena        HPI:  Anjel Soria is a 74 year old male who has medical history of anemia related to lower GI bleeding, coronary artery disease, vascular dementia, prior stroke with residual left sided weakness, 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  He is currently a resident of the [facility].   Per medical record, the patient with recent prior history of severe anemia requiring transfusion on April 9, 2020.  During that time, he was evaluated  At [facility] and found to be hemoccult positive and CT scan of abdomen was unremarkable.  He was subsequently discharged back to his nursing home with plan for outpatient follow up with GI.  However, the patient was unable to see GI as outpatient.   Today, patient presented to the [facility] ED where initial work up revealed stable hemoglobin/hematocrit 12.0/40.4. Otherwise, labs were unremarkable and COVID screen was negative. Of note, EKG in ED today shows sinus bradycardia and asymtpomatic with heart rate in upper 40's and low 50's.   He was to be discharged back to the nursing home, but patient had episode of melena and decision to transfer to [facility] for evaluation.     On arrival to the [facility], the patient had one small melanotic stool.  Given dementia, the patient is unable to provide detailed history.  Per nursing home medication reconciliation, the patient has not been on aspirin or Plavix recently and is not currently on any other type of blood thinners.  GI will be consulted and roge continue to trend hemoglobin/hematocrit.      Overview/Hospital Course:  After admission, Gastroenterology consulted and EGD without evidence for bleeding.  Colonoscopy 5/25/2020 noted fungating mass ascending colon and diverticulosis in the sigmoid colon and in the descending colon. Pathology pending.   No further episodes of hematochezia/melena and stable hemoglobin/hematocrit.  General Surgery consulted for evaluation.     Interval History: No acute events overnight.  Underwent right partial hemicolectomy yesterday.  Notes some abdominal discomfort but states he is passing gas.  No cp or sob.    Review of Systems   Reason unable to perform ROS: can provide some history.   Constitutional: Negative for fever.   HENT: Negative for congestion, sinus pressure and sneezing.    Eyes: Positive for visual disturbance (glaucoma/macular degeneration and can see shadows).   Respiratory: Negative for shortness of breath.    Cardiovascular: Negative for chest pain.   Gastrointestinal: Positive for abdominal pain. Negative for blood in stool (no episodes overnight), diarrhea, nausea and vomiting.   Endocrine: Negative for cold intolerance and heat intolerance.   Genitourinary: Negative for difficulty urinating and dysuria.   Musculoskeletal: Negative for arthralgias and back pain.   Neurological: Negative for headaches.   Psychiatric/Behavioral: Negative for agitation and behavioral problems.     Objective:     Vital Signs (Most Recent):  Temp: 98.6 °F (37 °C) (05/27/20 1135)  Pulse: 94 (05/27/20 1200)  Resp: 18 (05/27/20 0800)  BP: 131/69 (05/27/20 1135)  SpO2: (!) 92 % (05/27/20 1135) Vital Signs (24h Range):  Temp:  [98 °F (36.7 °C)-99.8 °F (37.7 °C)] 98.6 °F (37 °C)  Pulse:  [] 94  Resp:  [14-20] 18  SpO2:  [91 %-100 %] 92 %  BP: (109-131)/(55-75) 131/69     Weight: 88.7 kg (195 lb 8.8 oz)  Body mass index is 25.8 kg/m².    Intake/Output Summary (Last 24 hours) at 5/27/2020 1251  Last data filed at 5/26/2020 1311  Gross per 24 hour   Intake 600 ml   Output 70  ml   Net 530 ml      Physical Exam   Constitutional: He appears well-developed and well-nourished. No distress.   HENT:   Head: Normocephalic and atraumatic.   Eyes: Conjunctivae are normal.   Blindness bilaterally and can see shapes with peripheral vision   Neck: Neck supple.   Cardiovascular: Normal rate and regular rhythm.   No murmur heard.  Pulmonary/Chest: Effort normal. No respiratory distress. He has no decreased breath sounds. He has no wheezes.   Abdominal: Soft. Bowel sounds are increased. There is no tenderness. There is no rigidity.   Soft with a bit of distention, mild diffuse ttp, bowel sounds are present but diminished, incision is bandaged with small amount of dried blood.   Musculoskeletal: Normal range of motion. He exhibits no edema.   Neurological: He is alert. He has normal strength and normal reflexes. He is disoriented.   Oriented to self, knows he's in a hospital, fluent speech, moves all extremities   Skin: Skin is warm and dry.   Psychiatric: He has a normal mood and affect. His speech is normal and behavior is normal. Cognition and memory are impaired.       Significant Labs: All pertinent labs within the past 24 hours have been reviewed.    Significant Imaging: I have reviewed all pertinent imaging studies within the last 24 hours.      Assessment/Plan:      * Gastrointestinal hemorrhage with melena  -Mr. Soria was admitted to inpatient status  -GI consulted and performed EGD 5/22/2020 without evidence for source of bleeding.  Colonoscopy performed on 5/25/2020 which showed a fungating mass of ascending colon and diverticulosis.  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  -H/H remain stable.  Continue to monitor and transfuse for Hb less than 7.  -Patient is passing flatus.  Bowel sounds present but diminished.  States he is passing  flatus.  -Discussed with Dr. Jo and will continue with NPO but for meds at this time.          Malignant neoplasm of ascending colon  -Treatment as above.  -Await final path  -No gross metastatic lesions or enlarged lymph nodes observed during surgery on 5/26.      Acute blood loss anemia  -Believed to be due to colon cancer as above.  -No known episodes of melena or hematochezia since 5/22/2020    -On admit Hb 10.6 and it has trended down a bit but essentially stable at this time  -Follow  H/H and transfuse Hgb < 7         Coronary artery disease involving native coronary artery of native heart without angina pectoris  -History noted  -Previously on aspirin and plavix, but per NH record was not on these  -Continue home statin  -Continue home metoprolol with hold parameters for bradycardia.    Obstructive cardiomyopathy  -History noted per chart  -Echo 5/26 showed EF 65%, grade I diastolic dysfunction and no wall motion abnormalities  -Appears well compensated    Bradycardia  -History of sinus jessica noted.  -Did have bradycardia at times and home beta-blocker has been discontinued.  -At home takes metoprolol 12.5mg bid.  -Continue this but with hold parameters for hr less than 60.  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  Per NH records, levels drawn q6 months and WNL  -Dilantin and Depakote levels non-toxic  -Continue home medications  -Continue seizure  precautions      Subcortical vascular dementia without behavioral disturbance  -Appears stable and moderate  -Continue home dose of Aricept 10 mg daily  -Continue delirium precautions  -Palliative Care consult for symptom management and NP at home program referral      Bipolar affective disorder in remission  -History noted  -Mood appears stable  -Continue Depakote 500 mg daily, Prozac 20 mg daily and trazodone 50 mg nightly        Hiatal hernia  -Noted on EGD 5/22/2020        Gastroesophageal reflux disease  -History noted  -S/p EGD this stay with normal stomach, duodenum and noted LE ring  -Continue PPI daily      BPH (benign prostatic hyperplasia)  -No noted symptoms  -Continue home dose of Flomax      End-stage glaucoma  -History noted.  He is legally blind  -Provide assistance as needed  -Continue home medications        VTE Risk Mitigation (From admission, onward)         Ordered     Reason for No Pharmacological VTE Prophylaxis  Once     Question:  Reasons:  Answer:  Active Bleeding    05/21/20 1613     IP VTE HIGH RISK PATIENT  Once      05/21/20 1613     Place sequential compression device  Until discontinued      05/21/20 1613     Place NICOLE hose  Until discontinued      05/21/20 1601                      Von Moses MD  Department of Hospital Medicine   [facility]

## 2020-05-27 NOTE — ASSESSMENT & PLAN NOTE
-Noted residual weakness and walks with walker at baseline  -Continue atorvastatin 40 mg daily for secondary stroke prevention  -No aspirin/plavix due to GI bleeding.  -Continue PT/OT

## 2020-05-27 NOTE — PLAN OF CARE
Problem: Occupational Therapy Goal  Goal: Occupational Therapy Goal  Description  Goals to be met by: 6/1/2020    Patient will increase functional independence with ADLs by performing:    Feeding with Set-up Assistance.  UE Dressing with Supervision.  Grooming while seated with Supervision.  Toileting from toilet with Supervision for hygiene and clothing management.   Supine to sit with Supervision.  Toilet transfer to toilet with Contact Guard Assistance.     Outcome: Ongoing, Progressing   Progressing towards goals.  Pt to benefit from continued acute care OT services to increase independence in self-care/functional transfers.  Continue POC.  Recommend pt discharge back to NH with total care.

## 2020-05-27 NOTE — ASSESSMENT & PLAN NOTE
-History noted  -S/p EGD this stay with normal stomach, duodenum and noted LE ring  -Continue PPI daily

## 2020-05-27 NOTE — PLAN OF CARE
Problem: Physical Therapy Goal  Goal: Physical Therapy Goal  Description  Goals to be met by: 6/25/2020    Patient will perform the following to increase strength, improve mobility, and return to prior level of function:    1. Supine <> sit with SBA.  2. Sit<>stand with SBA with least restrictive assistive device.  3. Gait x 150 feet with SBA with least restrictive assistive device.   Outcome: Ongoing, Progressing   Pt sup to sit mod/max.A, sit to stand modA/HHA, pt able to take 2 very small side steps with modA, sit to supine with max.A. Pt with dec. Mobility today, limited by abd pain. Recommend return to NH.

## 2020-05-27 NOTE — ASSESSMENT & PLAN NOTE
-Mr. Soria was admitted to inpatient status  -GI consulted and performed EGD 5/22/2020 without evidence for source of bleeding.  Colonoscopy performed on 5/25/2020 which showed a fungating mass of ascending colon and diverticulosis.  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  -H/H trending down but remains above transfusion threshold.  Continue to monitor and transfuse for Hb less than 7.  -Bowel sounds improving but still diminished.  Mildly distended today.  Appropriate post-surgical TTP.  States he is passing flatus but no BM.  -Continue clears and advance per surgery recommendations.  -Appreciate assistance from Dr. Jo

## 2020-05-27 NOTE — ASSESSMENT & PLAN NOTE
-History noted  -Previously on aspirin and plavix, but per NH record was not on these  -Continue home statin  -Continue home metoprolol with hold parameters for bradycardia.

## 2020-05-27 NOTE — PROGRESS NOTES
[facility]  Cardiology  Progress Note    Patient Name: Anjel Soria  MRN: [id number]  Admission Date: 5/21/2020  Hospital Length of Stay: 5 days  Code Status: Full Code   Attending Physician: Von Moses MD   Primary Care Physician: Michael Blanco MD  Expected Discharge Date:   Principal Problem:Gastrointestinal hemorrhage with melena    Subjective:     Brief HPI:    Anjel Soria is a 74 y.o.male with hypertension and hypercholesterolemia. He has had a cerebrovascular accident in the past making him weak in the left side. He has vascular dementia. He stays at a Nursing Home in [place], [place]. According to the patient he is wheelchair bound due to that his legs are too weak to be able to ambulate. In 2018 he was seen at [facility] due to chest pain. He had an elevation in troponins. He appears to have been transferred to [facility] for further cardiac evaluation but am not able to find any records from the stay at [facility].     He presented to [facility] on 5/21/2020 with melena. He was transferred to [facility] for evaluation. He underwent colonoscopy on 5/25/2020 revealing a cecal mass. The plan is partial colectomy on 5/26/2020. He has been noted to be bradycardic during the hospital stay with a heart rate in the 50's during the night 5/25/2020 - 5/26/2020. He has been receiving metoprolol 12.5 mg Q12. The patient denies any chest pain or shortness of breath. He is comfortable supine. I am asked to see him for his bradycardia and assess need for possible further cardiac evaluation prior to surgery.    Hospital Course:     5/26/2020: Partial colectomy.    Interval History:    Doing well after surgery. This am he has no complaints. He denies CP or SOB. He does not appear to realize he just had surgery.    Review of Systems   Constitution: Positive for malaise/fatigue. Negative for chills and fever.   HENT: Negative for nosebleeds.    Eyes: Negative for vision loss in left eye and  vision loss in right eye.   Cardiovascular: Negative for chest pain, leg swelling, orthopnea, palpitations and paroxysmal nocturnal dyspnea.   Respiratory: Negative for cough, hemoptysis, shortness of breath, sputum production and wheezing.    Hematologic/Lymphatic: Negative for bleeding problem.   Skin: Negative for rash.   Musculoskeletal: Negative for myalgias.   Gastrointestinal: Negative for abdominal pain, nausea and vomiting.   Genitourinary: Negative for hematuria.   Neurological: Positive for focal weakness (left side) and weakness. Negative for dizziness, headaches, light-headedness and vertigo.   Psychiatric/Behavioral: Positive for memory loss. Negative for altered mental status. The patient is not nervous/anxious.    Allergic/Immunologic: Negative for persistent infections.     Objective:     Vital Signs (Most Recent):  Temp: 98.4 °F (36.9 °C) (05/27/20 0800)  Pulse: 89 (05/27/20 0800)  Resp: 18 (05/27/20 0800)  BP: 125/75 (05/27/20 0800)  SpO2: (!) 91 % (05/27/20 0800) Vital Signs (24h Range):  Temp:  [98 °F (36.7 °C)-99.8 °F (37.7 °C)] 98.4 °F (36.9 °C)  Pulse:  [] 89  Resp:  [14-20] 18  SpO2:  [91 %-100 %] 91 %  BP: (109-131)/(55-75) 125/75     Weight: 88.7 kg (195 lb 8.8 oz)  Body mass index is 25.8 kg/m².    SpO2: (!) 91 %  O2 Device (Oxygen Therapy): room air      Intake/Output Summary (Last 24 hours) at 5/27/2020 0835  Last data filed at 5/26/2020 1311  Gross per 24 hour   Intake 1300 ml   Output 400 ml   Net 900 ml       Lines/Drains/Airways     Drain                 Urethral Catheter 05/26/20 1120 Non-latex 16 Fr. less than 1 day          Peripheral Intravenous Line                 Peripheral IV - Single Lumen 05/24/20 1430 22 G Posterior;Right Hand 2 days         Midline Catheter Insertion/Assessment  - Single Lumen 05/25/20 0940 Left basilic vein (medial side of arm) 22g x 8cm 1 day         Peripheral IV - Single Lumen 05/25/20 1400 22 G Right 1 day                Physical Exam    Constitutional: He appears well-developed and well-nourished.   Cardiovascular: Normal rate, regular rhythm, S1 normal and S2 normal. Exam reveals gallop and S4.   Pulmonary/Chest: Effort normal and breath sounds normal.   Musculoskeletal:        Right ankle: He exhibits no swelling.        Left ankle: He exhibits no swelling.   Neurological: He is disoriented.   Psychiatric: Cognition and memory are impaired.       Current Medications:     atorvastatin  40 mg Oral QHS    divalproex  500 mg Oral Nightly    donepeziL  10 mg Oral Daily    dorzolamide  1 drop Both Eyes BID    FLUoxetine  20 mg Oral Daily    pantoprazole  40 mg Intravenous BID    phenytoin  100 mg Oral BID    tamsulosin  1 capsule Oral Daily    travoprost  1 drop Both Eyes QHS    traZODone  100 mg Oral QHS     Current Laboratory Results:    Recent Results (from the past 24 hour(s))   Echo Color Flow Doppler? Yes    Collection Time: 05/26/20  8:46 AM   Result Value Ref Range    BSA 2.14 m2    TDI SEPTAL 0.07 m/s    LV LATERAL E/E' RATIO 4.00 m/s    LV SEPTAL E/E' RATIO 6.86 m/s    LA WIDTH 3.59 cm    Right Atrial Pressure (from IVC) 3 mmHg    TDI LATERAL 0.12 m/s    PV PEAK VELOCITY 1.22 cm/s    LVIDD 4.88 3.5 - 6.0 cm    IVS 0.94 0.6 - 1.1 cm    PW 0.90 0.6 - 1.1 cm    LVIDS 2.75 2.1 - 4.0 cm    FS 44 28 - 44 %    LA volume 48.44 cm3    Sinus 3.26 cm    STJ 2.96 cm    Ascending aorta 2.96 cm    LV mass 156.39 g    LA size 3.27 cm    TAPSE 2.14 cm    Left Ventricle Relative Wall Thickness 0.37 cm    AV mean gradient 4 mmHg    AV valve area 2.28 cm2    AV Velocity Ratio 0.67     AV index (prosthetic) 0.72     MV valve area p 1/2 method 2.08 cm2    E/A ratio 0.55     Mean e' 0.10 m/s    E wave decelartion time 366.91 msec    Pulm vein S/D ratio 1.79     LVOT diameter 2.01 cm    LVOT area 3.2 cm2    LVOT peak joya 0.94 m/s    LVOT peak VTI 19.81 cm    Ao peak joya 1.41 m/s    Ao VTI 27.50 cm    LVOT stroke volume 62.83 cm3    AV peak gradient 8  mmHg    TV rest pulmonary artery pressure 9 mmHg    E/E' ratio 5.05 m/s    MV Peak E Homero 0.48 m/s    TR Max Homero 1.19 m/s    MV stenosis pressure 1/2 time 106 ms    MV Peak A Homero 0.87 m/s    PV Peak S Homero 0.70 m/s    PV Peak D Homero 0.39 m/s    LV Systolic Volume 28.21 mL    LV Systolic Volume Index 13.2 mL/m2    LV Diastolic Volume 111.52 mL    LV Diastolic Volume Index 52.32 mL/m2    LA Volume Index 22.7 mL/m2    LV Mass Index 73 g/m2    RA Major Axis 5.04 cm    Left Atrium Minor Axis 4.69 cm    Left Atrium Major Axis 5.03 cm    Triscuspid Valve Regurgitation Peak Gradient 6 mmHg    LA Volume Index (Mod) 19.7 mL/m2    LA volume (mod) 42.00 cm3   CBC auto differential    Collection Time: 05/27/20  5:05 AM   Result Value Ref Range    WBC 13.15 (H) 3.90 - 12.70 K/uL    RBC 4.04 (L) 4.60 - 6.20 M/uL    Hemoglobin 9.7 (L) 14.0 - 18.0 g/dL    Hematocrit 31.3 (L) 40.0 - 54.0 %    Mean Corpuscular Volume 78 (L) 82 - 98 fL    Mean Corpuscular Hemoglobin 24.0 (L) 27.0 - 31.0 pg    Mean Corpuscular Hemoglobin Conc 31.0 (L) 32.0 - 36.0 g/dL    RDW 27.7 (H) 11.5 - 14.5 %    Platelets 202 150 - 350 K/uL    MPV 8.9 (L) 9.2 - 12.9 fL    Immature Granulocytes 0.4 0.0 - 0.5 %    Gran # (ANC) 10.9 (H) 1.8 - 7.7 K/uL    Immature Grans (Abs) 0.05 (H) 0.00 - 0.04 K/uL    Lymph # 1.2 1.0 - 4.8 K/uL    Mono # 1.0 0.3 - 1.0 K/uL    Eos # 0.0 0.0 - 0.5 K/uL    Baso # 0.02 0.00 - 0.20 K/uL    nRBC 0 0 /100 WBC    Gran% 82.8 (H) 38.0 - 73.0 %    Lymph% 9.1 (L) 18.0 - 48.0 %    Mono% 7.5 4.0 - 15.0 %    Eosinophil% 0.0 0.0 - 8.0 %    Basophil% 0.2 0.0 - 1.9 %    Platelet Estimate Appears normal     Aniso Slight     Poik Slight     Poly Occasional     Hypo Occasional     Target Cells Occasional     Tear Drop Cells Occasional     Schistocytes Present     Large/Giant Platelets Present     Fragmented Cells Occasional     Differential Method Automated    Basic metabolic panel    Collection Time: 05/27/20  5:05 AM   Result Value Ref Range    Sodium  136 136 - 145 mmol/L    Potassium 4.3 3.5 - 5.1 mmol/L    Chloride 104 95 - 110 mmol/L    CO2 21 (L) 23 - 29 mmol/L    Glucose 182 (H) 70 - 110 mg/dL    BUN, Bld 9 8 - 23 mg/dL    Creatinine 1.0 0.5 - 1.4 mg/dL    Calcium 8.1 (L) 8.7 - 10.5 mg/dL    Anion Gap 11 8 - 16 mmol/L    eGFR if African American >60 >60 mL/min/1.73 m^2    eGFR if non African American >60 >60 mL/min/1.73 m^2     Current Imaging Results:    X-Ray Chest 1 View   Final Result      As above         Electronically signed by: Magy Malave MD   Date:    05/22/2020   Time:    09:57        5/26/2020: Echo:    Normal left ventricular systolic function. The estimated ejection fraction is 65%.  No wall motion abnormalities.  Grade I (mild) left ventricular diastolic dysfunction consistent with impaired relaxation.  Normal right ventricular systolic function.  Mild aortic valve sclerosis.  The estimated PA systolic pressure is 9 mmHg.  Normal central venous pressure (3 mmHg).          Assessment and Plan:     Problem List:    Active Diagnoses:    Diagnosis Date Noted POA    PRINCIPAL PROBLEM:  Gastrointestinal hemorrhage with melena [K92.1] 05/21/2020 Yes    Mass of colon [K63.89] 05/25/2020 Yes    Hiatal hernia [K44.9] 05/22/2020 Yes    Essential hypertension [I10] 05/21/2020 Yes    Gastroesophageal reflux disease [K21.9] 05/21/2020 Yes    Bradycardia [R00.1] 05/21/2020 Yes    Obstructive cardiomyopathy [I42.8] 05/21/2020 Yes    Bipolar affective disorder in remission [F31.70] 05/21/2020 Yes    Acute blood loss anemia [D62] 05/21/2020 Yes    BPH (benign prostatic hyperplasia) [N40.0] 05/09/2016 Yes    Coronary artery disease involving native coronary artery of native heart without angina pectoris [I25.10] 05/09/2016 Yes    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 [I69.398, G40.909] 05/09/2016 Not Applicable    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 Coronary Artery Disease              2018: Appears to have had NSTEMI. Evaluation at [facility]. Unable to obtain information.   5/26/2020: Echo: Normal left ventricular size and systolic function. EF 65%. Mild aortic valve sclerosis.              According to NH log appears to have been on aspirin and clopidogrel until recently.              Appears stable.     2. Sinus Bradycardia              HR in 50's.              On metoprolol 12.5 mg Q12.              5/26/2020: Metoprolol was discontinued.   Herat rate in 60-70 bpm range.     3. History of Cerebrovascular Accident              History of CVA causing weakness in left side.     4. Dementia              According to chart having vascular dementia.     5. Hypertension              Currently not on any antihypertensives.     6. Hypercholesterolemia              On atorvastatin 40 mg Q24.     7. Gastrointestinal Bleeding               5/21/2020: Presented with melena.              5/25/2020: Colonoscopy: Cecal mass.               5/26/2020:  Partial colectomy.   Dr. Von Jo.     VTE Risk Mitigation (From admission, onward)         Ordered     Reason for No Pharmacological VTE Prophylaxis  Once     Question:  Reasons:  Answer:  Active Bleeding    05/21/20 1613     IP VTE HIGH RISK PATIENT  Once      05/21/20 1613     Place sequential compression device  Until discontinued      05/21/20 1613     Place NICOLE hose  Until discontinued      05/21/20 1601                Richard Brown MD  Cardiology  [facility]

## 2020-05-27 NOTE — ASSESSMENT & PLAN NOTE
-History of sinus jessica noted.  -Did have bradycardia at times and home beta-blocker has been discontinued.  -At home takes metoprolol 12.5mg bid.  -Continue this but with hold parameters for hr less than 60.  -Monitor on telemetry

## 2020-05-27 NOTE — ASSESSMENT & PLAN NOTE
-Believed to be due to colon cancer as above.  -No known episodes of melena or hematochezia since 5/22/2020    -On admit Hb 10.6 and it is trending down but remains above transfusion threshold.  -Follow H/H daily and transfuse Hgb < 7

## 2020-05-27 NOTE — ASSESSMENT & PLAN NOTE
-History noted  -No seizures during his st5ay  -At NH noted to be on dilantin and depakote for mood disorder.  Per NH records, levels drawn q6 months and WNL  -Dilantin and Depakote levels non-toxic  -Continue home medications  -Continue seizure precautions

## 2020-05-27 NOTE — PLAN OF CARE
VSS on RA. Occasional moaning, prn pain medicine given. Cormier to gravity. Will continue to monitor    Problem: Adult Inpatient Plan of Care  Goal: Plan of Care Review  Outcome: Ongoing, Progressing  Flowsheets (Taken 5/27/2020 0618)  Plan of Care Reviewed With: patient     Problem: Adjustment to Illness (Gastrointestinal Bleeding)  Goal: Optimal Coping with Acute Illness  Intervention: Optimize Psychosocial Response to Unexpected Illness  Flowsheets (Taken 5/27/2020 0618)  Supportive Measures: verbalization of feelings encouraged     Problem: Fall Injury Risk  Goal: Absence of Fall and Fall-Related Injury  Intervention: Identify and Manage Contributors to Fall Injury Risk  Flowsheets (Taken 5/27/2020 0618)  Self-Care Promotion: independence encouraged; BADL personal objects within reach  Medication Review/Management: medications reviewed     Problem: Skin Injury Risk Increased  Goal: Skin Health and Integrity  Intervention: Optimize Skin Protection  Flowsheets (Taken 5/27/2020 0618)  Pressure Reduction Techniques: weight shift assistance provided  Skin Protection: incontinence pads utilized; tubing/devices free from skin contact  Head of Bed (HOB): HOB elevated

## 2020-05-27 NOTE — PT/OT/SLP PROGRESS
Occupational Therapy   Treatment    Name: Anjel Soria  MRN: [id number]  Admitting Diagnosis:  Gastrointestinal hemorrhage with melena  1 Day Post-Op    Recommendations:     Discharge Recommendations: nursing facility, basic  Discharge Equipment Recommendations:  none  Barriers to discharge:  None    Assessment:     Anjel Soria is a 74 y.o. male with a medical diagnosis of Gastrointestinal hemorrhage with melena.  He presents lying in bed and agreeable to sitting EOB for participation in grooming tasks.  Pt demonstrating decline from OT evaluation.  Pt had surgery yesterday and is needing significant assistance for ADLs and ADL mobility today.   Pt with poor sitting balance EOB.   Performance deficits affecting function are weakness, impaired endurance, impaired self care skills, impaired functional mobilty, gait instability, impaired balance, visual deficits, impaired cognition, decreased lower extremity function, decreased safety awareness, pain, impaired fine motor, impaired skin, impaired cardiopulmonary response to activity. Recommend discharge to NH with [facility].    Rehab Prognosis:  Fair/Good to return to PLOF; patient would benefit from acute skilled OT services to address these deficits and reach maximum level of function.       Plan:     Patient to be seen 3 x/week to address the above listed problems via self-care/home management, therapeutic activities, therapeutic exercises  · Plan of Care Expires: 06/23/20  · Plan of Care Reviewed with: patient    Subjective     Pain/Comfort:  Pain Rating 1: (Pt not rating or expressing pain.  Nurse reports she gave pt Tylenol prior to OT visit. )  Location 1: abdomen  Pain Rating Post-Intervention 1: (Pt did not rate and no complaints of pain.  )    Objective:     Communicated with: nurseThu, prior to session.  Patient found HOB elevated with bed alarm, peripheral IV, SCD, telemetry upon OT entry to room.    General Precautions: Standard, blind, fall   Orthopedic  Precautions:N/A   Braces: N/A     Occupational Performance:     Bed Mobility:    · Patient completed Rolling/Turning to Left with  minimum assistance  · Patient completed Rolling/Turning to Right with minimum assistance  · Patient completed Supine to Sit with moderate assistance  · Patient completed Sit to Supine with moderate assistance   · Scooting to HOB with Max A x 2      Functional Mobility/Transfers:  · Sit to stand not attempted due to nurse coming into room stating Telemetry monitor stating pt's HR was elevated so pt assisted back to supine with HOB elevated.    Activities of Daily Living:  · Feeding:  Assist due to blindness    · Grooming: Pt unable to sit EOB without Min<>Mod A with pt leaning heavily at times posteriorly. Wash face - Mod A  · Toileting: Cormier catheter        [facility] 6 Click ADL: 13    Treatment & Education:  Role of OT, POC, grooming tasks, sitting balance/tolerance, bed mobility    Patient left HOB elevated with all lines intact, call button in reach, bed alarm on and bilateral SCDs cycling and foam heel pads on bilateral feet.Education:    Bilateral UEs elevated on pillows.  Pt asked to speak to his daughter.   Pt's room did not have a phone so nurse notified of pt wanting to speak to his daughter.     GOALS:   Multidisciplinary Problems     Occupational Therapy Goals        Problem: Occupational Therapy Goal    Goal Priority Disciplines Outcome Interventions   Occupational Therapy Goal     OT, PT/OT Ongoing, Progressing    Description:  Goals to be met by: 6/1/2020    Patient will increase functional independence with ADLs by performing:    Feeding with Set-up Assistance.  UE Dressing with Supervision.  Grooming while seated with Supervision.  Toileting from toilet with Supervision for hygiene and clothing management.   Supine to sit with Supervision.  Toilet transfer to toilet with Contact Guard Assistance.                      Time Tracking:     OT Date of Treatment: 05/27/20  OT  Start Time: 1600  OT Stop Time: 1620  OT Total Time (min): 20 min    Billable Minutes:Self Care/Home Management 20    KRISTA Hernandez  5/27/2020

## 2020-05-27 NOTE — ASSESSMENT & PLAN NOTE
-Appears stable and moderate  -Continue home dose of Aricept 10 mg daily  -Continue delirium precautions  -Palliative Care consult for symptom management and NP at home program referral.  Patient is full code.

## 2020-05-27 NOTE — ASSESSMENT & PLAN NOTE
-Mr. Soria was admitted to inpatient status  -GI consulted and performed EGD 5/22/2020 without evidence for source of bleeding.  Colonoscopy performed on 5/25/2020 which showed a fungating mass of ascending colon and diverticulosis.  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  -H/H remain stable.  Continue to monitor and transfuse for Hb less than 7.  -Patient is passing flatus.  Bowel sounds present but diminished.  States he is passing flatus.  -Discussed with Dr. Jo and will continue with NPO but for meds at this time.

## 2020-05-27 NOTE — ASSESSMENT & PLAN NOTE
-History noted  -No seizures during his stay  -At NH noted to be on dilantin and depakote for mood disorder.  Per NH records, levels drawn q6 months and WNL  -Dilantin and Depakote levels non-toxic  -Continue home medications  -Continue seizure precautions

## 2020-05-27 NOTE — ASSESSMENT & PLAN NOTE
-History of sinus jessica noted.  -Did have bradycardia at times and home beta-blocker held briefly.  -At home takes metoprolol 12.5mg bid.  -Continue this but with hold parameters for hr less than 60.  -Monitor on telemetry

## 2020-05-27 NOTE — ASSESSMENT & PLAN NOTE
-History noted per chart  -Echo 5/26 showed EF 65%, grade I diastolic dysfunction and no wall motion abnormalities  -Appears well compensated

## 2020-05-28 LAB
ANION GAP SERPL CALC-SCNC: 10 MMOL/L (ref 8–16)
ANISOCYTOSIS BLD QL SMEAR: ABNORMAL
BASOPHILS # BLD AUTO: 0.01 K/UL (ref 0–0.2)
BASOPHILS NFR BLD: 0.1 % (ref 0–1.9)
BUN SERPL-MCNC: 10 MG/DL (ref 8–23)
CALCIUM SERPL-MCNC: 8.2 MG/DL (ref 8.7–10.5)
CHLORIDE SERPL-SCNC: 103 MMOL/L (ref 95–110)
CO2 SERPL-SCNC: 24 MMOL/L (ref 23–29)
CREAT SERPL-MCNC: 0.9 MG/DL (ref 0.5–1.4)
DIFFERENTIAL METHOD: ABNORMAL
EOSINOPHIL # BLD AUTO: 0 K/UL (ref 0–0.5)
EOSINOPHIL NFR BLD: 0.1 % (ref 0–8)
ERYTHROCYTE [DISTWIDTH] IN BLOOD BY AUTOMATED COUNT: ABNORMAL % (ref 11.5–14.5)
EST. GFR  (AFRICAN AMERICAN): >60 ML/MIN/1.73 M^2
EST. GFR  (NON AFRICAN AMERICAN): >60 ML/MIN/1.73 M^2
GLUCOSE SERPL-MCNC: 157 MG/DL (ref 70–110)
HCT VFR BLD AUTO: 27.8 % (ref 40–54)
HGB BLD-MCNC: 8.6 G/DL (ref 14–18)
IMM GRANULOCYTES # BLD AUTO: 0.04 K/UL (ref 0–0.04)
IMM GRANULOCYTES NFR BLD AUTO: 0.3 % (ref 0–0.5)
LYMPHOCYTES # BLD AUTO: 1.2 K/UL (ref 1–4.8)
LYMPHOCYTES NFR BLD: 9.9 % (ref 18–48)
MCH RBC QN AUTO: 24 PG (ref 27–31)
MCHC RBC AUTO-ENTMCNC: 30.9 G/DL (ref 32–36)
MCV RBC AUTO: 78 FL (ref 82–98)
MONOCYTES # BLD AUTO: 1.2 K/UL (ref 0.3–1)
MONOCYTES NFR BLD: 10.1 % (ref 4–15)
NEUTROPHILS # BLD AUTO: 9.3 K/UL (ref 1.8–7.7)
NEUTROPHILS NFR BLD: 79.5 % (ref 38–73)
NRBC BLD-RTO: 0 /100 WBC
PLATELET # BLD AUTO: 203 K/UL (ref 150–350)
PLATELET BLD QL SMEAR: ABNORMAL
PMV BLD AUTO: 8.9 FL (ref 9.2–12.9)
POTASSIUM SERPL-SCNC: 3.6 MMOL/L (ref 3.5–5.1)
RBC # BLD AUTO: 3.58 M/UL (ref 4.6–6.2)
SODIUM SERPL-SCNC: 137 MMOL/L (ref 136–145)
WBC # BLD AUTO: 11.7 K/UL (ref 3.9–12.7)

## 2020-05-28 PROCEDURE — 80048 BASIC METABOLIC PNL TOTAL CA: CPT

## 2020-05-28 PROCEDURE — 11000001 HC ACUTE MED/SURG PRIVATE ROOM

## 2020-05-28 PROCEDURE — 25000003 PHARM REV CODE 250: Performed by: INTERNAL MEDICINE

## 2020-05-28 PROCEDURE — 99233 SBSQ HOSP IP/OBS HIGH 50: CPT | Mod: ,,, | Performed by: INTERNAL MEDICINE

## 2020-05-28 PROCEDURE — 97530 THERAPEUTIC ACTIVITIES: CPT | Mod: CQ

## 2020-05-28 PROCEDURE — 99233 PR SUBSEQUENT HOSPITAL CARE,LEVL III: ICD-10-PCS | Mod: ,,, | Performed by: INTERNAL MEDICINE

## 2020-05-28 PROCEDURE — 25000003 PHARM REV CODE 250: Performed by: NURSE PRACTITIONER

## 2020-05-28 PROCEDURE — 36415 COLL VENOUS BLD VENIPUNCTURE: CPT

## 2020-05-28 PROCEDURE — C9113 INJ PANTOPRAZOLE SODIUM, VIA: HCPCS | Performed by: HOSPITALIST

## 2020-05-28 PROCEDURE — 94761 N-INVAS EAR/PLS OXIMETRY MLT: CPT

## 2020-05-28 PROCEDURE — 99233 SBSQ HOSP IP/OBS HIGH 50: CPT | Mod: ,,, | Performed by: HOSPITALIST

## 2020-05-28 PROCEDURE — 99900035 HC TECH TIME PER 15 MIN (STAT)

## 2020-05-28 PROCEDURE — 27000221 HC OXYGEN, UP TO 24 HOURS

## 2020-05-28 PROCEDURE — 85025 COMPLETE CBC W/AUTO DIFF WBC: CPT

## 2020-05-28 PROCEDURE — 63600175 PHARM REV CODE 636 W HCPCS: Performed by: SPECIALIST

## 2020-05-28 PROCEDURE — 99233 PR SUBSEQUENT HOSPITAL CARE,LEVL III: ICD-10-PCS | Mod: ,,, | Performed by: HOSPITALIST

## 2020-05-28 PROCEDURE — 97530 THERAPEUTIC ACTIVITIES: CPT

## 2020-05-28 PROCEDURE — 25000003 PHARM REV CODE 250: Performed by: HOSPITALIST

## 2020-05-28 PROCEDURE — 63600175 PHARM REV CODE 636 W HCPCS: Performed by: HOSPITALIST

## 2020-05-28 RX ADMIN — TAMSULOSIN HYDROCHLORIDE 0.4 MG: 0.4 CAPSULE ORAL at 08:05

## 2020-05-28 RX ADMIN — TRAZODONE HYDROCHLORIDE 100 MG: 100 TABLET ORAL at 08:05

## 2020-05-28 RX ADMIN — DIVALPROEX SODIUM 500 MG: 250 TABLET, DELAYED RELEASE ORAL at 08:05

## 2020-05-28 RX ADMIN — PHENYTOIN SODIUM 100 MG: 100 CAPSULE ORAL at 08:05

## 2020-05-28 RX ADMIN — PANTOPRAZOLE SODIUM 40 MG: 40 INJECTION, POWDER, LYOPHILIZED, FOR SOLUTION INTRAVENOUS at 08:05

## 2020-05-28 RX ADMIN — METOPROLOL TARTRATE 12.5 MG: 25 TABLET, FILM COATED ORAL at 08:05

## 2020-05-28 RX ADMIN — ACETAMINOPHEN 650 MG: 325 TABLET ORAL at 11:05

## 2020-05-28 RX ADMIN — DORZOLAMIDE HYDROCHLORIDE 1 DROP: 20 SOLUTION/ DROPS OPHTHALMIC at 08:05

## 2020-05-28 RX ADMIN — FLUOXETINE 20 MG: 20 CAPSULE ORAL at 08:05

## 2020-05-28 RX ADMIN — ATORVASTATIN CALCIUM 40 MG: 20 TABLET, FILM COATED ORAL at 08:05

## 2020-05-28 RX ADMIN — TRAVOPROST 1 DROP: 0.04 SOLUTION/ DROPS OPHTHALMIC at 08:05

## 2020-05-28 RX ADMIN — HYDROMORPHONE HYDROCHLORIDE 0.5 MG: 1 INJECTION, SOLUTION INTRAMUSCULAR; INTRAVENOUS; SUBCUTANEOUS at 10:05

## 2020-05-28 RX ADMIN — DONEPEZIL HYDROCHLORIDE 10 MG: 5 TABLET, FILM COATED ORAL at 08:05

## 2020-05-28 NOTE — PROGRESS NOTES
Postop day 2.  Status post laparotomy, left colectomy   Afebrile  Vital signs stable  No nausea, vomiting  Offered clear liquids yesterday, minimal intake  Abdomen-mild distention, incision clean and dry, positive bowel sounds,

## 2020-05-28 NOTE — PLAN OF CARE
Patient in no apparent distress. Sat's  95 % on room air . Per RN raman, patient desaturated to 91%. Placed on 2 lpm, sat's 96%.  Will continue to monitor.

## 2020-05-28 NOTE — PT/OT/SLP PROGRESS
Occupational Therapy   Treatment    Name: Anjel Soria  MRN: [id number]  Admitting Diagnosis:  Gastrointestinal hemorrhage with melena  2 Days Post-Op    Recommendations:     Discharge Recommendations: nursing facility, skilled  Discharge Equipment Recommendations:  (TBA at next LOC )  Barriers to discharge:       Assessment:     Anjel Soria is a 74 y.o. male with a medical diagnosis of Gastrointestinal hemorrhage with melena.  He presents with the following Performance deficits affecting function: weakness, impaired functional mobilty, impaired cognition, decreased safety awareness, impaired coordination, impaired cardiopulmonary response to activity, impaired endurance, gait instability, decreased coordination, pain, impaired balance, visual deficits, impaired self care skills.     Pt making steady progress towards goals, POC remains appropriate.  Pt with functional decline following surgery, now requiring increased assist for all ADLS & mobility with increased confusion. Pt would benefit from increased OT frequency in acute setting as well as SNF following hospitalization to facilitate safe return to PLOF.     Rehab Prognosis:  Good; patient would benefit from acute skilled OT services to address these deficits and reach maximum level of function.       Plan:     Patient to be seen 5 x/week to address the above listed problems via self-care/home management, therapeutic activities, therapeutic exercises  · Plan of Care Expires: 06/23/20  · Plan of Care Reviewed with: patient    Subjective     Pain/Comfort:  · Pain Rating 1: (did not quantify)  · Location 1: abdomen  · Pain Addressed 1: Reposition, Distraction, Cessation of Activity, Nurse notified    Objective:     Communicated with: KENNY Andino prior to session.  Patient found HOB elevated with bed alarm, peripheral IV, telemetry, SCD upon OT entry to room.    General Precautions: Standard, fall, blind   Orthopedic Precautions:N/A   Braces: N/A     Occupational  Performance:     Bed Mobility:    · Patient completed Scooting/Bridging with total assistance and 2 persons  · Patient completed Supine to Sit with total assistance and 2 persons  · Patient completed Sit to Supine with total assistance and 2 persons     Functional Mobility/Transfers:  · Patient completed Sit <> Stand Transfer with moderate-max assistance  with  hand-held assist x3 trials  · Functional Mobility: Mod A HHA x2 side steps    Activities of Daily Living:  · Grooming: moderate assistance thoroughness of washing face      [facility] 6 Click ADL: 15    Treatment & Education:  Pt performed ADLs & mobility as documented above.     Education provided on role of OT including safe performance of self-care tasks, mobility techniques, safe use of DME, and encouragement of mobilization during hospitalization to prevent/limit deconditioning as well as discharge recommendations, use of call light        Patient left HOB elevated with all lines intact, call button in reach, bed alarm on and RN Thu notifiedEducation:      GOALS:   Multidisciplinary Problems     Occupational Therapy Goals        Problem: Occupational Therapy Goal    Goal Priority Disciplines Outcome Interventions   Occupational Therapy Goal     OT, PT/OT Ongoing, Progressing    Description:  Goals to be met by: 6/1/2020    Patient will increase functional independence with ADLs by performing:    Feeding with Set-up Assistance.  UE Dressing with Supervision.  Grooming while seated with Supervision.  Toileting from toilet with Supervision for hygiene and clothing management.   Supine to sit with Supervision.  Toilet transfer to toilet with Contact Guard Assistance.                      Time Tracking:     OT Date of Treatment: 05/28/20  OT Start Time: 1154  OT Stop Time: 1212  OT Total Time (min): 18 min    Billable Minutes:Therapeutic Activity 18     Overlap with PT for portions of session due to complex nature of patient and for safety with mobility and  performance of self-care tasks to decrease fall risk as well as patient and caregiver injury as pt requires two skilled therapists to provide interventions.      Lelia Wallace, OT  5/28/2020

## 2020-05-28 NOTE — PLAN OF CARE
Chart reviewed  SNF recommendation noted  Will forward updated clinicals to facility  Mr Soria is a long term resident of [facility] ([place], [place])  Will follow closely          05/28/20 1602   Discharge Reassessment   Assessment Type Discharge Planning Reassessment   Provided patient/caregiver education on the expected discharge date and the discharge plan Yes   Do you have any problems affording any of your prescribed medications? No   Discharge Plan A Skilled Nursing Facility   Discharge Plan B Return to Nursing Home   DME Needed Upon Discharge  none   Patient choice form signed by patient/caregiver N/A

## 2020-05-28 NOTE — PROGRESS NOTES
[facility]  Cardiology  Progress Note    Patient Name: Anjel Soria  MRN: [id number]  Admission Date: 5/21/2020  Hospital Length of Stay: 6 days  Code Status: Full Code   Attending Physician: Von Moses MD   Primary Care Physician: Michael Blanco MD  Expected Discharge Date:   Principal Problem:Gastrointestinal hemorrhage with melena    Subjective:     Brief HPI:    Anjel Soria is a 74 y.o.male with hypertension and hypercholesterolemia. He has had a cerebrovascular accident in the past making him weak in the left side. He has vascular dementia. He stays at a Nursing Home in [place], [place]. According to the patient he is wheelchair bound due to that his legs are too weak to be able to ambulate. In 2018 he was seen at [facility] due to chest pain. He had an elevation in troponins. He appears to have been transferred to [facility] for further cardiac evaluation but am not able to find any records from the stay at [facility].     He presented to [facility] on 5/21/2020 with melena. He was transferred to [facility] for evaluation. He underwent colonoscopy on 5/25/2020 revealing a cecal mass. The plan is partial colectomy on 5/26/2020. He has been noted to be bradycardic during the hospital stay with a heart rate in the 50's during the night 5/25/2020 - 5/26/2020. He has been receiving metoprolol 12.5 mg Q12. The patient denies any chest pain or shortness of breath. He is comfortable supine. I am asked to see him for his bradycardia and assess need for possible further cardiac evaluation prior to surgery.    Hospital Course:     5/26/2020: Partial colectomy.    Interval History:    Doing well after surgery. He denies CP or SOB.    Review of Systems   Constitution: Positive for malaise/fatigue. Negative for chills and fever.   HENT: Negative for nosebleeds.    Eyes: Negative for vision loss in left eye and vision loss in right eye.   Cardiovascular: Negative for chest pain, leg swelling,  orthopnea, palpitations and paroxysmal nocturnal dyspnea.   Respiratory: Negative for cough, hemoptysis, shortness of breath, sputum production and wheezing.    Hematologic/Lymphatic: Negative for bleeding problem.   Skin: Negative for rash.   Musculoskeletal: Negative for myalgias.   Gastrointestinal: Positive for abdominal pain. Negative for nausea and vomiting.   Genitourinary: Negative for hematuria.   Neurological: Positive for focal weakness (left side) and weakness. Negative for dizziness, headaches, light-headedness and vertigo.   Psychiatric/Behavioral: Positive for memory loss. Negative for altered mental status. The patient is not nervous/anxious.    Allergic/Immunologic: Negative for persistent infections.     Objective:     Vital Signs (Most Recent):  Temp: 97.4 °F (36.3 °C) (05/28/20 0706)  Pulse: 86 (05/28/20 0909)  Resp: 18 (05/28/20 0909)  BP: 125/68 (05/28/20 0706)  SpO2: (!) 92 % (05/28/20 0909) Vital Signs (24h Range):  Temp:  [97.4 °F (36.3 °C)-98.6 °F (37 °C)] 97.4 °F (36.3 °C)  Pulse:  [] 86  Resp:  [18-20] 18  SpO2:  [91 %-92 %] 92 %  BP: (114-159)/(60-71) 125/68     Weight: 88.7 kg (195 lb 8.8 oz)  Body mass index is 25.8 kg/m².    SpO2: (!) 92 %  O2 Device (Oxygen Therapy): room air      Intake/Output Summary (Last 24 hours) at 5/28/2020 1013  Last data filed at 5/28/2020 0600  Gross per 24 hour   Intake 1035 ml   Output 900 ml   Net 135 ml       Lines/Drains/Airways     Drain                 Urethral Catheter 05/26/20 1120 Non-latex 16 Fr. 1 day          Peripheral Intravenous Line                 Midline Catheter Insertion/Assessment  - Single Lumen 05/25/20 0940 Left basilic vein (medial side of arm) 22g x 8cm 3 days                Physical Exam   Constitutional: He appears well-developed and well-nourished.   Cardiovascular: Normal rate, regular rhythm, S1 normal and S2 normal. Exam reveals gallop and S4.   Pulmonary/Chest: Effort normal and breath sounds normal.   Abdominal:  Normal appearance. He exhibits no distension. There is tenderness.   Musculoskeletal:        Right ankle: He exhibits no swelling.        Left ankle: He exhibits no swelling.   Neurological: He is disoriented.   Psychiatric: Cognition and memory are impaired.       Current Medications:     atorvastatin  40 mg Oral QHS    divalproex  500 mg Oral Nightly    donepeziL  10 mg Oral Daily    dorzolamide  1 drop Both Eyes BID    FLUoxetine  20 mg Oral Daily    metoprolol tartrate  12.5 mg Oral BID    pantoprazole  40 mg Intravenous Daily    phenytoin  100 mg Oral BID    tamsulosin  1 capsule Oral Daily    travoprost  1 drop Both Eyes QHS    traZODone  100 mg Oral QHS     Current Laboratory Results:    Recent Results (from the past 24 hour(s))   CBC auto differential    Collection Time: 05/28/20  5:25 AM   Result Value Ref Range    WBC 11.70 3.90 - 12.70 K/uL    RBC 3.58 (L) 4.60 - 6.20 M/uL    Hemoglobin 8.6 (L) 14.0 - 18.0 g/dL    Hematocrit 27.8 (L) 40.0 - 54.0 %    Mean Corpuscular Volume 78 (L) 82 - 98 fL    Mean Corpuscular Hemoglobin 24.0 (L) 27.0 - 31.0 pg    Mean Corpuscular Hemoglobin Conc 30.9 (L) 32.0 - 36.0 g/dL    RDW SEE COMMENT 11.5 - 14.5 %    Platelets 203 150 - 350 K/uL    MPV 8.9 (L) 9.2 - 12.9 fL    Immature Granulocytes 0.3 0.0 - 0.5 %    Gran # (ANC) 9.3 (H) 1.8 - 7.7 K/uL    Immature Grans (Abs) 0.04 0.00 - 0.04 K/uL    Lymph # 1.2 1.0 - 4.8 K/uL    Mono # 1.2 (H) 0.3 - 1.0 K/uL    Eos # 0.0 0.0 - 0.5 K/uL    Baso # 0.01 0.00 - 0.20 K/uL    nRBC 0 0 /100 WBC    Gran% 79.5 (H) 38.0 - 73.0 %    Lymph% 9.9 (L) 18.0 - 48.0 %    Mono% 10.1 4.0 - 15.0 %    Eosinophil% 0.1 0.0 - 8.0 %    Basophil% 0.1 0.0 - 1.9 %    Platelet Estimate Appears normal     Aniso Moderate     Differential Method Automated    Basic metabolic panel    Collection Time: 05/28/20  5:25 AM   Result Value Ref Range    Sodium 137 136 - 145 mmol/L    Potassium 3.6 3.5 - 5.1 mmol/L    Chloride 103 95 - 110 mmol/L    CO2 24 23 -  29 mmol/L    Glucose 157 (H) 70 - 110 mg/dL    BUN, Bld 10 8 - 23 mg/dL    Creatinine 0.9 0.5 - 1.4 mg/dL    Calcium 8.2 (L) 8.7 - 10.5 mg/dL    Anion Gap 10 8 - 16 mmol/L    eGFR if African American >60 >60 mL/min/1.73 m^2    eGFR if non African American >60 >60 mL/min/1.73 m^2     Current Imaging Results:    X-Ray Chest 1 View   Final Result      As above         Electronically signed by: Magy Malave MD   Date:    05/22/2020   Time:    09:57        5/26/2020: Echo:    Normal left ventricular systolic function. The estimated ejection fraction is 65%.  No wall motion abnormalities.  Grade I (mild) left ventricular diastolic dysfunction consistent with impaired relaxation.  Normal right ventricular systolic function.  Mild aortic valve sclerosis.  The estimated PA systolic pressure is 9 mmHg.  Normal central venous pressure (3 mmHg).      Assessment and Plan:     Problem List:    Active Diagnoses:    Diagnosis Date Noted POA    PRINCIPAL PROBLEM:  Gastrointestinal hemorrhage with melena [K92.1] 05/21/2020 Yes    Malignant neoplasm of ascending colon [C18.2] 05/27/2020 Yes    Hiatal hernia [K44.9] 05/22/2020 Yes    Essential hypertension [I10] 05/21/2020 Yes    Gastroesophageal reflux disease [K21.9] 05/21/2020 Yes    Bradycardia [R00.1] 05/21/2020 Yes    Obstructive cardiomyopathy [I42.8] 05/21/2020 Yes    Bipolar affective disorder in remission [F31.70] 05/21/2020 Yes    Acute blood loss anemia [D62] 05/21/2020 Yes    BPH (benign prostatic hyperplasia) [N40.0] 05/09/2016 Yes    Coronary artery disease involving native coronary artery of native heart without angina pectoris [I25.10] 05/09/2016 Yes    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 [I69.398, G40.909] 05/09/2016 Not Applicable    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 Coronary Artery Disease              2018: Appears to have had NSTEMI. Evaluation at [facility]. Unable to obtain information.   5/26/2020: Echo: Normal left ventricular size and systolic function. EF 65%. Mild aortic valve sclerosis.              According to NH log appears to have been on aspirin and clopidogrel until recently.              Appears stable.     2. Sinus Bradycardia              HR in 50's.              On metoprolol 12.5 mg Q12.              5/26/2020: Metoprolol was discontinued.   Heart rate in 60-70 bpm range.     3. History of Cerebrovascular Accident              History of CVA causing weakness in left side.     4. Dementia              According to chart having vascular dementia.     5. Hypertension              Currently not on any antihypertensives.     6. Hypercholesterolemia              On atorvastatin 40 mg Q24.     7. Gastrointestinal Bleeding               5/21/2020: Presented with melena.              5/25/2020: Colonoscopy: Cecal mass.               5/26/2020:  Partial colectomy.   Dr. Von Jo.     VTE Risk Mitigation (From admission, onward)         Ordered     Reason for No Pharmacological VTE Prophylaxis  Once     Question:  Reasons:  Answer:  Active Bleeding    05/21/20 1613     IP VTE HIGH RISK PATIENT  Once      05/21/20 1613     Place sequential compression device  Until discontinued      05/21/20 1613     Place NICOLE hose  Until discontinued      05/21/20 1601                Richard Brown MD  Cardiology  [facility]

## 2020-05-28 NOTE — PLAN OF CARE
Problem: Physical Therapy Goal  Goal: Physical Therapy Goal  Description  Goals to be met by: 6/25/2020    Patient will perform the following to increase strength, improve mobility, and return to prior level of function:    1. Supine <> sit with SBA.  2. Sit<>stand with SBA with least restrictive assistive device.  3. Gait x 150 feet with SBA with least restrictive assistive device.   Outcome: Ongoing, Progressing   Pt sup to sit max/totA x 2, sit to stand modA x 2, pt took a few steps fwc/bk and side steps with mod/max.A x 2.

## 2020-05-28 NOTE — SUBJECTIVE & OBJECTIVE
Interval History: No acute events overnight.  Tolerating clear liquids and states he is passing gas but no bowel movements.  Abd is mildly distended with minimal appropriate post-surgical tenderness.  No cp or sob.    Review of Systems   Reason unable to perform ROS: can provide some history.   Constitutional: Negative for fever.   HENT: Negative for congestion, sinus pressure and sneezing.    Eyes: Positive for visual disturbance (glaucoma/macular degeneration and can see shadows).   Respiratory: Negative for shortness of breath.    Cardiovascular: Negative for chest pain.   Gastrointestinal: Positive for abdominal distention. Negative for abdominal pain, blood in stool (no episodes overnight), diarrhea, nausea and vomiting.   Endocrine: Negative for cold intolerance and heat intolerance.   Genitourinary: Negative for difficulty urinating and dysuria.   Musculoskeletal: Negative for arthralgias and back pain.   Neurological: Negative for headaches.   Psychiatric/Behavioral: Negative for agitation and behavioral problems.     Objective:     Vital Signs (Most Recent):  Temp: 97.4 °F (36.3 °C) (05/28/20 0706)  Pulse: 86 (05/28/20 0909)  Resp: 18 (05/28/20 0909)  BP: 125/68 (05/28/20 0706)  SpO2: (!) 92 % (05/28/20 0909) Vital Signs (24h Range):  Temp:  [97.4 °F (36.3 °C)-98.5 °F (36.9 °C)] 97.4 °F (36.3 °C)  Pulse:  [] 86  Resp:  [18-20] 18  SpO2:  [91 %-92 %] 92 %  BP: (114-159)/(60-71) 125/68     Weight: 88.7 kg (195 lb 8.8 oz)  Body mass index is 25.8 kg/m².    Intake/Output Summary (Last 24 hours) at 5/28/2020 1234  Last data filed at 5/28/2020 0600  Gross per 24 hour   Intake 1035 ml   Output 900 ml   Net 135 ml      Physical Exam   Constitutional: He appears well-developed and well-nourished. No distress.   HENT:   Head: Normocephalic and atraumatic.   Eyes: Conjunctivae are normal.   Blindness bilaterally and can see shapes with peripheral vision   Neck: Neck supple.   Cardiovascular: Normal rate and  regular rhythm.   No murmur heard.  Pulmonary/Chest: Effort normal. No respiratory distress. He has no decreased breath sounds. He has no wheezes.   Abdominal: He exhibits distension. Bowel sounds are increased. There is no tenderness. There is no rigidity.   Mildly distended, mild diffuse ttp, bowel sounds are diminished but improved today, incision is bandaged with small amount of dried blood.   Musculoskeletal: Normal range of motion. He exhibits no edema.   Neurological: He is alert. He has normal strength and normal reflexes. He is disoriented.   Oriented to self, knows he's in a hospital, fluent speech, moves all extremities   Skin: Skin is warm and dry.   Psychiatric: He has a normal mood and affect. His speech is normal and behavior is normal. Cognition and memory are impaired.     Significant Labs: All pertinent labs within the past 24 hours have been reviewed.    Significant Imaging: I have reviewed all pertinent imaging studies within the last 24 hours.

## 2020-05-28 NOTE — PROGRESS NOTES
Postop day 1.  Status post right colectomy  No abdominal complaints  Has passed some flatus    PE  Abdomen-nondistended, positive bowel sounds, dressing dry, mild incisional tenderness    Plan  Offer clear liquids

## 2020-05-28 NOTE — PROGRESS NOTES
[facility] Hospital Medicine  Progress Note    Patient Name: Anjel Soria  MRN: [id number]  Patient Class: IP- Inpatient   Admission Date: 5/21/2020  Length of Stay: 6 days  Attending Physician: Von Moses MD  Primary Care Provider: Michael Blanco MD        Subjective:     Principal Problem:Gastrointestinal hemorrhage with melena        HPI:  Anjel Soria is a 74 year old male who has medical history of anemia related to lower GI bleeding, coronary artery disease, vascular dementia, prior stroke with residual left sided weakness, 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  He is currently a resident of the [facility].   Per medical record, the patient with recent prior history of severe anemia requiring transfusion on April 9, 2020.  During that time, he was evaluated  At [facility] and found to be hemoccult positive and CT scan of abdomen was unremarkable.  He was subsequently discharged back to his nursing home with plan for outpatient follow up with GI.  However, the patient was unable to see GI as outpatient.   Today, patient presented to the [facility] ED where initial work up revealed stable hemoglobin/hematocrit 12.0/40.4. Otherwise, labs were unremarkable and COVID screen was negative. Of note, EKG in ED today shows sinus bradycardia and asymtpomatic with heart rate in upper 40's and low 50's.   He was to be discharged back to the nursing home, but patient had episode of melena and decision to transfer to [facility] for evaluation.     On arrival to the [facility], the patient had one small melanotic stool.  Given dementia, the patient is unable to provide detailed history.  Per nursing home medication reconciliation, the patient has not been on aspirin or Plavix recently and is not currently on any other type of blood thinners.  GI will be consulted and roge continue to trend hemoglobin/hematocrit.      Overview/Hospital Course:  After admission, Gastroenterology consulted and EGD without evidence for bleeding.  Colonoscopy 5/25/2020 noted fungating mass ascending colon and diverticulosis in the sigmoid colon and in the descending colon. Pathology pending.   No further episodes of hematochezia/melena and stable hemoglobin/hematocrit.  General Surgery consulted for evaluation.     Interval History: No acute events overnight.  Tolerating clear liquids and states he is passing gas but no bowel movements.  Abd is mildly distended with minimal appropriate post-surgical tenderness.  No cp or sob.    Review of Systems   Reason unable to perform ROS: can provide some history.   Constitutional: Negative for fever.   HENT: Negative for congestion, sinus pressure and sneezing.    Eyes: Positive for visual disturbance (glaucoma/macular degeneration and can see shadows).   Respiratory: Negative for shortness of breath.    Cardiovascular: Negative for chest pain.   Gastrointestinal: Positive for abdominal distention. Negative for abdominal pain, blood in stool (no episodes overnight), diarrhea, nausea and vomiting.   Endocrine: Negative for cold intolerance and heat intolerance.   Genitourinary: Negative for difficulty urinating and dysuria.   Musculoskeletal: Negative for arthralgias and back pain.   Neurological: Negative for headaches.   Psychiatric/Behavioral: Negative for agitation and behavioral problems.     Objective:     Vital Signs (Most Recent):  Temp: 97.4 °F (36.3 °C) (05/28/20 0706)  Pulse: 86 (05/28/20 0909)  Resp: 18 (05/28/20 0909)  BP: 125/68 (05/28/20 0706)  SpO2: (!) 92 % (05/28/20 0909) Vital Signs (24h Range):  Temp:  [97.4 °F (36.3 °C)-98.5 °F (36.9 °C)] 97.4 °F (36.3 °C)  Pulse:  [] 86  Resp:  [18-20] 18  SpO2:  [91 %-92 %] 92 %  BP: (114-159)/(60-71) 125/68     Weight: 88.7 kg (195 lb 8.8 oz)  Body mass index is 25.8 kg/m².    Intake/Output Summary (Last 24 hours) at 5/28/2020 1234  Last data  filed at 5/28/2020 0600  Gross per 24 hour   Intake 1035 ml   Output 900 ml   Net 135 ml      Physical Exam   Constitutional: He appears well-developed and well-nourished. No distress.   HENT:   Head: Normocephalic and atraumatic.   Eyes: Conjunctivae are normal.   Blindness bilaterally and can see shapes with peripheral vision   Neck: Neck supple.   Cardiovascular: Normal rate and regular rhythm.   No murmur heard.  Pulmonary/Chest: Effort normal. No respiratory distress. He has no decreased breath sounds. He has no wheezes.   Abdominal: He exhibits distension. Bowel sounds are increased. There is no tenderness. There is no rigidity.   Mildly distended, mild diffuse ttp, bowel sounds are diminished but improved today, incision is bandaged with small amount of dried blood.   Musculoskeletal: Normal range of motion. He exhibits no edema.   Neurological: He is alert. He has normal strength and normal reflexes. He is disoriented.   Oriented to self, knows he's in a hospital, fluent speech, moves all extremities   Skin: Skin is warm and dry.   Psychiatric: He has a normal mood and affect. His speech is normal and behavior is normal. Cognition and memory are impaired.     Significant Labs: All pertinent labs within the past 24 hours have been reviewed.    Significant Imaging: I have reviewed all pertinent imaging studies within the last 24 hours.      Assessment/Plan:      * Gastrointestinal hemorrhage with melena  -Mr. Soria was admitted to inpatient status  -GI consulted and performed EGD 5/22/2020 without evidence for source of bleeding.  Colonoscopy performed on 5/25/2020 which showed a fungating mass of ascending colon and diverticulosis.  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  -H/H trending down but remains above transfusion threshold.  Continue to monitor and  transfuse for Hb less than 7.  -Bowel sounds improving but still diminished.  Mildly distended today.  Appropriate post-surgical TTP.  States he is passing flatus but no BM.  -Continue clears and advance per surgery recommendations.  -Appreciate assistance from Dr. Jo    Malignant neoplasm of ascending colon  -Treatment as above.  -Await final path  -No gross metastatic lesions or enlarged lymph nodes observed during surgery on 5/26.    Acute blood loss anemia  -Believed to be due to colon cancer as above.  -No known episodes of melena or hematochezia since 5/22/2020    -On admit Hb 10.6 and it is trending down but remains above transfusion threshold.  -Follow H/H daily and transfuse Hgb < 7    Coronary artery disease involving native coronary artery of native heart without angina pectoris  -History noted  -Previously on aspirin and plavix, but per NH record was not on these  -Continue home statin  -Continue home metoprolol with hold parameters for bradycardia.    Obstructive cardiomyopathy  -History noted per chart  -Echo 5/26 showed EF 65%, grade I diastolic dysfunction and no wall motion abnormalities  -Appears well compensated    Bradycardia  -History of sinus jessica noted.  -Did have bradycardia at times and home beta-blocker held briefly.  -At home takes metoprolol 12.5mg bid.  -Continue this but with hold parameters for hr less than 60.  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   Per NH records, levels drawn q6 months and WNL  -Dilantin and Depakote levels non-toxic  -Continue home medications  -Continue seizure precautions    Subcortical vascular dementia without behavioral disturbance  -Appears stable and moderate  -Continue home dose of Aricept 10 mg daily  -Continue delirium precautions  -Palliative Care consult for symptom management and NP at home program referral.  Patient is full code.    Bipolar affective disorder in remission  -History noted  -Mood appears stable  -Continue Depakote 500 mg daily, Prozac 20 mg daily and trazodone 50 mg nightly    Hiatal hernia  -Noted on EGD 5/22/2020    Gastroesophageal reflux disease  -History noted  -S/p EGD this stay with normal stomach, duodenum and noted LE ring  -Continue PPI daily    BPH (benign prostatic hyperplasia)  -No noted symptoms  -Continue home dose of Flomax    End-stage glaucoma  -History noted.  He is legally blind  -Provide assistance as needed  -Continue home medications      VTE Risk Mitigation (From admission, onward)         Ordered     Reason for No Pharmacological VTE Prophylaxis  Once     Question:  Reasons:  Answer:  Active Bleeding    05/21/20 1613     IP VTE HIGH RISK PATIENT  Once      05/21/20 1613     Place sequential compression device  Until discontinued      05/21/20 1613     Place NICOLE hose  Until discontinued      05/21/20 1601                      Von Moses MD  Department of Hospital Medicine   [facility]

## 2020-05-28 NOTE — NURSING
VSS on room air.  Encouraged PO fluids today. Pt remains on clear liquid diet. Abdominal dressing intact with small amt of blood tinged drainage noted.langley to gravity drain,  Midline to Rt arm saline lock. Bed in low position with call light in reach. Will continue to moniotr

## 2020-05-28 NOTE — PLAN OF CARE
Problem: Occupational Therapy Goal  Goal: Occupational Therapy Goal  Description  Goals to be met by: 6/1/2020    Patient will increase functional independence with ADLs by performing:    Feeding with Set-up Assistance.  UE Dressing with Supervision.  Grooming while seated with Supervision.  Toileting from toilet with Supervision for hygiene and clothing management.   Supine to sit with Supervision.  Toilet transfer to toilet with Contact Guard Assistance.     Outcome: Ongoing, Progressing   Pt making steady progress towards goals, POC remains appropriate.  Pt with functional decline following surgery, now requiring increased assist for all ADLS & mobility with increased confusion. Pt would benefit from increased OT frequency in acute setting as well as SNF following hospitalization to facilitate safe return to PLOF.

## 2020-05-28 NOTE — PLAN OF CARE
VSS, on RA. No complaints or s/s of pain. Cormier hanging to gravity. Pt oriented to self and place. Free of falls, bed locked in lowest position, side rails up x3.     Problem: Adult Inpatient Plan of Care  Goal: Plan of Care Review  Outcome: Ongoing, Progressing  Flowsheets (Taken 5/28/2020 0511)  Plan of Care Reviewed With: patient     Problem: Adjustment to Illness (Gastrointestinal Bleeding)  Goal: Optimal Coping with Acute Illness  Intervention: Optimize Psychosocial Response to Unexpected Illness  Flowsheets (Taken 5/28/2020 0511)  Supportive Measures: self-responsibility promoted; positive reinforcement provided; relaxation techniques promoted     Problem: Fall Injury Risk  Goal: Absence of Fall and Fall-Related Injury  Intervention: Identify and Manage Contributors to Fall Injury Risk  Flowsheets (Taken 5/28/2020 0511)  Self-Care Promotion: independence encouraged  Medication Review/Management: medications reviewed     Problem: Skin Injury Risk Increased  Goal: Skin Health and Integrity  Intervention: Optimize Skin Protection  Flowsheets (Taken 5/28/2020 0511)  Pressure Reduction Techniques: weight shift assistance provided  Skin Protection: incontinence pads utilized; tubing/devices free from skin contact  Head of Bed (HOB): HOB elevated

## 2020-05-28 NOTE — PT/OT/SLP PROGRESS
Physical Therapy Treatment    Patient Name:  Anjel Soria   MRN:  [id number]    Recommendations:     Discharge Recommendations:  nursing facility, basic   Discharge Equipment Recommendations: none   Barriers to discharge: Decreased caregiver support    Assessment:     Anjel Soria is a 74 y.o. male admitted with a medical diagnosis of Gastrointestinal hemorrhage with melena.  He presents with the following impairments/functional limitations:  weakness, impaired endurance, impaired self care skills, impaired functional mobilty, gait instability, impaired balance, visual deficits, impaired cognition, decreased coordination, decreased upper extremity function, decreased lower extremity function, decreased safety awareness, pain, impaired skin, impaired cardiopulmonary response to activity ;pt with fair mobility today, limited mostly by abd pain.    Rehab Prognosis: Good; patient would benefit from acute skilled PT services to address these deficits and reach maximum level of function.    Recent Surgery: Procedure(s) (LRB):  LAPAROTOMY, EXPLORATORY (N/A)  COLECTOMY, PARTIAL - RIGHT COLECTOMY (Right) 2 Days Post-Op    Plan:     During this hospitalization, patient to be seen 5 x/week to address the identified rehab impairments via gait training, therapeutic activities, therapeutic exercises and progress toward the following goals:    · Plan of Care Expires:  06/25/20    Subjective     Chief Complaint: pain  Patient/Family Comments/goals: pt agreeable to session, though moaning a lot with movement. Not speaking much.   Pain/Comfort:  · Pain Rating 1: (pt moaning in pain with movement, did not rate, but appeared to be ~8/10)  · Location - Side 1: Right  · Location 1: abdomen  · Pain Addressed 1: Pre-medicate for activity, Reposition, Distraction, Cessation of Activity, Nurse notified(nsg gave pain meds ~45 min. prior to session)  · Pain Rating Post-Intervention 1: (pt did not appear to be in pain at rest, fell asleep quickly  after session ended.)      Objective:     Communicated with nurs prior to session.  Patient found supine with bed alarm, peripheral IV, SCD, telemetry upon PT entry to room.     General Precautions: Standard, fall, blind, seizure   Orthopedic Precautions:N/A   Braces: N/A     Functional Mobility:  · Bed Mobility:     · Supine to Sit: maximal assistance, total assistance and of 2 persons  · Sit to Supine: maximal assistance, total assistance and of 2 persons  · Transfers:     · Sit to Stand:  moderate assistance and of 2 persons with hand-held assist  · Gait: pt took a few steps fwd/back (and side steps)with HHA/modA x 2 people (co-tx with OT)      AM-PAC 6 CLICK MOBILITY  Turning over in bed (including adjusting bedclothes, sheets and blankets)?: 2  Sitting down on and standing up from a chair with arms (e.g., wheelchair, bedside commode, etc.): 2  Moving from lying on back to sitting on the side of the bed?: 2  Moving to and from a bed to a chair (including a wheelchair)?: 2  Need to walk in hospital room?: 2  Climbing 3-5 steps with a railing?: 2  Basic Mobility Total Score: 12       Therapeutic Activities and Exercises:   OT present and working on a few ADL's.  pt falling asleep quickly after returning to supine, unable to do ex's at this time.    Patient left HOB elevated with all lines intact, call button in reach, bed alarm on, nurse notified and SCD's on and heels elevated off bed...    GOALS:   Multidisciplinary Problems     Physical Therapy Goals        Problem: Physical Therapy Goal    Goal Priority Disciplines Outcome Goal Variances Interventions   Physical Therapy Goal     PT, PT/OT Ongoing, Progressing     Description:  Goals to be met by: 6/25/2020    Patient will perform the following to increase strength, improve mobility, and return to prior level of function:    1. Supine <> sit with SBA.  2. Sit<>stand with SBA with least restrictive assistive device.  3. Gait x 150 feet with SBA with least  restrictive assistive device.                    Time Tracking:     PT Received On: 05/28/20  PT Start Time: 1154     PT Stop Time: 1213  PT Total Time (min): 19 min     Billable Minutes: Therapeutic Activity 19    Treatment Type: Treatment  PT/PTA: PTA     PTA Visit Number: 2     Nella Richards PTA  05/28/2020

## 2020-05-29 LAB
ANION GAP SERPL CALC-SCNC: 12 MMOL/L (ref 8–16)
ANISOCYTOSIS BLD QL SMEAR: ABNORMAL
BASOPHILS # BLD AUTO: 0.01 K/UL (ref 0–0.2)
BASOPHILS NFR BLD: 0.1 % (ref 0–1.9)
BUN SERPL-MCNC: 12 MG/DL (ref 8–23)
CALCIUM SERPL-MCNC: 8.4 MG/DL (ref 8.7–10.5)
CHLORIDE SERPL-SCNC: 101 MMOL/L (ref 95–110)
CO2 SERPL-SCNC: 23 MMOL/L (ref 23–29)
CREAT SERPL-MCNC: 0.9 MG/DL (ref 0.5–1.4)
DACRYOCYTES BLD QL SMEAR: ABNORMAL
DIFFERENTIAL METHOD: ABNORMAL
EOSINOPHIL # BLD AUTO: 0 K/UL (ref 0–0.5)
EOSINOPHIL NFR BLD: 0 % (ref 0–8)
ERYTHROCYTE [DISTWIDTH] IN BLOOD BY AUTOMATED COUNT: 27.4 % (ref 11.5–14.5)
EST. GFR  (AFRICAN AMERICAN): >60 ML/MIN/1.73 M^2
EST. GFR  (NON AFRICAN AMERICAN): >60 ML/MIN/1.73 M^2
FINAL PATHOLOGIC DIAGNOSIS: NORMAL
GLUCOSE SERPL-MCNC: 177 MG/DL (ref 70–110)
GROSS: NORMAL
HCT VFR BLD AUTO: 30 % (ref 40–54)
HGB BLD-MCNC: 9.2 G/DL (ref 14–18)
HYPOCHROMIA BLD QL SMEAR: ABNORMAL
IMM GRANULOCYTES # BLD AUTO: 0.03 K/UL (ref 0–0.04)
IMM GRANULOCYTES NFR BLD AUTO: 0.3 % (ref 0–0.5)
LYMPHOCYTES # BLD AUTO: 0.7 K/UL (ref 1–4.8)
LYMPHOCYTES NFR BLD: 6.5 % (ref 18–48)
MCH RBC QN AUTO: 23.8 PG (ref 27–31)
MCHC RBC AUTO-ENTMCNC: 30.7 G/DL (ref 32–36)
MCV RBC AUTO: 78 FL (ref 82–98)
MICROSCOPIC EXAM: NORMAL
MONOCYTES # BLD AUTO: 1 K/UL (ref 0.3–1)
MONOCYTES NFR BLD: 10.1 % (ref 4–15)
NEUTROPHILS # BLD AUTO: 8.5 K/UL (ref 1.8–7.7)
NEUTROPHILS NFR BLD: 83 % (ref 38–73)
NRBC BLD-RTO: 0 /100 WBC
PLATELET # BLD AUTO: 245 K/UL (ref 150–350)
PLATELET BLD QL SMEAR: ABNORMAL
PMV BLD AUTO: 9 FL (ref 9.2–12.9)
POLYCHROMASIA BLD QL SMEAR: ABNORMAL
POTASSIUM SERPL-SCNC: 3.9 MMOL/L (ref 3.5–5.1)
RBC # BLD AUTO: 3.86 M/UL (ref 4.6–6.2)
SODIUM SERPL-SCNC: 136 MMOL/L (ref 136–145)
WBC # BLD AUTO: 10.21 K/UL (ref 3.9–12.7)

## 2020-05-29 PROCEDURE — 25000003 PHARM REV CODE 250: Performed by: NURSE PRACTITIONER

## 2020-05-29 PROCEDURE — 36415 COLL VENOUS BLD VENIPUNCTURE: CPT

## 2020-05-29 PROCEDURE — 97535 SELF CARE MNGMENT TRAINING: CPT

## 2020-05-29 PROCEDURE — C9113 INJ PANTOPRAZOLE SODIUM, VIA: HCPCS | Performed by: HOSPITALIST

## 2020-05-29 PROCEDURE — 85025 COMPLETE CBC W/AUTO DIFF WBC: CPT

## 2020-05-29 PROCEDURE — 25000003 PHARM REV CODE 250: Performed by: HOSPITALIST

## 2020-05-29 PROCEDURE — 80048 BASIC METABOLIC PNL TOTAL CA: CPT

## 2020-05-29 PROCEDURE — 11000001 HC ACUTE MED/SURG PRIVATE ROOM

## 2020-05-29 PROCEDURE — 99233 PR SUBSEQUENT HOSPITAL CARE,LEVL III: ICD-10-PCS | Mod: ,,, | Performed by: HOSPITALIST

## 2020-05-29 PROCEDURE — 99900035 HC TECH TIME PER 15 MIN (STAT)

## 2020-05-29 PROCEDURE — 99233 SBSQ HOSP IP/OBS HIGH 50: CPT | Mod: ,,, | Performed by: INTERNAL MEDICINE

## 2020-05-29 PROCEDURE — 99233 SBSQ HOSP IP/OBS HIGH 50: CPT | Mod: ,,, | Performed by: HOSPITALIST

## 2020-05-29 PROCEDURE — 63600175 PHARM REV CODE 636 W HCPCS: Performed by: HOSPITALIST

## 2020-05-29 PROCEDURE — 99233 PR SUBSEQUENT HOSPITAL CARE,LEVL III: ICD-10-PCS | Mod: ,,, | Performed by: INTERNAL MEDICINE

## 2020-05-29 PROCEDURE — 63600175 PHARM REV CODE 636 W HCPCS: Performed by: SPECIALIST

## 2020-05-29 PROCEDURE — 27000221 HC OXYGEN, UP TO 24 HOURS

## 2020-05-29 PROCEDURE — 63600175 PHARM REV CODE 636 W HCPCS: Performed by: INTERNAL MEDICINE

## 2020-05-29 PROCEDURE — 97110 THERAPEUTIC EXERCISES: CPT | Mod: CQ

## 2020-05-29 PROCEDURE — 25000003 PHARM REV CODE 250: Performed by: INTERNAL MEDICINE

## 2020-05-29 PROCEDURE — 94761 N-INVAS EAR/PLS OXIMETRY MLT: CPT

## 2020-05-29 RX ORDER — TRAZODONE HYDROCHLORIDE 50 MG/1
50 TABLET ORAL NIGHTLY
Status: DISCONTINUED | OUTPATIENT
Start: 2020-05-29 | End: 2020-06-02

## 2020-05-29 RX ORDER — HYDROMORPHONE HYDROCHLORIDE 1 MG/ML
0.5 INJECTION, SOLUTION INTRAMUSCULAR; INTRAVENOUS; SUBCUTANEOUS EVERY 6 HOURS PRN
Status: DISCONTINUED | OUTPATIENT
Start: 2020-05-29 | End: 2020-06-02

## 2020-05-29 RX ADMIN — TRAZODONE HYDROCHLORIDE 50 MG: 50 TABLET ORAL at 08:05

## 2020-05-29 RX ADMIN — DIVALPROEX SODIUM 500 MG: 250 TABLET, DELAYED RELEASE ORAL at 08:05

## 2020-05-29 RX ADMIN — PHENYTOIN SODIUM 100 MG: 100 CAPSULE ORAL at 08:05

## 2020-05-29 RX ADMIN — HYDROMORPHONE HYDROCHLORIDE 0.5 MG: 1 INJECTION, SOLUTION INTRAMUSCULAR; INTRAVENOUS; SUBCUTANEOUS at 11:05

## 2020-05-29 RX ADMIN — ONDANSETRON 4 MG: 2 INJECTION INTRAMUSCULAR; INTRAVENOUS at 08:05

## 2020-05-29 RX ADMIN — PANTOPRAZOLE SODIUM 40 MG: 40 INJECTION, POWDER, LYOPHILIZED, FOR SOLUTION INTRAVENOUS at 09:05

## 2020-05-29 RX ADMIN — DORZOLAMIDE HYDROCHLORIDE 1 DROP: 20 SOLUTION/ DROPS OPHTHALMIC at 09:05

## 2020-05-29 RX ADMIN — FLUOXETINE 20 MG: 20 CAPSULE ORAL at 09:05

## 2020-05-29 RX ADMIN — TRAVOPROST 1 DROP: 0.04 SOLUTION/ DROPS OPHTHALMIC at 09:05

## 2020-05-29 RX ADMIN — DONEPEZIL HYDROCHLORIDE 10 MG: 5 TABLET, FILM COATED ORAL at 09:05

## 2020-05-29 RX ADMIN — PHENYTOIN SODIUM 100 MG: 100 CAPSULE ORAL at 09:05

## 2020-05-29 RX ADMIN — TAMSULOSIN HYDROCHLORIDE 0.4 MG: 0.4 CAPSULE ORAL at 09:05

## 2020-05-29 RX ADMIN — ATORVASTATIN CALCIUM 40 MG: 20 TABLET, FILM COATED ORAL at 08:05

## 2020-05-29 NOTE — PT/OT/SLP PROGRESS
Occupational Therapy   Treatment    Name: Anjel Soria  MRN: [id number]  Admitting Diagnosis:  Gastrointestinal hemorrhage with melena  3 Days Post-Op    Recommendations:     Discharge Recommendations: nursing facility, skilled  Discharge Equipment Recommendations:  (NH to provide any needed equipment.)  Barriers to discharge:  None    Assessment:     Anjel Soria is a 74 y.o. male with a medical diagnosis of Gastrointestinal hemorrhage with melena.  He presents agreeable to OT tx session.  Pt verbalizing abdominal pain with movement/transitions.  Pt needing Max A overall with self care tasks.   Pt has impaired cognition Performance deficits affecting function are weakness, impaired endurance, impaired self care skills, impaired balance, gait instability, impaired functional mobilty, impaired cognition, pain, decreased lower extremity function, decreased upper extremity function, impaired skin, impaired fine motor, decreased safety awareness, impaired coordination, impaired cardiopulmonary response to activity, visual deficits.  Recommend SNF with OT and PT so pt returns to PLOF.    Rehab Prognosis:  Good to return to PLOF; patient would benefit from acute skilled OT services to address these deficits and reach maximum level of function.       Plan:     Patient to be seen 5 x/week to address the above listed problems via self-care/home management, therapeutic activities, therapeutic exercises  · Plan of Care Expires: 06/23/20  · Plan of Care Reviewed with: patient    Subjective     Pain/Comfort:  Pain Rating 1: (Pt c/o pain but unable to rate.  Nurse notified and pt able to participate in therapy session. )  Location 1: abdomen  Pain Addressed 1: Pre-medicate for activity, Reposition, Distraction, Cessation of Activity  Pain Rating Post-Intervention 1: (No c/o pain at end of tx session.)    Objective:     Communicated with: martha prior to session.  Patient found HOB elevated with bed alarm, peripheral IV, SCD,  telemetry upon OT entry to room.    General Precautions: Standard, fall, seizure, blind   Orthopedic Precautions:N/A   Braces: N/A     Occupational Performance:     Bed Mobility:    · Patient completed Rolling/Turning to Left with  minimum assistance  · Patient completed Rolling/Turning to Right with minimum assistance  · Patient completed Scooting/Bridging with moderate assistance with FOB elevated and OT having to place pt's hands on the head board and place pt's feet on the bed to be able to push himself up in the bed  · Patient completed Supine to Sit with moderate assistance  · Patient completed Sit to Supine with moderate assistance     Functional Mobility/Transfers:  · Patient completed Sit <> Stand Transfer with moderate assistance  with  hand-held assist   · Functional Mobility: Pt with improved sitting balance sitting EOB but fatigues easily and has midline abdominal incision with staples.  Pt able to tolerate sitting EOB for about 5 minutes before needs more support.  Attempted to get pt to bedside chair but no chair alarm pads available in supply closet.    Activities of Daily Living:  · Grooming: Mod A for washing face; Max A for brushing teeth  · Bathing: Max A for sponge bathing  · Upper Body Dressing: Max A  · Lower Body Dressing: Max A/Total A  · Toileting: Cormier cathter      [facility] 6 Click ADL: 12    Treatment & Education:  Role of OT, POC, participation in ADLs and ADL mobility    Patient left HOB elevated with all lines intact, call button in reach, bed alarm on and nurse notifiedEducation:  .    GOALS:   Multidisciplinary Problems     Occupational Therapy Goals        Problem: Occupational Therapy Goal    Goal Priority Disciplines Outcome Interventions   Occupational Therapy Goal     OT, PT/OT Ongoing, Progressing    Description:  Goals to be met by: 6/1/2020    Patient will increase functional independence with ADLs by performing:    Feeding with Set-up Assistance.  UE Dressing with  Supervision.  Grooming while seated with Supervision.  Toileting from toilet with Supervision for hygiene and clothing management.   Supine to sit with Supervision.  Toilet transfer to toilet with Contact Guard Assistance.                      Time Tracking:     OT Date of Treatment: 05/29/20  OT Start Time: 1031  OT Stop Time: 1110  OT Total Time (min): 39 min     Overlap with PT for portions of session due to complex nature of pt and need for increased safety.  Two skilled therapists needed during functional mobility to decrease patient fall risk and decrease risk of caregiver injury.      Billable Minutes:Self Care/Home Management 39    KRISTA Hernandez  5/29/2020

## 2020-05-29 NOTE — ASSESSMENT & PLAN NOTE
-History of sinus jessica noted.  -Did have bradycardia at times early in his stay and home beta-blocker held briefly.  -At home takes metoprolol 12.5mg bid.  -Continue this but with hold parameters for hr less than 60.  No bradycardia noted in last 24 hours.  -Monitor on telemetry

## 2020-05-29 NOTE — PROGRESS NOTES
[facility] Hospital Medicine  Progress Note    Patient Name: Anjel Soria  MRN: [id number]  Patient Class: IP- Inpatient   Admission Date: 5/21/2020  Length of Stay: 7 days  Attending Physician: Von Moses MD  Primary Care Provider: [facility]        Subjective:     Principal Problem:Gastrointestinal hemorrhage with melena        HPI:  Anjel Soria is a 74 year old male who has medical history of anemia related to lower GI bleeding, coronary artery disease, vascular dementia, prior stroke with residual left sided weakness, 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  He is currently a resident of the [facility].   Per medical record, the patient with recent prior history of severe anemia requiring transfusion on April 9, 2020.  During that time, he was evaluated  At [facility] and found to be hemoccult positive and CT scan of abdomen was unremarkable.  He was subsequently discharged back to his nursing home with plan for outpatient follow up with GI.  However, the patient was unable to see GI as outpatient.   Today, patient presented to the [facility] ED where initial work up revealed stable hemoglobin/hematocrit 12.0/40.4. Otherwise, labs were unremarkable and COVID screen was negative. Of note, EKG in ED today shows sinus bradycardia and asymtpomatic with heart rate in upper 40's and low 50's.   He was to be discharged back to the nursing home, but patient had episode of melena and decision to transfer to [facility] for evaluation.     On arrival to the [facility], the patient had one small melanotic stool.  Given dementia, the patient is unable to provide detailed history.  Per nursing home medication reconciliation, the patient has not been on aspirin or Plavix recently and is not currently on any other type of blood thinners.  GI will be consulted and roge continue to trend  hemoglobin/hematocrit.     Overview/Hospital Course:  After admission, Gastroenterology consulted and EGD without evidence for bleeding.  Colonoscopy 5/25/2020 noted fungating mass ascending colon and diverticulosis in the sigmoid colon and in the descending colon. Pathology pending.   No further episodes of hematochezia/melena and stable hemoglobin/hematocrit.  General Surgery consulted for evaluation.     Interval History: No acute events overnight.  Tolerating clears but barely eating any of them.  States he is passing gas but no bowel movements.  No nausea or vomiting.  Abd remains mildly distended with minimal tenderness.  No cp or sob.    Review of Systems   Reason unable to perform ROS: can provide some history.   Constitutional: Negative for fever.   HENT: Negative for congestion, sinus pressure and sneezing.    Eyes: Positive for visual disturbance (glaucoma/macular degeneration and can see shadows).   Respiratory: Negative for shortness of breath.    Cardiovascular: Negative for chest pain.   Gastrointestinal: Positive for abdominal distention. Negative for abdominal pain, blood in stool (no episodes overnight), diarrhea, nausea and vomiting.   Endocrine: Negative for cold intolerance and heat intolerance.   Genitourinary: Negative for difficulty urinating and dysuria.   Musculoskeletal: Negative for arthralgias and back pain.   Neurological: Negative for headaches.   Psychiatric/Behavioral: Negative for agitation and behavioral problems.     Objective:     Vital Signs (Most Recent):  Temp: 99.2 °F (37.3 °C) (05/29/20 0746)  Pulse: 85 (05/29/20 1000)  Resp: 20 (05/29/20 0746)  BP: (!) 145/74 (05/29/20 0746)  SpO2: 97 % (05/29/20 0746) Vital Signs (24h Range):  Temp:  [97.7 °F (36.5 °C)-99.4 °F (37.4 °C)] 99.2 °F (37.3 °C)  Pulse:  [78-93] 85  Resp:  [16-20] 20  SpO2:  [91 %-97 %] 97 %  BP: (124-145)/(68-77) 145/74     Weight: 88.7 kg (195 lb 8.8 oz)  Body mass index is 25.8 kg/m².    Intake/Output Summary  (Last 24 hours) at 5/29/2020 1133  Last data filed at 5/29/2020 0500  Gross per 24 hour   Intake 840 ml   Output 500 ml   Net 340 ml      Physical Exam   Constitutional: He appears well-developed and well-nourished. No distress.   HENT:   Head: Normocephalic and atraumatic.   Eyes: Conjunctivae are normal.   Blindness bilaterally and can see shapes with peripheral vision   Neck: Neck supple.   Cardiovascular: Normal rate and regular rhythm.   No murmur heard.  Pulmonary/Chest: Effort normal. No respiratory distress. He has no decreased breath sounds. He has no wheezes.   Abdominal: He exhibits distension. Bowel sounds are increased. There is no tenderness. There is no rigidity.   Mildly distended, minimally ttp, bowel sounds are diminished but present, incision is open and is c/d/i with small amount of serous fluid in umbilicus.   Musculoskeletal: Normal range of motion. He exhibits no edema.   Neurological: He is alert. He has normal strength and normal reflexes. He is disoriented.   Oriented to self, knows he's in a hospital, slightly slurred speech, moves all extremities   Skin: Skin is warm and dry.   Psychiatric: He has a normal mood and affect. His speech is normal and behavior is normal. Cognition and memory are impaired.     Significant Labs: All pertinent labs within the past 24 hours have been reviewed.    Significant Imaging: I have reviewed all pertinent imaging studies within the last 24 hours.      Assessment/Plan:      * Gastrointestinal hemorrhage with melena  -Mr. Soria was admitted to inpatient status  -GI consulted and performed EGD 5/22/2020 without evidence for source of bleeding.  Colonoscopy performed on 5/25/2020 which showed a fungating mass of ascending colon and diverticulosis.  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  -H/H trending down but  remains above transfusion threshold.  Continue to monitor and transfuse for Hb less than 7.  -Bowel sounds present but still diminished.  Remains mildly distended today.  Minimal TTP.  States he is passing flatus but no BM.  No N/V.  -Nurse states he doesn't like clears, but tolerates them when he takes them.  Will try a full liquid diet with close monitoring.  -Appreciate assistance from Dr. Jo    Malignant neoplasm of ascending colon  -Treatment as above.  -No gross metastatic lesions or enlarged lymph nodes observed during surgery on 5/26.  -Await final path    Acute blood loss anemia  -Believed to be due to colon cancer as above.  -No known episodes of melena or hematochezia since 5/22/2020    -On admit Hb 10.6.  It trended down but has improved today and remains above transfusion threshold.  -Follow H/H daily and transfuse Hgb < 7    Coronary artery disease involving native coronary artery of native heart without angina pectoris  -History noted  -Previously on aspirin and plavix, but per NH record was not on these  -Continue home statin  -Continue home metoprolol with hold parameters for bradycardia.    Obstructive cardiomyopathy  -History noted per chart  -Echo 5/26 showed EF 65%, grade I diastolic dysfunction and no wall motion abnormalities  -Appears well compensated    Bradycardia  -History of sinus jessica noted.  -Did have bradycardia at times early in his stay and home beta-blocker held briefly.  -At home takes metoprolol 12.5mg bid.  -Continue this but with hold parameters for hr less than 60.  No bradycardia noted in last 24 hours.  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  Per NH records, levels drawn q6 months and WNL  -Dilantin and Depakote levels non-toxic  -Continue home medications  -Continue seizure precautions    Subcortical vascular dementia without behavioral disturbance  -Appears stable and moderate  -Continue home dose of Aricept 10 mg daily  -Continue delirium precautions  -Palliative Care consult for symptom management and NP at home program referral.  Patient is full code.    Bipolar affective disorder in remission  -History noted  -Mood appears stable  -Continue Depakote 500 mg daily, Prozac 20 mg daily.  -A bit sleepier today so will decrease trazodone to 50 mg nightly    Hiatal hernia  -Noted on EGD 5/22/2020    Gastroesophageal reflux disease  -History noted  -S/p EGD this stay with normal stomach, duodenum and noted LE ring  -Continue PPI daily    BPH (benign prostatic hyperplasia)  -No noted symptoms  -Continue home dose of Flomax    End-stage glaucoma  -History noted.  He is legally blind  -Provide assistance as needed  -Continue home medications      VTE Risk Mitigation (From admission, onward)         Ordered     Reason for No Pharmacological VTE Prophylaxis  Once     Question:  Reasons:  Answer:  Active Bleeding    05/21/20 1613     IP VTE HIGH RISK PATIENT  Once      05/21/20 1613     Place sequential compression device  Until discontinued      05/21/20 1613     Place NICOLE hose  Until discontinued      05/21/20 1601                      Von Moses MD  Department of Hospital Medicine   [facility]

## 2020-05-29 NOTE — PROGRESS NOTES
Low grade temp  vss  Minimal oral intake  Abd--protuberant, slight clear drainage from wound, few bs     Wbc--wnl

## 2020-05-29 NOTE — PT/OT/SLP PROGRESS
Physical Therapy Treatment    Patient Name:  Anjel Soria   MRN:  [id number]    Recommendations:     Discharge Recommendations:  nursing facility, basic   Discharge Equipment Recommendations: none   Barriers to discharge: Decreased caregiver support and current functional level    Assessment:     Anjel Soria is a 74 y.o. male admitted with a medical diagnosis of Gastrointestinal hemorrhage with melena.  He presents with the following impairments/functional limitations:  weakness, impaired functional mobilty, impaired self care skills, decreased lower extremity function, impaired endurance, gait instability, impaired muscle length, impaired skin, impaired balance .    Supine to sit modA at trunk for lifting assist. Pt performed LAQ, HF & AP x10 B LE while seated EOB with supervision- pt with posterior lean toward end of TE due to fatigue.  Sit<>stand modA with B HHA for lifting. Pt took 5 side steps to HOB with modA for w/s.  Pt with improved functional mobility during today's session.    Rehab Prognosis: Good; patient would benefit from acute skilled PT services to address these deficits and reach maximum level of function.    Recent Surgery: Procedure(s) (LRB):  LAPAROTOMY, EXPLORATORY (N/A)  COLECTOMY, PARTIAL - RIGHT COLECTOMY (Right) 3 Days Post-Op    Plan:     During this hospitalization, patient to be seen 5 x/week to address the identified rehab impairments via gait training, therapeutic activities, therapeutic exercises and progress toward the following goals:    · Plan of Care Expires:  06/25/20    Subjective     Chief Complaint: slight abdominal pain  Patient/Family Comments/goals: pt agreeable to co treat session  Pain/Comfort:  · Pain Rating 1: 4/10  · Location 1: abdomen  · Pain Addressed 1: Reposition, Distraction, Cessation of Activity  · Pain Rating Post-Intervention 1: 4/10      Objective:     Communicated with nurse Ryan prior to session.  Patient found supine with bed alarm, peripheral IV, SCD,  telemetry upon PT entry to room.     General Precautions: Standard, fall, blind, seizure   Orthopedic Precautions:N/A   Braces:       Functional Mobility:  Bed mobility- Supine to sit modA at trunk for lifting assist.   Transfers-Sit<>stand modA with B HHA for lifting.   Gait- Pt took 5 side steps to HOB with modA for w/s.      AM-PAC 6 CLICK MOBILITY  Turning over in bed (including adjusting bedclothes, sheets and blankets)?: 3  Sitting down on and standing up from a chair with arms (e.g., wheelchair, bedside commode, etc.): 3  Moving from lying on back to sitting on the side of the bed?: 3  Moving to and from a bed to a chair (including a wheelchair)?: 3  Need to walk in hospital room?: 2  Climbing 3-5 steps with a railing?: 2  Basic Mobility Total Score: 16       Therapeutic Activities and Exercises:  Pt performed LAQ, HF & AP x10 B LE while seated EOB with supervision- pt with posterior lean toward end of TE due to fatigue.    Patient left supine with all lines intact, call button in reach and OT Rosa present.    GOALS:   Multidisciplinary Problems     Physical Therapy Goals        Problem: Physical Therapy Goal    Goal Priority Disciplines Outcome Goal Variances Interventions   Physical Therapy Goal     PT, PT/OT Ongoing, Progressing     Description:  Goals to be met by: 6/25/2020    Patient will perform the following to increase strength, improve mobility, and return to prior level of function:    1. Supine <> sit with SBA.  2. Sit<>stand with SBA with least restrictive assistive device.  3. Gait x 150 feet with SBA with least restrictive assistive device.                    Time Tracking:     PT Received On: 05/29/20  PT Start Time: 1030     PT Stop Time: 1048  PT Total Time (min): 18 min     Billable Minutes: Therapeutic Exercise 18    Treatment Type: Treatment  PT/PTA: PTA     PTA Visit Number: 3     Crystal Zacarias PTA  05/29/2020

## 2020-05-29 NOTE — ASSESSMENT & PLAN NOTE
-Believed to be due to colon cancer as above.  -No known episodes of melena or hematochezia since 5/22/2020    -On admit Hb 10.6.  It trended down but has improved today and remains above transfusion threshold.  -Follow H/H daily and transfuse Hgb < 7

## 2020-05-29 NOTE — ASSESSMENT & PLAN NOTE
-Treatment as above.  -No gross metastatic lesions or enlarged lymph nodes observed during surgery on 5/26.  -Await final path

## 2020-05-29 NOTE — ASSESSMENT & PLAN NOTE
-History noted  -Mood appears stable  -Continue Depakote 500 mg daily, Prozac 20 mg daily.  -A bit sleepier today so will decrease trazodone to 50 mg nightly

## 2020-05-29 NOTE — SUBJECTIVE & OBJECTIVE
Interval History: No acute events overnight.  Tolerating clears but barely eating any of them.  States he is passing gas but no bowel movements.  No nausea or vomiting.  Abd remains mildly distended with minimal tenderness.  No cp or sob.    Review of Systems   Reason unable to perform ROS: can provide some history.   Constitutional: Negative for fever.   HENT: Negative for congestion, sinus pressure and sneezing.    Eyes: Positive for visual disturbance (glaucoma/macular degeneration and can see shadows).   Respiratory: Negative for shortness of breath.    Cardiovascular: Negative for chest pain.   Gastrointestinal: Positive for abdominal distention. Negative for abdominal pain, blood in stool (no episodes overnight), diarrhea, nausea and vomiting.   Endocrine: Negative for cold intolerance and heat intolerance.   Genitourinary: Negative for difficulty urinating and dysuria.   Musculoskeletal: Negative for arthralgias and back pain.   Neurological: Negative for headaches.   Psychiatric/Behavioral: Negative for agitation and behavioral problems.     Objective:     Vital Signs (Most Recent):  Temp: 99.2 °F (37.3 °C) (05/29/20 0746)  Pulse: 85 (05/29/20 1000)  Resp: 20 (05/29/20 0746)  BP: (!) 145/74 (05/29/20 0746)  SpO2: 97 % (05/29/20 0746) Vital Signs (24h Range):  Temp:  [97.7 °F (36.5 °C)-99.4 °F (37.4 °C)] 99.2 °F (37.3 °C)  Pulse:  [78-93] 85  Resp:  [16-20] 20  SpO2:  [91 %-97 %] 97 %  BP: (124-145)/(68-77) 145/74     Weight: 88.7 kg (195 lb 8.8 oz)  Body mass index is 25.8 kg/m².    Intake/Output Summary (Last 24 hours) at 5/29/2020 1133  Last data filed at 5/29/2020 0500  Gross per 24 hour   Intake 840 ml   Output 500 ml   Net 340 ml      Physical Exam   Constitutional: He appears well-developed and well-nourished. No distress.   HENT:   Head: Normocephalic and atraumatic.   Eyes: Conjunctivae are normal.   Blindness bilaterally and can see shapes with peripheral vision   Neck: Neck supple.    Cardiovascular: Normal rate and regular rhythm.   No murmur heard.  Pulmonary/Chest: Effort normal. No respiratory distress. He has no decreased breath sounds. He has no wheezes.   Abdominal: He exhibits distension. Bowel sounds are increased. There is no tenderness. There is no rigidity.   Mildly distended, minimally ttp, bowel sounds are diminished but present, incision is open and is c/d/i with small amount of serous fluid in umbilicus.   Musculoskeletal: Normal range of motion. He exhibits no edema.   Neurological: He is alert. He has normal strength and normal reflexes. He is disoriented.   Oriented to self, knows he's in a hospital, slightly slurred speech, moves all extremities   Skin: Skin is warm and dry.   Psychiatric: He has a normal mood and affect. His speech is normal and behavior is normal. Cognition and memory are impaired.     Significant Labs: All pertinent labs within the past 24 hours have been reviewed.    Significant Imaging: I have reviewed all pertinent imaging studies within the last 24 hours.

## 2020-05-29 NOTE — PROGRESS NOTES
[facility]  Cardiology  Progress Note    Patient Name: Anjel Soria  MRN: [id number]  Admission Date: 5/21/2020  Hospital Length of Stay: 7 days  Code Status: Full Code   Attending Physician: Von Moses MD   Primary Care Physician: [facility]  Expected Discharge Date:   Principal Problem:Gastrointestinal hemorrhage with melena    Subjective:     Brief HPI:    Anjel Soria is a 74 y.o.male with hypertension and hypercholesterolemia. He has had a cerebrovascular accident in the past making him weak in the left side. He has vascular dementia. He stays at a Nursing Home in [place], [place]. According to the patient he is wheelchair bound due to that his legs are too weak to be able to ambulate. In 2018 he was seen at [facility] due to chest pain. He had an elevation in troponins. He appears to have been transferred to [facility] for further cardiac evaluation but am not able to find any records from the stay at [facility].     He presented to [place] on 5/21/2020 with melena. He was transferred to [facility] for evaluation. He underwent colonoscopy on 5/25/2020 revealing a cecal mass. The plan is partial colectomy on 5/26/2020. He has been noted to be bradycardic during the hospital stay with a heart rate in the 50's during the night 5/25/2020 - 5/26/2020. He has been receiving metoprolol 12.5 mg Q12. The patient denies any chest pain or shortness of breath. He is comfortable supine. I am asked to see him for his bradycardia and assess need for possible further cardiac evaluation prior to surgery.    Hospital Course:     5/26/2020: Partial colectomy.    Interval History:    Doing well after surgery. He denies CP or SOB. Sleepy when I see him.    Review of Systems   Constitution: Positive for malaise/fatigue. Negative for chills and fever.   HENT: Negative for nosebleeds.    Eyes: Negative for vision loss in left eye and vision loss in right eye.    Cardiovascular: Negative for chest pain, leg swelling, orthopnea, palpitations and paroxysmal nocturnal dyspnea.   Respiratory: Negative for cough, hemoptysis, shortness of breath, sputum production and wheezing.    Hematologic/Lymphatic: Negative for bleeding problem.   Skin: Negative for rash.   Musculoskeletal: Negative for myalgias.   Gastrointestinal: Positive for abdominal pain. Negative for nausea and vomiting.   Genitourinary: Negative for hematuria.   Neurological: Positive for focal weakness (left side) and weakness. Negative for dizziness, headaches, light-headedness and vertigo.   Psychiatric/Behavioral: Positive for memory loss. Negative for altered mental status. The patient is not nervous/anxious.    Allergic/Immunologic: Negative for persistent infections.     Objective:     Vital Signs (Most Recent):  Temp: 98.3 °F (36.8 °C) (05/29/20 1153)  Pulse: 83 (05/29/20 1200)  Resp: 20 (05/29/20 0746)  BP: 131/70 (05/29/20 1153)  SpO2: 95 % (05/29/20 1153) Vital Signs (24h Range):  Temp:  [97.7 °F (36.5 °C)-99.4 °F (37.4 °C)] 98.3 °F (36.8 °C)  Pulse:  [78-93] 83  Resp:  [16-20] 20  SpO2:  [91 %-97 %] 95 %  BP: (124-145)/(68-77) 131/70     Weight: 88.7 kg (195 lb 8.8 oz)  Body mass index is 25.8 kg/m².    SpO2: 95 %  O2 Device (Oxygen Therapy): nasal cannula      Intake/Output Summary (Last 24 hours) at 5/29/2020 1332  Last data filed at 5/29/2020 0500  Gross per 24 hour   Intake 480 ml   Output 500 ml   Net -20 ml       Lines/Drains/Airways     Drain                 Urethral Catheter 05/26/20 1120 Non-latex 16 Fr. 3 days          Peripheral Intravenous Line                 Midline Catheter Insertion/Assessment  - Single Lumen 05/25/20 0940 Left basilic vein (medial side of arm) 22g x 8cm 4 days                Physical Exam   Constitutional: He appears well-developed and well-nourished.   Cardiovascular: Normal rate, regular rhythm, S1 normal and S2 normal. Exam reveals gallop and S4.   Pulmonary/Chest:  Effort normal and breath sounds normal.   Abdominal: Normal appearance. He exhibits no distension. There is tenderness.   Musculoskeletal:        Right ankle: He exhibits no swelling.        Left ankle: He exhibits no swelling.   Neurological: He is disoriented.   Psychiatric: Cognition and memory are impaired.       Current Medications:     atorvastatin  40 mg Oral QHS    divalproex  500 mg Oral Nightly    donepeziL  10 mg Oral Daily    dorzolamide  1 drop Both Eyes BID    FLUoxetine  20 mg Oral Daily    pantoprazole  40 mg Intravenous Daily    phenytoin  100 mg Oral BID    tamsulosin  1 capsule Oral Daily    travoprost  1 drop Both Eyes QHS    traZODone  50 mg Oral QHS     Current Laboratory Results:    Recent Results (from the past 24 hour(s))   CBC auto differential    Collection Time: 05/29/20  4:41 AM   Result Value Ref Range    WBC 10.21 3.90 - 12.70 K/uL    RBC 3.86 (L) 4.60 - 6.20 M/uL    Hemoglobin 9.2 (L) 14.0 - 18.0 g/dL    Hematocrit 30.0 (L) 40.0 - 54.0 %    Mean Corpuscular Volume 78 (L) 82 - 98 fL    Mean Corpuscular Hemoglobin 23.8 (L) 27.0 - 31.0 pg    Mean Corpuscular Hemoglobin Conc 30.7 (L) 32.0 - 36.0 g/dL    RDW 27.4 (H) 11.5 - 14.5 %    Platelets 245 150 - 350 K/uL    MPV 9.0 (L) 9.2 - 12.9 fL    Immature Granulocytes 0.3 0.0 - 0.5 %    Gran # (ANC) 8.5 (H) 1.8 - 7.7 K/uL    Immature Grans (Abs) 0.03 0.00 - 0.04 K/uL    Lymph # 0.7 (L) 1.0 - 4.8 K/uL    Mono # 1.0 0.3 - 1.0 K/uL    Eos # 0.0 0.0 - 0.5 K/uL    Baso # 0.01 0.00 - 0.20 K/uL    nRBC 0 0 /100 WBC    Gran% 83.0 (H) 38.0 - 73.0 %    Lymph% 6.5 (L) 18.0 - 48.0 %    Mono% 10.1 4.0 - 15.0 %    Eosinophil% 0.0 0.0 - 8.0 %    Basophil% 0.1 0.0 - 1.9 %    Platelet Estimate Appears normal     Aniso Moderate     Poly Occasional     Hypo Occasional     Tear Drop Cells Occasional     Differential Method Automated    Basic metabolic panel    Collection Time: 05/29/20  4:41 AM   Result Value Ref Range    Sodium 136 136 - 145 mmol/L     Potassium 3.9 3.5 - 5.1 mmol/L    Chloride 101 95 - 110 mmol/L    CO2 23 23 - 29 mmol/L    Glucose 177 (H) 70 - 110 mg/dL    BUN, Bld 12 8 - 23 mg/dL    Creatinine 0.9 0.5 - 1.4 mg/dL    Calcium 8.4 (L) 8.7 - 10.5 mg/dL    Anion Gap 12 8 - 16 mmol/L    eGFR if African American >60 >60 mL/min/1.73 m^2    eGFR if non African American >60 >60 mL/min/1.73 m^2     Current Imaging Results:    X-Ray Chest 1 View   Final Result      As above         Electronically signed by: Magy Malave MD   Date:    05/22/2020   Time:    09:57        5/26/2020: Echo:    Normal left ventricular systolic function. The estimated ejection fraction is 65%.  No wall motion abnormalities.  Grade I (mild) left ventricular diastolic dysfunction consistent with impaired relaxation.  Normal right ventricular systolic function.  Mild aortic valve sclerosis.  The estimated PA systolic pressure is 9 mmHg.  Normal central venous pressure (3 mmHg).      Assessment and Plan:     Problem List:    Active Diagnoses:    Diagnosis Date Noted POA    PRINCIPAL PROBLEM:  Gastrointestinal hemorrhage with melena [K92.1] 05/21/2020 Yes    Malignant neoplasm of ascending colon [C18.2] 05/27/2020 Yes    Hiatal hernia [K44.9] 05/22/2020 Yes    Essential hypertension [I10] 05/21/2020 Yes    Gastroesophageal reflux disease [K21.9] 05/21/2020 Yes    Bradycardia [R00.1] 05/21/2020 Yes    Obstructive cardiomyopathy [I42.8] 05/21/2020 Yes    Bipolar affective disorder in remission [F31.70] 05/21/2020 Yes    Acute blood loss anemia [D62] 05/21/2020 Yes    BPH (benign prostatic hyperplasia) [N40.0] 05/09/2016 Yes    Coronary artery disease involving native coronary artery of native heart without angina pectoris [I25.10] 05/09/2016 Yes    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 [I69.398, G40.909] 05/09/2016 Not Applicable    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 Coronary Artery Disease              2018: Appears to have had NSTEMI. Evaluation at [facility]. Unable to obtain information.   5/26/2020: Echo: Normal left ventricular size and systolic function. EF 65%. Mild aortic valve sclerosis.              According to NH log appears to have been on aspirin and clopidogrel until recently.              Appears stable.     2. Sinus Bradycardia              HR in 50's.              On metoprolol 12.5 mg Q12.              5/26/2020: Metoprolol was discontinued.   Heart rate in 60-70 bpm range.     3. History of Cerebrovascular Accident              History of CVA causing weakness in left side.     4. Dementia              According to chart having vascular dementia.     5. Hypertension              Currently not on any antihypertensives.     6. Hypercholesterolemia              On atorvastatin 40 mg Q24.     7. Gastrointestinal Bleeding               5/21/2020: Presented with melena.              5/25/2020: Colonoscopy: Cecal mass.               5/26/2020:  Partial colectomy.   Dr. Von Jo.     VTE Risk Mitigation (From admission, onward)         Ordered     Reason for No Pharmacological VTE Prophylaxis  Once     Question:  Reasons:  Answer:  Active Bleeding    05/21/20 1613     IP VTE HIGH RISK PATIENT  Once      05/21/20 1613     Place sequential compression device  Until discontinued      05/21/20 1613     Place NICOLE hose  Until discontinued      05/21/20 1601                Richard Brown MD  Cardiology  [facility]

## 2020-05-29 NOTE — ASSESSMENT & PLAN NOTE
-Mr. Soria was admitted to inpatient status  -GI consulted and performed EGD 5/22/2020 without evidence for source of bleeding.  Colonoscopy performed on 5/25/2020 which showed a fungating mass of ascending colon and diverticulosis.  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  -H/H trending down but remains above transfusion threshold.  Continue to monitor and transfuse for Hb less than 7.  -Bowel sounds present but still diminished.  Remains mildly distended today.  Minimal TTP.  States he is passing flatus but no BM.  No N/V.  -Nurse states he doesn't like clears, but tolerates them when he takes them.  Will try a full liquid diet with close monitoring.  -Appreciate assistance from Dr. Jo

## 2020-05-29 NOTE — PLAN OF CARE
Problem: Physical Therapy Goal  Goal: Physical Therapy Goal  Description  Goals to be met by: 6/25/2020    Patient will perform the following to increase strength, improve mobility, and return to prior level of function:    1. Supine <> sit with SBA.  2. Sit<>stand with SBA with least restrictive assistive device.  3. Gait x 150 feet with SBA with least restrictive assistive device.   Outcome: Ongoing, Progressing   Supine to sit modA at trunk for lifting assist. Pt performed LAQ, HF & AP x10 B LE while seated EOB with supervision- pt with posterior lean toward end of TE due to fatigue.  Sit<>stand modA with B HHA for lifting. Pt took 5 side steps to HOB with modA for w/s.  Pt with improved functional mobility during today's session.

## 2020-05-29 NOTE — PLAN OF CARE
Problem: Occupational Therapy Goal  Goal: Occupational Therapy Goal  Description  Goals to be met by: 6/1/2020    Patient will increase functional independence with ADLs by performing:    Feeding with Set-up Assistance.  UE Dressing with Supervision.  Grooming while seated with Supervision.  Toileting from toilet with Supervision for hygiene and clothing management.   Supine to sit with Supervision.  Toilet transfer to toilet with Contact Guard Assistance.     Outcome: Ongoing, Progressing   Progressing towards goals.  Pt to benefit from continued acute care OT services to increase independence in self-care/functional transfers.  Continue POC.   Recommend SNF with OT and PT.

## 2020-05-30 LAB
ANION GAP SERPL CALC-SCNC: 13 MMOL/L (ref 8–16)
ANISOCYTOSIS BLD QL SMEAR: ABNORMAL
BASOPHILS # BLD AUTO: 0.02 K/UL (ref 0–0.2)
BASOPHILS NFR BLD: 0.2 % (ref 0–1.9)
BUN SERPL-MCNC: 19 MG/DL (ref 8–23)
CALCIUM SERPL-MCNC: 8.5 MG/DL (ref 8.7–10.5)
CHLORIDE SERPL-SCNC: 100 MMOL/L (ref 95–110)
CO2 SERPL-SCNC: 25 MMOL/L (ref 23–29)
CREAT SERPL-MCNC: 1 MG/DL (ref 0.5–1.4)
DACRYOCYTES BLD QL SMEAR: ABNORMAL
DIFFERENTIAL METHOD: ABNORMAL
EOSINOPHIL # BLD AUTO: 0.1 K/UL (ref 0–0.5)
EOSINOPHIL NFR BLD: 0.7 % (ref 0–8)
ERYTHROCYTE [DISTWIDTH] IN BLOOD BY AUTOMATED COUNT: 26.7 % (ref 11.5–14.5)
EST. GFR  (AFRICAN AMERICAN): >60 ML/MIN/1.73 M^2
EST. GFR  (NON AFRICAN AMERICAN): >60 ML/MIN/1.73 M^2
GLUCOSE SERPL-MCNC: 116 MG/DL (ref 70–110)
HCT VFR BLD AUTO: 28 % (ref 40–54)
HGB BLD-MCNC: 8.4 G/DL (ref 14–18)
HYPOCHROMIA BLD QL SMEAR: ABNORMAL
IMM GRANULOCYTES # BLD AUTO: 0.01 K/UL (ref 0–0.04)
IMM GRANULOCYTES NFR BLD AUTO: 0.1 % (ref 0–0.5)
LYMPHOCYTES # BLD AUTO: 1.3 K/UL (ref 1–4.8)
LYMPHOCYTES NFR BLD: 14.7 % (ref 18–48)
MCH RBC QN AUTO: 23.6 PG (ref 27–31)
MCHC RBC AUTO-ENTMCNC: 30 G/DL (ref 32–36)
MCV RBC AUTO: 79 FL (ref 82–98)
MONOCYTES # BLD AUTO: 1.1 K/UL (ref 0.3–1)
MONOCYTES NFR BLD: 12.7 % (ref 4–15)
NEUTROPHILS # BLD AUTO: 6.2 K/UL (ref 1.8–7.7)
NEUTROPHILS NFR BLD: 71.6 % (ref 38–73)
NRBC BLD-RTO: 0 /100 WBC
PLATELET # BLD AUTO: 235 K/UL (ref 150–350)
PMV BLD AUTO: 8.5 FL (ref 9.2–12.9)
POLYCHROMASIA BLD QL SMEAR: ABNORMAL
POTASSIUM SERPL-SCNC: 3.8 MMOL/L (ref 3.5–5.1)
RBC # BLD AUTO: 3.56 M/UL (ref 4.6–6.2)
SCHISTOCYTES BLD QL SMEAR: PRESENT
SODIUM SERPL-SCNC: 138 MMOL/L (ref 136–145)
TOXIC GRANULES BLD QL SMEAR: PRESENT
WBC # BLD AUTO: 8.68 K/UL (ref 3.9–12.7)

## 2020-05-30 PROCEDURE — 99233 PR SUBSEQUENT HOSPITAL CARE,LEVL III: ICD-10-PCS | Mod: ,,, | Performed by: HOSPITALIST

## 2020-05-30 PROCEDURE — 99233 SBSQ HOSP IP/OBS HIGH 50: CPT | Mod: ,,, | Performed by: HOSPITALIST

## 2020-05-30 PROCEDURE — 99233 PR SUBSEQUENT HOSPITAL CARE,LEVL III: ICD-10-PCS | Mod: ,,, | Performed by: INTERNAL MEDICINE

## 2020-05-30 PROCEDURE — 94761 N-INVAS EAR/PLS OXIMETRY MLT: CPT

## 2020-05-30 PROCEDURE — 80048 BASIC METABOLIC PNL TOTAL CA: CPT

## 2020-05-30 PROCEDURE — 36415 COLL VENOUS BLD VENIPUNCTURE: CPT

## 2020-05-30 PROCEDURE — C9113 INJ PANTOPRAZOLE SODIUM, VIA: HCPCS | Performed by: HOSPITALIST

## 2020-05-30 PROCEDURE — 85025 COMPLETE CBC W/AUTO DIFF WBC: CPT

## 2020-05-30 PROCEDURE — 11000001 HC ACUTE MED/SURG PRIVATE ROOM

## 2020-05-30 PROCEDURE — 99233 SBSQ HOSP IP/OBS HIGH 50: CPT | Mod: ,,, | Performed by: INTERNAL MEDICINE

## 2020-05-30 PROCEDURE — 25000003 PHARM REV CODE 250: Performed by: HOSPITALIST

## 2020-05-30 PROCEDURE — 27000221 HC OXYGEN, UP TO 24 HOURS

## 2020-05-30 PROCEDURE — 63600175 PHARM REV CODE 636 W HCPCS: Performed by: HOSPITALIST

## 2020-05-30 PROCEDURE — 25000003 PHARM REV CODE 250: Performed by: INTERNAL MEDICINE

## 2020-05-30 PROCEDURE — 25000003 PHARM REV CODE 250: Performed by: NURSE PRACTITIONER

## 2020-05-30 PROCEDURE — 99900035 HC TECH TIME PER 15 MIN (STAT)

## 2020-05-30 PROCEDURE — 63600175 PHARM REV CODE 636 W HCPCS: Performed by: INTERNAL MEDICINE

## 2020-05-30 RX ADMIN — TRAZODONE HYDROCHLORIDE 50 MG: 50 TABLET ORAL at 08:05

## 2020-05-30 RX ADMIN — ONDANSETRON 4 MG: 2 INJECTION INTRAMUSCULAR; INTRAVENOUS at 06:05

## 2020-05-30 RX ADMIN — PANTOPRAZOLE SODIUM 40 MG: 40 INJECTION, POWDER, LYOPHILIZED, FOR SOLUTION INTRAVENOUS at 09:05

## 2020-05-30 RX ADMIN — DONEPEZIL HYDROCHLORIDE 10 MG: 5 TABLET, FILM COATED ORAL at 09:05

## 2020-05-30 RX ADMIN — DORZOLAMIDE HYDROCHLORIDE 1 DROP: 20 SOLUTION/ DROPS OPHTHALMIC at 09:05

## 2020-05-30 RX ADMIN — HYDROMORPHONE HYDROCHLORIDE 0.5 MG: 1 INJECTION, SOLUTION INTRAMUSCULAR; INTRAVENOUS; SUBCUTANEOUS at 05:05

## 2020-05-30 RX ADMIN — ATORVASTATIN CALCIUM 40 MG: 20 TABLET, FILM COATED ORAL at 08:05

## 2020-05-30 RX ADMIN — FLUOXETINE 20 MG: 20 CAPSULE ORAL at 09:05

## 2020-05-30 RX ADMIN — HYDROMORPHONE HYDROCHLORIDE 0.5 MG: 1 INJECTION, SOLUTION INTRAMUSCULAR; INTRAVENOUS; SUBCUTANEOUS at 06:05

## 2020-05-30 RX ADMIN — PHENYTOIN SODIUM 100 MG: 100 CAPSULE ORAL at 09:05

## 2020-05-30 RX ADMIN — DIVALPROEX SODIUM 500 MG: 250 TABLET, DELAYED RELEASE ORAL at 08:05

## 2020-05-30 RX ADMIN — TRAVOPROST 1 DROP: 0.04 SOLUTION/ DROPS OPHTHALMIC at 08:05

## 2020-05-30 RX ADMIN — PHENYTOIN SODIUM 100 MG: 100 CAPSULE ORAL at 08:05

## 2020-05-30 RX ADMIN — TAMSULOSIN HYDROCHLORIDE 0.4 MG: 0.4 CAPSULE ORAL at 09:05

## 2020-05-30 RX ADMIN — DORZOLAMIDE HYDROCHLORIDE 1 DROP: 20 SOLUTION/ DROPS OPHTHALMIC at 08:05

## 2020-05-30 NOTE — ASSESSMENT & PLAN NOTE
-Mr. Soria was admitted to inpatient status  -GI consulted and performed EGD 5/22/2020 without evidence for source of bleeding.  Colonoscopy performed on 5/25/2020 which showed a fungating mass of ascending colon and diverticulosis.  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  -H/H trending down but remains above transfusion threshold.  Continue to monitor and transfuse for Hb less than 7.  -Today with increased abdominal discomfort and nausea.  Remains distended but bowel sounds are better.  Minimal TTP.  States he is passing flatus but no BM.  -Continue incentive spirometry.    -Check KUB.  Concerned for possible post-operative ileus  -Discussed with Dr. Jo.

## 2020-05-30 NOTE — PROGRESS NOTES
Postop day 5.  Status post laparotomy, right hemicolectomy  Diagnosis-hematochezia, cecal carcinoma  No BM/flatus    PE  Afebrile  Vital signs stable  Awake and alert  Abdomen-soft, mild distention, active bowel sounds, in incision clean and dry  Extremities-nontender    Impression/plan  Surgically stable postop, await return of bowel function

## 2020-05-30 NOTE — SUBJECTIVE & OBJECTIVE
Interval History: No acute events overnight.  States he is passing gas, but has increased abdominal discomfort and nausea today.  No vomiting or BMs.  Abd remains distended but bowel sounds are improved.  No cp or sob.    Review of Systems   Reason unable to perform ROS: can provide some history.   Constitutional: Negative for fever.   HENT: Negative for congestion, sinus pressure and sneezing.    Eyes: Positive for visual disturbance (glaucoma/macular degeneration and can see shadows).   Respiratory: Negative for shortness of breath.    Cardiovascular: Negative for chest pain.   Gastrointestinal: Positive for abdominal distention and nausea. Negative for abdominal pain, blood in stool (no episodes overnight), diarrhea and vomiting.   Endocrine: Negative for cold intolerance and heat intolerance.   Genitourinary: Negative for difficulty urinating and dysuria.   Musculoskeletal: Negative for arthralgias and back pain.   Neurological: Negative for headaches.   Psychiatric/Behavioral: Negative for agitation and behavioral problems.     Objective:     Vital Signs (Most Recent):  Temp: 98.3 °F (36.8 °C) (05/30/20 0747)  Pulse: 70 (05/30/20 1000)  Resp: 18 (05/30/20 0747)  BP: 123/63 (05/30/20 0747)  SpO2: (!) 94 % (05/30/20 0747) Vital Signs (24h Range):  Temp:  [98.3 °F (36.8 °C)-98.8 °F (37.1 °C)] 98.3 °F (36.8 °C)  Pulse:  [70-89] 70  Resp:  [18] 18  SpO2:  [93 %-99 %] 94 %  BP: (122-137)/(62-83) 123/63     Weight: 88.7 kg (195 lb 8.8 oz)  Body mass index is 25.8 kg/m².    Intake/Output Summary (Last 24 hours) at 5/30/2020 1018  Last data filed at 5/30/2020 1016  Gross per 24 hour   Intake 440 ml   Output 200 ml   Net 240 ml      Physical Exam   Constitutional: He appears well-developed and well-nourished. No distress.   HENT:   Head: Normocephalic and atraumatic.   Eyes: Conjunctivae are normal.   Blindness bilaterally and can see shapes with peripheral vision   Neck: Neck supple.   Cardiovascular: Normal rate and  regular rhythm.   No murmur heard.  Pulmonary/Chest: Effort normal. No respiratory distress. He has no decreased breath sounds. He has no wheezes.   Abdominal: He exhibits distension. Bowel sounds are increased. There is no tenderness. There is no rigidity.   Mildly distended, minimally ttp, bowel sounds are improved today, incision is exposed and is c/d/i with small amount of serous fluid in umbilicus.   Musculoskeletal: Normal range of motion. He exhibits no edema.   Neurological: He is alert. He has normal strength and normal reflexes. He is disoriented.   Oriented to self, knows he's in a hospital, slightly slurred speech, moves all extremities   Skin: Skin is warm and dry.   Psychiatric: He has a normal mood and affect. His speech is normal and behavior is normal. Cognition and memory are impaired.     Significant Labs: All pertinent labs within the past 24 hours have been reviewed.    Significant Imaging: I have reviewed all pertinent imaging studies within the last 24 hours.

## 2020-05-30 NOTE — PROGRESS NOTES
[facility], [place]  Cardiology  Progress Note    Patient Name: Anjel Soria  MRN: [id number]  Admission Date: 5/21/2020  Hospital Length of Stay: 8 days  Code Status: Full Code   Attending Physician: Von Moses MD   Primary Care Physician: [facility]  Expected Discharge Date:   Principal Problem:Gastrointestinal hemorrhage with melena    Subjective:     Brief HPI:    Anjel Soria is a 74 y.o.male with hypertension and hypercholesterolemia. He has had a cerebrovascular accident in the past making him weak in the left side. He has vascular dementia. He stays at a Nursing Home in [place], [place]. According to the patient he is wheelchair bound due to that his legs are too weak to be able to ambulate. In 2018 he was seen at [facility] due to chest pain. He had an elevation in troponins. He appears to have been transferred to [facility] for further cardiac evaluation but am not able to find any records from the stay at [facility].     He presented to [place] on 5/21/2020 with melena. He was transferred to [facility] for evaluation. He underwent colonoscopy on 5/25/2020 revealing a cecal mass. The plan is partial colectomy on 5/26/2020. He has been noted to be bradycardic during the hospital stay with a heart rate in the 50's during the night 5/25/2020 - 5/26/2020. He has been receiving metoprolol 12.5 mg Q12. The patient denies any chest pain or shortness of breath. He is comfortable supine. I am asked to see him for his bradycardia and assess need for possible further cardiac evaluation prior to surgery.    Hospital Course:     5/26/2020: Partial colectomy.    Interval History:    Doing well after surgery. He denies CP or SOB.    Review of Systems   Constitution: Positive for malaise/fatigue. Negative for chills and fever.   HENT: Negative for nosebleeds.    Eyes: Negative for vision loss in left eye and vision loss in right eye.   Cardiovascular: Negative for chest  pain, leg swelling, orthopnea, palpitations and paroxysmal nocturnal dyspnea.   Respiratory: Negative for cough, hemoptysis, shortness of breath, sputum production and wheezing.    Hematologic/Lymphatic: Negative for bleeding problem.   Skin: Negative for rash.   Musculoskeletal: Negative for myalgias.   Gastrointestinal: Positive for abdominal pain. Negative for nausea and vomiting.   Genitourinary: Negative for hematuria.   Neurological: Positive for focal weakness (left side) and weakness. Negative for dizziness, headaches, light-headedness and vertigo.   Psychiatric/Behavioral: Positive for memory loss. Negative for altered mental status. The patient is not nervous/anxious.    Allergic/Immunologic: Negative for persistent infections.     Objective:     Vital Signs (Most Recent):  Temp: 98.3 °F (36.8 °C) (05/30/20 0747)  Pulse: 70 (05/30/20 0800)  Resp: 18 (05/30/20 0747)  BP: 123/63 (05/30/20 0747)  SpO2: (!) 94 % (05/30/20 0747) Vital Signs (24h Range):  Temp:  [98.3 °F (36.8 °C)-98.8 °F (37.1 °C)] 98.3 °F (36.8 °C)  Pulse:  [70-89] 70  Resp:  [18] 18  SpO2:  [93 %-99 %] 94 %  BP: (122-137)/(62-83) 123/63     Weight: 88.7 kg (195 lb 8.8 oz)  Body mass index is 25.8 kg/m².    SpO2: (!) 94 %  O2 Device (Oxygen Therapy): nasal cannula      Intake/Output Summary (Last 24 hours) at 5/30/2020 0943  Last data filed at 5/30/2020 0600  Gross per 24 hour   Intake 240 ml   Output 200 ml   Net 40 ml       Lines/Drains/Airways     Drain                 Urethral Catheter 05/26/20 1120 Non-latex 16 Fr. 3 days          Peripheral Intravenous Line                 Midline Catheter Insertion/Assessment  - Single Lumen 05/25/20 0940 Left basilic vein (medial side of arm) 22g x 8cm 5 days                Physical Exam   Constitutional: He appears well-developed and well-nourished.   Cardiovascular: Normal rate, regular rhythm, S1 normal and S2 normal. Exam reveals gallop and S4.   Pulmonary/Chest: Effort normal and breath sounds  normal.   Abdominal: Normal appearance. He exhibits no distension. There is tenderness.   Musculoskeletal:        Right ankle: He exhibits no swelling.        Left ankle: He exhibits no swelling.   Neurological: He is disoriented.   Psychiatric: Cognition and memory are impaired.       Current Medications:     atorvastatin  40 mg Oral QHS    divalproex  500 mg Oral Nightly    donepeziL  10 mg Oral Daily    dorzolamide  1 drop Both Eyes BID    FLUoxetine  20 mg Oral Daily    pantoprazole  40 mg Intravenous Daily    phenytoin  100 mg Oral BID    tamsulosin  1 capsule Oral Daily    travoprost  1 drop Both Eyes QHS    traZODone  50 mg Oral QHS     Current Laboratory Results:    Recent Results (from the past 24 hour(s))   CBC auto differential    Collection Time: 05/30/20  4:23 AM   Result Value Ref Range    WBC 8.68 3.90 - 12.70 K/uL    RBC 3.56 (L) 4.60 - 6.20 M/uL    Hemoglobin 8.4 (L) 14.0 - 18.0 g/dL    Hematocrit 28.0 (L) 40.0 - 54.0 %    Mean Corpuscular Volume 79 (L) 82 - 98 fL    Mean Corpuscular Hemoglobin 23.6 (L) 27.0 - 31.0 pg    Mean Corpuscular Hemoglobin Conc 30.0 (L) 32.0 - 36.0 g/dL    RDW 26.7 (H) 11.5 - 14.5 %    Platelets 235 150 - 350 K/uL    MPV 8.5 (L) 9.2 - 12.9 fL    Immature Granulocytes 0.1 0.0 - 0.5 %    Gran # (ANC) 6.2 1.8 - 7.7 K/uL    Immature Grans (Abs) 0.01 0.00 - 0.04 K/uL    Lymph # 1.3 1.0 - 4.8 K/uL    Mono # 1.1 (H) 0.3 - 1.0 K/uL    Eos # 0.1 0.0 - 0.5 K/uL    Baso # 0.02 0.00 - 0.20 K/uL    nRBC 0 0 /100 WBC    Gran% 71.6 38.0 - 73.0 %    Lymph% 14.7 (L) 18.0 - 48.0 %    Mono% 12.7 4.0 - 15.0 %    Eosinophil% 0.7 0.0 - 8.0 %    Basophil% 0.2 0.0 - 1.9 %    Aniso Moderate     Poly Occasional     Hypo Occasional     Tear Drop Cells Occasional     Schistocytes Present     Toxic Granulation Present     Differential Method Automated    Basic metabolic panel    Collection Time: 05/30/20  4:23 AM   Result Value Ref Range    Sodium 138 136 - 145 mmol/L    Potassium 3.8 3.5 -  5.1 mmol/L    Chloride 100 95 - 110 mmol/L    CO2 25 23 - 29 mmol/L    Glucose 116 (H) 70 - 110 mg/dL    BUN, Bld 19 8 - 23 mg/dL    Creatinine 1.0 0.5 - 1.4 mg/dL    Calcium 8.5 (L) 8.7 - 10.5 mg/dL    Anion Gap 13 8 - 16 mmol/L    eGFR if African American >60 >60 mL/min/1.73 m^2    eGFR if non African American >60 >60 mL/min/1.73 m^2     Current Imaging Results:    X-Ray Chest 1 View   Final Result      As above         Electronically signed by: Magy Malave MD   Date:    05/22/2020   Time:    09:57      X-Ray Abdomen AP 1 View    (Results Pending)     5/26/2020: Echo:    Normal left ventricular systolic function. The estimated ejection fraction is 65%.  No wall motion abnormalities.  Grade I (mild) left ventricular diastolic dysfunction consistent with impaired relaxation.  Normal right ventricular systolic function.  Mild aortic valve sclerosis.  The estimated PA systolic pressure is 9 mmHg.  Normal central venous pressure (3 mmHg).      Assessment and Plan:     Problem List:    Active Diagnoses:    Diagnosis Date Noted POA    PRINCIPAL PROBLEM:  Gastrointestinal hemorrhage with melena [K92.1] 05/21/2020 Yes    Malignant neoplasm of ascending colon [C18.2] 05/27/2020 Yes    Hiatal hernia [K44.9] 05/22/2020 Yes    Essential hypertension [I10] 05/21/2020 Yes    Gastroesophageal reflux disease [K21.9] 05/21/2020 Yes    Bradycardia [R00.1] 05/21/2020 Yes    Obstructive cardiomyopathy [I42.8] 05/21/2020 Yes    Bipolar affective disorder in remission [F31.70] 05/21/2020 Yes    Acute blood loss anemia [D62] 05/21/2020 Yes    BPH (benign prostatic hyperplasia) [N40.0] 05/09/2016 Yes    Coronary artery disease involving native coronary artery of native heart without angina pectoris [I25.10] 05/09/2016 Yes    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 [I69.398, G40.909] 05/09/2016 Not Applicable    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 Coronary Artery Disease              2018: Appears to have had NSTEMI. Evaluation at [facility]. Unable to obtain information.   5/26/2020: Echo: Normal left ventricular size and systolic function. EF 65%. Mild aortic valve sclerosis.              According to NH log appears to have been on aspirin and clopidogrel until recently.              Appears stable.     2. Sinus Bradycardia              HR in 50's.              On metoprolol 12.5 mg Q12.              5/26/2020: Metoprolol was discontinued.   Heart rate in 60-70 bpm range.     3. History of Cerebrovascular Accident              History of CVA causing weakness in left side.     4. Dementia              According to chart having vascular dementia.     5. Hypertension              Currently not on any antihypertensives.     6. Hypercholesterolemia              On atorvastatin 40 mg Q24.     7. Gastrointestinal Bleeding               5/21/2020: Presented with melena.              5/25/2020: Colonoscopy: Cecal mass.               5/26/2020:  Partial colectomy.   Dr. Von Jo.     VTE Risk Mitigation (From admission, onward)         Ordered     Reason for No Pharmacological VTE Prophylaxis  Once     Question:  Reasons:  Answer:  Active Bleeding    05/21/20 1613     IP VTE HIGH RISK PATIENT  Once      05/21/20 1613     Place sequential compression device  Until discontinued      05/21/20 1613     Place NICOLE hose  Until discontinued      05/21/20 1601                Richard Brown MD  Cardiology  [facility]

## 2020-05-30 NOTE — PT/OT/SLP PROGRESS
Occupational Therapy  Not seen Note    Patient Name:  Anjel Soria   MRN:  [id number]    OT treatment attempted.  Patient not seen today secondary to abdominal Pain. Will follow-up on his next scheduled therapy day..    Bryce Pisano, LOTR  5/30/2020

## 2020-05-30 NOTE — PROGRESS NOTES
[facility] Hospital Medicine  Progress Note    Patient Name: Anjel Soria  MRN: [id number]  Patient Class: IP- Inpatient   Admission Date: 5/21/2020  Length of Stay: 8 days  Attending Physician: Von Moses MD  Primary Care Provider: [facility]        Subjective:     Principal Problem:Gastrointestinal hemorrhage with melena        HPI:  Anjel Soria is a 74 year old male who has medical history of anemia related to lower GI bleeding, coronary artery disease, vascular dementia, prior stroke with residual left sided weakness, 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  He is currently a resident of the [facility].   Per medical record, the patient with recent prior history of severe anemia requiring transfusion on April 9, 2020.  During that time, he was evaluated  At [facility] and found to be hemoccult positive and CT scan of abdomen was unremarkable.  He was subsequently discharged back to his nursing home with plan for outpatient follow up with GI.  However, the patient was unable to see GI as outpatient.   Today, patient presented to the [facility] ED where initial work up revealed stable hemoglobin/hematocrit 12.0/40.4. Otherwise, labs were unremarkable and COVID screen was negative. Of note, EKG in ED today shows sinus bradycardia and asymtpomatic with heart rate in upper 40's and low 50's.   He was to be discharged back to the nursing home, but patient had episode of melena and decision to transfer to [facility] for evaluation.     On arrival to the [facility], the patient had one small melanotic stool.  Given dementia, the patient is unable to provide detailed history.  Per nursing home medication reconciliation, the patient has not been on aspirin or Plavix recently and is not currently on any other type of blood thinners.  GI will be consulted and roge continue to trend  hemoglobin/hematocrit.     Overview/Hospital Course:  After admission, Gastroenterology consulted and EGD without evidence for bleeding.  Colonoscopy 5/25/2020 noted fungating mass ascending colon and diverticulosis in the sigmoid colon and in the descending colon. Pathology pending.   No further episodes of hematochezia/melena and stable hemoglobin/hematocrit.  General Surgery consulted for evaluation.     Interval History: No acute events overnight.  States he is passing gas, but has increased abdominal discomfort and nausea today.  No vomiting or BMs.  Abd remains distended but bowel sounds are improved.  No cp or sob.    Review of Systems   Reason unable to perform ROS: can provide some history.   Constitutional: Negative for fever.   HENT: Negative for congestion, sinus pressure and sneezing.    Eyes: Positive for visual disturbance (glaucoma/macular degeneration and can see shadows).   Respiratory: Negative for shortness of breath.    Cardiovascular: Negative for chest pain.   Gastrointestinal: Positive for abdominal distention and nausea. Negative for abdominal pain, blood in stool (no episodes overnight), diarrhea and vomiting.   Endocrine: Negative for cold intolerance and heat intolerance.   Genitourinary: Negative for difficulty urinating and dysuria.   Musculoskeletal: Negative for arthralgias and back pain.   Neurological: Negative for headaches.   Psychiatric/Behavioral: Negative for agitation and behavioral problems.     Objective:     Vital Signs (Most Recent):  Temp: 98.3 °F (36.8 °C) (05/30/20 0747)  Pulse: 70 (05/30/20 1000)  Resp: 18 (05/30/20 0747)  BP: 123/63 (05/30/20 0747)  SpO2: (!) 94 % (05/30/20 0747) Vital Signs (24h Range):  Temp:  [98.3 °F (36.8 °C)-98.8 °F (37.1 °C)] 98.3 °F (36.8 °C)  Pulse:  [70-89] 70  Resp:  [18] 18  SpO2:  [93 %-99 %] 94 %  BP: (122-137)/(62-83) 123/63     Weight: 88.7 kg (195 lb 8.8 oz)  Body mass index is 25.8 kg/m².    Intake/Output Summary (Last 24 hours) at  5/30/2020 1018  Last data filed at 5/30/2020 1016  Gross per 24 hour   Intake 440 ml   Output 200 ml   Net 240 ml      Physical Exam   Constitutional: He appears well-developed and well-nourished. No distress.   HENT:   Head: Normocephalic and atraumatic.   Eyes: Conjunctivae are normal.   Blindness bilaterally and can see shapes with peripheral vision   Neck: Neck supple.   Cardiovascular: Normal rate and regular rhythm.   No murmur heard.  Pulmonary/Chest: Effort normal. No respiratory distress. He has no decreased breath sounds. He has no wheezes.   Abdominal: He exhibits distension. Bowel sounds are increased. There is no tenderness. There is no rigidity.   Mildly distended, minimally ttp, bowel sounds are improved today, incision is exposed and is c/d/i with small amount of serous fluid in umbilicus.   Musculoskeletal: Normal range of motion. He exhibits no edema.   Neurological: He is alert. He has normal strength and normal reflexes. He is disoriented.   Oriented to self, knows he's in a hospital, slightly slurred speech, moves all extremities   Skin: Skin is warm and dry.   Psychiatric: He has a normal mood and affect. His speech is normal and behavior is normal. Cognition and memory are impaired.     Significant Labs: All pertinent labs within the past 24 hours have been reviewed.    Significant Imaging: I have reviewed all pertinent imaging studies within the last 24 hours.      Assessment/Plan:      * Gastrointestinal hemorrhage with melena  -Mr. Soria was admitted to inpatient status  -GI consulted and performed EGD 5/22/2020 without evidence for source of bleeding.  Colonoscopy performed on 5/25/2020 which showed a fungating mass of ascending colon and diverticulosis.  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  -H/H trending down but remains above transfusion  threshold.  Continue to monitor and transfuse for Hb less than 7.  -Today with increased abdominal discomfort and nausea.  Remains distended but bowel sounds are better.  Minimal TTP.  States he is passing flatus but no BM.  -Continue incentive spirometry.    -Check KUB.  Concerned for possible post-operative ileus  -Discussed with Dr. Jo.    Malignant neoplasm of ascending colon  -Treatment as above.  -No gross metastatic lesions or enlarged lymph nodes observed during surgery on 5/26.  -Await final path    Acute blood loss anemia  -Believed to be due to colon cancer as above.  -No known episodes of melena or hematochezia since 5/22/2020    -On admit Hb 10.6.  Trending down but remains above transfusion threshold and no obvious bleeding.  -Follow H/H daily and transfuse Hgb < 7    Coronary artery disease involving native coronary artery of native heart without angina pectoris  -History noted  -Previously on aspirin and plavix, but per NH record was not on these  -Continue home statin  -Continue home metoprolol with hold parameters for bradycardia.    Obstructive cardiomyopathy  -History noted per chart  -Echo 5/26 showed EF 65%, grade I diastolic dysfunction and no wall motion abnormalities  -Appears well compensated    Bradycardia  -History of sinus jessica noted.  -Did have bradycardia at times early in his stay and home beta-blocker held briefly.  -At home takes metoprolol 12.5mg bid.  -Continue this but with hold parameters for hr less than 60.  No bradycardia noted in last 24 hours.  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  -Continue PT/OT     Seizure disorder as  sequela of cerebrovascular accident  -History noted  -No seizures during his stay  -At NH noted to be on dilantin and depakote for mood disorder.  Per NH records, levels drawn q6 months and WNL  -Dilantin and Depakote levels non-toxic  -Continue home medications  -Continue seizure precautions    Subcortical vascular dementia without behavioral disturbance  -Appears stable and moderate  -Continue home dose of Aricept 10 mg daily  -Continue delirium precautions  -Palliative Care consult for symptom management and NP at home program referral.  Patient is full code.    Bipolar affective disorder in remission  -History noted  -Mood appears stable  -Continue Depakote 500 mg daily, Prozac 20 mg daily.  -He was a bit sleepier on 5/29 so I decreased trazodone to 50 mg nightly with good results.    Hiatal hernia  -Noted on EGD 5/22/2020    Gastroesophageal reflux disease  -History noted  -S/p EGD this stay with normal stomach, duodenum and noted LE ring  -Continue PPI daily    BPH (benign prostatic hyperplasia)  -No noted symptoms  -Continue home dose of Flomax    End-stage glaucoma  -History noted.  He is legally blind  -Provide assistance as needed  -Continue home medications      VTE Risk Mitigation (From admission, onward)         Ordered     Reason for No Pharmacological VTE Prophylaxis  Once     Question:  Reasons:  Answer:  Active Bleeding    05/21/20 1613     IP VTE HIGH RISK PATIENT  Once      05/21/20 1613     Place sequential compression device  Until discontinued      05/21/20 1613     Place NICOLE hose  Until discontinued      05/21/20 1601                      Von Moses MD  Department of Hospital Medicine   [facility]

## 2020-05-30 NOTE — PLAN OF CARE
Plan of care reviewed with patient. AAOx1. Abdomen is distended and slightly taut, especially around the left side. Patient still has not had a bowel movement but states he is passing gas. Bowel sounds hypoactive. Small amount of serosanguinous drainage from incision. Zofran given for nausea. Patient denies pain. Bed in lowest position and locked with bed alarm on. Call bell within reach. Will continue to monitor

## 2020-05-30 NOTE — ASSESSMENT & PLAN NOTE
-Believed to be due to colon cancer as above.  -No known episodes of melena or hematochezia since 5/22/2020    -On admit Hb 10.6.  Trending down but remains above transfusion threshold and no obvious bleeding.  -Follow H/H daily and transfuse Hgb < 7

## 2020-05-30 NOTE — ASSESSMENT & PLAN NOTE
-History noted  -Mood appears stable  -Continue Depakote 500 mg daily, Prozac 20 mg daily.  -He was a bit sleepier on 5/29 so I decreased trazodone to 50 mg nightly with good results.

## 2020-05-31 PROBLEM — K91.89 ILEUS FOLLOWING GASTROINTESTINAL SURGERY: Status: ACTIVE | Noted: 2020-05-31

## 2020-05-31 PROBLEM — K56.7 ILEUS FOLLOWING GASTROINTESTINAL SURGERY: Status: ACTIVE | Noted: 2020-05-31

## 2020-05-31 LAB
ANION GAP SERPL CALC-SCNC: 12 MMOL/L (ref 8–16)
ANISOCYTOSIS BLD QL SMEAR: SLIGHT
BASOPHILS # BLD AUTO: 0.02 K/UL (ref 0–0.2)
BASOPHILS NFR BLD: 0.2 % (ref 0–1.9)
BUN SERPL-MCNC: 17 MG/DL (ref 8–23)
CALCIUM SERPL-MCNC: 8.7 MG/DL (ref 8.7–10.5)
CHLORIDE SERPL-SCNC: 99 MMOL/L (ref 95–110)
CO2 SERPL-SCNC: 26 MMOL/L (ref 23–29)
CREAT SERPL-MCNC: 0.9 MG/DL (ref 0.5–1.4)
DACRYOCYTES BLD QL SMEAR: ABNORMAL
DIFFERENTIAL METHOD: ABNORMAL
EOSINOPHIL # BLD AUTO: 0.1 K/UL (ref 0–0.5)
EOSINOPHIL NFR BLD: 1.5 % (ref 0–8)
ERYTHROCYTE [DISTWIDTH] IN BLOOD BY AUTOMATED COUNT: 26.2 % (ref 11.5–14.5)
EST. GFR  (AFRICAN AMERICAN): >60 ML/MIN/1.73 M^2
EST. GFR  (NON AFRICAN AMERICAN): >60 ML/MIN/1.73 M^2
GLUCOSE SERPL-MCNC: 102 MG/DL (ref 70–110)
HCT VFR BLD AUTO: 28.2 % (ref 40–54)
HGB BLD-MCNC: 8.5 G/DL (ref 14–18)
HYPOCHROMIA BLD QL SMEAR: ABNORMAL
IMM GRANULOCYTES # BLD AUTO: 0.03 K/UL (ref 0–0.04)
IMM GRANULOCYTES NFR BLD AUTO: 0.4 % (ref 0–0.5)
LYMPHOCYTES # BLD AUTO: 1.2 K/UL (ref 1–4.8)
LYMPHOCYTES NFR BLD: 14.5 % (ref 18–48)
MCH RBC QN AUTO: 23.9 PG (ref 27–31)
MCHC RBC AUTO-ENTMCNC: 30.1 G/DL (ref 32–36)
MCV RBC AUTO: 79 FL (ref 82–98)
MONOCYTES # BLD AUTO: 1 K/UL (ref 0.3–1)
MONOCYTES NFR BLD: 12.2 % (ref 4–15)
NEUTROPHILS # BLD AUTO: 5.8 K/UL (ref 1.8–7.7)
NEUTROPHILS NFR BLD: 71.2 % (ref 38–73)
NRBC BLD-RTO: 0 /100 WBC
OVALOCYTES BLD QL SMEAR: ABNORMAL
PLATELET # BLD AUTO: 242 K/UL (ref 150–350)
PMV BLD AUTO: 8.5 FL (ref 9.2–12.9)
POIKILOCYTOSIS BLD QL SMEAR: SLIGHT
POLYCHROMASIA BLD QL SMEAR: ABNORMAL
POTASSIUM SERPL-SCNC: 4 MMOL/L (ref 3.5–5.1)
RBC # BLD AUTO: 3.56 M/UL (ref 4.6–6.2)
SCHISTOCYTES BLD QL SMEAR: PRESENT
SODIUM SERPL-SCNC: 137 MMOL/L (ref 136–145)
SPHEROCYTES BLD QL SMEAR: ABNORMAL
TARGETS BLD QL SMEAR: ABNORMAL
WBC # BLD AUTO: 8.14 K/UL (ref 3.9–12.7)

## 2020-05-31 PROCEDURE — 99900035 HC TECH TIME PER 15 MIN (STAT)

## 2020-05-31 PROCEDURE — 85025 COMPLETE CBC W/AUTO DIFF WBC: CPT

## 2020-05-31 PROCEDURE — 80048 BASIC METABOLIC PNL TOTAL CA: CPT

## 2020-05-31 PROCEDURE — 36415 COLL VENOUS BLD VENIPUNCTURE: CPT

## 2020-05-31 PROCEDURE — 63600175 PHARM REV CODE 636 W HCPCS: Performed by: INTERNAL MEDICINE

## 2020-05-31 PROCEDURE — 99233 SBSQ HOSP IP/OBS HIGH 50: CPT | Mod: ,,, | Performed by: INTERNAL MEDICINE

## 2020-05-31 PROCEDURE — 25000003 PHARM REV CODE 250: Performed by: INTERNAL MEDICINE

## 2020-05-31 PROCEDURE — 97530 THERAPEUTIC ACTIVITIES: CPT

## 2020-05-31 PROCEDURE — 99233 PR SUBSEQUENT HOSPITAL CARE,LEVL III: ICD-10-PCS | Mod: ,,, | Performed by: INTERNAL MEDICINE

## 2020-05-31 PROCEDURE — 99233 PR SUBSEQUENT HOSPITAL CARE,LEVL III: ICD-10-PCS | Mod: ,,, | Performed by: HOSPITALIST

## 2020-05-31 PROCEDURE — 25000003 PHARM REV CODE 250: Performed by: NURSE PRACTITIONER

## 2020-05-31 PROCEDURE — C9113 INJ PANTOPRAZOLE SODIUM, VIA: HCPCS | Performed by: HOSPITALIST

## 2020-05-31 PROCEDURE — 99233 SBSQ HOSP IP/OBS HIGH 50: CPT | Mod: ,,, | Performed by: HOSPITALIST

## 2020-05-31 PROCEDURE — 97535 SELF CARE MNGMENT TRAINING: CPT

## 2020-05-31 PROCEDURE — 11000001 HC ACUTE MED/SURG PRIVATE ROOM

## 2020-05-31 PROCEDURE — 25000003 PHARM REV CODE 250: Performed by: HOSPITALIST

## 2020-05-31 PROCEDURE — 94761 N-INVAS EAR/PLS OXIMETRY MLT: CPT

## 2020-05-31 PROCEDURE — 27000221 HC OXYGEN, UP TO 24 HOURS

## 2020-05-31 PROCEDURE — 63600175 PHARM REV CODE 636 W HCPCS: Performed by: HOSPITALIST

## 2020-05-31 RX ADMIN — HYDROMORPHONE HYDROCHLORIDE 0.5 MG: 1 INJECTION, SOLUTION INTRAMUSCULAR; INTRAVENOUS; SUBCUTANEOUS at 04:05

## 2020-05-31 RX ADMIN — PHENYTOIN SODIUM 100 MG: 100 CAPSULE ORAL at 09:05

## 2020-05-31 RX ADMIN — DIVALPROEX SODIUM 500 MG: 250 TABLET, DELAYED RELEASE ORAL at 08:05

## 2020-05-31 RX ADMIN — PANTOPRAZOLE SODIUM 40 MG: 40 INJECTION, POWDER, LYOPHILIZED, FOR SOLUTION INTRAVENOUS at 09:05

## 2020-05-31 RX ADMIN — TRAVOPROST 1 DROP: 0.04 SOLUTION/ DROPS OPHTHALMIC at 08:05

## 2020-05-31 RX ADMIN — TRAZODONE HYDROCHLORIDE 50 MG: 50 TABLET ORAL at 08:05

## 2020-05-31 RX ADMIN — TAMSULOSIN HYDROCHLORIDE 0.4 MG: 0.4 CAPSULE ORAL at 09:05

## 2020-05-31 RX ADMIN — PHENYTOIN SODIUM 100 MG: 100 CAPSULE ORAL at 08:05

## 2020-05-31 RX ADMIN — ONDANSETRON 4 MG: 2 INJECTION INTRAMUSCULAR; INTRAVENOUS at 02:05

## 2020-05-31 RX ADMIN — ACETAMINOPHEN 650 MG: 325 TABLET ORAL at 08:05

## 2020-05-31 RX ADMIN — ATORVASTATIN CALCIUM 40 MG: 20 TABLET, FILM COATED ORAL at 08:05

## 2020-05-31 RX ADMIN — HYDROMORPHONE HYDROCHLORIDE 0.5 MG: 1 INJECTION, SOLUTION INTRAMUSCULAR; INTRAVENOUS; SUBCUTANEOUS at 10:05

## 2020-05-31 RX ADMIN — DORZOLAMIDE HYDROCHLORIDE 1 DROP: 20 SOLUTION/ DROPS OPHTHALMIC at 09:05

## 2020-05-31 RX ADMIN — DONEPEZIL HYDROCHLORIDE 10 MG: 5 TABLET, FILM COATED ORAL at 09:05

## 2020-05-31 RX ADMIN — FLUOXETINE 20 MG: 20 CAPSULE ORAL at 09:05

## 2020-05-31 RX ADMIN — HYDROMORPHONE HYDROCHLORIDE 0.5 MG: 1 INJECTION, SOLUTION INTRAMUSCULAR; INTRAVENOUS; SUBCUTANEOUS at 02:05

## 2020-05-31 RX ADMIN — DORZOLAMIDE HYDROCHLORIDE 1 DROP: 20 SOLUTION/ DROPS OPHTHALMIC at 08:05

## 2020-05-31 NOTE — PROGRESS NOTES
[facility] Hospital Medicine  Progress Note    Patient Name: Anjel Soria  MRN: [id number]  Patient Class: IP- Inpatient   Admission Date: 5/21/2020  Length of Stay: 9 days  Attending Physician: Von Moses MD  Primary Care Provider: [facility]        Subjective:     Principal Problem:Gastrointestinal hemorrhage with melena        HPI:  Anjel Soria is a 74 year old male who has medical history of anemia related to lower GI bleeding, coronary artery disease, vascular dementia, prior stroke with residual left sided weakness, 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  He is currently a resident of the [facility].   Per medical record, the patient with recent prior history of severe anemia requiring transfusion on April 9, 2020.  During that time, he was evaluated  At [facility] and found to be hemoccult positive and CT scan of abdomen was unremarkable.  He was subsequently discharged back to his nursing home with plan for outpatient follow up with GI.  However, the patient was unable to see GI as outpatient.   Today, patient presented to the [facility] ED where initial work up revealed stable hemoglobin/hematocrit 12.0/40.4. Otherwise, labs were unremarkable and COVID screen was negative. Of note, EKG in ED today shows sinus bradycardia and asymtpomatic with heart rate in upper 40's and low 50's.   He was to be discharged back to the nursing home, but patient had episode of melena and decision to transfer to [facility] for evaluation.     On arrival to the [facility], the patient had one small melanotic stool.  Given dementia, the patient is unable to provide detailed history.  Per nursing home medication reconciliation, the patient has not been on aspirin or Plavix recently and is not currently on any other type of blood thinners.  GI will be consulted and roge continue to trend  hemoglobin/hematocrit.     Overview/Hospital Course:  After admission, Gastroenterology consulted and EGD without evidence for bleeding.  Colonoscopy 5/25/2020 noted fungating mass ascending colon and diverticulosis in the sigmoid colon and in the descending colon. Pathology pending.   No further episodes of hematochezia/melena and stable hemoglobin/hematocrit.  General Surgery consulted for evaluation.     Interval History: No acute events overnight.  Looks about the same today.  States he is passing gas, but again has increasing abdominal distention despite strong bowel sounds.  Reports mild nausea but no vomiting or BMs.  No cp or sob.    Review of Systems   Reason unable to perform ROS: can provide some history.   Constitutional: Negative for fever.   HENT: Negative for congestion, sinus pressure and sneezing.    Eyes: Positive for visual disturbance (glaucoma/macular degeneration and can see shadows).   Respiratory: Negative for shortness of breath.    Cardiovascular: Negative for chest pain.   Gastrointestinal: Positive for abdominal distention and nausea. Negative for abdominal pain, blood in stool, diarrhea and vomiting.   Endocrine: Negative for cold intolerance and heat intolerance.   Genitourinary: Negative for difficulty urinating and dysuria.   Musculoskeletal: Negative for arthralgias and back pain.   Neurological: Negative for headaches.   Psychiatric/Behavioral: Negative for agitation and behavioral problems.     Objective:     Vital Signs (Most Recent):  Temp: 98.3 °F (36.8 °C) (05/31/20 0740)  Pulse: 70 (05/31/20 1000)  Resp: 18 (05/31/20 0740)  BP: 123/70 (05/31/20 0740)  SpO2: 95 % (05/31/20 0740) Vital Signs (24h Range):  Temp:  [98.2 °F (36.8 °C)-99.1 °F (37.3 °C)] 98.3 °F (36.8 °C)  Pulse:  [66-93] 70  Resp:  [16-18] 18  SpO2:  [93 %-96 %] 95 %  BP: (123-157)/(63-78) 123/70     Weight: 88.7 kg (195 lb 8.8 oz)  Body mass index is 25.8 kg/m².    Intake/Output Summary (Last 24 hours) at 5/31/2020  1154  Last data filed at 5/31/2020 0700  Gross per 24 hour   Intake 120 ml   Output 650 ml   Net -530 ml      Physical Exam   Constitutional: He appears well-developed and well-nourished. No distress.   HENT:   Head: Normocephalic and atraumatic.   Eyes: Conjunctivae are normal.   Blindness bilaterally and can see shapes with peripheral vision   Neck: Neck supple.   Cardiovascular: Normal rate and regular rhythm.   No murmur heard.  Pulmonary/Chest: Effort normal. No respiratory distress. He has no decreased breath sounds. He has no wheezes.   Abdominal: He exhibits distension. Bowel sounds are increased. There is no tenderness. There is no rigidity.   More distended today but good bowel sounds, minimally ttp, incision is exposed and is c/d/i    Musculoskeletal: Normal range of motion. He exhibits no edema.   Neurological: He is alert. He has normal strength and normal reflexes. He is disoriented.   Oriented to self, knows he's in a hospital, slightly slurred speech, moves all extremities   Skin: Skin is warm and dry.   Psychiatric: He has a normal mood and affect. His speech is normal and behavior is normal. Cognition and memory are impaired.     Significant Labs: All pertinent labs within the past 24 hours have been reviewed.    Significant Imaging: I have reviewed all pertinent imaging studies within the last 24 hours.      Assessment/Plan:      * Gastrointestinal hemorrhage with melena  -Mr. Soria was admitted to inpatient status  -GI consulted and performed EGD 5/22/2020 without evidence for source of bleeding.  Colonoscopy performed on 5/25/2020 which showed a fungating mass of ascending colon and diverticulosis.  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  -H/H trended down but remains stable today and is above transfusion threshold.  Continue to monitor and transfuse  for Hb less than 7.  -Today has increased abdominal distension but states passing gas and no nausea.  Bowel sounds better today and still minimal TTP.  -Continue incentive spirometry as able.    -KUB showed dilated gas filled loop of small bowel and a distended stomach.  Suspect post-operative ileus.  -Discussed with Dr. Jo.  Ultimately may need NGT but will attempt mobilization first.  Patient hasn't been moving much.  Will order for him to get out of bed at least twice daily.    Malignant neoplasm of ascending colon  -Treatment as above.  -No gross metastatic lesions or enlarged lymph nodes observed during surgery on 5/26.  -Await final path    Ileus following gastrointestinal surgery  -Treatment as above.      Acute blood loss anemia  -Believed to be due to colon cancer as above.  -No known episodes of melena or hematochezia since 5/22/2020    -On admit Hb 10.6.  Trended down but stable today and  remains above transfusion threshold and no obvious bleeding.  -Follow H/H daily and transfuse Hgb < 7    Coronary artery disease involving native coronary artery of native heart without angina pectoris  -History noted  -Previously on aspirin and plavix, but per NH record was not on these  -Continue home statin  -Continue home metoprolol with hold parameters for bradycardia.    Obstructive cardiomyopathy  -History noted per chart  -Echo 5/26 showed EF 65%, grade I diastolic dysfunction and no wall motion abnormalities  -Appears well compensated    Bradycardia  -History of sinus jessica noted.  -Did have bradycardia at times early in his stay and home beta-blocker held briefly.  -At home takes metoprolol 12.5mg bid.  -Continue this but with hold parameters for hr less than 60.  No bradycardia noted in last 24 hours.  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  Per NH records, levels drawn q6 months and WNL  -Dilantin and Depakote levels non-toxic  -Continue home medications  -Continue seizure precautions    Subcortical vascular dementia without behavioral disturbance  -Appears stable and moderate  -Continue home dose of Aricept 10 mg daily  -Continue delirium precautions  -Palliative Care consult for symptom management and NP at home program referral.  Patient is full code.    Bipolar affective disorder in remission  -History noted  -Mood appears stable  -Continue Depakote 500 mg daily, Prozac 20 mg daily.  -He was a bit sleepier on 5/29 so I decreased trazodone to 50 mg nightly with good results.    Hiatal hernia  -Noted on EGD 5/22/2020    Gastroesophageal reflux disease  -History noted  -S/p EGD this stay with normal stomach, duodenum and noted LE ring  -Continue PPI daily    BPH (benign prostatic hyperplasia)  -No noted symptoms  -Continue home dose of Flomax    End-stage glaucoma  -History noted.  He is legally blind  -Provide assistance as needed  -Continue home medications      VTE Risk Mitigation (From admission, onward)         Ordered     Reason for No Pharmacological VTE Prophylaxis  Once     Question:  Reasons:  Answer:  Active Bleeding    05/21/20 1613     IP VTE HIGH RISK PATIENT  Once      05/21/20 1613     Place sequential compression device  Until discontinued      05/21/20 1613     Place NICOLE hose  Until discontinued      05/21/20 1601                      Von Moses MD  Department of Hospital Medicine   [facility]

## 2020-05-31 NOTE — PLAN OF CARE
Patient AOx1, VSS on 2L O2. His abd is round & distended. He had one complaint of pain towards the end of the shift which was relieved by PRN medication. Abd dressing is clean, dry, & intact. Incision has staples intact and no signs of erythema, swelling, or drainage. His appetite remains poor taking in only fluids and ice cream. Cormier in place with concentrated urine. He felt too tired to participate in PT/OT. Tele in place showing NSR. Patient free of falls or injury during shift. Bed lowered and locked. Call light in reach. Purposeful rounding completed. Will continue to monitor.

## 2020-05-31 NOTE — PT/OT/SLP PROGRESS
Occupational Therapy   Treatment    Name: Anjel Soria  MRN: [id number]  Admitting Diagnosis:  Gastrointestinal hemorrhage with melena  5 Days Post-Op    Recommendations:     Discharge Recommendations: nursing facility, skilled  Discharge Equipment Recommendations:  (NH provided DME)  Barriers to discharge:  None    Assessment:     Anjel Soria is a 74 y.o. male with a medical diagnosis of Gastrointestinal hemorrhage with melena.  He presents with eagerness to eat breakfast. Performance deficits affecting function are impaired endurance, pain, visual deficits, impaired functional mobilty, decreased safety awareness, impaired fine motor, impaired self care skills, decreased upper extremity function, decreased lower extremity function, decreased ROM, impaired coordination, impaired cardiopulmonary response to activity, impaired cognition. He was Mod A self-feeding with VC to stay on task and Mod A G/H (wash face and hands) bed level.  Continuation of OT treatment is needed to maximize patient function while in the hospital.    Rehab Prognosis:  Fair; patient would benefit from acute skilled OT services to address these deficits and reach maximum level of function.       Plan:     Patient to be seen 5 x/week to address the above listed problems via self-care/home management, therapeutic activities, therapeutic exercises  · Plan of Care Expires: 06/23/20  · Plan of Care Reviewed with: patient    Subjective     Pain/Comfort:  · Pain Rating 1: 0/10(bed level)  · Location - Orientation 1: generalized  · Location 1: back  · Pain Addressed 1: Distraction, Nurse notified  · Pain Rating Post-Intervention 1: 8/10    Objective:     Communicated with: patient and his nurse prior to session.  Patient found with bed in chair position nurse present with bed alarm, peripheral IV, SCD, telemetry upon OT entry to room.  He was agreeable to OT treatment.    General Precautions: Standard, blind, seizure, fall   Orthopedic Precautions:N/A    Braces: N/A     Occupational Performance:     Bed Mobility:    · None    Functional Mobility/Transfers:  · None    Activities of Daily Living:  · Mod A self-feeding (manual assist for process and VC to stay on task) bed level, poor utensil manipulation  · Mod A G/H (wash face and hands) with assist for completeness and VC for where to clean.    [facility] 6 Click ADL: 12    Treatment & Education:  Cognition: orientation to situation and need for pt to try food before asking if he had a desire to eat specific items, memory assist to stay on task    Patient left with bed in chair position with all lines intact, call button in reach, bed alarm on and nurse notifiedEducation:      GOALS:   Multidisciplinary Problems     Occupational Therapy Goals        Problem: Occupational Therapy Goal    Goal Priority Disciplines Outcome Interventions   Occupational Therapy Goal     OT, PT/OT Ongoing, Progressing    Description:  Goals to be met by: 6/1/2020    Patient will increase functional independence with ADLs by performing:    Feeding with Set-up Assistance.  UE Dressing with Supervision.  Grooming while seated with Supervision.  Toileting from toilet with Supervision for hygiene and clothing management.   Supine to sit with Supervision.  Toilet transfer to toilet with Contact Guard Assistance.                      Time Tracking:     OT Date of Treatment: 05/31/20  OT Start Time: 0927  OT Stop Time: 1006  OT Total Time (min): 39 min    Billable Minutes:Self Care/Home Management 39    Bryce Pisano, LOTR  5/31/2020

## 2020-05-31 NOTE — PROGRESS NOTES
Pt A&O x 2, knows name and hospital. Spoke with daughter on the phone for awhile. VSS during shift. Pt took off the oxygen a couple of times, O2 sat decreased to 88% w/o O2. C/o abdom pain, declined pain medication until AM. Able to sleep a few hours. Cath care provided. Pt encouraged to change positions frequently. Wound care and skin care provided.

## 2020-05-31 NOTE — PROGRESS NOTES
[facility]  Cardiology  Progress Note    Patient Name: Anjel Soria  MRN: [id number]  Admission Date: 5/21/2020  Hospital Length of Stay: 9 days  Code Status: Full Code   Attending Physician: Von Moses MD   Primary Care Physician: [facility]  Expected Discharge Date:   Principal Problem:Gastrointestinal hemorrhage with melena    Subjective:     Brief HPI:    Anjel Soria is a 74 y.o.male with hypertension and hypercholesterolemia. He has had a cerebrovascular accident in the past making him weak in the left side. He has vascular dementia. He stays at a Nursing Home in [place], [place]. According to the patient he is wheelchair bound due to that his legs are too weak to be able to ambulate. In 2018 he was seen at [facility] due to chest pain. He had an elevation in troponins. He appears to have been transferred to [facility] for further cardiac evaluation but am not able to find any records from the stay at [facility].     He presented to [facility] on 5/21/2020 with melena. He was transferred to [facility] for evaluation. He underwent colonoscopy on 5/25/2020 revealing a cecal mass. The plan is partial colectomy on 5/26/2020. He has been noted to be bradycardic during the hospital stay with a heart rate in the 50's during the night 5/25/2020 - 5/26/2020. He has been receiving metoprolol 12.5 mg Q12. The patient denies any chest pain or shortness of breath. He is comfortable supine. I am asked to see him for his bradycardia and assess need for possible further cardiac evaluation prior to surgery.    Hospital Course:     5/26/2020: Partial colectomy.    Interval History:    He denies CP or SOB. Some po intake.    Review of Systems   Constitution: Positive for malaise/fatigue. Negative for chills and fever.   HENT: Negative for nosebleeds.    Eyes: Negative for vision loss in left eye and vision loss in right eye.   Cardiovascular: Negative for chest pain, leg  swelling, orthopnea, palpitations and paroxysmal nocturnal dyspnea.   Respiratory: Negative for cough, hemoptysis, shortness of breath, sputum production and wheezing.    Hematologic/Lymphatic: Negative for bleeding problem.   Skin: Negative for rash.   Musculoskeletal: Negative for myalgias.   Gastrointestinal: Positive for abdominal pain. Negative for nausea and vomiting.   Genitourinary: Negative for hematuria.   Neurological: Positive for focal weakness (left side) and weakness. Negative for dizziness, headaches, light-headedness and vertigo.   Psychiatric/Behavioral: Positive for memory loss. Negative for altered mental status. The patient is not nervous/anxious.    Allergic/Immunologic: Negative for persistent infections.     Objective:     Vital Signs (Most Recent):  Temp: 98.3 °F (36.8 °C) (05/31/20 0740)  Pulse: 70 (05/31/20 0800)  Resp: 18 (05/31/20 0740)  BP: 123/70 (05/31/20 0740)  SpO2: 95 % (05/31/20 0740) Vital Signs (24h Range):  Temp:  [98.2 °F (36.8 °C)-99.1 °F (37.3 °C)] 98.3 °F (36.8 °C)  Pulse:  [66-93] 70  Resp:  [14-18] 18  SpO2:  [93 %-97 %] 95 %  BP: (118-157)/(63-78) 123/70     Weight: 88.7 kg (195 lb 8.8 oz)  Body mass index is 25.8 kg/m².    SpO2: 95 %  O2 Device (Oxygen Therapy): nasal cannula      Intake/Output Summary (Last 24 hours) at 5/31/2020 1004  Last data filed at 5/31/2020 0700  Gross per 24 hour   Intake 320 ml   Output 650 ml   Net -330 ml       Lines/Drains/Airways     Drain                 Urethral Catheter 05/26/20 1120 Non-latex 16 Fr. 4 days          Peripheral Intravenous Line                 Midline Catheter Insertion/Assessment  - Single Lumen 05/25/20 0940 Left basilic vein (medial side of arm) 22g x 8cm 6 days                Physical Exam   Constitutional: He appears well-developed and well-nourished.   Cardiovascular: Normal rate, regular rhythm, S1 normal and S2 normal. Exam reveals gallop and S4.   Pulmonary/Chest: Effort normal and breath sounds normal.    Abdominal: Normal appearance. He exhibits no distension. There is tenderness.   Musculoskeletal:        Right ankle: He exhibits no swelling.        Left ankle: He exhibits no swelling.   Neurological: He is disoriented.   Psychiatric: Cognition and memory are impaired.       Current Medications:     atorvastatin  40 mg Oral QHS    divalproex  500 mg Oral Nightly    donepeziL  10 mg Oral Daily    dorzolamide  1 drop Both Eyes BID    FLUoxetine  20 mg Oral Daily    pantoprazole  40 mg Intravenous Daily    phenytoin  100 mg Oral BID    tamsulosin  1 capsule Oral Daily    travoprost  1 drop Both Eyes QHS    traZODone  50 mg Oral QHS     Current Laboratory Results:    Recent Results (from the past 24 hour(s))   CBC auto differential    Collection Time: 05/31/20  3:54 AM   Result Value Ref Range    WBC 8.14 3.90 - 12.70 K/uL    RBC 3.56 (L) 4.60 - 6.20 M/uL    Hemoglobin 8.5 (L) 14.0 - 18.0 g/dL    Hematocrit 28.2 (L) 40.0 - 54.0 %    Mean Corpuscular Volume 79 (L) 82 - 98 fL    Mean Corpuscular Hemoglobin 23.9 (L) 27.0 - 31.0 pg    Mean Corpuscular Hemoglobin Conc 30.1 (L) 32.0 - 36.0 g/dL    RDW 26.2 (H) 11.5 - 14.5 %    Platelets 242 150 - 350 K/uL    MPV 8.5 (L) 9.2 - 12.9 fL    Immature Granulocytes 0.4 0.0 - 0.5 %    Gran # (ANC) 5.8 1.8 - 7.7 K/uL    Immature Grans (Abs) 0.03 0.00 - 0.04 K/uL    Lymph # 1.2 1.0 - 4.8 K/uL    Mono # 1.0 0.3 - 1.0 K/uL    Eos # 0.1 0.0 - 0.5 K/uL    Baso # 0.02 0.00 - 0.20 K/uL    nRBC 0 0 /100 WBC    Gran% 71.2 38.0 - 73.0 %    Lymph% 14.5 (L) 18.0 - 48.0 %    Mono% 12.2 4.0 - 15.0 %    Eosinophil% 1.5 0.0 - 8.0 %    Basophil% 0.2 0.0 - 1.9 %    Aniso Slight     Poik Slight     Poly Occasional     Hypo Occasional     Ovalocytes Occasional     Target Cells Occasional     Tear Drop Cells Occasional     Spherocytes Occasional     Schistocytes Present     Differential Method Automated    Basic metabolic panel    Collection Time: 05/31/20  3:54 AM   Result Value Ref Range     Sodium 137 136 - 145 mmol/L    Potassium 4.0 3.5 - 5.1 mmol/L    Chloride 99 95 - 110 mmol/L    CO2 26 23 - 29 mmol/L    Glucose 102 70 - 110 mg/dL    BUN, Bld 17 8 - 23 mg/dL    Creatinine 0.9 0.5 - 1.4 mg/dL    Calcium 8.7 8.7 - 10.5 mg/dL    Anion Gap 12 8 - 16 mmol/L    eGFR if African American >60 >60 mL/min/1.73 m^2    eGFR if non African American >60 >60 mL/min/1.73 m^2     Current Imaging Results:    X-Ray Abdomen AP 1 View   Final Result      As above.  Close radiographic follow-up recommended.         Electronically signed by: Robert Ortiz MD   Date:    05/30/2020   Time:    12:34      X-Ray Chest 1 View   Final Result      As above         Electronically signed by: Magy Malave MD   Date:    05/22/2020   Time:    09:57        5/26/2020: Echo:    Normal left ventricular systolic function. The estimated ejection fraction is 65%.  No wall motion abnormalities.  Grade I (mild) left ventricular diastolic dysfunction consistent with impaired relaxation.  Normal right ventricular systolic function.  Mild aortic valve sclerosis.  The estimated PA systolic pressure is 9 mmHg.  Normal central venous pressure (3 mmHg).      Assessment and Plan:     Problem List:    Active Diagnoses:    Diagnosis Date Noted POA    PRINCIPAL PROBLEM:  Gastrointestinal hemorrhage with melena [K92.1] 05/21/2020 Yes    Malignant neoplasm of ascending colon [C18.2] 05/27/2020 Yes    Hiatal hernia [K44.9] 05/22/2020 Yes    Essential hypertension [I10] 05/21/2020 Yes    Gastroesophageal reflux disease [K21.9] 05/21/2020 Yes    Bradycardia [R00.1] 05/21/2020 Yes    Obstructive cardiomyopathy [I42.8] 05/21/2020 Yes    Bipolar affective disorder in remission [F31.70] 05/21/2020 Yes    Acute blood loss anemia [D62] 05/21/2020 Yes    BPH (benign prostatic hyperplasia) [N40.0] 05/09/2016 Yes    Coronary artery disease involving native coronary artery of native heart without angina pectoris [I25.10] 05/09/2016 Yes    Seizure  disorder as sequela of cerebrovascular accident [I69.398, G40.909] 05/09/2016 Not Applicable    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 Coronary Artery Disease              2018: Appears to have had NSTEMI. Evaluation at [facility]. Unable to obtain information.   5/26/2020: Echo: Normal left ventricular size and systolic function. EF 65%. Mild aortic valve sclerosis.              According to NH log appears to have been on aspirin and clopidogrel until recently.              Appears stable.     2. Sinus Bradycardia              HR in 50's.              On metoprolol 12.5 mg Q12.              5/26/2020: Metoprolol was discontinued.   Heart rate in 60-70 bpm range.     3. History of Cerebrovascular Accident              History of CVA causing weakness in left side.     4. Dementia              According to chart having vascular dementia.     5. Hypertension              Currently not on any antihypertensives.     6. Hypercholesterolemia              On atorvastatin 40 mg Q24.     7. Gastrointestinal Bleeding               5/21/2020: Presented with melena.              5/25/2020: Colonoscopy: Cecal mass.               5/26/2020:  Partial colectomy.   Dr. Von Jo.     VTE Risk Mitigation (From admission, onward)         Ordered     Reason for No Pharmacological VTE Prophylaxis  Once     Question:  Reasons:  Answer:  Active Bleeding    05/21/20 1613     IP VTE HIGH RISK PATIENT  Once      05/21/20 1613     Place sequential compression device  Until discontinued      05/21/20 1613     Place NICOLE hose  Until discontinued      05/21/20 1601                Richard Brown MD  Cardiology  [facility]

## 2020-05-31 NOTE — ASSESSMENT & PLAN NOTE
-Believed to be due to colon cancer as above.  -No known episodes of melena or hematochezia since 5/22/2020    -On admit Hb 10.6.  Trended down but stable today and  remains above transfusion threshold and no obvious bleeding.  -Follow H/H daily and transfuse Hgb < 7

## 2020-05-31 NOTE — PLAN OF CARE
Problem: Occupational Therapy Goal  Goal: Occupational Therapy Goal  Description  Goals to be met by: 6/1/2020    Patient will increase functional independence with ADLs by performing:    Feeding with Set-up Assistance.  UE Dressing with Supervision.  Grooming while seated with Supervision.  Toileting from toilet with Supervision for hygiene and clothing management.   Supine to sit with Supervision.  Toilet transfer to toilet with Contact Guard Assistance.     Outcome: Ongoing, Progressing   The patient was eager for breakfast.  He was Mod a self-feeding (spillage and assist needed for 2-handed approach, VC for memory to stay on task) and Mod A G/H (wash face and hands (assist for completeness) this morning  He completed about 570% of the meal.  Ester Roberts, Bryce, LOTR 5/31/2020

## 2020-05-31 NOTE — PLAN OF CARE
Problem: Physical Therapy Goal  Goal: Physical Therapy Goal  Description  Goals to be met by: 6/25/2020    Patient will perform the following to increase strength, improve mobility, and return to prior level of function:    1. Supine <> sit with SBA.  2. Sit<>stand with SBA with least restrictive assistive device.  3. Gait x 150 feet with SBA with least restrictive assistive device.   Outcome: Ongoing, Progressing     Due to increased assistance patient is requiring for all mobility after hemicolectomy, d/c rec changed to SNF before transition to NH. Currently requires mod-maxA for supine<>sit and sit<>stand, able to take steps with RW and min-modA with tactile and verbal cueing for direction due to impaired vision.

## 2020-05-31 NOTE — ASSESSMENT & PLAN NOTE
-Mr. Soria was admitted to inpatient status  -GI consulted and performed EGD 5/22/2020 without evidence for source of bleeding.  Colonoscopy performed on 5/25/2020 which showed a fungating mass of ascending colon and diverticulosis.  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  -H/H trended down but remains stable today and is above transfusion threshold.  Continue to monitor and transfuse for Hb less than 7.  -Today has increased abdominal distension but states passing gas and no nausea.  Bowel sounds better today and still minimal TTP.  -Continue incentive spirometry as able.    -KUB showed dilated gas filled loop of small bowel and a distended stomach.  Suspect post-operative ileus.  -Discussed with Dr. Jo.  Ultimately may need NGT but will attempt mobilization first.  Patient hasn't been moving much.  Will order for him to get out of bed at least twice daily.

## 2020-05-31 NOTE — SUBJECTIVE & OBJECTIVE
Interval History: No acute events overnight.  Looks about the same today.  States he is passing gas, but again has increasing abdominal distention despite strong bowel sounds.  Reports mild nausea but no vomiting or BMs.  No cp or sob.    Review of Systems   Reason unable to perform ROS: can provide some history.   Constitutional: Negative for fever.   HENT: Negative for congestion, sinus pressure and sneezing.    Eyes: Positive for visual disturbance (glaucoma/macular degeneration and can see shadows).   Respiratory: Negative for shortness of breath.    Cardiovascular: Negative for chest pain.   Gastrointestinal: Positive for abdominal distention and nausea. Negative for abdominal pain, blood in stool, diarrhea and vomiting.   Endocrine: Negative for cold intolerance and heat intolerance.   Genitourinary: Negative for difficulty urinating and dysuria.   Musculoskeletal: Negative for arthralgias and back pain.   Neurological: Negative for headaches.   Psychiatric/Behavioral: Negative for agitation and behavioral problems.     Objective:     Vital Signs (Most Recent):  Temp: 98.3 °F (36.8 °C) (05/31/20 0740)  Pulse: 70 (05/31/20 1000)  Resp: 18 (05/31/20 0740)  BP: 123/70 (05/31/20 0740)  SpO2: 95 % (05/31/20 0740) Vital Signs (24h Range):  Temp:  [98.2 °F (36.8 °C)-99.1 °F (37.3 °C)] 98.3 °F (36.8 °C)  Pulse:  [66-93] 70  Resp:  [16-18] 18  SpO2:  [93 %-96 %] 95 %  BP: (123-157)/(63-78) 123/70     Weight: 88.7 kg (195 lb 8.8 oz)  Body mass index is 25.8 kg/m².    Intake/Output Summary (Last 24 hours) at 5/31/2020 1154  Last data filed at 5/31/2020 0700  Gross per 24 hour   Intake 120 ml   Output 650 ml   Net -530 ml      Physical Exam   Constitutional: He appears well-developed and well-nourished. No distress.   HENT:   Head: Normocephalic and atraumatic.   Eyes: Conjunctivae are normal.   Blindness bilaterally and can see shapes with peripheral vision   Neck: Neck supple.   Cardiovascular: Normal rate and regular  rhythm.   No murmur heard.  Pulmonary/Chest: Effort normal. No respiratory distress. He has no decreased breath sounds. He has no wheezes.   Abdominal: He exhibits distension. Bowel sounds are increased. There is no tenderness. There is no rigidity.   More distended today but good bowel sounds, minimally ttp, incision is exposed and is c/d/i    Musculoskeletal: Normal range of motion. He exhibits no edema.   Neurological: He is alert. He has normal strength and normal reflexes. He is disoriented.   Oriented to self, knows he's in a hospital, slightly slurred speech, moves all extremities   Skin: Skin is warm and dry.   Psychiatric: He has a normal mood and affect. His speech is normal and behavior is normal. Cognition and memory are impaired.     Significant Labs: All pertinent labs within the past 24 hours have been reviewed.    Significant Imaging: I have reviewed all pertinent imaging studies within the last 24 hours.

## 2020-05-31 NOTE — PROGRESS NOTES
Temperature 98.9  Vital signs stable  No bowel movement  Abdomen-protuberant, incisional tenderness, active bowel sounds, dressing clean dry and intact    Impression/plan  Stable postop, await return of full bowel function  Try clear liquids

## 2020-06-01 PROBLEM — R53.81 DEBILITY: Status: ACTIVE | Noted: 2020-06-01

## 2020-06-01 LAB
ANION GAP SERPL CALC-SCNC: 12 MMOL/L (ref 8–16)
BASOPHILS # BLD AUTO: 0.02 K/UL (ref 0–0.2)
BASOPHILS NFR BLD: 0.2 % (ref 0–1.9)
BUN SERPL-MCNC: 13 MG/DL (ref 8–23)
CALCIUM SERPL-MCNC: 9 MG/DL (ref 8.7–10.5)
CHLORIDE SERPL-SCNC: 97 MMOL/L (ref 95–110)
CO2 SERPL-SCNC: 29 MMOL/L (ref 23–29)
CREAT SERPL-MCNC: 0.8 MG/DL (ref 0.5–1.4)
DIFFERENTIAL METHOD: ABNORMAL
EOSINOPHIL # BLD AUTO: 0.2 K/UL (ref 0–0.5)
EOSINOPHIL NFR BLD: 1.8 % (ref 0–8)
ERYTHROCYTE [DISTWIDTH] IN BLOOD BY AUTOMATED COUNT: 25.7 % (ref 11.5–14.5)
EST. GFR  (AFRICAN AMERICAN): >60 ML/MIN/1.73 M^2
EST. GFR  (NON AFRICAN AMERICAN): >60 ML/MIN/1.73 M^2
GLUCOSE SERPL-MCNC: 116 MG/DL (ref 70–110)
HCT VFR BLD AUTO: 28.3 % (ref 40–54)
HGB BLD-MCNC: 8.6 G/DL (ref 14–18)
IMM GRANULOCYTES # BLD AUTO: 0.06 K/UL (ref 0–0.04)
IMM GRANULOCYTES NFR BLD AUTO: 0.5 % (ref 0–0.5)
LYMPHOCYTES # BLD AUTO: 1 K/UL (ref 1–4.8)
LYMPHOCYTES NFR BLD: 8.9 % (ref 18–48)
MCH RBC QN AUTO: 24.1 PG (ref 27–31)
MCHC RBC AUTO-ENTMCNC: 30.4 G/DL (ref 32–36)
MCV RBC AUTO: 79 FL (ref 82–98)
MONOCYTES # BLD AUTO: 0.9 K/UL (ref 0.3–1)
MONOCYTES NFR BLD: 8.6 % (ref 4–15)
NEUTROPHILS # BLD AUTO: 8.8 K/UL (ref 1.8–7.7)
NEUTROPHILS NFR BLD: 80 % (ref 38–73)
NRBC BLD-RTO: 0 /100 WBC
PLATELET # BLD AUTO: 222 K/UL (ref 150–350)
PMV BLD AUTO: 8.1 FL (ref 9.2–12.9)
POTASSIUM SERPL-SCNC: 3.5 MMOL/L (ref 3.5–5.1)
RBC # BLD AUTO: 3.57 M/UL (ref 4.6–6.2)
SODIUM SERPL-SCNC: 138 MMOL/L (ref 136–145)
WBC # BLD AUTO: 10.97 K/UL (ref 3.9–12.7)

## 2020-06-01 PROCEDURE — 11000001 HC ACUTE MED/SURG PRIVATE ROOM

## 2020-06-01 PROCEDURE — 63600175 PHARM REV CODE 636 W HCPCS: Performed by: HOSPITALIST

## 2020-06-01 PROCEDURE — 97802 MEDICAL NUTRITION INDIV IN: CPT

## 2020-06-01 PROCEDURE — 99233 PR SUBSEQUENT HOSPITAL CARE,LEVL III: ICD-10-PCS | Mod: ,,, | Performed by: HOSPITALIST

## 2020-06-01 PROCEDURE — 80048 BASIC METABOLIC PNL TOTAL CA: CPT

## 2020-06-01 PROCEDURE — 99233 PR SUBSEQUENT HOSPITAL CARE,LEVL III: ICD-10-PCS | Mod: ,,, | Performed by: INTERNAL MEDICINE

## 2020-06-01 PROCEDURE — 25000003 PHARM REV CODE 250: Performed by: INTERNAL MEDICINE

## 2020-06-01 PROCEDURE — 85025 COMPLETE CBC W/AUTO DIFF WBC: CPT

## 2020-06-01 PROCEDURE — 27000221 HC OXYGEN, UP TO 24 HOURS

## 2020-06-01 PROCEDURE — C9113 INJ PANTOPRAZOLE SODIUM, VIA: HCPCS | Performed by: HOSPITALIST

## 2020-06-01 PROCEDURE — 97530 THERAPEUTIC ACTIVITIES: CPT

## 2020-06-01 PROCEDURE — 99233 SBSQ HOSP IP/OBS HIGH 50: CPT | Mod: ,,, | Performed by: INTERNAL MEDICINE

## 2020-06-01 PROCEDURE — 99233 SBSQ HOSP IP/OBS HIGH 50: CPT | Mod: ,,, | Performed by: HOSPITALIST

## 2020-06-01 PROCEDURE — 25000003 PHARM REV CODE 250: Performed by: HOSPITALIST

## 2020-06-01 PROCEDURE — 94761 N-INVAS EAR/PLS OXIMETRY MLT: CPT

## 2020-06-01 PROCEDURE — 97535 SELF CARE MNGMENT TRAINING: CPT

## 2020-06-01 PROCEDURE — 36415 COLL VENOUS BLD VENIPUNCTURE: CPT

## 2020-06-01 PROCEDURE — 25000003 PHARM REV CODE 250: Performed by: NURSE PRACTITIONER

## 2020-06-01 RX ADMIN — DORZOLAMIDE HYDROCHLORIDE 1 DROP: 20 SOLUTION/ DROPS OPHTHALMIC at 09:06

## 2020-06-01 RX ADMIN — PHENYTOIN SODIUM 100 MG: 100 CAPSULE ORAL at 09:06

## 2020-06-01 RX ADMIN — DONEPEZIL HYDROCHLORIDE 10 MG: 5 TABLET, FILM COATED ORAL at 08:06

## 2020-06-01 RX ADMIN — PHENYTOIN SODIUM 100 MG: 100 CAPSULE ORAL at 08:06

## 2020-06-01 RX ADMIN — DORZOLAMIDE HYDROCHLORIDE 1 DROP: 20 SOLUTION/ DROPS OPHTHALMIC at 08:06

## 2020-06-01 RX ADMIN — PANTOPRAZOLE SODIUM 40 MG: 40 INJECTION, POWDER, LYOPHILIZED, FOR SOLUTION INTRAVENOUS at 08:06

## 2020-06-01 RX ADMIN — ATORVASTATIN CALCIUM 40 MG: 20 TABLET, FILM COATED ORAL at 09:06

## 2020-06-01 RX ADMIN — TRAZODONE HYDROCHLORIDE 50 MG: 50 TABLET ORAL at 09:06

## 2020-06-01 RX ADMIN — DIVALPROEX SODIUM 500 MG: 250 TABLET, DELAYED RELEASE ORAL at 09:06

## 2020-06-01 RX ADMIN — TRAVOPROST 1 DROP: 0.04 SOLUTION/ DROPS OPHTHALMIC at 09:06

## 2020-06-01 RX ADMIN — FLUOXETINE 20 MG: 20 CAPSULE ORAL at 08:06

## 2020-06-01 RX ADMIN — TAMSULOSIN HYDROCHLORIDE 0.4 MG: 0.4 CAPSULE ORAL at 08:06

## 2020-06-01 NOTE — PLAN OF CARE
Problem: Physical Therapy Goal  Goal: Physical Therapy Goal  Description  Goals to be met by: 6/25/2020    Patient will perform the following to increase strength, improve mobility, and return to prior level of function:    1. Supine <> sit with SBA.  2. Sit<>stand with SBA with least restrictive assistive device.  3. Gait x 150 feet with SBA with least restrictive assistive device.   Outcome: Ongoing, Progressing    Pt tolerated treatment well with no adverse reactions. Pt is progressing toward meeting goals. Pt performed supine > sit with max A, sit <> stand with mod A and rolling walker, and transfer to recliner with min A and rolling walker. Demonstrated improved activity tolerance. Pt continues to be limited by abdominal pain. PT will continue to follow and progress goals as tolerated. Recommend discharge to SNF.

## 2020-06-01 NOTE — PLAN OF CARE
Recommendations    1. If diet unable to be upgraded consider TPN.   -Recommend Clinimix-E 5/20 @ 75ml/hr with 250ml 20% lipids.   -Provides 1584 kcal (88% EEN) and 90gm protein (100% EPN).      Goals: Pt to meet >75% of EEN/EPN.  Nutrition Goal Status: new

## 2020-06-01 NOTE — PROGRESS NOTES
[facility] Hospital Medicine  Progress Note    Patient Name: Anjel Soria  MRN: [id number]  Patient Class: IP- Inpatient   Admission Date: 5/21/2020  Length of Stay: 10 days  Attending Physician: Von Moses MD  Primary Care Provider: [facility]        Subjective:     Principal Problem:Gastrointestinal hemorrhage with melena        HPI:  Anjel Soria is a 74 year old male who has medical history of anemia related to lower GI bleeding, coronary artery disease, vascular dementia, prior stroke with residual left sided weakness, 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  He is currently a resident of the [facility].   Per medical record, the patient with recent prior history of severe anemia requiring transfusion on April 9, 2020.  During that time, he was evaluated  At [facility] and found to be hemoccult positive and CT scan of abdomen was unremarkable.  He was subsequently discharged back to his nursing home with plan for outpatient follow up with GI.  However, the patient was unable to see GI as outpatient.   Today, patient presented to the [facility] ED where initial work up revealed stable hemoglobin/hematocrit 12.0/40.4. Otherwise, labs were unremarkable and COVID screen was negative. Of note, EKG in ED today shows sinus bradycardia and asymtpomatic with heart rate in upper 40's and low 50's.   He was to be discharged back to the nursing home, but patient had episode of melena and decision to transfer to [facility] for evaluation.     On arrival to the [facility], the patient had one small melanotic stool.  Given dementia, the patient is unable to provide detailed history.  Per nursing home medication reconciliation, the patient has not been on aspirin or Plavix recently and is not currently on any other type of blood thinners.  GI will be consulted and roge continue to trend  hemoglobin/hematocrit.     Overview/Hospital Course:  After admission, Gastroenterology consulted and EGD without evidence for bleeding.  Colonoscopy 5/25/2020 noted fungating mass ascending colon and diverticulosis in the sigmoid colon and in the descending colon. Pathology pending.   No further episodes of hematochezia/melena and stable hemoglobin/hematocrit.  General Surgery consulted for evaluation.     Interval History: No acute events overnight.  Today sitting in bedside chair and looks more comfortable.  States he is passing gas and abdomen a bit less distended.  Abd still with diffuse ttp.  No cp or sob.    Review of Systems   Reason unable to perform ROS: can provide some history.   Constitutional: Negative for fever.   HENT: Negative for congestion, sinus pressure and sneezing.    Eyes: Positive for visual disturbance (glaucoma/macular degeneration and can see shadows).   Respiratory: Negative for shortness of breath.    Cardiovascular: Negative for chest pain.   Gastrointestinal: Positive for abdominal distention and nausea. Negative for abdominal pain, blood in stool, diarrhea and vomiting.   Endocrine: Negative for cold intolerance and heat intolerance.   Genitourinary: Negative for difficulty urinating and dysuria.   Musculoskeletal: Negative for arthralgias and back pain.   Neurological: Negative for headaches.   Psychiatric/Behavioral: Negative for agitation and behavioral problems.     Objective:     Vital Signs (Most Recent):  Temp: 98.7 °F (37.1 °C) (06/01/20 0721)  Pulse: 76 (06/01/20 1000)  Resp: 18 (06/01/20 0721)  BP: (!) 142/71 (06/01/20 0721)  SpO2: 99 % (06/01/20 0721) Vital Signs (24h Range):  Temp:  [96.8 °F (36 °C)-98.7 °F (37.1 °C)] 98.7 °F (37.1 °C)  Pulse:  [68-92] 76  Resp:  [16-18] 18  SpO2:  [96 %-99 %] 99 %  BP: (124-143)/(61-71) 142/71     Weight: 88.7 kg (195 lb 8.8 oz)  Body mass index is 25.8 kg/m².    Intake/Output Summary (Last 24 hours) at 6/1/2020 1013  Last data filed at  5/31/2020 1835  Gross per 24 hour   Intake 200 ml   Output 200 ml   Net 0 ml      Physical Exam   Constitutional: He appears well-developed and well-nourished. No distress.   HENT:   Head: Normocephalic and atraumatic.   Eyes: Conjunctivae are normal.   Blindness bilaterally and can see shapes with peripheral vision   Neck: Neck supple.   Cardiovascular: Normal rate and regular rhythm.   No murmur heard.  Pulmonary/Chest: Effort normal. No respiratory distress. He has no decreased breath sounds. He has no wheezes.   Abdominal: He exhibits distension. Bowel sounds are increased. There is no tenderness. There is no rigidity.   A bit less distended today, good bowel sounds, mildly ttp, incision is bandaged.   Musculoskeletal: Normal range of motion. He exhibits no edema.   Neurological: He is alert. He has normal strength and normal reflexes. He is disoriented.   Oriented to self, knows he's in a hospital, slightly slurred speech, moves all extremities, diffusely weak.   Skin: Skin is warm and dry.   Psychiatric: He has a normal mood and affect. His speech is normal and behavior is normal. Cognition and memory are impaired.     Significant Labs: All pertinent labs within the past 24 hours have been reviewed.    Significant Imaging: I have reviewed all pertinent imaging studies within the last 24 hours.      Assessment/Plan:      * Gastrointestinal hemorrhage with melena  -Mr. Soria was admitted to inpatient status  -GI consulted and performed EGD 5/22/2020 without evidence for source of bleeding.  Colonoscopy performed on 5/25/2020 which showed a fungating mass of ascending colon and diverticulosis.  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  -H/H trended down but remains stable today and is above transfusion threshold.  Continue to monitor and transfuse for Hb less than  7.  -KUB showed dilated gas filled loop of small bowel and a distended stomach.  Suspect post-operative ileus.  -Today abdominal distension is a bit improved.  Still with abdominal TTP but states passing gas and no nausea.  He has strong bowel sounds.  -Continue incentive spirometry as able.    -Discussed with Dr. Jo.  Ultimately may need NGT but will continue attempts of mobilization first.  Await return of bowel function.    Malignant neoplasm of ascending colon  -Treatment as above.  -No gross metastatic lesions or enlarged lymph nodes observed during surgery on 5/26.  -Await final path    Ileus following gastrointestinal surgery  -Treatment as above.      Acute blood loss anemia  -Believed to be due to colon cancer as above.  -No known episodes of melena or hematochezia since 5/22/2020    -On admit Hb 10.6.  Trended down but stable today and  remains above transfusion threshold and no obvious bleeding.  -Follow H/H daily and transfuse Hgb < 7    Coronary artery disease involving native coronary artery of native heart without angina pectoris  -History noted  -Previously on aspirin and plavix, but per NH record was not on these  -Continue home statin  -Continue home metoprolol with hold parameters for bradycardia.    Obstructive cardiomyopathy  -History noted per chart  -Echo 5/26 showed EF 65%, grade I diastolic dysfunction and no wall motion abnormalities  -Appears well compensated    Bradycardia  -History of sinus jessica noted.  -Did have bradycardia at times early in his stay and home beta-blocker held briefly.  -At home takes metoprolol 12.5mg bid.  -Continue this but with hold parameters for hr less than 60.  No bradycardia noted in last 24 hours.  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  Per NH records, levels drawn q6 months and WNL  -Dilantin and Depakote levels non-toxic  -Continue home medications  -Continue seizure precautions    Subcortical vascular dementia without behavioral disturbance  -Appears stable and moderate  -Continue home dose of Aricept 10 mg daily  -Continue delirium precautions  -Palliative Care consult for symptom management and NP at home program referral.  Patient is full code.    Bipolar affective disorder in remission  -History noted  -Mood appears stable  -Continue Depakote 500 mg daily, Prozac 20 mg daily.  -He was a bit sleepier on 5/29 so I decreased trazodone to 50 mg nightly with good results.    Debility  -PT/OT consulted and recommend SNF placement at discharge  -Attempted to call son, Miki Soria, to discuss.  Left message with my personal number.  -As SW to discuss with family as well.      Hiatal hernia  -Noted on EGD 5/22/2020    Gastroesophageal reflux disease  -History noted  -S/p EGD this stay with normal stomach, duodenum and noted LE ring  -Continue PPI daily    BPH (benign prostatic hyperplasia)  -No noted symptoms  -Continue home dose of Flomax    End-stage glaucoma  -History noted.  He is legally blind  -Provide assistance as needed  -Continue home medications      VTE Risk Mitigation (From admission, onward)         Ordered     Reason for No Pharmacological VTE Prophylaxis  Once     Question:  Reasons:  Answer:  Active Bleeding    05/21/20 1613     IP VTE HIGH RISK PATIENT  Once      05/21/20 1613     Place sequential compression device  Until discontinued      05/21/20 1613     Place NCIOLE hose  Until discontinued      05/21/20 1601                       Von Moses MD  Department of Hospital Medicine   [facility]

## 2020-06-01 NOTE — PROGRESS NOTES
[facility]  Cardiology  Progress Note    Patient Name: Anjel Soria  MRN: [id number]  Admission Date: 5/21/2020  Hospital Length of Stay: 10 days  Code Status: Full Code   Attending Physician: Von Moses MD   Primary Care Physician: [facility]  Expected Discharge Date:   Principal Problem:Gastrointestinal hemorrhage with melena    Subjective:     Brief HPI:    Anjel Soria is a 74 y.o.male with hypertension and hypercholesterolemia. He has had a cerebrovascular accident in the past making him weak in the left side. He has vascular dementia. He stays at a Nursing Home in [place], [place]. According to the patient he is wheelchair bound due to that his legs are too weak to be able to ambulate. In 2018 he was seen at [facility] due to chest pain. He had an elevation in troponins. He appears to have been transferred to [facility] for further cardiac evaluation but am not able to find any records from the stay at [facility].     He presented to [place] on 5/21/2020 with melena. He was transferred to [facility] for evaluation. He underwent colonoscopy on 5/25/2020 revealing a cecal mass. The plan is partial colectomy on 5/26/2020. He has been noted to be bradycardic during the hospital stay with a heart rate in the 50's during the night 5/25/2020 - 5/26/2020. He has been receiving metoprolol 12.5 mg Q12. The patient denies any chest pain or shortness of breath. He is comfortable supine. I am asked to see him for his bradycardia and assess need for possible further cardiac evaluation prior to surgery.    Hospital Course:     5/26/2020: Partial colectomy.    Interval History:    He denies CP or SOB. Some po intake. Still some abdominal pain.    Review of Systems   Constitution: Positive for malaise/fatigue. Negative for chills and fever.   HENT: Negative for nosebleeds.    Eyes: Negative for vision loss in left eye and vision loss in right eye.   Cardiovascular:  Negative for chest pain, leg swelling, orthopnea, palpitations and paroxysmal nocturnal dyspnea.   Respiratory: Negative for cough, hemoptysis, shortness of breath, sputum production and wheezing.    Hematologic/Lymphatic: Negative for bleeding problem.   Skin: Negative for rash.   Musculoskeletal: Negative for myalgias.   Gastrointestinal: Positive for abdominal pain. Negative for nausea and vomiting.   Genitourinary: Negative for hematuria.   Neurological: Positive for focal weakness (left side) and weakness. Negative for dizziness, headaches, light-headedness and vertigo.   Psychiatric/Behavioral: Positive for memory loss. Negative for altered mental status. The patient is not nervous/anxious.    Allergic/Immunologic: Negative for persistent infections.     Objective:     Vital Signs (Most Recent):  Temp: 98.7 °F (37.1 °C) (06/01/20 0721)  Pulse: 71 (06/01/20 0800)  Resp: 18 (06/01/20 0721)  BP: (!) 142/71 (06/01/20 0721)  SpO2: 99 % (06/01/20 0721) Vital Signs (24h Range):  Temp:  [96.8 °F (36 °C)-98.7 °F (37.1 °C)] 98.7 °F (37.1 °C)  Pulse:  [68-92] 71  Resp:  [16-18] 18  SpO2:  [96 %-99 %] 99 %  BP: (124-143)/(61-71) 142/71     Weight: 88.7 kg (195 lb 8.8 oz)  Body mass index is 25.8 kg/m².    SpO2: 99 %  O2 Device (Oxygen Therapy): nasal cannula      Intake/Output Summary (Last 24 hours) at 6/1/2020 0927  Last data filed at 5/31/2020 1835  Gross per 24 hour   Intake 200 ml   Output 200 ml   Net 0 ml       Lines/Drains/Airways     Drain                 Urethral Catheter 05/26/20 1120 Non-latex 16 Fr. 5 days          Peripheral Intravenous Line                 Midline Catheter Insertion/Assessment  - Single Lumen 05/25/20 0940 Left basilic vein (medial side of arm) 22g x 8cm 6 days                Physical Exam   Constitutional: He appears well-developed and well-nourished.   Cardiovascular: Normal rate, regular rhythm, S1 normal and S2 normal. Exam reveals gallop and S4.   Pulmonary/Chest: Effort normal and  breath sounds normal.   Abdominal: Normal appearance. He exhibits no distension. There is tenderness.   Musculoskeletal:        Right ankle: He exhibits no swelling.        Left ankle: He exhibits no swelling.   Neurological: He is disoriented.   Psychiatric: Cognition and memory are impaired.       Current Medications:     atorvastatin  40 mg Oral QHS    divalproex  500 mg Oral Nightly    donepeziL  10 mg Oral Daily    dorzolamide  1 drop Both Eyes BID    FLUoxetine  20 mg Oral Daily    pantoprazole  40 mg Intravenous Daily    phenytoin  100 mg Oral BID    tamsulosin  1 capsule Oral Daily    travoprost  1 drop Both Eyes QHS    traZODone  50 mg Oral QHS     Current Laboratory Results:    No results found for this or any previous visit (from the past 24 hour(s)).  Current Imaging Results:    X-Ray Abdomen AP 1 View   Final Result      As above.  Close radiographic follow-up recommended.         Electronically signed by: Robert Ortiz MD   Date:    05/30/2020   Time:    12:34      X-Ray Chest 1 View   Final Result      As above         Electronically signed by: Magy Malave MD   Date:    05/22/2020   Time:    09:57        5/26/2020: Echo:    Normal left ventricular systolic function. The estimated ejection fraction is 65%.  No wall motion abnormalities.  Grade I (mild) left ventricular diastolic dysfunction consistent with impaired relaxation.  Normal right ventricular systolic function.  Mild aortic valve sclerosis.  The estimated PA systolic pressure is 9 mmHg.  Normal central venous pressure (3 mmHg).      Assessment and Plan:     Problem List:    Active Diagnoses:    Diagnosis Date Noted POA    PRINCIPAL PROBLEM:  Gastrointestinal hemorrhage with melena [K92.1] 05/21/2020 Yes    Ileus following gastrointestinal surgery [K91.89, K56.7] 05/31/2020 No    Malignant neoplasm of ascending colon [C18.2] 05/27/2020 Yes    Hiatal hernia [K44.9] 05/22/2020 Yes    Essential hypertension [I10] 05/21/2020  Yes    Gastroesophageal reflux disease [K21.9] 05/21/2020 Yes    Bradycardia [R00.1] 05/21/2020 Yes    Obstructive cardiomyopathy [I42.8] 05/21/2020 Yes    Bipolar affective disorder in remission [F31.70] 05/21/2020 Yes    Acute blood loss anemia [D62] 05/21/2020 Yes    BPH (benign prostatic hyperplasia) [N40.0] 05/09/2016 Yes    Coronary artery disease involving native coronary artery of native heart without angina pectoris [I25.10] 05/09/2016 Yes    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 [I69.398, G40.909] 05/09/2016 Not Applicable    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 Coronary Artery Disease              2018: Appears to have had NSTEMI. Evaluation at [facility]. Unable to obtain information.   5/26/2020: Echo: Normal left ventricular size and systolic function. EF 65%. Mild aortic valve sclerosis.              According to NH log appears to have been on aspirin and clopidogrel until recently.              Appears stable.     2. Sinus Bradycardia              HR in 50's.              On metoprolol 12.5 mg Q12.              5/26/2020: Metoprolol was discontinued.   Heart rate in 60-70 bpm range.     3. History of Cerebrovascular Accident              History of CVA causing weakness in left side.     4. Dementia              According to chart having vascular dementia.     5. Hypertension              Currently not on any antihypertensives.     6. Hypercholesterolemia              On atorvastatin 40 mg Q24.     7. Gastrointestinal Bleeding               5/21/2020: Presented with melena.              5/25/2020: Colonoscopy: Cecal mass.               5/26/2020:  Partial colectomy.   Dr. Von Jo.     VTE Risk Mitigation  (From admission, onward)         Ordered     Reason for No Pharmacological VTE Prophylaxis  Once     Question:  Reasons:  Answer:  Active Bleeding    05/21/20 1613     IP VTE HIGH RISK PATIENT  Once      05/21/20 1613     Place sequential compression device  Until discontinued      05/21/20 1613     Place NICOLE hose  Until discontinued      05/21/20 1601                Richard Brown MD  Cardiology  [facility]

## 2020-06-01 NOTE — SUBJECTIVE & OBJECTIVE
Interval History: No acute events overnight.  Today sitting in bedside chair and looks more comfortable.  States he is passing gas and abdomen a bit less distended.  Abd still with diffuse ttp.  No cp or sob.    Review of Systems   Reason unable to perform ROS: can provide some history.   Constitutional: Negative for fever.   HENT: Negative for congestion, sinus pressure and sneezing.    Eyes: Positive for visual disturbance (glaucoma/macular degeneration and can see shadows).   Respiratory: Negative for shortness of breath.    Cardiovascular: Negative for chest pain.   Gastrointestinal: Positive for abdominal distention and nausea. Negative for abdominal pain, blood in stool, diarrhea and vomiting.   Endocrine: Negative for cold intolerance and heat intolerance.   Genitourinary: Negative for difficulty urinating and dysuria.   Musculoskeletal: Negative for arthralgias and back pain.   Neurological: Negative for headaches.   Psychiatric/Behavioral: Negative for agitation and behavioral problems.     Objective:     Vital Signs (Most Recent):  Temp: 98.7 °F (37.1 °C) (06/01/20 0721)  Pulse: 76 (06/01/20 1000)  Resp: 18 (06/01/20 0721)  BP: (!) 142/71 (06/01/20 0721)  SpO2: 99 % (06/01/20 0721) Vital Signs (24h Range):  Temp:  [96.8 °F (36 °C)-98.7 °F (37.1 °C)] 98.7 °F (37.1 °C)  Pulse:  [68-92] 76  Resp:  [16-18] 18  SpO2:  [96 %-99 %] 99 %  BP: (124-143)/(61-71) 142/71     Weight: 88.7 kg (195 lb 8.8 oz)  Body mass index is 25.8 kg/m².    Intake/Output Summary (Last 24 hours) at 6/1/2020 1013  Last data filed at 5/31/2020 1835  Gross per 24 hour   Intake 200 ml   Output 200 ml   Net 0 ml      Physical Exam   Constitutional: He appears well-developed and well-nourished. No distress.   HENT:   Head: Normocephalic and atraumatic.   Eyes: Conjunctivae are normal.   Blindness bilaterally and can see shapes with peripheral vision   Neck: Neck supple.   Cardiovascular: Normal rate and regular rhythm.   No murmur  heard.  Pulmonary/Chest: Effort normal. No respiratory distress. He has no decreased breath sounds. He has no wheezes.   Abdominal: He exhibits distension. Bowel sounds are increased. There is no tenderness. There is no rigidity.   A bit less distended today, good bowel sounds, mildly ttp, incision is bandaged.   Musculoskeletal: Normal range of motion. He exhibits no edema.   Neurological: He is alert. He has normal strength and normal reflexes. He is disoriented.   Oriented to self, knows he's in a hospital, slightly slurred speech, moves all extremities, diffusely weak.   Skin: Skin is warm and dry.   Psychiatric: He has a normal mood and affect. His speech is normal and behavior is normal. Cognition and memory are impaired.     Significant Labs: All pertinent labs within the past 24 hours have been reviewed.    Significant Imaging: I have reviewed all pertinent imaging studies within the last 24 hours.

## 2020-06-01 NOTE — ASSESSMENT & PLAN NOTE
-PT/OT consulted and recommend SNF placement at discharge  -Attempted to call son, Miki Soria, to discuss.  Left message with my personal number.  -As SW to discuss with family as well.

## 2020-06-01 NOTE — ASSESSMENT & PLAN NOTE
-Mr. Soria was admitted to inpatient status  -GI consulted and performed EGD 5/22/2020 without evidence for source of bleeding.  Colonoscopy performed on 5/25/2020 which showed a fungating mass of ascending colon and diverticulosis.  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  -H/H trended down but remains stable today and is above transfusion threshold.  Continue to monitor and transfuse for Hb less than 7.  -KUB showed dilated gas filled loop of small bowel and a distended stomach.  Suspect post-operative ileus.  -Today abdominal distension is a bit improved.  Still with abdominal TTP but states passing gas and no nausea.  He has strong bowel sounds.  -Continue incentive spirometry as able.    -Discussed with Dr. Jo.  Ultimately may need NGT but will continue attempts of mobilization first.  Await return of bowel function.

## 2020-06-01 NOTE — PT/OT/SLP PROGRESS
Physical Therapy Treatment    Patient Name:  Anjel Soria   MRN:  [id number]    Recommendations:     Discharge Recommendations:  nursing facility, skilled   Discharge Equipment Recommendations: (Defer to SNF)   Barriers to discharge: Current functional level    Assessment:     Anjel Soria is a 74 y.o. male admitted with a medical diagnosis of Gastrointestinal hemorrhage with melena.  He presents with the following impairments/functional limitations:  weakness, impaired endurance, impaired self care skills, impaired functional mobilty, gait instability, impaired balance, decreased lower extremity function, visual deficits, pain, impaired cardiopulmonary response to activity.    Pt tolerated treatment well with no adverse reactions. Pt is progressing toward meeting goals. Pt performed supine > sit with max A, sit <> stand with mod A and rolling walker, and transfer to recliner with min A and rolling walker. Demonstrated improved activity tolerance. Pt continues to be limited by abdominal pain. PT will continue to follow and progress goals as tolerated. Recommend discharge to SNF.    The patient is safe to transfer with RN assist, whiteboard updated.     Rehab Prognosis: Good; patient would benefit from acute skilled PT services to address these deficits and reach maximum level of function.    Recent Surgery: Procedure(s) (LRB):  LAPAROTOMY, EXPLORATORY (N/A)  COLECTOMY, PARTIAL - RIGHT COLECTOMY (Right) 6 Days Post-Op    Plan:     During this hospitalization, patient to be seen 5 x/week to address the identified rehab impairments via gait training, therapeutic activities, therapeutic exercises and progress toward the following goals:    · Plan of Care Expires:  06/25/20    Subjective     Chief Complaint: Abdominal pain  Patient/Family Comments/goals: No goal stated. Pt initially reluctant to participate in PT due to abdominal pain, but agreed to transfer to recliner with max encouragement.  Pain/Comfort:  · Pain Rating  1: (Complained of abdominal pain, but did not rate.)  · Location - Orientation 1: generalized  · Location 1: abdomen  · Pain Addressed 1: Reposition, Distraction, Cessation of Activity  · Pain Rating Post-Intervention 1: (Pain unchanged)      Objective:     Communicated with RN (Phylicia) prior to session.  Patient found supine with peripheral IV, bed alarm, oxygen, SCD, telemetry upon PT entry to room.     General Precautions: Standard, fall, blind, seizure   Orthopedic Precautions:N/A   Braces: N/A     Functional Mobility:  · Bed Mobility:     · Supine to Sit: maximal assistance  · Transfers:     · Sit to Stand:  moderate assistance with rolling walker  · Bed to Recliner: minimum assistance with rolling walker  · Gait: Pt took 3 steps during transfer to recliner with min A and rolling walker.    AM-PAC 6 CLICK MOBILITY  Turning over in bed (including adjusting bedclothes, sheets and blankets)?: 3  Sitting down on and standing up from a chair with arms (e.g., wheelchair, bedside commode, etc.): 2  Moving from lying on back to sitting on the side of the bed?: 2  Moving to and from a bed to a chair (including a wheelchair)?: 2  Need to walk in hospital room?: 2  Climbing 3-5 steps with a railing?: 2  Basic Mobility Total Score: 13       Therapeutic Activities and Exercises:   Bed mobility, transfer, and gait training as described above.    PT educated patient re:   · PT plan of care/role of PT  · Use of rolling walker  · Fall and safety precautions  · Gait deviations  · Discharge recommendations   · Use of call light (don't get up without assistance)  Pt verbalized understanding     Patient encouraged to sit up in chair throughout the day to prevent deconditioning.     Patient left up in chair with all lines intact, call button in reach and chair alarm on..    GOALS:   Multidisciplinary Problems     Physical Therapy Goals        Problem: Physical Therapy Goal    Goal Priority Disciplines Outcome Goal Variances  Interventions   Physical Therapy Goal     PT, PT/OT Ongoing, Progressing     Description:  Goals to be met by: 6/25/2020    Patient will perform the following to increase strength, improve mobility, and return to prior level of function:    1. Supine <> sit with SBA.  2. Sit<>stand with SBA with least restrictive assistive device.  3. Gait x 150 feet with SBA with least restrictive assistive device.                    Time Tracking:     PT Received On: 06/01/20  PT Start Time: 0810     PT Stop Time: 0834  PT Total Time (min): 24 min     Billable Minutes: Therapeutic Activity 24    Treatment Type: Treatment  PT/PTA: PT     PTA Visit Number: 0     Brittney Perez, PT  06/01/2020

## 2020-06-01 NOTE — PROGRESS NOTES
Afebrile  Vital signs stable  Passing flatus  No BM  Abdomen-softer, positive bowel sounds, incisions clean and dry    Plan  Liquids

## 2020-06-01 NOTE — PROGRESS NOTES
PT got patient up in chair at 1300, and he remained sitting up in the chair until 1845 when staff RN assisted him back into bed.

## 2020-06-01 NOTE — PLAN OF CARE
"Assessed pt for spiritual distress - none was detected nor recorded.   Pt reported he lives in a nursing home which "my daughter put me in there a year ago", and he doesn't particularly like it, but is content.   Pt has some difficulty forming thoughts and words for conversation, and also gets drowsy often.  Pt was grateful for spiritual care visit and follow-up care was offered upon request.  "

## 2020-06-01 NOTE — PROGRESS NOTES
"[facility]  Adult Nutrition  Progress Note    SUMMARY       Recommendations    1. If diet unable to be upgraded consider TPN.   -Recommend Clinimix-E 5/20 @ 75ml/hr with 250ml 20% lipids.   -Provides 1584 kcal (88% EEN) and 90gm protein (100% EPN).      Goals: Pt to meet >75% of EEN/EPN.  Nutrition Goal Status: new  Communication of RD Recs: (plan of care)    Reason for Assessment    Reason For Assessment: length of stay  Diagnosis: gastrointestinal disease(gastrointestinal hemorrhage with melena)  Relevant Medical History: CVA, CAD, HTN, GERD, HLD, prostate ca  General Information Comments: 74 y.o. male with HTN and hypercholesterolemia. Presented with CVA t in the past making him weak in the left side. He has vascular dementia. He stays at a Nursing Home in [place], [place]. According to the patient he is wheelchair bound due to that his legs are too weak to be able to ambulate.  Presented to [place] on 5/21 with melena. He was transferred to [facility] for evaluation. Colonoscopy on 5/25 revealing a cecal mass. Partial colectomy on 5/26/2020. Pt currently with fatigue and abdominal pain. Diet switched to Clear Liquids to assist with BM. NFPE performed on 6/1/20. Pt with signs of mild to moderate muscle wasting or fat depletion.    Nutrition Discharge Planning: Pending medical course    Nutrition Risk Screen    Nutrition Risk Screen: other (see comments)(poor appetite)    Nutrition/Diet History    Spiritual, Cultural Beliefs, Caodaism Practices, Values that Affect Care: no(None stated)  Factors Affecting Nutritional Intake: altered gastrointestinal function, abdominal pain, abdominal distension    Anthropometrics    Temp: 97.9 °F (36.6 °C)  Height Method: Stated  Height: 6' 1" (185.4 cm)  Height (inches): 73 in  Weight Method: Bed Scale  Weight: 88.7 kg (195 lb 8.8 oz)  Weight (lb): 195.55 lb  Ideal Body Weight (IBW), Male: 184 lb  % Ideal Body Weight, Male (lb): 106.28 %  BMI (Calculated): 25.8  BMI " Grade: 25 - 29.9 - overweight     Lab/Procedures/Meds    Pertinent Labs Reviewed: reviewed  Pertinent Labs Comments: noted  Pertinent Medications Reviewed: reviewed  Pertinent Medications Comments: statin, pantoprazole    Physical Findings/Assessment    Angel Luis score 15     Estimated/Assessed Needs    Weight Used For Calorie Calculations: 88.7 kg (195 lb 8.8 oz)  Energy Calorie Requirements (kcal): 2112 kcal daily  Energy Need Method: [place]-St Jeor(x 1.25 PAL)  Protein Requirements:  gm daily  Weight Used For Protein Calculations: 88.7 kg (195 lb 8.8 oz)(1.0-1.2 gm/kg)  Fluid Requirements (mL): 1 mL/kcal   Estimated Fluid Requirement Method: RDA Method  RDA Method (mL): 2112  CHO Requirement: 264 gm daily    Nutrition Prescription Ordered    Current Diet Order: Clear Liquids    Evaluation of Received Nutrient/Fluid Intake    Energy Calories Required: other (see comments)  Comments: LBM 5/26  % Intake of Estimated Energy Needs: 25 - 50 %  % Meal Intake: 25 - 50 %    Nutrition Risk    Level of Risk/Frequency of Follow-up: high     Assessment and Plan    Nutrition Problem  Inadequate oral intake    Related to (etiology):   Constipation and abdominal pain    Signs and Symptoms (as evidenced by):   Last BM 5.26 & clear liquids diet.    Interventions (treatment strategy):  Collaboration with other providers    Nutrition Diagnosis Status:   New    Monitor and Evaluation    Food and Nutrient Intake: energy intake  Food and Nutrient Adminstration: diet order  Knowledge/Beliefs/Attitudes: food and nutrition knowledge/skill  Anthropometric Measurements: weight, weight change  Biochemical Data, Medical Tests and Procedures: lipid profile, electrolyte and renal panel, gastrointestinal profile, glucose/endocrine profile, inflammatory profile     Malnutrition Assessment    Orbital Region (Subcutaneous Fat Loss): moderate depletion  Upper Arm Region (Subcutaneous Fat Loss): moderate depletion   [place] Region (Muscle Loss):  mild depletion  Clavicle Bone Region (Muscle Loss): moderate depletion  Patellar Region (Muscle Loss): moderate depletion  Anterior Thigh Region (Muscle Loss): moderate depletion  Posterior Calf Region (Muscle Loss): moderate depletion   Edema (Fluid Accumulation): 0-->no edema present   Subcutaneous Fat Loss (Final Summary): moderate protein-calorie malnutrition  Muscle Loss Evaluation (Final Summary): moderate protein-calorie malnutrition       Nutrition Follow-Up    RD Follow-up?: Yes

## 2020-06-01 NOTE — NURSING
Patient remains OOB in chair; does not want to get back in bed at this time. Will continue to monitor. Call light and table in reach.

## 2020-06-01 NOTE — NURSING
2 different phlebotomists have attempted lab draws without success and will try a 3rd time. I have secure chateed this information to Dr. Moses.

## 2020-06-01 NOTE — NURSING
Assisted patient with his CL lunch tray. Patient ate about 50% at which point he began to have abdominal discomfort. Patient is currently in no pain or distress. No n/v. Patient remains OOB in chair. Will continue to monitor.

## 2020-06-01 NOTE — PROGRESS NOTES
Pt a&ox 2, VSS during the night. Langley in place, langley care provided. PRN dilaudid and tylenol admin for facial grimacing and holding of stomach. Encouraged and assisted pt to change positions frequently. No c/o nausea. Bed alarm on, bed in low position, and call bell within reach.

## 2020-06-02 PROBLEM — D62 ACUTE BLOOD LOSS ANEMIA: Status: RESOLVED | Noted: 2020-05-21 | Resolved: 2020-06-02

## 2020-06-02 PROBLEM — K21.9 GASTROESOPHAGEAL REFLUX DISEASE: Status: RESOLVED | Noted: 2020-05-21 | Resolved: 2020-06-02

## 2020-06-02 PROBLEM — K56.7 ILEUS FOLLOWING GASTROINTESTINAL SURGERY: Status: RESOLVED | Noted: 2020-05-31 | Resolved: 2020-06-02

## 2020-06-02 PROBLEM — K92.1 GASTROINTESTINAL HEMORRHAGE WITH MELENA: Status: RESOLVED | Noted: 2020-05-21 | Resolved: 2020-06-02

## 2020-06-02 PROBLEM — I42.8 OBSTRUCTIVE CARDIOMYOPATHY: Status: RESOLVED | Noted: 2020-05-21 | Resolved: 2020-06-02

## 2020-06-02 PROBLEM — K44.9 HIATAL HERNIA: Status: RESOLVED | Noted: 2020-05-22 | Resolved: 2020-06-02

## 2020-06-02 PROBLEM — K91.89 ILEUS FOLLOWING GASTROINTESTINAL SURGERY: Status: RESOLVED | Noted: 2020-05-31 | Resolved: 2020-06-02

## 2020-06-02 PROBLEM — R00.1 BRADYCARDIA: Status: RESOLVED | Noted: 2020-05-21 | Resolved: 2020-06-02

## 2020-06-02 LAB
ALLENS TEST: ABNORMAL
ANION GAP SERPL CALC-SCNC: 15 MMOL/L (ref 8–16)
ANISOCYTOSIS BLD QL SMEAR: SLIGHT
BACTERIA #/AREA URNS HPF: ABNORMAL /HPF
BASOPHILS # BLD AUTO: 0.02 K/UL (ref 0–0.2)
BASOPHILS NFR BLD: 0.2 % (ref 0–1.9)
BILIRUB UR QL STRIP: ABNORMAL
BUN SERPL-MCNC: 13 MG/DL (ref 8–23)
CALCIUM SERPL-MCNC: 8.4 MG/DL (ref 8.7–10.5)
CHLORIDE SERPL-SCNC: 98 MMOL/L (ref 95–110)
CLARITY UR: ABNORMAL
CO2 SERPL-SCNC: 24 MMOL/L (ref 23–29)
COLOR UR: YELLOW
CREAT SERPL-MCNC: 0.9 MG/DL (ref 0.5–1.4)
DACRYOCYTES BLD QL SMEAR: ABNORMAL
DELSYS: ABNORMAL
DIFFERENTIAL METHOD: ABNORMAL
EOSINOPHIL # BLD AUTO: 0 K/UL (ref 0–0.5)
EOSINOPHIL NFR BLD: 0.1 % (ref 0–8)
ERYTHROCYTE [DISTWIDTH] IN BLOOD BY AUTOMATED COUNT: 25.8 % (ref 11.5–14.5)
EST. GFR  (AFRICAN AMERICAN): >60 ML/MIN/1.73 M^2
EST. GFR  (NON AFRICAN AMERICAN): >60 ML/MIN/1.73 M^2
GLUCOSE SERPL-MCNC: 116 MG/DL (ref 70–110)
GLUCOSE UR QL STRIP: NEGATIVE
HCO3 UR-SCNC: 27.6 MMOL/L (ref 24–28)
HCT VFR BLD AUTO: 26.2 % (ref 40–54)
HGB BLD-MCNC: 8.2 G/DL (ref 14–18)
HGB UR QL STRIP: ABNORMAL
HYALINE CASTS #/AREA URNS LPF: 0 /LPF
HYPOCHROMIA BLD QL SMEAR: ABNORMAL
IMM GRANULOCYTES # BLD AUTO: 0.08 K/UL (ref 0–0.04)
IMM GRANULOCYTES NFR BLD AUTO: 0.6 % (ref 0–0.5)
KETONES UR QL STRIP: ABNORMAL
LEUKOCYTE ESTERASE UR QL STRIP: ABNORMAL
LYMPHOCYTES # BLD AUTO: 0.6 K/UL (ref 1–4.8)
LYMPHOCYTES NFR BLD: 4.8 % (ref 18–48)
MCH RBC QN AUTO: 24.1 PG (ref 27–31)
MCHC RBC AUTO-ENTMCNC: 31.3 G/DL (ref 32–36)
MCV RBC AUTO: 77 FL (ref 82–98)
MICROSCOPIC COMMENT: ABNORMAL
MONOCYTES # BLD AUTO: 1.1 K/UL (ref 0.3–1)
MONOCYTES NFR BLD: 8.4 % (ref 4–15)
NEUTROPHILS # BLD AUTO: 11.2 K/UL (ref 1.8–7.7)
NEUTROPHILS NFR BLD: 85.9 % (ref 38–73)
NITRITE UR QL STRIP: POSITIVE
NRBC BLD-RTO: 0 /100 WBC
PCO2 BLDA: 34.8 MMHG (ref 35–45)
PH SMN: 7.51 [PH] (ref 7.35–7.45)
PH UR STRIP: 6 [PH] (ref 5–8)
PLATELET # BLD AUTO: 228 K/UL (ref 150–350)
PLATELET BLD QL SMEAR: ABNORMAL
PMV BLD AUTO: 8.3 FL (ref 9.2–12.9)
PO2 BLDA: 64 MMHG (ref 80–100)
POC BE: 5 MMOL/L
POC SATURATED O2: 94 % (ref 95–100)
POLYCHROMASIA BLD QL SMEAR: ABNORMAL
POTASSIUM SERPL-SCNC: 3.5 MMOL/L (ref 3.5–5.1)
PROT UR QL STRIP: ABNORMAL
RBC # BLD AUTO: 3.4 M/UL (ref 4.6–6.2)
RBC #/AREA URNS HPF: >100 /HPF (ref 0–4)
SAMPLE: ABNORMAL
SITE: ABNORMAL
SODIUM SERPL-SCNC: 137 MMOL/L (ref 136–145)
SP GR UR STRIP: >=1.03 (ref 1–1.03)
SQUAMOUS #/AREA URNS HPF: 0 /HPF
URN SPEC COLLECT METH UR: ABNORMAL
UROBILINOGEN UR STRIP-ACNC: 1 EU/DL
WBC # BLD AUTO: 13.01 K/UL (ref 3.9–12.7)
WBC #/AREA URNS HPF: >100 /HPF (ref 0–5)
WBC CLUMPS URNS QL MICRO: ABNORMAL

## 2020-06-02 PROCEDURE — C9113 INJ PANTOPRAZOLE SODIUM, VIA: HCPCS | Performed by: HOSPITALIST

## 2020-06-02 PROCEDURE — 82803 BLOOD GASES ANY COMBINATION: CPT

## 2020-06-02 PROCEDURE — 27000221 HC OXYGEN, UP TO 24 HOURS

## 2020-06-02 PROCEDURE — 36600 WITHDRAWAL OF ARTERIAL BLOOD: CPT

## 2020-06-02 PROCEDURE — 63600175 PHARM REV CODE 636 W HCPCS: Performed by: HOSPITALIST

## 2020-06-02 PROCEDURE — 80048 BASIC METABOLIC PNL TOTAL CA: CPT

## 2020-06-02 PROCEDURE — 99233 PR SUBSEQUENT HOSPITAL CARE,LEVL III: ICD-10-PCS | Mod: ,,, | Performed by: INTERNAL MEDICINE

## 2020-06-02 PROCEDURE — 36415 COLL VENOUS BLD VENIPUNCTURE: CPT

## 2020-06-02 PROCEDURE — 81000 URINALYSIS NONAUTO W/SCOPE: CPT

## 2020-06-02 PROCEDURE — 25000003 PHARM REV CODE 250: Performed by: NURSE PRACTITIONER

## 2020-06-02 PROCEDURE — 97535 SELF CARE MNGMENT TRAINING: CPT

## 2020-06-02 PROCEDURE — 87086 URINE CULTURE/COLONY COUNT: CPT

## 2020-06-02 PROCEDURE — 25000003 PHARM REV CODE 250: Performed by: HOSPITALIST

## 2020-06-02 PROCEDURE — 99233 SBSQ HOSP IP/OBS HIGH 50: CPT | Mod: ,,, | Performed by: INTERNAL MEDICINE

## 2020-06-02 PROCEDURE — 87186 SC STD MICRODIL/AGAR DIL: CPT | Mod: 59

## 2020-06-02 PROCEDURE — 87040 BLOOD CULTURE FOR BACTERIA: CPT

## 2020-06-02 PROCEDURE — 99233 SBSQ HOSP IP/OBS HIGH 50: CPT | Mod: ,,, | Performed by: HOSPITALIST

## 2020-06-02 PROCEDURE — 99900035 HC TECH TIME PER 15 MIN (STAT)

## 2020-06-02 PROCEDURE — 97530 THERAPEUTIC ACTIVITIES: CPT

## 2020-06-02 PROCEDURE — 99233 PR SUBSEQUENT HOSPITAL CARE,LEVL III: ICD-10-PCS | Mod: ,,, | Performed by: HOSPITALIST

## 2020-06-02 PROCEDURE — 94761 N-INVAS EAR/PLS OXIMETRY MLT: CPT

## 2020-06-02 PROCEDURE — 87077 CULTURE AEROBIC IDENTIFY: CPT | Mod: 59

## 2020-06-02 PROCEDURE — 85025 COMPLETE CBC W/AUTO DIFF WBC: CPT

## 2020-06-02 PROCEDURE — 87088 URINE BACTERIA CULTURE: CPT

## 2020-06-02 PROCEDURE — 63600175 PHARM REV CODE 636 W HCPCS: Performed by: NURSE PRACTITIONER

## 2020-06-02 PROCEDURE — 11000001 HC ACUTE MED/SURG PRIVATE ROOM

## 2020-06-02 PROCEDURE — 25000003 PHARM REV CODE 250: Performed by: INTERNAL MEDICINE

## 2020-06-02 PROCEDURE — 94799 UNLISTED PULMONARY SVC/PX: CPT

## 2020-06-02 RX ORDER — MORPHINE SULFATE 2 MG/ML
2 INJECTION, SOLUTION INTRAMUSCULAR; INTRAVENOUS ONCE
Status: COMPLETED | OUTPATIENT
Start: 2020-06-02 | End: 2020-06-02

## 2020-06-02 RX ORDER — ACETAMINOPHEN 500 MG
1000 TABLET ORAL EVERY 6 HOURS PRN
Status: DISCONTINUED | OUTPATIENT
Start: 2020-06-02 | End: 2020-06-03

## 2020-06-02 RX ORDER — ENOXAPARIN SODIUM 100 MG/ML
40 INJECTION SUBCUTANEOUS EVERY 24 HOURS
Status: DISCONTINUED | OUTPATIENT
Start: 2020-06-02 | End: 2020-06-09 | Stop reason: HOSPADM

## 2020-06-02 RX ORDER — ASPIRIN 81 MG/1
81 TABLET ORAL DAILY
Status: DISCONTINUED | OUTPATIENT
Start: 2020-06-03 | End: 2020-06-09 | Stop reason: HOSPADM

## 2020-06-02 RX ADMIN — ATORVASTATIN CALCIUM 40 MG: 20 TABLET, FILM COATED ORAL at 08:06

## 2020-06-02 RX ADMIN — PHENYTOIN SODIUM 100 MG: 100 CAPSULE ORAL at 08:06

## 2020-06-02 RX ADMIN — PHENYTOIN SODIUM 100 MG: 100 CAPSULE ORAL at 09:06

## 2020-06-02 RX ADMIN — DORZOLAMIDE HYDROCHLORIDE 1 DROP: 20 SOLUTION/ DROPS OPHTHALMIC at 08:06

## 2020-06-02 RX ADMIN — TRAVOPROST 1 DROP: 0.04 SOLUTION/ DROPS OPHTHALMIC at 08:06

## 2020-06-02 RX ADMIN — DIVALPROEX SODIUM 500 MG: 250 TABLET, DELAYED RELEASE ORAL at 08:06

## 2020-06-02 RX ADMIN — ENOXAPARIN SODIUM 40 MG: 100 INJECTION SUBCUTANEOUS at 08:06

## 2020-06-02 RX ADMIN — MORPHINE SULFATE 2 MG: 2 INJECTION, SOLUTION INTRAMUSCULAR; INTRAVENOUS at 11:06

## 2020-06-02 RX ADMIN — DONEPEZIL HYDROCHLORIDE 10 MG: 5 TABLET, FILM COATED ORAL at 09:06

## 2020-06-02 RX ADMIN — DORZOLAMIDE HYDROCHLORIDE 1 DROP: 20 SOLUTION/ DROPS OPHTHALMIC at 09:06

## 2020-06-02 RX ADMIN — TAMSULOSIN HYDROCHLORIDE 0.4 MG: 0.4 CAPSULE ORAL at 09:06

## 2020-06-02 RX ADMIN — PANTOPRAZOLE SODIUM 40 MG: 40 INJECTION, POWDER, LYOPHILIZED, FOR SOLUTION INTRAVENOUS at 09:06

## 2020-06-02 RX ADMIN — FLUOXETINE 20 MG: 20 CAPSULE ORAL at 09:06

## 2020-06-02 RX ADMIN — ACETAMINOPHEN 1000 MG: 500 TABLET ORAL at 08:06

## 2020-06-02 NOTE — PLAN OF CARE
Problem: Physical Therapy Goal  Goal: Physical Therapy Goal  Description  Goals to be met by: 6/25/2020    Patient will perform the following to increase strength, improve mobility, and return to prior level of function:    1. Supine <> sit with SBA.  2. Sit<>stand with SBA with least restrictive assistive device.  3. Gait x 150 feet with SBA with least restrictive assistive device.   Outcome: Ongoing, Progressing     Progressing towards goals, unable to perform longer gait bouts at this time due to incontinence of bowel. Amb x6 ft to chair with modA and verbal/tactile cues for direction. Requires max-totalA for rolling and supine>sit due to decreased alertness/command following at initiation of session. Rec for d/c to SNF before transition to NH.

## 2020-06-02 NOTE — ASSESSMENT & PLAN NOTE
Upper endoscopy on 5/22/2020 did not reveal source of bleeding however colonoscopy revealed fungating mass of the ascending colon and diverticulosis on 5/25/2020.  Biopsy from colonoscopy revealed invasive adenocarcinoma, moderately to poorly differentiated.  Patient underwent partial right hemicolectomy on 5/26/2020 performed by Dr. Von Jo Jr.  Hemoglobin stable.  Post-operative course complicated by ileus which appears to be improving/resolving however he continues to have poor oral intake.  Continue with modified diet as per Dr. Von Jo Jr.  Mild low grade fever and elevated white blood cell today.  Checking blood cultures, discontinue Cormier catheter, checking urinalysis and urine culture.

## 2020-06-02 NOTE — NURSING
spoke with MD about concerns of decreased appetite, increased lethargy and confusion, slight elevation to wbc, history of dementia, instructed to watch for any acute changes.

## 2020-06-02 NOTE — NURSING
easily aroused, keeps eyes closed and moans at times. denies pain at this time, repositioned in bed. call bell in reach.

## 2020-06-02 NOTE — PLAN OF CARE
Patient resting in NAD. VSS on 2L NC. Dressing to abdomen c/d/i. Pt had 2 loose stools this shift. Incontinence care provided. Cormier draining to gravity. No needs expressed at this time. Safety measures maintained. Will continue to monitor.

## 2020-06-02 NOTE — PROGRESS NOTES
"[facility] Hospital Medicine  Progress Note    Patient Name: Anjel Soria  MRN: [id number]  Patient Class: IP- Inpatient   Admission Date: 5/21/2020  Length of Stay: 11 days  Attending Physician: Nolan Segovia MD  Primary Care Provider: [facility]        Subjective:     Principal Problem:Malignant neoplasm of ascending colon      HPI:  Per Madiha Ann, NP:    "Anjel Soria is a 74 year old male who has medical history of anemia related to lower GI bleeding, coronary artery disease, vascular dementia, prior stroke with residual left sided weakness, 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  He is currently a resident of the [facility].   Per medical record, the patient with recent prior history of severe anemia requiring transfusion on April 9, 2020.  During that time, he was evaluated  At [facility] and found to be hemoccult positive and CT scan of abdomen was unremarkable.  He was subsequently discharged back to his nursing home with plan for outpatient follow up with GI.  However, the patient was unable to see GI as outpatient.   Today, patient presented to the [facility] ED where initial work up revealed stable hemoglobin/hematocrit 12.0/40.4. Otherwise, labs were unremarkable and COVID screen was negative. Of note, EKG in ED today shows sinus bradycardia and asymtpomatic with heart rate in upper 40's and low 50's.   He was to be discharged back to the nursing home, but patient had episode of melena and decision to transfer to [facility] for evaluation.     On arrival to the [facility], the patient had one small melanotic stool.  Given dementia, the patient is unable to provide detailed history.  Per nursing home medication reconciliation, the patient has not been on aspirin or Plavix recently and is not currently on any other type of blood thinners.  GI will be consulted and " "roge continue to trend hemoglobin/hematocrit."    Overview/Hospital Course:  Mr. Soria is a 74 year-old man with history of coronary artery disease, vascular dementia, stroke with residual left sided weakness, post-stroke seizures, benign prostatic hyperplasia, hypertension and gastroesophageal reflux disease who was admitted from his nursing home ([facility]) for evaluation of bradycardia and melena.  Gastroenterology service consulted and he underwent upper endoscopy on 5/22/2020 with no source of bleeding identified.  On 5/25/2020 he underwent colonoscopy which showed a fungating mass of ascending colon.  General surgery was consulted and took him to operating room for partial right hemicolectomy performed on 5/26/2020.  He has developed a mild post-operative ileus for which he has been treated conservatively. Patient with some improvement but continues to have poor oral intake.    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    Interval History:  Low grade fever, mild leukocytosis.  Poor oral intake.    Review of Systems   Constitutional: Positive for fever. Negative for chills.   Respiratory: Negative for shortness of breath.    Cardiovascular: Negative for chest pain.   Gastrointestinal: Positive for abdominal pain. Negative for constipation, diarrhea, nausea and vomiting.   Psychiatric/Behavioral: Positive for confusion.     Objective:     Vital Signs (Most Recent):  Temp: 98.4 °F (36.9 °C) (06/02/20 1142)  Pulse: 78 (06/02/20 1400)  Resp: 18 (06/02/20 1142)  BP: 131/72 (06/02/20 1142)  SpO2: 97 % (06/02/20 1142) Vital Signs (24h Range):  Temp:  [98.4 °F (36.9 °C)-100.3 °F (37.9 °C)] 98.4 °F (36.9 °C)  Pulse:  [] 78  Resp:  [16-20] 18  SpO2:  [93 %-98 %] 97 %  BP: (116-131)/(57-72) 131/72     Weight: 88.7 kg (195 lb 8.8 " oz)  Body mass index is 25.8 kg/m².    Intake/Output Summary (Last 24 hours) at 6/2/2020 1703  Last data filed at 6/2/2020 0800  Gross per 24 hour   Intake 360 ml   Output 540 ml   Net -180 ml      Physical Exam   Constitutional: He appears well-developed and well-nourished. No distress.   HENT:   Head: Normocephalic and atraumatic.   Eyes: Conjunctivae are normal.   Bilateral blindness.   Neck: Neck supple.   Cardiovascular: Normal rate and regular rhythm.   No murmur heard.  Pulmonary/Chest: Effort normal. No respiratory distress. He has no decreased breath sounds. He has no wheezes.   Abdominal: Bowel sounds are normal. He exhibits distension. There is tenderness. There is no rigidity.   Mild tenderness around the incision.   Musculoskeletal: Normal range of motion. He exhibits no edema.   Neurological: He is alert. He has normal strength and normal reflexes. He is disoriented.   Mildly lethargic but arousable.  Upon arousing, oriented to self and answers some questions.  Confused.   Skin: Skin is warm and dry.   Psychiatric: He has a normal mood and affect. His speech is normal and behavior is normal. Cognition and memory are impaired.       Significant Labs: All pertinent labs within the past 24 hours have been reviewed.    Significant Imaging: I have reviewed all pertinent imaging results/findings within the past 24 hours.      Assessment/Plan:      * Malignant neoplasm of ascending colon  Upper endoscopy on 5/22/2020 did not reveal source of bleeding however colonoscopy revealed fungating mass of the ascending colon and diverticulosis on 5/25/2020.  Biopsy from colonoscopy revealed invasive adenocarcinoma, moderately to poorly differentiated.  Patient underwent partial right hemicolectomy on 5/26/2020 performed by Dr. Von Jo Jr.  Hemoglobin stable.  Post-operative course complicated by ileus which appears to be improving/resolving however he continues to have poor oral intake.  Continue with modified  diet as per Dr. Von Jo Jr.  Mild low grade fever and elevated white blood cell today.  Checking blood cultures, discontinue Cormier catheter, checking urinalysis and urine culture.    Subcortical vascular dementia without behavioral disturbance  Suspect patient likely close to baseline or mildly worse due to poor nutrition and stress from surgery.  Continue supportive care, nutrition as tolerated, and, and continue delirium precautions.  Hold donepezil as unlikely to be providing much benefit and may be contributing to poor oral intake and confusion.    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  Continue atorvastatin 40 mg daily for secondary stroke prevention.  Antiplatelet therapy due to gastrointestinal bleeding.  Continue with therapy.    Bipolar affective disorder in remission  Continue with divalproex and fluoxetine.  Holding trazodone due to persist somnolence today.    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  Continue home anti-seizure regimen.    Essential hypertension  Reasonably controlled with current regimen.  Will continue with current regimen and continue to monitor.    Benign prostatic hyperplasia  Continue with tamsulosin.  Discontinue Cormier catheter today.    Debility  Physical and occupational therapy consulted and recommended skilled nursing once stable for discharge.    Coronary artery disease involving native coronary artery of native heart without angina pectoris  Previously on aspirin and clopidogrel however per nursing home records patient has not been on antiplatelet therapy.  Continue with statin and metoprolol with hold parameters.    End-stage glaucoma  Legally blind.  Continue with home eye drops.    VTE Risk Mitigation (From admission, onward)         Ordered     Reason for No Pharmacological VTE Prophylaxis  Once     Question:  Reasons:  Answer:  Active Bleeding    05/21/20 0329      IP VTE HIGH RISK PATIENT  Once      05/21/20 1613     Place sequential compression device  Until discontinued      05/21/20 1613     Place NICOLE hose  Until discontinued      05/21/20 1601                Nolan Segovia MD  Department of Hospital Medicine   [facility]

## 2020-06-02 NOTE — ASSESSMENT & PLAN NOTE
Suspect patient likely close to baseline or mildly worse due to poor nutrition and stress from surgery.  Continue supportive care, nutrition as tolerated, and, and continue delirium precautions.  Hold donepezil as unlikely to be providing much benefit and may be contributing to poor oral intake and confusion.

## 2020-06-02 NOTE — PT/OT/SLP PROGRESS
Physical Therapy Treatment    Patient Name:  Anjel Soria   MRN:  [id number]    Recommendations:     Discharge Recommendations:  nursing facility, skilled   Discharge Equipment Recommendations: (Defer to SNF)   Barriers to discharge: Current [facility] level     Assessment:     Anjel Soria is a 74 y.o. male admitted with a medical diagnosis of Gastrointestinal hemorrhage with melena.  He presents with the following impairments/functional limitations:  weakness, impaired endurance, impaired sensation, impaired self care skills, impaired functional mobilty, gait instability, impaired balance, visual deficits, impaired cognition, decreased upper extremity function, decreased lower extremity function, decreased safety awareness, pain, decreased ROM, impaired coordination, impaired fine motor, impaired cardiopulmonary response to activity .    Progressing towards goals, unable to perform longer gait bouts at this time due to incontinence of bowel. Amb x6 ft to chair with modA and verbal/tactile cues for direction. Requires max-totalA for rolling and supine>sit due to decreased alertness/command following at initiation of session. Rec for d/c to SNF before transition to NH.    Rehab Prognosis: Good; patient would benefit from acute skilled PT services to address these deficits and reach maximum level of function.    Recent Surgery: Procedure(s) (LRB):  LAPAROTOMY, EXPLORATORY (N/A)  COLECTOMY, PARTIAL - RIGHT COLECTOMY (Right) 7 Days Post-Op    Plan:     During this hospitalization, patient to be seen 5 x/week to address the identified rehab impairments via gait training, therapeutic activities, therapeutic exercises and progress toward the following goals:    · Plan of Care Expires:  06/25/20    Subjective     Chief Complaint: Abdominal pain and back pain, frequent negative verbalizations during session  Patient/Family Comments/goals: Patient agreeable to PT treatment once therapist began initiating  mobility.  Pain/Comfort:  Pain Rating 1: (Abdonimal and back pain, unable to rate)  Pain Rating Post-Intervention 1: (Occasionally with negative verbalizations while sitting in chair, but falling asleep)      Objective:     Communicated with KENNY Baker prior to session.  Patient found HOB elevated with bed alarm, langley catheter, peripheral IV, telemetry, SCD, oxygen upon PT entry to room. PCT present for chair follow.    General Precautions: Standard, blind, fall, seizure(liquids)   Orthopedic Precautions:N/A   Braces: N/A     Patient donned yellow socks and gait belt for OOB mobility.    Functional Mobility:  · Bed Mobility:     · Scooting anteriorly at EOB: minimum assistance  · Rolling L&R: Maximal assistance - once guided, able to hold and pull on bed rail with UE  · PCT provided julita hygiene for loose BM  · Supine to Sit: total assistance   · Transfers:     · Sit to Stand: maximal assistance with rolling walker  · Pt with minimal initiation of anterior weight shift  · Gait: x6 ft with RW and modA  · PT provided maximal verbal and tactile cueing for direction due to pt's imapired vision.   · No overt LOB or knee buckling noted during gait.   · Increased time in double limb stance, occasional requires tactile cues for progression of LE, mostly with R delayed stepping        AM-PAC 6 CLICK MOBILITY  Turning over in bed (including adjusting bedclothes, sheets and blankets)?: 3  Sitting down on and standing up from a chair with arms (e.g., wheelchair, bedside commode, etc.): 2  Moving from lying on back to sitting on the side of the bed?: 2  Moving to and from a bed to a chair (including a wheelchair)?: 2  Need to walk in hospital room?: 2  Climbing 3-5 steps with a railing?: 2  Basic Mobility Total Score: 13       Therapeutic Activities and Exercises:  · Pt initially hesitant to participate with c/o back and abd pain    Patient left up in chair with all lines intact, call button in reach and KENNY Baker and BLAYNE Lee  notified.    GOALS:   Multidisciplinary Problems     Physical Therapy Goals        Problem: Physical Therapy Goal    Goal Priority Disciplines Outcome Goal Variances Interventions   Physical Therapy Goal     PT, PT/OT Ongoing, Progressing     Description:  Goals to be met by: 6/25/2020    Patient will perform the following to increase strength, improve mobility, and return to prior level of function:    1. Supine <> sit with SBA.  2. Sit<>stand with SBA with least restrictive assistive device.  3. Gait x 150 feet with SBA with least restrictive assistive device.                    Time Tracking:     PT Received On: 06/02/20  PT Start Time: 1506     PT Stop Time: 1526  PT Total Time (min): 20 min     Billable Minutes: Therapeutic Activity 20    Treatment Type: Treatment  PT/PTA: PT     PTA Visit Number: 0     Ni Olivares, PT  06/02/2020

## 2020-06-02 NOTE — ASSESSMENT & PLAN NOTE
Previously on aspirin and clopidogrel however per nursing home records patient has not been on antiplatelet therapy.  Continue with statin and metoprolol with hold parameters.

## 2020-06-02 NOTE — SUBJECTIVE & OBJECTIVE
Interval History:  Low grade fever, mild leukocytosis.  Poor oral intake.    Review of Systems   Constitutional: Positive for fever. Negative for chills.   Respiratory: Negative for shortness of breath.    Cardiovascular: Negative for chest pain.   Gastrointestinal: Positive for abdominal pain. Negative for constipation, diarrhea, nausea and vomiting.   Psychiatric/Behavioral: Positive for confusion.     Objective:     Vital Signs (Most Recent):  Temp: 98.4 °F (36.9 °C) (06/02/20 1142)  Pulse: 78 (06/02/20 1400)  Resp: 18 (06/02/20 1142)  BP: 131/72 (06/02/20 1142)  SpO2: 97 % (06/02/20 1142) Vital Signs (24h Range):  Temp:  [98.4 °F (36.9 °C)-100.3 °F (37.9 °C)] 98.4 °F (36.9 °C)  Pulse:  [] 78  Resp:  [16-20] 18  SpO2:  [93 %-98 %] 97 %  BP: (116-131)/(57-72) 131/72     Weight: 88.7 kg (195 lb 8.8 oz)  Body mass index is 25.8 kg/m².    Intake/Output Summary (Last 24 hours) at 6/2/2020 1703  Last data filed at 6/2/2020 0800  Gross per 24 hour   Intake 360 ml   Output 540 ml   Net -180 ml      Physical Exam   Constitutional: He appears well-developed and well-nourished. No distress.   HENT:   Head: Normocephalic and atraumatic.   Eyes: Conjunctivae are normal.   Bilateral blindness.   Neck: Neck supple.   Cardiovascular: Normal rate and regular rhythm.   No murmur heard.  Pulmonary/Chest: Effort normal. No respiratory distress. He has no decreased breath sounds. He has no wheezes.   Abdominal: Bowel sounds are normal. He exhibits distension. There is tenderness. There is no rigidity.   Mild tenderness around the incision.   Musculoskeletal: Normal range of motion. He exhibits no edema.   Neurological: He is alert. He has normal strength and normal reflexes. He is disoriented.   Mildly lethargic but arousable.  Upon arousing, oriented to self and answers some questions.  Confused.   Skin: Skin is warm and dry.   Psychiatric: He has a normal mood and affect. His speech is normal and behavior is normal. Cognition  and memory are impaired.       Significant Labs: All pertinent labs within the past 24 hours have been reviewed.    Significant Imaging: I have reviewed all pertinent imaging results/findings within the past 24 hours.

## 2020-06-02 NOTE — PLAN OF CARE
Pt will return to [facility] when discharged, however he will go Skilled then return to [facility].    CM verified with facility that they do indeed offer skilled services.     Discharge dependent on Surgery, Dr Jo.    Cm to follow for plans and arrangements.    No need for Pasrr/142, PPD up to date.     06/02/20 1311   Discharge Assessment   Assessment Type Discharge Planning Reassessment   Confirmed/corrected address and phone number on facesheet? Yes   Assessment information obtained from? Patient;Medical Record   Prior to hospitilization cognitive status: Alert/Oriented   Prior to hospitalization functional status: Partially Dependent   Current cognitive status: Alert/Oriented   Current Functional Status: Partially Dependent   Able to Return to Prior Arrangements yes   Is patient able to care for self after discharge? No   Patient's perception of discharge disposition skilled nursing facility   Patient currently being followed by outpatient case management? No   Patient currently receives any other outside agency services? No   Do you have any problems affording any of your prescribed medications? No   Is the patient taking medications as prescribed? yes   Does the patient have transportation home? Yes   Does the patient receive services at [facility]? No   Discharge Plan A Skilled Nursing Facility   Discharge Plan B Skilled Nursing Facility   DME Needed Upon Discharge  none   Patient/Family in Agreement with Plan yes

## 2020-06-02 NOTE — ASSESSMENT & PLAN NOTE
Physical and occupational therapy consulted and recommended skilled nursing once stable for discharge.

## 2020-06-02 NOTE — PLAN OF CARE
Problem: Occupational Therapy Goal  Goal: Occupational Therapy Goal  Description  Goals to be met by: 6/1/2020    Patient will increase functional independence with ADLs by performing:    Feeding with Set-up Assistance.  UE Dressing with Supervision.  Grooming while seated with Supervision.  Toileting from toilet with Supervision for hygiene and clothing management.   Supine to sit with Supervision.  Toilet transfer to toilet with Contact Guard Assistance.     Outcome: Ongoing, Not Progressing   Pt with poor progress at this time.  Pt continues to be confused and needing significant amount of assistance for self care tasks.  Recommend SNF with OT and PT.

## 2020-06-02 NOTE — PROGRESS NOTES
[facility]  Cardiology  Progress Note    Patient Name: Anjel Soria  MRN: [id number]  Admission Date: 5/21/2020  Hospital Length of Stay: 11 days  Code Status: Full Code   Attending Physician: Nolan Segovia MD   Primary Care Physician: [facility]  Expected Discharge Date:   Principal Problem:Gastrointestinal hemorrhage with melena    Subjective:     Brief HPI:    Anjel Soria is a 74 y.o.male with hypertension and hypercholesterolemia. He has had a cerebrovascular accident in the past making him weak in the left side. He has vascular dementia. He stays at a Nursing Home in [place], [place]. According to the patient he is wheelchair bound due to that his legs are too weak to be able to ambulate. In 2018 he was seen at [facility] due to chest pain. He had an elevation in troponins. He appears to have been transferred to [facility] for further cardiac evaluation but am not able to find any records from the stay at [facility].     He presented to [place] on 5/21/2020 with melena. He was transferred to [facility] for evaluation. He underwent colonoscopy on 5/25/2020 revealing a cecal mass. The plan is partial colectomy on 5/26/2020. He has been noted to be bradycardic during the hospital stay with a heart rate in the 50's during the night 5/25/2020 - 5/26/2020. He has been receiving metoprolol 12.5 mg Q12. The patient denies any chest pain or shortness of breath. He is comfortable supine. I am asked to see him for his bradycardia and assess need for possible further cardiac evaluation prior to surgery.    Hospital Course:     5/26/2020: Partial colectomy.    Interval History:    He denies CP or SOB. Poor po intake. Still some abdominal pain.    Review of Systems   Constitution: Positive for malaise/fatigue.   Eyes: Positive for vision loss in left eye and vision loss in right eye.   Cardiovascular: Negative for chest pain.   Respiratory: Negative for shortness  of breath.    Skin: Negative for rash.   Musculoskeletal: Negative for myalgias.   Gastrointestinal: Positive for abdominal pain.   Neurological: Positive for focal weakness (left side) and weakness.   Psychiatric/Behavioral: Positive for memory loss. Negative for altered mental status. The patient is not nervous/anxious.    Allergic/Immunologic: Negative for persistent infections.     Objective:     Vital Signs (Most Recent):  Temp: 98.4 °F (36.9 °C) (06/02/20 1142)  Pulse: 78 (06/02/20 1400)  Resp: 18 (06/02/20 1142)  BP: 131/72 (06/02/20 1142)  SpO2: 97 % (06/02/20 1142) Vital Signs (24h Range):  Temp:  [98.4 °F (36.9 °C)-100.3 °F (37.9 °C)] 98.4 °F (36.9 °C)  Pulse:  [] 78  Resp:  [16-20] 18  SpO2:  [93 %-98 %] 97 %  BP: (116-131)/(57-72) 131/72     Weight: 88.7 kg (195 lb 8.8 oz)  Body mass index is 25.8 kg/m².    SpO2: 97 %  O2 Device (Oxygen Therapy): nasal cannula      Intake/Output Summary (Last 24 hours) at 6/2/2020 1652  Last data filed at 6/2/2020 0800  Gross per 24 hour   Intake 360 ml   Output 540 ml   Net -180 ml       Lines/Drains/Airways     Drain                 Urethral Catheter 05/26/20 1120 Non-latex 16 Fr. 7 days          Peripheral Intravenous Line                 Midline Catheter Insertion/Assessment  - Single Lumen 05/25/20 0940 Left basilic vein (medial side of arm) 22g x 8cm 8 days                Physical Exam   Constitutional: He appears well-developed and well-nourished.   Cardiovascular: Normal rate, regular rhythm, S1 normal and S2 normal. Exam reveals gallop and S4.   Pulmonary/Chest: Effort normal and breath sounds normal.   Abdominal: Normal appearance. He exhibits no distension. There is tenderness.   Musculoskeletal:        Right ankle: He exhibits no swelling.        Left ankle: He exhibits no swelling.   Neurological: He is disoriented.   Psychiatric: Cognition and memory are impaired.       Current Medications:     atorvastatin  40 mg Oral QHS    divalproex  500 mg Oral  Nightly    dorzolamide  1 drop Both Eyes BID    FLUoxetine  20 mg Oral Daily    pantoprazole  40 mg Intravenous Daily    phenytoin  100 mg Oral BID    tamsulosin  1 capsule Oral Daily    travoprost  1 drop Both Eyes QHS     Current Laboratory Results:    Recent Results (from the past 24 hour(s))   CBC auto differential    Collection Time: 06/02/20  4:57 AM   Result Value Ref Range    WBC 13.01 (H) 3.90 - 12.70 K/uL    RBC 3.40 (L) 4.60 - 6.20 M/uL    Hemoglobin 8.2 (L) 14.0 - 18.0 g/dL    Hematocrit 26.2 (L) 40.0 - 54.0 %    Mean Corpuscular Volume 77 (L) 82 - 98 fL    Mean Corpuscular Hemoglobin 24.1 (L) 27.0 - 31.0 pg    Mean Corpuscular Hemoglobin Conc 31.3 (L) 32.0 - 36.0 g/dL    RDW 25.8 (H) 11.5 - 14.5 %    Platelets 228 150 - 350 K/uL    MPV 8.3 (L) 9.2 - 12.9 fL    Immature Granulocytes 0.6 (H) 0.0 - 0.5 %    Gran # (ANC) 11.2 (H) 1.8 - 7.7 K/uL    Immature Grans (Abs) 0.08 (H) 0.00 - 0.04 K/uL    Lymph # 0.6 (L) 1.0 - 4.8 K/uL    Mono # 1.1 (H) 0.3 - 1.0 K/uL    Eos # 0.0 0.0 - 0.5 K/uL    Baso # 0.02 0.00 - 0.20 K/uL    nRBC 0 0 /100 WBC    Gran% 85.9 (H) 38.0 - 73.0 %    Lymph% 4.8 (L) 18.0 - 48.0 %    Mono% 8.4 4.0 - 15.0 %    Eosinophil% 0.1 0.0 - 8.0 %    Basophil% 0.2 0.0 - 1.9 %    Platelet Estimate Appears normal     Aniso Slight     Poly Occasional     Hypo Occasional     Tear Drop Cells Occasional     Differential Method Automated    Basic metabolic panel    Collection Time: 06/02/20  4:57 AM   Result Value Ref Range    Sodium 137 136 - 145 mmol/L    Potassium 3.5 3.5 - 5.1 mmol/L    Chloride 98 95 - 110 mmol/L    CO2 24 23 - 29 mmol/L    Glucose 116 (H) 70 - 110 mg/dL    BUN, Bld 13 8 - 23 mg/dL    Creatinine 0.9 0.5 - 1.4 mg/dL    Calcium 8.4 (L) 8.7 - 10.5 mg/dL    Anion Gap 15 8 - 16 mmol/L    eGFR if African American >60 >60 mL/min/1.73 m^2    eGFR if non African American >60 >60 mL/min/1.73 m^2   ISTAT PROCEDURE    Collection Time: 06/02/20  4:41 PM   Result Value Ref Range    POC PH  7.508 (H) 7.35 - 7.45    POC PCO2 34.8 (L) 35 - 45 mmHg    POC PO2 64 (L) 80 - 100 mmHg    POC HCO3 27.6 24 - 28 mmol/L    POC BE 5 -2 to 2 mmol/L    POC SATURATED O2 94 (L) 95 - 100 %    Sample ARTERIAL     Site RB     Allens Test Pass     DelSys No Device      Current Imaging Results:    X-Ray Abdomen AP 1 View   Final Result      As above.  Close radiographic follow-up recommended.         Electronically signed by: Robert Ortiz MD   Date:    05/30/2020   Time:    12:34      X-Ray Chest 1 View   Final Result      As above         Electronically signed by: Magy Malave MD   Date:    05/22/2020   Time:    09:57        5/26/2020: Echo:    Normal left ventricular systolic function. The estimated ejection fraction is 65%.  No wall motion abnormalities.  Grade I (mild) left ventricular diastolic dysfunction consistent with impaired relaxation.  Normal right ventricular systolic function.  Mild aortic valve sclerosis.  The estimated PA systolic pressure is 9 mmHg.  Normal central venous pressure (3 mmHg).      Assessment and Plan:     Problem List:    Active Diagnoses:    Diagnosis Date Noted POA    PRINCIPAL PROBLEM:  Gastrointestinal hemorrhage with melena [K92.1] 05/21/2020 Yes    Debility [R53.81] 06/01/2020 Yes    Ileus following gastrointestinal surgery [K91.89, K56.7] 05/31/2020 No    Malignant neoplasm of ascending colon [C18.2] 05/27/2020 Yes    Hiatal hernia [K44.9] 05/22/2020 Yes    Essential hypertension [I10] 05/21/2020 Yes    Gastroesophageal reflux disease [K21.9] 05/21/2020 Yes    Bradycardia [R00.1] 05/21/2020 Yes    Obstructive cardiomyopathy [I42.8] 05/21/2020 Yes    Bipolar affective disorder in remission [F31.70] 05/21/2020 Yes    Acute blood loss anemia [D62] 05/21/2020 Yes    BPH (benign prostatic hyperplasia) [N40.0] 05/09/2016 Yes    Coronary artery disease involving native coronary artery of native heart without angina pectoris [I25.10] 05/09/2016 Yes    Seizure disorder as  sequela of cerebrovascular accident [I69.398, G40.909] 05/09/2016 Not Applicable    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 Coronary Artery Disease              2018: Appears to have had NSTEMI. Evaluation at [facility]. Unable to obtain information.   5/26/2020: Echo: Normal left ventricular size and systolic function. EF 65%. Mild aortic valve sclerosis.              According to NH log appears to have been on aspirin and clopidogrel until recently.              Appears stable.     2. Sinus Bradycardia              HR in 50's.              On metoprolol 12.5 mg Q12.              5/26/2020: Metoprolol was discontinued.   Heart rate in 60-70 bpm range.     3. History of Cerebrovascular Accident              History of CVA causing weakness in left side.     4. Dementia              According to chart having vascular dementia.     5. Hypertension              Currently not on any antihypertensives.     6. Hypercholesterolemia              On atorvastatin 40 mg Q24.     7. Gastrointestinal Bleeding               5/21/2020: Presented with melena.              5/25/2020: Colonoscopy: Cecal mass.               5/26/2020:  Partial colectomy.   Dr. Von Jo.     VTE Risk Mitigation (From admission, onward)         Ordered     Reason for No Pharmacological VTE Prophylaxis  Once     Question:  Reasons:  Answer:  Active Bleeding    05/21/20 1613     IP VTE HIGH RISK PATIENT  Once      05/21/20 1613     Place sequential compression device  Until discontinued      05/21/20 1613     Place NICOLE hose  Until discontinued      05/21/20 1601                Richard Brown MD  Cardiology  [facility]

## 2020-06-02 NOTE — PROGRESS NOTES
Low-grade temp  Vital signs stable  Tolerating clear liquids  Has been passing flatus but no documented bowel movement  Abdomen-softer, positive bowel sounds, incisions clean and    WBC -13,000    Impression/plan  Slow progression  Will increase diet to full liquids

## 2020-06-02 NOTE — ASSESSMENT & PLAN NOTE
Noted residual weakness and walks with walker at baseline.  Continue atorvastatin 40 mg daily for secondary stroke prevention.  Antiplatelet therapy due to gastrointestinal bleeding.  Continue with therapy.

## 2020-06-02 NOTE — PT/OT/SLP PROGRESS
Occupational Therapy   Treatment    Name: Anjel Soria  MRN: [id number]  Admitting Diagnosis:  Gastrointestinal hemorrhage with melena  6 Days Post-Op    Recommendations:     Discharge Recommendations: nursing facility, skilled  Discharge Equipment Recommendations:  (Next level of care to determine)  Barriers to discharge:  None    Assessment:     Anjel Soria is a 74 y.o. male with a medical diagnosis of Gastrointestinal hemorrhage with melena.  He presents agreeable to grooming tasks and bilateral UE therapeutic exercises. Performance deficits affecting function are weakness, impaired endurance, impaired sensation, impaired self care skills, impaired functional mobilty, gait instability, impaired cognition, decreased lower extremity function, decreased upper extremity function, visual deficits, impaired balance, decreased safety awareness, decreased ROM, impaired coordination, pain, impaired fine motor, impaired cardiopulmonary response to activity.  Recommend SNF with OT and PT.     Rehab Prognosis:  Fair to return to PLOF; patient would benefit from acute skilled OT services to address these deficits and reach maximum level of function.       Plan:     Patient to be seen 5 x/week to address the above listed problems via self-care/home management, therapeutic activities, therapeutic exercises  · Plan of Care Expires: 06/23/20  · Plan of Care Reviewed with: patient    Subjective     Pain/Comfort:  · Pain Rating 1: (Pt stating he had abdominal pain but unable to rate.)  · Location - Orientation 1: generalized  · Location 1: abdomen  · Pain Addressed 1: Reposition, Distraction, Cessation of Activity, Nurse notified  · Pain Rating Post-Intervention 1: (Pt did not rate abdominal pain but nurse notified.)    Objective:     Communicated with: nursePhylicia, prior to session.  Patient found HOB elevated with oxygen, bed alarm, peripheral IV, SCD, telemetry upon OT entry to room.  Pt reporting he does get scared at times due  to not being his normal self since surgery.  Nurse notified of pt's concerns.    General Precautions: Standard, blind, fall, seizure   Orthopedic Precautions:N/A   Braces: N/A     Occupational Performance:       Activities of Daily Living:  · Grooming: Mod A to wash face; Max A to brush teeth  in supported sitting.    [facility] 6 Click ADL: 10    Treatment & Education:  Role of OT, POC, bilateral UE exercises, self care tasks, positioning, pressure relief    Patient left HOB elevated with all lines intact, call button in reach, bed alarm on, nurse notified and foam heel protectors, bilateral SCDs cyclingEducation:      GOALS:   Multidisciplinary Problems     Occupational Therapy Goals        Problem: Occupational Therapy Goal    Goal Priority Disciplines Outcome Interventions   Occupational Therapy Goal     OT, PT/OT Ongoing, Not Progressing    Description:  Goals to be met by: 6/1/2020    Patient will increase functional independence with ADLs by performing:    Feeding with Set-up Assistance.  UE Dressing with Supervision.  Grooming while seated with Supervision.  Toileting from toilet with Supervision for hygiene and clothing management.   Supine to sit with Supervision.  Toilet transfer to toilet with Contact Guard Assistance.                      Time Tracking:     OT Date of Treatment: 06/01/20  OT Start Time: 1658  OT Stop Time: 1717  OT Total Time (min): 19 min    Billable Minutes:Self Care/Home Management 19    KRISTA Hernandez  6/1/2020

## 2020-06-02 NOTE — NURSING
very drowsy, heavy coaxing needed to take morning medications, refused breakfast, patient states he did not sleep well last night, will monitor closely.

## 2020-06-03 PROBLEM — T83.511A URINARY TRACT INFECTION ASSOCIATED WITH INDWELLING URETHRAL CATHETER: Status: ACTIVE | Noted: 2020-06-03

## 2020-06-03 PROBLEM — N39.0 URINARY TRACT INFECTION ASSOCIATED WITH INDWELLING URETHRAL CATHETER: Status: ACTIVE | Noted: 2020-06-03

## 2020-06-03 LAB
ALLENS TEST: ABNORMAL
ANION GAP SERPL CALC-SCNC: 14 MMOL/L (ref 8–16)
ANISOCYTOSIS BLD QL SMEAR: SLIGHT
BASOPHILS # BLD AUTO: 0.01 K/UL (ref 0–0.2)
BASOPHILS NFR BLD: 0.1 % (ref 0–1.9)
BUN SERPL-MCNC: 14 MG/DL (ref 8–23)
CALCIUM SERPL-MCNC: 8.5 MG/DL (ref 8.7–10.5)
CHLORIDE SERPL-SCNC: 99 MMOL/L (ref 95–110)
CO2 SERPL-SCNC: 26 MMOL/L (ref 23–29)
CREAT SERPL-MCNC: 0.8 MG/DL (ref 0.5–1.4)
DACRYOCYTES BLD QL SMEAR: ABNORMAL
DELSYS: ABNORMAL
DIFFERENTIAL METHOD: ABNORMAL
EOSINOPHIL # BLD AUTO: 0.1 K/UL (ref 0–0.5)
EOSINOPHIL NFR BLD: 1 % (ref 0–8)
ERYTHROCYTE [DISTWIDTH] IN BLOOD BY AUTOMATED COUNT: 25.5 % (ref 11.5–14.5)
ERYTHROCYTE [SEDIMENTATION RATE] IN BLOOD BY WESTERGREN METHOD: 17 MM/H
EST. GFR  (AFRICAN AMERICAN): >60 ML/MIN/1.73 M^2
EST. GFR  (NON AFRICAN AMERICAN): >60 ML/MIN/1.73 M^2
FIO2: 28
FLOW: 2
GLUCOSE SERPL-MCNC: 105 MG/DL (ref 70–110)
HCO3 UR-SCNC: 28.1 MMOL/L (ref 24–28)
HCT VFR BLD AUTO: 29.2 % (ref 40–54)
HGB BLD-MCNC: 8.7 G/DL (ref 14–18)
HYPOCHROMIA BLD QL SMEAR: ABNORMAL
IMM GRANULOCYTES # BLD AUTO: 0.04 K/UL (ref 0–0.04)
IMM GRANULOCYTES NFR BLD AUTO: 0.4 % (ref 0–0.5)
LYMPHOCYTES # BLD AUTO: 0.7 K/UL (ref 1–4.8)
LYMPHOCYTES NFR BLD: 6.6 % (ref 18–48)
MAGNESIUM SERPL-MCNC: 2.1 MG/DL (ref 1.6–2.6)
MCH RBC QN AUTO: 23.1 PG (ref 27–31)
MCHC RBC AUTO-ENTMCNC: 29.8 G/DL (ref 32–36)
MCV RBC AUTO: 78 FL (ref 82–98)
MODE: ABNORMAL
MONOCYTES # BLD AUTO: 1 K/UL (ref 0.3–1)
MONOCYTES NFR BLD: 10.1 % (ref 4–15)
NEUTROPHILS # BLD AUTO: 8 K/UL (ref 1.8–7.7)
NEUTROPHILS NFR BLD: 81.8 % (ref 38–73)
NRBC BLD-RTO: 0 /100 WBC
PCO2 BLDA: 35.6 MMHG (ref 35–45)
PH SMN: 7.51 [PH] (ref 7.35–7.45)
PHOSPHATE SERPL-MCNC: 2.6 MG/DL (ref 2.7–4.5)
PLATELET # BLD AUTO: 228 K/UL (ref 150–350)
PLATELET BLD QL SMEAR: ABNORMAL
PMV BLD AUTO: 7.8 FL (ref 9.2–12.9)
PO2 BLDA: 76 MMHG (ref 80–100)
POC BE: 5 MMOL/L
POC SATURATED O2: 96 % (ref 95–100)
POLYCHROMASIA BLD QL SMEAR: ABNORMAL
POTASSIUM SERPL-SCNC: 3.2 MMOL/L (ref 3.5–5.1)
RBC # BLD AUTO: 3.76 M/UL (ref 4.6–6.2)
SAMPLE: ABNORMAL
SITE: ABNORMAL
SODIUM SERPL-SCNC: 139 MMOL/L (ref 136–145)
SP02: 97
WBC # BLD AUTO: 9.81 K/UL (ref 3.9–12.7)

## 2020-06-03 PROCEDURE — 99900035 HC TECH TIME PER 15 MIN (STAT)

## 2020-06-03 PROCEDURE — 63600175 PHARM REV CODE 636 W HCPCS: Performed by: HOSPITALIST

## 2020-06-03 PROCEDURE — 99233 PR SUBSEQUENT HOSPITAL CARE,LEVL III: ICD-10-PCS | Mod: ,,, | Performed by: INTERNAL MEDICINE

## 2020-06-03 PROCEDURE — C9113 INJ PANTOPRAZOLE SODIUM, VIA: HCPCS | Performed by: HOSPITALIST

## 2020-06-03 PROCEDURE — 94761 N-INVAS EAR/PLS OXIMETRY MLT: CPT

## 2020-06-03 PROCEDURE — 27000221 HC OXYGEN, UP TO 24 HOURS

## 2020-06-03 PROCEDURE — 92610 EVALUATE SWALLOWING FUNCTION: CPT

## 2020-06-03 PROCEDURE — 36415 COLL VENOUS BLD VENIPUNCTURE: CPT

## 2020-06-03 PROCEDURE — 11000001 HC ACUTE MED/SURG PRIVATE ROOM

## 2020-06-03 PROCEDURE — 25000003 PHARM REV CODE 250: Performed by: INTERNAL MEDICINE

## 2020-06-03 PROCEDURE — 82803 BLOOD GASES ANY COMBINATION: CPT

## 2020-06-03 PROCEDURE — 97803 MED NUTRITION INDIV SUBSEQ: CPT

## 2020-06-03 PROCEDURE — 25000003 PHARM REV CODE 250: Performed by: NURSE PRACTITIONER

## 2020-06-03 PROCEDURE — 83735 ASSAY OF MAGNESIUM: CPT

## 2020-06-03 PROCEDURE — 36600 WITHDRAWAL OF ARTERIAL BLOOD: CPT

## 2020-06-03 PROCEDURE — 80048 BASIC METABOLIC PNL TOTAL CA: CPT

## 2020-06-03 PROCEDURE — 99233 SBSQ HOSP IP/OBS HIGH 50: CPT | Mod: ,,, | Performed by: INTERNAL MEDICINE

## 2020-06-03 PROCEDURE — 85025 COMPLETE CBC W/AUTO DIFF WBC: CPT

## 2020-06-03 PROCEDURE — 97535 SELF CARE MNGMENT TRAINING: CPT

## 2020-06-03 PROCEDURE — 99233 PR SUBSEQUENT HOSPITAL CARE,LEVL III: ICD-10-PCS | Mod: ,,, | Performed by: HOSPITALIST

## 2020-06-03 PROCEDURE — 25000003 PHARM REV CODE 250: Performed by: HOSPITALIST

## 2020-06-03 PROCEDURE — 84100 ASSAY OF PHOSPHORUS: CPT

## 2020-06-03 PROCEDURE — 99233 SBSQ HOSP IP/OBS HIGH 50: CPT | Mod: ,,, | Performed by: HOSPITALIST

## 2020-06-03 PROCEDURE — 97530 THERAPEUTIC ACTIVITIES: CPT

## 2020-06-03 RX ORDER — ACETAMINOPHEN 500 MG
1000 TABLET ORAL EVERY 6 HOURS PRN
Status: DISCONTINUED | OUTPATIENT
Start: 2020-06-03 | End: 2020-06-09 | Stop reason: HOSPADM

## 2020-06-03 RX ADMIN — DORZOLAMIDE HYDROCHLORIDE 1 DROP: 20 SOLUTION/ DROPS OPHTHALMIC at 08:06

## 2020-06-03 RX ADMIN — PHENYTOIN SODIUM 100 MG: 100 CAPSULE ORAL at 08:06

## 2020-06-03 RX ADMIN — DIVALPROEX SODIUM 500 MG: 250 TABLET, DELAYED RELEASE ORAL at 08:06

## 2020-06-03 RX ADMIN — ENOXAPARIN SODIUM 40 MG: 100 INJECTION SUBCUTANEOUS at 04:06

## 2020-06-03 RX ADMIN — TRAVOPROST 1 DROP: 0.04 SOLUTION/ DROPS OPHTHALMIC at 08:06

## 2020-06-03 RX ADMIN — ATORVASTATIN CALCIUM 40 MG: 20 TABLET, FILM COATED ORAL at 08:06

## 2020-06-03 RX ADMIN — PANTOPRAZOLE SODIUM 40 MG: 40 INJECTION, POWDER, LYOPHILIZED, FOR SOLUTION INTRAVENOUS at 08:06

## 2020-06-03 RX ADMIN — FLUOXETINE 20 MG: 20 CAPSULE ORAL at 08:06

## 2020-06-03 RX ADMIN — TAMSULOSIN HYDROCHLORIDE 0.4 MG: 0.4 CAPSULE ORAL at 08:06

## 2020-06-03 RX ADMIN — ASPIRIN 81 MG: 81 TABLET, COATED ORAL at 08:06

## 2020-06-03 RX ADMIN — CEFTRIAXONE 1 G: 1 INJECTION, SOLUTION INTRAVENOUS at 01:06

## 2020-06-03 NOTE — NURSING
"swallowed his medications this am with no issues but refused oral intake, was able to get him to drink 1/2 his orange juice. When I asked if I could bring him anything else he stated, "I just want to do what I want to do." will continue to increase oral intake.   "

## 2020-06-03 NOTE — NURSING
much more awake and alert this am, c/o abdominal pain, belly nondistended and soft but tender to touch, hyperactive bowel sounds and patient had a huge watery BM that has filled his bed, changed and repositioned, tolerated well.

## 2020-06-03 NOTE — PLAN OF CARE
Problem: Physical Therapy Goal  Goal: Physical Therapy Goal  Description  Goals to be met by: 6/25/2020    Patient will perform the following to increase strength, improve mobility, and return to prior level of function:    1. Supine <> sit with SBA.  2. Sit<>stand with SBA with least restrictive assistive device.  3. Gait x 150 feet with SBA with least restrictive assistive device.   Outcome: Ongoing, Progressing     Pt tolerated treatment well with no adverse reactions. Pt is progressing toward meeting goals. Pt performed supine > sit with mod A, sit <> stand 2x with mod A and rolling walker. Pt was able to take several side-steps at EOB with RW and CGA. Required max A for sit > supine. Demonstrated improved activity tolerance. Pt continues to be limited by fatigue and visual impairments. PT will continue to follow and progress goals as tolerated. Recommend discharge to SNF.

## 2020-06-03 NOTE — ASSESSMENT & PLAN NOTE
Patient low-grade fever mild leukocytosis.  Urinalysis positive for urinary tract infection.  Cormier catheter discontinued.  Treating with ceftriaxone.  Afebrile and white blood cell count normal this morning.  Clinically patient overall much more awake and alert today.

## 2020-06-03 NOTE — PLAN OF CARE
Problem: Occupational Therapy Goal  Goal: Occupational Therapy Goal  Description  Goals to be met by: 6/1/2020    Patient will increase functional independence with ADLs by performing:    Feeding with Set-up Assistance.  UE Dressing with Supervision.  Grooming while seated with Supervision.  Toileting from toilet with Supervision for hygiene and clothing management.   Supine to sit with Supervision.  Toilet transfer to toilet with Contact Guard Assistance.     Outcome: Ongoing, Progressing   Pt was sleepy and hand had very limited ability to participate in therapy.  He was Total A self-feeding and Min A G/H (wash face and hands), reporting Right side pain when attempts were made to get him to Right hand to assist with self-feeding.  KRISTA Pisano 6/3/2020

## 2020-06-03 NOTE — ASSESSMENT & PLAN NOTE
Upper endoscopy on 5/22/2020 did not reveal source of bleeding however colonoscopy revealed fungating mass of the ascending colon and diverticulosis on 5/25/2020.  Biopsy from colonoscopy revealed invasive adenocarcinoma, moderately to poorly differentiated.  Patient underwent partial right hemicolectomy on 5/26/2020 performed by Dr. Von Jo Jr.  Hemoglobin stable.  Post-operative course complicated by ileus which appears to be improving/resolving however he continues to have poor oral intake.  Continue with modified diet as per Dr. Von Jo Jr.

## 2020-06-03 NOTE — PT/OT/SLP PROGRESS
Physical Therapy Treatment    Patient Name:  Anjel Soria   MRN:  [id number]    Recommendations:     Discharge Recommendations:  nursing facility, skilled   Discharge Equipment Recommendations: (Defer to SNF)   Barriers to discharge: Decreased caregiver support    Assessment:     Anjel Soria is a 74 y.o. male admitted with a medical diagnosis of Malignant neoplasm of ascending colon.  He presents with the following impairments/functional limitations:  weakness, impaired endurance, impaired self care skills, impaired functional mobilty, impaired balance, gait instability, decreased lower extremity function, decreased safety awareness, visual deficits, edema, impaired cardiopulmonary response to activity.    Pt tolerated treatment well with no adverse reactions. Pt is progressing toward meeting goals. Pt performed supine > sit with mod A, sit <> stand 2x with mod A and rolling walker. Pt was able to take several side-steps at EOB with RW and CGA. Required max A for sit > supine. Demonstrated improved activity tolerance. Pt continues to be limited by fatigue and visual impairments. PT will continue to follow and progress goals as tolerated. Recommend discharge to SNF.    Rehab Prognosis: Good; patient would benefit from acute skilled PT services to address these deficits and reach maximum level of function.    Recent Surgery: Procedure(s) (LRB):  LAPAROTOMY, EXPLORATORY (N/A)  COLECTOMY, PARTIAL - RIGHT COLECTOMY (Right) 8 Days Post-Op    Plan:     During this hospitalization, patient to be seen 5 x/week to address the identified rehab impairments via gait training, therapeutic activities, therapeutic exercises and progress toward the following goals:    · Plan of Care Expires:  06/25/20    Subjective     Chief Complaint: Abdominal pain  Patient/Family Comments/goals: No goal stated.  Pain/Comfort:  · Pain Rating 1: (Reported abdominal pain, but did not rate)  · Location - Orientation 1: generalized  · Location 1:  abdomen  · Pain Addressed 1: Reposition, Distraction, Cessation of Activity  · Pain Rating Post-Intervention 1: (Reported abdominal pain, but did not rate)      Objective:     Communicated with RN (Olivia) prior to session.  Patient found supine with peripheral IV, telemetry, oxygen upon PT entry to room.     General Precautions: Standard, aspiration, fall   Orthopedic Precautions:N/A   Braces: N/A     Functional Mobility: Gait belt and non-slip socks donned prior to mobility.  · Bed Mobility:     · Supine to Sit: moderate assistance  · Sit to Supine: maximal assistance  · Transfers:     · Sit to Stand:  moderate assistance with rolling walker  · Performed 2x.  · Gait: Pt took 4 side-steps at EOB with min A and rolling walker.      AM-PAC 6 CLICK MOBILITY  Turning over in bed (including adjusting bedclothes, sheets and blankets)?: 3  Sitting down on and standing up from a chair with arms (e.g., wheelchair, bedside commode, etc.): 3  Moving from lying on back to sitting on the side of the bed?: 3  Moving to and from a bed to a chair (including a wheelchair)?: 2  Need to walk in hospital room?: 2  Climbing 3-5 steps with a railing?: 2  Basic Mobility Total Score: 15       Therapeutic Activities and Exercises:   Bed mobility, transfer, and gait training as described above.    PT educated patient re:   · PT plan of care/role of PT  · Use of rolling walker  · Fall and safety precautions  · Gait deviations  · Discharge recommendations   · Use of call light (don't get up without assistance)  Pt verbalized understanding, but no evidence of learning/carry-over.    The patient is safe to transfer with RN assist, whiteboard updated.   Patient encouraged to sit up in chair throughout the day to prevent deconditioning.     Patient left supine with all lines intact, call button in reach and bed alarm on..    GOALS:   Multidisciplinary Problems     Physical Therapy Goals        Problem: Physical Therapy Goal    Goal Priority  Disciplines Outcome Goal Variances Interventions   Physical Therapy Goal     PT, PT/OT Ongoing, Progressing     Description:  Goals to be met by: 6/25/2020    Patient will perform the following to increase strength, improve mobility, and return to prior level of function:    1. Supine <> sit with SBA.  2. Sit<>stand with SBA with least restrictive assistive device.  3. Gait x 150 feet with SBA with least restrictive assistive device.                    Time Tracking:     PT Received On: 06/03/20  PT Start Time: 1520     PT Stop Time: 1535  PT Total Time (min): 15 min     Billable Minutes: Therapeutic Activity 15    Treatment Type: Treatment  PT/PTA: PT     PTA Visit Number: 0     Brittney Perez, PT  06/03/2020

## 2020-06-03 NOTE — ASSESSMENT & PLAN NOTE
Continue with divalproex and fluoxetine.  Discontinued trazodone due to somnolence yesterday.  Patent much more alert today.  Continue to monitor.

## 2020-06-03 NOTE — ASSESSMENT & PLAN NOTE
Noted residual weakness and walks with walker at baseline.  Continue atorvastatin 40 mg daily for secondary stroke prevention.  Antiplatelet therapy held due to gastrointestinal bleeding.  Restarted aspirin.  Continue with therapy.

## 2020-06-03 NOTE — ASSESSMENT & PLAN NOTE
Suspect patient likely close to baseline or mildly worse due to poor nutrition and stress from surgery.  Continue supportive care, nutrition as tolerated, and, and continue delirium precautions.  Mental status improved with discontinuing tramadol and donepezil.  Continue to monitor.
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
## 2020-06-03 NOTE — PLAN OF CARE
Patient resting in NAD. VSS on 2L NC. Pain managed with PRN and 1x dose pain medication. Dressing to abdomen c/d/i. 1 loose BM this shift. Cormier removed. Incontinence care provided. Weight shift assistance provided. Purposeful rounding performed. Safety measures maintained. Will continue to monitor.

## 2020-06-03 NOTE — PROGRESS NOTES
"[facility] Hospital Medicine  Progress Note    Patient Name: Anjel Soria  MRN: [id number]  Patient Class: IP- Inpatient   Admission Date: 5/21/2020  Length of Stay: 12 days  Attending Physician: Nolan Segovia MD  Primary Care Provider: [facility]        Subjective:     Principal Problem:Malignant neoplasm of ascending colon        HPI:  Per Madiha Ann, NP:    "Anjel Soria is a 74 year old male who has medical history of anemia related to lower GI bleeding, coronary artery disease, vascular dementia, prior stroke with residual left sided weakness, 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  He is currently a resident of the [facility].   Per medical record, the patient with recent prior history of severe anemia requiring transfusion on April 9, 2020.  During that time, he was evaluated  At [facility] and found to be hemoccult positive and CT scan of abdomen was unremarkable.  He was subsequently discharged back to his nursing home with plan for outpatient follow up with GI.  However, the patient was unable to see GI as outpatient.   Today, patient presented to the [facility] ED where initial work up revealed stable hemoglobin/hematocrit 12.0/40.4. Otherwise, labs were unremarkable and COVID screen was negative. Of note, EKG in ED today shows sinus bradycardia and asymtpomatic with heart rate in upper 40's and low 50's.   He was to be discharged back to the nursing home, but patient had episode of melena and decision to transfer to [facility] for evaluation.     On arrival to the [facility], the patient had one small melanotic stool.  Given dementia, the patient is unable to provide detailed history.  Per nursing home medication reconciliation, the patient has not been on aspirin or Plavix recently and is not currently on any other type of blood thinners.  GI will be consulted " "and roge continue to trend hemoglobin/hematocrit."    Overview/Hospital Course:  Mr. Soria is a 74 year-old man with history of coronary artery disease, vascular dementia, stroke with residual left sided weakness, post-stroke seizures, benign prostatic hyperplasia, hypertension and gastroesophageal reflux disease who was admitted from his nursing home ([facility]) for evaluation of bradycardia and melena.  Gastroenterology service consulted and he underwent upper endoscopy on 5/22/2020 with no source of bleeding identified.  On 5/25/2020 he underwent colonoscopy which showed a fungating mass of ascending colon.  General surgery was consulted and took him to operating room for partial right hemicolectomy performed on 5/26/2020.  He has developed a mild post-operative ileus for which he has been treated conservatively. Patient with some improvement but continues to have poor oral intake.    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    Interval History:  No fever.  Leukocytosis resolved.  Improve mental status.    Review of Systems   Constitutional: Negative for chills and fever.   Respiratory: Negative for shortness of breath.    Cardiovascular: Negative for chest pain.   Gastrointestinal: Negative for abdominal pain, constipation, diarrhea, nausea and vomiting.   Psychiatric/Behavioral: Negative for confusion.     Objective:     Vital Signs (Most Recent):  Temp: 98.1 °F (36.7 °C) (06/03/20 1606)  Pulse: 88 (06/03/20 1606)  Resp: 16 (06/03/20 0800)  BP: 132/70 (06/03/20 1606)  SpO2: 95 % (06/03/20 1606) Vital Signs (24h Range):  Temp:  [97.3 °F (36.3 °C)-98.5 °F (36.9 °C)] 98.1 °F (36.7 °C)  Pulse:  [69-94] 88  Resp:  [16-20] 16  SpO2:  [93 %-99 %] 95 %  BP: (121-133)/(58-90) 132/70     Weight: 88.7 kg (195 lb 8.8 oz)  Body mass " index is 25.8 kg/m².    Intake/Output Summary (Last 24 hours) at 6/3/2020 1610  Last data filed at 6/2/2020 2000  Gross per 24 hour   Intake 240 ml   Output 20 ml   Net 220 ml      Physical Exam   Constitutional: He appears well-developed and well-nourished. No distress.   HENT:   Head: Normocephalic and atraumatic.   Eyes: Conjunctivae are normal.   Bilateral blindness.   Neck: Neck supple.   Cardiovascular: Normal rate and regular rhythm.   No murmur heard.  Pulmonary/Chest: Effort normal. No respiratory distress. He has no decreased breath sounds. He has no wheezes.   Abdominal: Bowel sounds are normal. He exhibits no distension. There is tenderness. There is no rigidity.   Mild tenderness around the incision.  Abdomen less tender.   Musculoskeletal: Normal range of motion. He exhibits no edema.   Neurological: He is alert. He has normal strength and normal reflexes.   Much more awake today.  Alert and less confused.   Skin: Skin is warm and dry.   Psychiatric: He has a normal mood and affect. His speech is normal and behavior is normal. Cognition and memory are impaired.       Significant Labs: All pertinent labs within the past 24 hours have been reviewed.    Significant Imaging: I have reviewed all pertinent imaging results/findings within the past 24 hours.      Assessment/Plan:      * Malignant neoplasm of ascending colon  Upper endoscopy on 5/22/2020 did not reveal source of bleeding however colonoscopy revealed fungating mass of the ascending colon and diverticulosis on 5/25/2020.  Biopsy from colonoscopy revealed invasive adenocarcinoma, moderately to poorly differentiated.  Patient underwent partial right hemicolectomy on 5/26/2020 performed by Dr. Von Jo Jr.  Hemoglobin stable.  Post-operative course complicated by ileus which appears to be improving/resolving however he continues to have poor oral intake.  Continue with modified diet as per Dr. Von Jo Jr.    Subcortical vascular dementia  without behavioral disturbance  Suspect patient likely close to baseline or mildly worse due to poor nutrition and stress from surgery.  Continue supportive care, nutrition as tolerated, and, and continue delirium precautions.  Mental status improved with discontinuing tramadol and donepezil.  Continue to monitor.    Urinary tract infection associated with indwelling urethral catheter  Patient low-grade fever mild leukocytosis.  Urinalysis positive for urinary tract infection.  Cormier catheter discontinued.  Treating with ceftriaxone.  Afebrile and white blood cell count normal this morning.  Clinically patient overall much more awake and alert today.    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  Continue atorvastatin 40 mg daily for secondary stroke prevention.  Antiplatelet therapy held due to gastrointestinal bleeding.  Restarted aspirin.  Continue with therapy.    Bipolar affective disorder in remission  Continue with divalproex and fluoxetine.  Discontinued trazodone due to somnolence yesterday.  Patent much more alert today.  Continue to monitor.    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  Continue home anti-seizure regimen.    Essential hypertension  Reasonably controlled with current regimen.  Will continue with current regimen and continue to monitor.    Benign prostatic hyperplasia  Continue with tamsulosin.  Discontinue Cormier catheter today.    Debility  Physical and occupational therapy consulted and recommended skilled nursing once stable for discharge.    Coronary artery disease involving native coronary artery of native heart without angina pectoris  Previously on aspirin and clopidogrel however per nursing home records patient has not been on antiplatelet therapy.  Continue with statin and metoprolol with hold parameters.    End-stage glaucoma  Legally blind.  Continue with home eye drops.    VTE Risk  Mitigation (From admission, onward)         Ordered     enoxaparin injection 40 mg  Daily      06/02/20 1753     Reason for No Pharmacological VTE Prophylaxis  Once     Question:  Reasons:  Answer:  Active Bleeding    05/21/20 1613     IP VTE HIGH RISK PATIENT  Once      05/21/20 1613     Place sequential compression device  Until discontinued      05/21/20 1613     Place NICOLE hose  Until discontinued      05/21/20 1601                Nolan Segovia MD  Department of Hospital Medicine   [facility]

## 2020-06-03 NOTE — SUBJECTIVE & OBJECTIVE
Interval History:  No fever.  Leukocytosis resolved.  Improve mental status.    Review of Systems   Constitutional: Negative for chills and fever.   Respiratory: Negative for shortness of breath.    Cardiovascular: Negative for chest pain.   Gastrointestinal: Negative for abdominal pain, constipation, diarrhea, nausea and vomiting.   Psychiatric/Behavioral: Negative for confusion.     Objective:     Vital Signs (Most Recent):  Temp: 98.1 °F (36.7 °C) (06/03/20 1606)  Pulse: 88 (06/03/20 1606)  Resp: 16 (06/03/20 0800)  BP: 132/70 (06/03/20 1606)  SpO2: 95 % (06/03/20 1606) Vital Signs (24h Range):  Temp:  [97.3 °F (36.3 °C)-98.5 °F (36.9 °C)] 98.1 °F (36.7 °C)  Pulse:  [69-94] 88  Resp:  [16-20] 16  SpO2:  [93 %-99 %] 95 %  BP: (121-133)/(58-90) 132/70     Weight: 88.7 kg (195 lb 8.8 oz)  Body mass index is 25.8 kg/m².    Intake/Output Summary (Last 24 hours) at 6/3/2020 1610  Last data filed at 6/2/2020 2000  Gross per 24 hour   Intake 240 ml   Output 20 ml   Net 220 ml      Physical Exam   Constitutional: He appears well-developed and well-nourished. No distress.   HENT:   Head: Normocephalic and atraumatic.   Eyes: Conjunctivae are normal.   Bilateral blindness.   Neck: Neck supple.   Cardiovascular: Normal rate and regular rhythm.   No murmur heard.  Pulmonary/Chest: Effort normal. No respiratory distress. He has no decreased breath sounds. He has no wheezes.   Abdominal: Bowel sounds are normal. He exhibits no distension. There is tenderness. There is no rigidity.   Mild tenderness around the incision.  Abdomen less tender.   Musculoskeletal: Normal range of motion. He exhibits no edema.   Neurological: He is alert. He has normal strength and normal reflexes.   Much more awake today.  Alert and less confused.   Skin: Skin is warm and dry.   Psychiatric: He has a normal mood and affect. His speech is normal and behavior is normal. Cognition and memory are impaired.       Significant Labs: All pertinent labs  within the past 24 hours have been reviewed.    Significant Imaging: I have reviewed all pertinent imaging results/findings within the past 24 hours.

## 2020-06-03 NOTE — PLAN OF CARE
Problem: SLP Goal  Goal: SLP Goal  Description  1. Pt will be able to consume 75% of mechanical soft solids and thin liquids meal with no overt s/s of airway threat or aspiration.   Outcome: Ongoing, Progressing     Bedside swallow evaluation completed this date

## 2020-06-03 NOTE — PROGRESS NOTES
Afebrile  Vital signs stable  Tolerating diet but intake poor  Abdomen-soft, nontender, incision clean and

## 2020-06-03 NOTE — PLAN OF CARE
Patient not making any attempt to increase oral intake or work with therapy, takes heavy coaxing to perform task after multiple tries, I spoke with his daughter and notified of these issues.

## 2020-06-03 NOTE — PT/OT/SLP PROGRESS
Occupational Therapy   Treatment    Name: Anjel Soria  MRN: [id number]  Admitting Diagnosis:  Malignant neoplasm of ascending colon  7 Days Post-Op    Recommendations:     Discharge Recommendations: nursing facility, skilled  Discharge Equipment Recommendations:  (Next level of care to determine.)  Barriers to discharge:  None    Assessment:     Anjel Soria is a 74 y.o. male with a medical diagnosis of Malignant neoplasm of ascending colon.  He presents sitting in bedside chair and wanting to get back into bed.  Significant difference with pt's ADL transfers this afternoon with pt needing Min A for sit to stand with RW and Min A for step transfer chair to bed.  Pt able to stand at CGA<>SBA with RW during toilet hygiene.  Performance deficits affecting function are weakness, impaired endurance, impaired self care skills, impaired functional mobilty, gait instability, impaired balance, visual deficits, decreased lower extremity function, decreased upper extremity function, decreased safety awareness, pain, decreased ROM, impaired coordination, impaired fine motor, impaired cardiopulmonary response to activity.  Recommend SNF with OT and PT.     Rehab Prognosis:  Fair/Good to return to [facility].; patient would benefit from acute skilled OT services to address these deficits and reach maximum level of function.       Plan:     Patient to be seen 5 x/week to address the above listed problems via self-care/home management, therapeutic activities, therapeutic exercises  · Plan of Care Expires: 06/23/20  · Plan of Care Reviewed with: patient    Subjective     Pain/Comfort:  · Pain Rating 1: 0/10  · Pain Rating Post-Intervention 1: 0/10    Objective:     Communicated with: nurseOlivia, prior to session.  Patient found up in chair with oxygen, langley catheter, peripheral IV, telemetry(O2 NC was not in pt's nose when found and pt reporting he thought it was in his nose.) upon OT entry to room.    General Precautions: Standard, fall,  blind, seizure   Orthopedic Precautions:N/A   Braces: N/A     Occupational Performance:     Bed Mobility:    · Patient completed Sit to Supine with minimum assistance     Functional Mobility/Transfers:  · Patient completed Sit <> Stand Transfer with minimum assistance  with  rolling walker   · Patient completed Bed <> Chair Transfer using Step Transfer technique with minimum assistance with rolling walker  · Functional Mobility: Verbal and tactile cues for hand placement during Sit<>Stand with RW due to pt's visual impairment; no LOB and pt able to perform sit<>stand consistently at Min A level.  Pt able to stand with RW for 2 minutes for toilet hygiene.    Activities of Daily Living:  · Feeding:  Pt able to  cup, drink from straw in cup and set it back down on bedside table with verbal cues only after pt oriented to placement of cup on table.    · Grooming: Set up for washing face in supported sitting Pt declined oral hygiene.  · Toileting: Cormier catheter; Pt needing toilet hygiene of buttocks and pt able to stand for 2 minutes holding onto RW without LOB at CGA<>SBA .        [facility] 6 Click ADL: 14    Treatment & Education:  Role of OT, POC, feeding, grooming, ADL transfer training, standing tolerance, bed mobility    Patient left HOB elevated with all lines intact, call button in reach, bed alarm on and nurse notifiedEducation:      GOALS:   Multidisciplinary Problems     Occupational Therapy Goals        Problem: Occupational Therapy Goal    Goal Priority Disciplines Outcome Interventions   Occupational Therapy Goal     OT, PT/OT Ongoing, Progressing    Description:  Goals to be met by: 6/1/2020    Patient will increase functional independence with ADLs by performing:    Feeding with Set-up Assistance.  UE Dressing with Supervision.  Grooming while seated with Supervision.  Toileting from toilet with Supervision for hygiene and clothing management.   Supine to sit with Supervision.  Toilet transfer to  toilet with Contact Guard Assistance.                      Time Tracking:     OT Date of Treatment: 06/02/20  OT Start Time: 1830  OT Stop Time: 1845  OT Total Time (min): 15 min    Billable Minutes:Self Care/Home Management 15    KRISTA Hernandez  6/2/2020

## 2020-06-03 NOTE — PLAN OF CARE
Recommendations    1. Upgraded diet to GI soft as medically able.   2. Add Boost Plus to aid in meeting needs.  Goals: Pt to meet >75% of EEN/EPN.  Nutrition Goal Status: new

## 2020-06-03 NOTE — PROGRESS NOTES
[facility]  Cardiology  Progress Note    Patient Name: Anjel Soria  MRN: [id number]  Admission Date: 5/21/2020  Hospital Length of Stay: 12 days  Code Status: Full Code   Attending Physician: Nolan Segovia MD   Primary Care Physician: [facility]  Expected Discharge Date:   Principal Problem:Malignant neoplasm of ascending colon    Subjective:     Brief HPI:    Anjel Soria is a 74 y.o.male with hypertension and hypercholesterolemia. He has had a cerebrovascular accident in the past making him weak in the left side. He has vascular dementia. He stays at a Nursing Home in [place], [place]. According to the patient he is wheelchair bound due to that his legs are too weak to be able to ambulate. In 2018 he was seen at [facility] due to chest pain. He had an elevation in troponins. He appears to have been transferred to [facility] for further cardiac evaluation but am not able to find any records from the stay at [facility].     He presented to [facility] on 5/21/2020 with melena. He was transferred to [facility] for evaluation. He underwent colonoscopy on 5/25/2020 revealing a cecal mass. The plan is partial colectomy on 5/26/2020. He has been noted to be bradycardic during the hospital stay with a heart rate in the 50's during the night 5/25/2020 - 5/26/2020. He has been receiving metoprolol 12.5 mg Q12. The patient denies any chest pain or shortness of breath. He is comfortable supine. I am asked to see him for his bradycardia and assess need for possible further cardiac evaluation prior to surgery.    Hospital Course:     5/26/2020: Partial colectomy.    Interval History:    He denies CP or SOB. Poor po intake. Still some abdominal pain. More alert.    Review of Systems   Constitution: Positive for malaise/fatigue.   Eyes: Positive for vision loss in left eye and vision loss in right eye.   Cardiovascular: Negative for chest pain.   Respiratory: Negative for  shortness of breath.    Skin: Negative for rash.   Musculoskeletal: Negative for myalgias.   Gastrointestinal: Positive for abdominal pain.   Neurological: Positive for focal weakness (left side) and weakness.   Psychiatric/Behavioral: Positive for memory loss. Negative for altered mental status. The patient is not nervous/anxious.    Allergic/Immunologic: Negative for persistent infections.     Objective:     Vital Signs (Most Recent):  Temp: 98.1 °F (36.7 °C) (06/03/20 1606)  Pulse: 88 (06/03/20 1606)  Resp: 16 (06/03/20 0800)  BP: 132/70 (06/03/20 1606)  SpO2: 95 % (06/03/20 1606) Vital Signs (24h Range):  Temp:  [97.3 °F (36.3 °C)-98.5 °F (36.9 °C)] 98.1 °F (36.7 °C)  Pulse:  [69-94] 88  Resp:  [16-20] 16  SpO2:  [93 %-99 %] 95 %  BP: (121-133)/(58-90) 132/70     Weight: 88.7 kg (195 lb 8.8 oz)  Body mass index is 25.8 kg/m².    SpO2: 95 %  O2 Device (Oxygen Therapy): nasal cannula      Intake/Output Summary (Last 24 hours) at 6/3/2020 1653  Last data filed at 6/2/2020 2000  Gross per 24 hour   Intake 240 ml   Output 20 ml   Net 220 ml       Lines/Drains/Airways     Peripheral Intravenous Line                 Midline Catheter Insertion/Assessment  - Single Lumen 05/25/20 0940 Left basilic vein (medial side of arm) 22g x 8cm 9 days                Physical Exam   Constitutional: He appears well-developed and well-nourished.   Cardiovascular: Normal rate, regular rhythm, S1 normal and S2 normal. Exam reveals gallop and S4.   Pulmonary/Chest: Effort normal and breath sounds normal.   Abdominal: Normal appearance. He exhibits no distension.   Musculoskeletal:        Right ankle: He exhibits no swelling.        Left ankle: He exhibits no swelling.   Neurological: He is disoriented.   Psychiatric: Cognition and memory are impaired.       Current Medications:     aspirin  81 mg Oral Daily    atorvastatin  40 mg Oral QHS    cefTRIAXone (ROCEPHIN) IVPB  1 g Intravenous Q24H    divalproex  500 mg Oral Nightly     dorzolamide  1 drop Both Eyes BID    enoxaparin  40 mg Subcutaneous Daily    FLUoxetine  20 mg Oral Daily    pantoprazole  40 mg Intravenous Daily    phenytoin  100 mg Oral BID    tamsulosin  1 capsule Oral Daily    travoprost  1 drop Both Eyes QHS     Current Laboratory Results:    Recent Results (from the past 24 hour(s))   Blood culture    Collection Time: 06/02/20  5:04 PM   Result Value Ref Range    Blood Culture, Routine Gram stain augustina bottle: Gram negative rods      Blood Culture, Routine       Results called to and read back by: Olivia Caro RN 06/03/2020  08:29    Blood Culture, Routine       Gram stain aer bottle: Gram negative rods 06/03/2020  09:13   Urinalysis, Reflex to Urine Culture Urine, Clean Catch    Collection Time: 06/02/20  5:08 PM   Result Value Ref Range    Specimen UA Urine, Catheterized     Color, UA Yellow Yellow, Straw, Evelin    Appearance, UA Cloudy (A) Clear    pH, UA 6.0 5.0 - 8.0    Specific Gravity, UA >=1.030 (A) 1.005 - 1.030    Protein, UA 1+ (A) Negative    Glucose, UA Negative Negative    Ketones, UA 2+ (A) Negative    Bilirubin (UA) 1+ (A) Negative    Occult Blood UA 3+ (A) Negative    Nitrite, UA Positive (A) Negative    Urobilinogen, UA 1.0 <2.0 EU/dL    Leukocytes, UA Trace (A) Negative   Urinalysis Microscopic    Collection Time: 06/02/20  5:08 PM   Result Value Ref Range    RBC, UA >100 (H) 0 - 4 /hpf    WBC, UA >100 (H) 0 - 5 /hpf    WBC Clumps, UA Few (A) None-Rare    Bacteria Many (A) None-Occ /hpf    Squam Epithel, UA 0 /hpf    Hyaline Casts, UA 0 0-1/lpf /lpf    Microscopic Comment SEE COMMENT    Blood culture    Collection Time: 06/02/20  7:30 PM   Result Value Ref Range    Blood Culture, Routine No Growth to date    CBC auto differential    Collection Time: 06/03/20  5:33 AM   Result Value Ref Range    WBC 9.81 3.90 - 12.70 K/uL    RBC 3.76 (L) 4.60 - 6.20 M/uL    Hemoglobin 8.7 (L) 14.0 - 18.0 g/dL    Hematocrit 29.2 (L) 40.0 - 54.0 %    Mean Corpuscular Volume  78 (L) 82 - 98 fL    Mean Corpuscular Hemoglobin 23.1 (L) 27.0 - 31.0 pg    Mean Corpuscular Hemoglobin Conc 29.8 (L) 32.0 - 36.0 g/dL    RDW 25.5 (H) 11.5 - 14.5 %    Platelets 228 150 - 350 K/uL    MPV 7.8 (L) 9.2 - 12.9 fL    Immature Granulocytes 0.4 0.0 - 0.5 %    Gran # (ANC) 8.0 (H) 1.8 - 7.7 K/uL    Immature Grans (Abs) 0.04 0.00 - 0.04 K/uL    Lymph # 0.7 (L) 1.0 - 4.8 K/uL    Mono # 1.0 0.3 - 1.0 K/uL    Eos # 0.1 0.0 - 0.5 K/uL    Baso # 0.01 0.00 - 0.20 K/uL    nRBC 0 0 /100 WBC    Gran% 81.8 (H) 38.0 - 73.0 %    Lymph% 6.6 (L) 18.0 - 48.0 %    Mono% 10.1 4.0 - 15.0 %    Eosinophil% 1.0 0.0 - 8.0 %    Basophil% 0.1 0.0 - 1.9 %    Platelet Estimate Appears normal     Aniso Slight     Poly Occasional     Hypo Occasional     Tear Drop Cells Occasional     Differential Method Automated    Basic metabolic panel    Collection Time: 06/03/20  5:33 AM   Result Value Ref Range    Sodium 139 136 - 145 mmol/L    Potassium 3.2 (L) 3.5 - 5.1 mmol/L    Chloride 99 95 - 110 mmol/L    CO2 26 23 - 29 mmol/L    Glucose 105 70 - 110 mg/dL    BUN, Bld 14 8 - 23 mg/dL    Creatinine 0.8 0.5 - 1.4 mg/dL    Calcium 8.5 (L) 8.7 - 10.5 mg/dL    Anion Gap 14 8 - 16 mmol/L    eGFR if African American >60 >60 mL/min/1.73 m^2    eGFR if non African American >60 >60 mL/min/1.73 m^2   Magnesium    Collection Time: 06/03/20  5:33 AM   Result Value Ref Range    Magnesium 2.1 1.6 - 2.6 mg/dL   Phosphorus    Collection Time: 06/03/20  5:33 AM   Result Value Ref Range    Phosphorus 2.6 (L) 2.7 - 4.5 mg/dL   ISTAT PROCEDURE    Collection Time: 06/03/20 11:36 AM   Result Value Ref Range    POC PH 7.506 (H) 7.35 - 7.45    POC PCO2 35.6 35 - 45 mmHg    POC PO2 76 (L) 80 - 100 mmHg    POC HCO3 28.1 (H) 24 - 28 mmol/L    POC BE 5 -2 to 2 mmol/L    POC SATURATED O2 96 95 - 100 %    Rate 17     Sample ARTERIAL     Site RR     Allens Test Pass     DelSys Nasal Can     Mode SPONT     Flow 2     FiO2 28     Sp02 97      Current Imaging  Results:    X-Ray Abdomen Flat And Erect   Final Result   Abnormal      Ileus or developing small bowel obstruction is considered likely.  Serial radiograph or correlation with CT is recommended.      This report was flagged in Epic as abnormal.      Electronically signed by resident: Kaden Mei   Date:    06/02/2020   Time:    19:34      Electronically signed by: Tre Grimes MD   Date:    06/02/2020   Time:    19:44      X-Ray Abdomen AP 1 View   Final Result      As above.  Close radiographic follow-up recommended.         Electronically signed by: Robert Ortiz MD   Date:    05/30/2020   Time:    12:34      X-Ray Chest 1 View   Final Result      As above         Electronically signed by: Magy Malave MD   Date:    05/22/2020   Time:    09:57        5/26/2020: Echo:    Normal left ventricular systolic function. The estimated ejection fraction is 65%.  No wall motion abnormalities.  Grade I (mild) left ventricular diastolic dysfunction consistent with impaired relaxation.  Normal right ventricular systolic function.  Mild aortic valve sclerosis.  The estimated PA systolic pressure is 9 mmHg.  Normal central venous pressure (3 mmHg).      Assessment and Plan:     Problem List:    Active Diagnoses:    Diagnosis Date Noted POA    PRINCIPAL PROBLEM:  Malignant neoplasm of ascending colon [C18.2] 05/27/2020 Yes    Urinary tract infection associated with indwelling urethral catheter [T83.511A, N39.0] 06/03/2020 No    Debility [R53.81] 06/01/2020 Yes    Essential hypertension [I10] 05/21/2020 Yes    Bipolar affective disorder in remission [F31.70] 05/21/2020 Yes    Benign prostatic hyperplasia [N40.0] 05/09/2016 Yes    Coronary artery disease involving native coronary artery of native heart without angina pectoris [I25.10] 05/09/2016 Yes    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 [I69.398, G40.909] 05/09/2016 Not Applicable    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  [I69.90] 05/09/2016 Not Applicable    End-stage glaucoma [H40.9] 04/20/2015 Yes    Subcortical vascular dementia without behavioral disturbance [F01.50]  Yes      Problems Resolved During this Admission:    Diagnosis Date Noted Date Resolved POA    Ileus following gastrointestinal surgery [K91.89, K56.7] 05/31/2020 06/02/2020 No    Hiatal hernia [K44.9] 05/22/2020 06/02/2020 Yes    Gastrointestinal hemorrhage with melena [K92.1] 05/21/2020 06/02/2020 Yes    Gastroesophageal reflux disease [K21.9] 05/21/2020 06/02/2020 Yes    Bradycardia [R00.1] 05/21/2020 06/02/2020 Yes    Obstructive cardiomyopathy [I42.8] 05/21/2020 06/02/2020 Yes    Acute blood loss anemia [D62] 05/21/2020 06/02/2020 Yes     Assessment and Plan:     1. Coronary Artery Disease              2018: Appears to have had NSTEMI. Evaluation at [facility]. Unable to obtain information.   5/26/2020: Echo: Normal left ventricular size and systolic function. EF 65%. Mild aortic valve sclerosis.              According to NH log appears to have been on aspirin and clopidogrel until recently.              Appears stable.     2. Sinus Bradycardia              HR in 50's.              used to be on metoprolol 12.5 mg Q12.              5/26/2020: Metoprolol was discontinued.   Heart rate in 60-70 bpm range.     3. History of Cerebrovascular Accident              History of CVA causing weakness in left side.     4. Dementia              According to chart having vascular dementia.     5. Hypertension              Currently not on any antihypertensives.     6. Hypercholesterolemia              On atorvastatin 40 mg Q24.     7. Gastrointestinal Bleeding               5/21/2020: Presented with melena.              5/25/2020: Colonoscopy: Cecal mass.               5/26/2020:  Partial colectomy.   Dr. Von Jo.     VTE Risk Mitigation (From admission, onward)         Ordered     enoxaparin injection 40 mg  Daily      06/02/20 0566     Reason for No  Pharmacological VTE Prophylaxis  Once     Question:  Reasons:  Answer:  Active Bleeding    05/21/20 1613     IP VTE HIGH RISK PATIENT  Once      05/21/20 1613     Place sequential compression device  Until discontinued      05/21/20 1613     Place NICOLE hose  Until discontinued      05/21/20 1601                Richard Brown MD  Cardiology  [facility]

## 2020-06-03 NOTE — PLAN OF CARE
Problem: Occupational Therapy Goal  Goal: Occupational Therapy Goal  Description  Goals to be met by: 6/1/2020    Patient will increase functional independence with ADLs by performing:    Feeding with Set-up Assistance.  UE Dressing with Supervision.  Grooming while seated with Supervision.  Toileting from toilet with Supervision for hygiene and clothing management.   Supine to sit with Supervision.  Toilet transfer to toilet with Contact Guard Assistance.     Outcome: Ongoing, Progressing   Progressing towards goals.  Pt performed sit<>stand with RW at Min A twice during tx session, once from chair and once from bed.  Pt able to  cup off bedside table, drink from glass with straw and place cup back down on bedside table with verbal cues. Pt to benefit from continued acute care OT services to increase independence in self-care/functional transfers.  Continue POC.  Recommend discharge to SNF with OT and PT.

## 2020-06-03 NOTE — PT/OT/SLP PROGRESS
Occupational Therapy   Treatment    Name: Anjel Soria  MRN: [id number]  Admitting Diagnosis:  Malignant neoplasm of ascending colon  8 Days Post-Op    Recommendations:     Discharge Recommendations: nursing facility, skilled  Discharge Equipment Recommendations:  (defer to next level of care)  Barriers to discharge:  None    Assessment:     Anjel Soria is a 74 y.o. male with a medical diagnosis of Malignant neoplasm of ascending colon.  He presents with low arousal and confusion.. Performance deficits affecting function are weakness, impaired endurance, impaired self care skills, impaired functional mobilty, impaired balance, decreased upper extremity function, decreased lower extremity function, visual deficits, gait instability, pain, impaired fine motor, decreased safety awareness.  He was Total A self-feeding and Min A G/H (wash face and hands), reporting Right side pain when attempts were made to get him to Right hand to assist with self-feeding.  Continuation of OT treatment is needed to maximize patient function while in the hospital.    Rehab Prognosis:  Fair; patient would benefit from acute skilled OT services to address these deficits and reach maximum level of function.       Plan:     Patient to be seen 5 x/week to address the above listed problems via self-care/home management, therapeutic activities, therapeutic exercises  · Plan of Care Expires: 06/23/20  · Plan of Care Reviewed with: patient    Subjective     Pain/Comfort:  · Pain Rating 1: 0/10(chair position in bed beginning and end of session)  · Location - Side 1: Right  · Location - Orientation 1: generalized(Left trunk pain with attempt at RUE use for self-feeding)  · Location 1: abdomen  · Pain Addressed 1: Reposition, Cessation of Activity    Objective:     Communicated with: patient and his nurse prior to session.  Patient found with bed in chair position with telemetry, peripheral IV, oxygen(2L 02 NC) upon OT entry to room.  He was  difficult to arouse for therapy, but was accepting of intervention but had difficulty participating in activity due to confusion and low arousal.    General Precautions: Standard, fall, blind, seizure   Orthopedic Precautions:N/A   Braces: N/A     Occupational Performance:     Bed Mobility:    · None     Functional Mobility/Transfers:  · None    Activities of Daily Living:  · Total A self-feeding (pt had able to use his hands to assist, reporting Right trunk paint with assisted to lift Right arm (unable to hold cup or spoon, unable to use two handed assist for feeding) bed level  · Min A G/H (wash face and hands-assist for completeness) bed level    [facility] 6 Click ADL: 14    Treatment & Education:    Patient left supine with all lines intact, call button in reach, bed alarm on and nurse notifiedEducation:      GOALS:   Multidisciplinary Problems     Occupational Therapy Goals        Problem: Occupational Therapy Goal    Goal Priority Disciplines Outcome Interventions   Occupational Therapy Goal     OT, PT/OT Ongoing, Progressing    Description:  Goals to be met by: 6/1/2020    Patient will increase functional independence with ADLs by performing:    Feeding with Set-up Assistance.  UE Dressing with Supervision.  Grooming while seated with Supervision.  Toileting from toilet with Supervision for hygiene and clothing management.   Supine to sit with Supervision.  Toilet transfer to toilet with Contact Guard Assistance.                      Time Tracking:     OT Date of Treatment: 06/03/20  OT Start Time: 0911  OT Stop Time: 0939  OT Total Time (min): 28 min    Billable Minutes:Self Care/Home Management 28    Ester Roberts, Bryce, LOTR  6/3/2020

## 2020-06-03 NOTE — PLAN OF CARE
Chart reviewed  [place]- day 12  D/c date unknown  SNF recommendation noted in chart   Updated clinicals to facility were forwarded  Mr Soria is a long term resident of [facility] ([place], [place])  Will follow closely   Dr Segovia is the attending.   Consults following: Dr Jo, Dr Brown, PT, OT,ST       06/03/20 4456   Discharge Reassessment   Assessment Type Discharge Planning Reassessment   Provided patient/caregiver education on the expected discharge date and the discharge plan Yes   Do you have any problems affording any of your prescribed medications? No   Discharge Plan A Skilled Nursing Facility

## 2020-06-03 NOTE — PROGRESS NOTES
"[facility]  Adult Nutrition  Progress Note    SUMMARY       Recommendations    1. Upgraded diet to GI soft as medically able.   2. Add Boost Plus to aid in meeting needs.  Goals: Pt to meet >75% of EEN/EPN.  Nutrition Goal Status: new  Communication of RD Recs: (plan of care)    Reason for Assessment    Reason For Assessment: length of stay  Diagnosis: gastrointestinal disease(gastrointestinal hemorrhage with melena)  Relevant Medical History: CVA, CAD, HTN, GERD, HLD, prostate ca  General Information Comments:   6/3/20: Diet upgraded to full liquids. RN reports pt not interested in eating breakfast. Will add Boost to meals.  6/1/20: 74 y.o. male with HTN and hypercholesterolemia. Presented with CVA t in the past making him weak in the left side. He has vascular dementia. He stays at a Nursing Home in [place], [place]. According to the patient he is wheelchair bound due to that his legs are too weak to be able to ambulate.  Presented to [facility] on 5/21 with melena. He was transferred to [facility] for evaluation. Colonoscopy on 5/25 revealing a cecal mass. Partial colectomy on 5/26/2020. Pt currently with fatigue and abdominal pain. Diet switched to Clear Liquids to assist with BM. NFPE performed on 6/1/20. Pt with signs of mild to moderate muscle wasting or fat depletion.    Nutrition Discharge Planning: Pending medical course    Nutrition Risk Screen    Nutrition Risk Screen: other (see comments)(no appetite)    Nutrition/Diet History    Spiritual, Cultural Beliefs, Sikh Practices, Values that Affect Care: (None stated)  Factors Affecting Nutritional Intake: decreased appetite, depression, altered gastrointestinal function    Anthropometrics    Temp: 98.2 °F (36.8 °C)  Height Method: Stated  Height: 6' 1" (185.4 cm)  Height (inches): 73 in  Weight Method: Bed Scale  Weight: 88.7 kg (195 lb 8.8 oz)  Weight (lb): 195.55 lb  Ideal Body Weight (IBW), Male: 184 lb  % Ideal Body Weight, Male (lb): 106.28 " %  BMI (Calculated): 25.8  BMI Grade: 25 - 29.9 - overweight     Lab/Procedures/Meds    Pertinent Labs Reviewed: reviewed  Pertinent Labs Comments: K 3.2, Dani 8.5, phos 2.6  Pertinent Medications Reviewed: reviewed  Pertinent Medications Comments: statin, pantoprazole    Physical Findings/Assessment    Angel Luis score 13     Estimated/Assessed Needs    Weight Used For Calorie Calculations: 88.7 kg (195 lb 8.8 oz)  Energy Calorie Requirements (kcal): 2112 kcal daily  Energy Need Method: [place]-St Jeor(x 1.25 PAL)  Protein Requirements:  gm daily  Weight Used For Protein Calculations: 88.7 kg (195 lb 8.8 oz)(1.0-1.2 gm/kg)  Fluid Requirements (mL): 1 mL/kcal   Estimated Fluid Requirement Method: RDA Method  RDA Method (mL): 2112  CHO Requirement: 264 gm daily    Nutrition Prescription Ordered    Current Diet Order: Full Liquids    Evaluation of Received Nutrient/Fluid Intake    Energy Calories Required: other (see comments)  Comments: LBM 6/3  % Intake of Estimated Energy Needs: 0 - 25 %  % Meal Intake: 0 - 25 %    Nutrition Risk    Level of Risk/Frequency of Follow-up: moderate - high     Assessment and Plan    Nutrition Problem  Inadequate oral intake     Related to (etiology):   Decreased appetite     Signs and Symptoms (as evidenced by):   Full liquids diet intake <25% @ breakfast     Interventions (treatment strategy):  Collaboration with other providers     Nutrition Diagnosis Status:   New     Monitor and Evaluation    Food and Nutrient Intake: energy intake  Food and Nutrient Adminstration: diet order  Knowledge/Beliefs/Attitudes: food and nutrition knowledge/skill  Anthropometric Measurements: weight, weight change  Biochemical Data, Medical Tests and Procedures: lipid profile, electrolyte and renal panel, gastrointestinal profile, glucose/endocrine profile, inflammatory profile     Malnutrition Assessment     Orbital Region (Subcutaneous Fat Loss): moderate depletion  Upper Arm Region (Subcutaneous Fat  Loss): moderate depletion   Adventist Region (Muscle Loss): mild depletion  Clavicle Bone Region (Muscle Loss): moderate depletion  Patellar Region (Muscle Loss): moderate depletion  Anterior Thigh Region (Muscle Loss): moderate depletion  Posterior Calf Region (Muscle Loss): moderate depletion   Edema (Fluid Accumulation): 0-->no edema present   Subcutaneous Fat Loss (Final Summary): moderate protein-calorie malnutrition  Muscle Loss Evaluation (Final Summary): moderate protein-calorie malnutrition         Nutrition Follow-Up    RD Follow-up?: Yes

## 2020-06-04 LAB
ANION GAP SERPL CALC-SCNC: 8 MMOL/L (ref 8–16)
ANISOCYTOSIS BLD QL SMEAR: SLIGHT
BACTERIA UR CULT: ABNORMAL
BACTERIA UR CULT: ABNORMAL
BASOPHILS # BLD AUTO: 0.03 K/UL (ref 0–0.2)
BASOPHILS NFR BLD: 0.3 % (ref 0–1.9)
BUN SERPL-MCNC: 10 MG/DL (ref 8–23)
BURR CELLS BLD QL SMEAR: ABNORMAL
CALCIUM SERPL-MCNC: 8.3 MG/DL (ref 8.7–10.5)
CHLORIDE SERPL-SCNC: 99 MMOL/L (ref 95–110)
CO2 SERPL-SCNC: 29 MMOL/L (ref 23–29)
CREAT SERPL-MCNC: 0.8 MG/DL (ref 0.5–1.4)
DACRYOCYTES BLD QL SMEAR: ABNORMAL
DIFFERENTIAL METHOD: ABNORMAL
EOSINOPHIL # BLD AUTO: 0.1 K/UL (ref 0–0.5)
EOSINOPHIL NFR BLD: 0.6 % (ref 0–8)
ERYTHROCYTE [DISTWIDTH] IN BLOOD BY AUTOMATED COUNT: 25 % (ref 11.5–14.5)
EST. GFR  (AFRICAN AMERICAN): >60 ML/MIN/1.73 M^2
EST. GFR  (NON AFRICAN AMERICAN): >60 ML/MIN/1.73 M^2
GLUCOSE SERPL-MCNC: 124 MG/DL (ref 70–110)
HCT VFR BLD AUTO: 27.6 % (ref 40–54)
HGB BLD-MCNC: 8.4 G/DL (ref 14–18)
HYPOCHROMIA BLD QL SMEAR: ABNORMAL
IMM GRANULOCYTES # BLD AUTO: 0.04 K/UL (ref 0–0.04)
IMM GRANULOCYTES NFR BLD AUTO: 0.4 % (ref 0–0.5)
LYMPHOCYTES # BLD AUTO: 1.1 K/UL (ref 1–4.8)
LYMPHOCYTES NFR BLD: 11.6 % (ref 18–48)
MAGNESIUM SERPL-MCNC: 2.2 MG/DL (ref 1.6–2.6)
MCH RBC QN AUTO: 23.4 PG (ref 27–31)
MCHC RBC AUTO-ENTMCNC: 30.4 G/DL (ref 32–36)
MCV RBC AUTO: 77 FL (ref 82–98)
MONOCYTES # BLD AUTO: 1.2 K/UL (ref 0.3–1)
MONOCYTES NFR BLD: 12.4 % (ref 4–15)
NEUTROPHILS # BLD AUTO: 7 K/UL (ref 1.8–7.7)
NEUTROPHILS NFR BLD: 74.7 % (ref 38–73)
NRBC BLD-RTO: 0 /100 WBC
OVALOCYTES BLD QL SMEAR: ABNORMAL
PHOSPHATE SERPL-MCNC: 2 MG/DL (ref 2.7–4.5)
PLATELET # BLD AUTO: 269 K/UL (ref 150–350)
PLATELET BLD QL SMEAR: ABNORMAL
PMV BLD AUTO: 8 FL (ref 9.2–12.9)
POIKILOCYTOSIS BLD QL SMEAR: SLIGHT
POLYCHROMASIA BLD QL SMEAR: ABNORMAL
POTASSIUM SERPL-SCNC: 3.2 MMOL/L (ref 3.5–5.1)
RBC # BLD AUTO: 3.59 M/UL (ref 4.6–6.2)
SCHISTOCYTES BLD QL SMEAR: ABNORMAL
SCHISTOCYTES BLD QL SMEAR: PRESENT
SODIUM SERPL-SCNC: 136 MMOL/L (ref 136–145)
SPHEROCYTES BLD QL SMEAR: ABNORMAL
TARGETS BLD QL SMEAR: ABNORMAL
TOXIC GRANULES BLD QL SMEAR: PRESENT
WBC # BLD AUTO: 9.43 K/UL (ref 3.9–12.7)

## 2020-06-04 PROCEDURE — 85025 COMPLETE CBC W/AUTO DIFF WBC: CPT

## 2020-06-04 PROCEDURE — 36415 COLL VENOUS BLD VENIPUNCTURE: CPT

## 2020-06-04 PROCEDURE — 25000003 PHARM REV CODE 250: Performed by: NURSE PRACTITIONER

## 2020-06-04 PROCEDURE — 99233 PR SUBSEQUENT HOSPITAL CARE,LEVL III: ICD-10-PCS | Mod: ,,, | Performed by: INTERNAL MEDICINE

## 2020-06-04 PROCEDURE — 11000001 HC ACUTE MED/SURG PRIVATE ROOM

## 2020-06-04 PROCEDURE — 97530 THERAPEUTIC ACTIVITIES: CPT | Mod: CQ

## 2020-06-04 PROCEDURE — 83735 ASSAY OF MAGNESIUM: CPT

## 2020-06-04 PROCEDURE — C9113 INJ PANTOPRAZOLE SODIUM, VIA: HCPCS | Performed by: HOSPITALIST

## 2020-06-04 PROCEDURE — 99233 SBSQ HOSP IP/OBS HIGH 50: CPT | Mod: ,,, | Performed by: INTERNAL MEDICINE

## 2020-06-04 PROCEDURE — 25000003 PHARM REV CODE 250: Performed by: HOSPITALIST

## 2020-06-04 PROCEDURE — 94761 N-INVAS EAR/PLS OXIMETRY MLT: CPT

## 2020-06-04 PROCEDURE — 97535 SELF CARE MNGMENT TRAINING: CPT

## 2020-06-04 PROCEDURE — 99233 PR SUBSEQUENT HOSPITAL CARE,LEVL III: ICD-10-PCS | Mod: ,,, | Performed by: HOSPITALIST

## 2020-06-04 PROCEDURE — 27000221 HC OXYGEN, UP TO 24 HOURS

## 2020-06-04 PROCEDURE — 63600175 PHARM REV CODE 636 W HCPCS: Performed by: HOSPITALIST

## 2020-06-04 PROCEDURE — 84100 ASSAY OF PHOSPHORUS: CPT

## 2020-06-04 PROCEDURE — 25000003 PHARM REV CODE 250: Performed by: INTERNAL MEDICINE

## 2020-06-04 PROCEDURE — 99233 SBSQ HOSP IP/OBS HIGH 50: CPT | Mod: ,,, | Performed by: HOSPITALIST

## 2020-06-04 PROCEDURE — 80048 BASIC METABOLIC PNL TOTAL CA: CPT

## 2020-06-04 RX ADMIN — ATORVASTATIN CALCIUM 40 MG: 20 TABLET, FILM COATED ORAL at 08:06

## 2020-06-04 RX ADMIN — ASPIRIN 81 MG: 81 TABLET, COATED ORAL at 08:06

## 2020-06-04 RX ADMIN — DORZOLAMIDE HYDROCHLORIDE 1 DROP: 20 SOLUTION/ DROPS OPHTHALMIC at 08:06

## 2020-06-04 RX ADMIN — PANTOPRAZOLE SODIUM 40 MG: 40 INJECTION, POWDER, LYOPHILIZED, FOR SOLUTION INTRAVENOUS at 08:06

## 2020-06-04 RX ADMIN — ACETAMINOPHEN 1000 MG: 500 TABLET ORAL at 08:06

## 2020-06-04 RX ADMIN — PHENYTOIN SODIUM 100 MG: 100 CAPSULE ORAL at 08:06

## 2020-06-04 RX ADMIN — TAMSULOSIN HYDROCHLORIDE 0.4 MG: 0.4 CAPSULE ORAL at 08:06

## 2020-06-04 RX ADMIN — DIVALPROEX SODIUM 500 MG: 250 TABLET, DELAYED RELEASE ORAL at 08:06

## 2020-06-04 RX ADMIN — TRAVOPROST 1 DROP: 0.04 SOLUTION/ DROPS OPHTHALMIC at 08:06

## 2020-06-04 RX ADMIN — FLUOXETINE 20 MG: 20 CAPSULE ORAL at 08:06

## 2020-06-04 RX ADMIN — CEFTRIAXONE 1 G: 1 INJECTION, SOLUTION INTRAVENOUS at 12:06

## 2020-06-04 RX ADMIN — ENOXAPARIN SODIUM 40 MG: 100 INJECTION SUBCUTANEOUS at 04:06

## 2020-06-04 NOTE — PT/OT/SLP PROGRESS
Physical Therapy Treatment    Patient Name:  Anjel Soria   MRN:  [id number]    Recommendations:     Discharge Recommendations:  nursing facility, skilled   Discharge Equipment Recommendations: (defer to SNF)   Barriers to discharge: Decreased caregiver support    Assessment:     Anjel Soria is a 74 y.o. male admitted with a medical diagnosis of Malignant neoplasm of ascending colon.  He presents with the following impairments/functional limitations:  weakness, impaired endurance, impaired self care skills, impaired functional mobilty, gait instability, impaired balance, visual deficits, decreased lower extremity function, decreased safety awareness, impaired cardiopulmonary response to activity ,    Pt presented supine w/ HOB elevated upon arrival of PT. Pt agreeable to PT. Pt sit <> stand w/ ModA and RW. Pt took 6 steps to Foot of bed w/ RW and Chetan.    Rehab Prognosis: Good; patient would benefit from acute skilled PT services to address these deficits and reach maximum level of function.    Recent Surgery: Procedure(s) (LRB):  LAPAROTOMY, EXPLORATORY (N/A)  COLECTOMY, PARTIAL - RIGHT COLECTOMY (Right) 9 Days Post-Op    Plan:     During this hospitalization, patient to be seen 5 x/week to address the identified rehab impairments via gait training, therapeutic activities, therapeutic exercises and progress toward the following goals:    · Plan of Care Expires:  06/25/20    Subjective     Chief Complaint: stomach pain  Patient/Family Comments/goals: I just had surgery yesterday  Pain/Comfort:  · Pain Rating 1: (stomach pain but did not rate)  · Location - Side 1: Right  · Location - Orientation 1: generalized  · Location 1: abdomen  · Pain Addressed 1: Reposition, Distraction, Cessation of Activity      Objective:     Communicated with lindsey Syed) prior to session.  Patient found HOB elevated with peripheral IV, oxygen, telemetry upon PT entry to room.     General Precautions: Standard, aspiration, fall   Orthopedic  Precautions:N/A   Braces: N/A     Functional Mobility:  · Transfers:     · Sit to Stand:  moderate assistance with rolling walker  · Gait: 6 steps to front of bed w/ RW and Chetan      AM-PAC 6 CLICK MOBILITY  Turning over in bed (including adjusting bedclothes, sheets and blankets)?: 3  Sitting down on and standing up from a chair with arms (e.g., wheelchair, bedside commode, etc.): 3  Moving from lying on back to sitting on the side of the bed?: 3  Moving to and from a bed to a chair (including a wheelchair)?: 2  Need to walk in hospital room?: 3  Climbing 3-5 steps with a railing?: 2  Basic Mobility Total Score: 16       Therapeutic Activities and Exercises:   sat EOB for 2 minutes    Patient left HOB elevated with all lines intact, call button in reach, bed alarm on and ns notified..    GOALS:   Multidisciplinary Problems     Physical Therapy Goals        Problem: Physical Therapy Goal    Goal Priority Disciplines Outcome Goal Variances Interventions   Physical Therapy Goal     PT, PT/OT Ongoing, Progressing     Description:  Goals to be met by: 6/25/2020    Patient will perform the following to increase strength, improve mobility, and return to prior level of function:    1. Supine <> sit with SBA.  2. Sit<>stand with SBA with least restrictive assistive device.  3. Gait x 150 feet with SBA with least restrictive assistive device.                    Time Tracking:     PT Received On: 06/04/20  PT Start Time: 1030     PT Stop Time: 1050  PT Total Time (min): 20 min     Billable Minutes: Therapeutic Activity 20    Treatment Type: Treatment  PT/PTA: PTA     PTA Visit Number: Shea     Madan Schulte PTA  06/04/2020

## 2020-06-04 NOTE — PLAN OF CARE
Problem: Occupational Therapy Goal  Goal: Occupational Therapy Goal  Description  Goals to be met by: 6/1/2020; Goals to be met by 6/12/20    Patient will increase functional independence with ADLs by performing:    Feeding with Set-up Assistance.  UE Dressing with Supervision.  Grooming while seated with Supervision.  Toileting from toilet with Supervision for hygiene and clothing management.   Supine to sit with Supervision.  Toilet transfer to toilet with Contact Guard Assistance.     Outcome: Ongoing, Progressing   Progressing towards goals.  Pt to benefit from continued acute care OT services to increase independence in self-care/functional transfers.  Continue POC.  Recommend discharge to SNF with OT, PT and SLP.

## 2020-06-04 NOTE — PLAN OF CARE
Problem: Physical Therapy Goal  Goal: Physical Therapy Goal  Description  Goals to be met by: 6/25/2020    Patient will perform the following to increase strength, improve mobility, and return to prior level of function:    1. Supine <> sit with SBA.  2. Sit<>stand with SBA with least restrictive assistive device.  3. Gait x 150 feet with SBA with least restrictive assistive device.   Outcome: Ongoing, Progressing   Pt presented supine w/ HOB elevated upon arrival of PT. Pt agreeable to PT. Pt sit <> stand w/ ModA and RW. Pt took 6 steps to Foot of bed w/ RW and Chetan

## 2020-06-04 NOTE — PLAN OF CARE
Problem: Adult Inpatient Plan of Care  Goal: Plan of Care Review  Outcome: Ongoing, Progressing     Problem: Adjustment to Illness (Gastrointestinal Bleeding)  Goal: Optimal Coping with Acute Illness  Outcome: Ongoing, Progressing     Problem: Fall Injury Risk  Goal: Absence of Fall and Fall-Related Injury  Outcome: Ongoing, Progressing     Problem: Skin Injury Risk Increased  Goal: Skin Health and Integrity  Outcome: Ongoing, Progressing     Problem: Coping Ineffective  Goal: Effective Coping  Outcome: Ongoing, Progressing

## 2020-06-04 NOTE — PT/OT/SLP PROGRESS
Occupational Therapy   Treatment    Name: Anjel Soria  MRN: [id number]  Admitting Diagnosis:  Malignant neoplasm of ascending colon  9 Days Post-Op    Recommendations:     Discharge Recommendations: nursing facility, skilled  Discharge Equipment Recommendations:  (Defer to SNF)  Barriers to discharge:  None    Assessment:     Anjel Soria is a 74 y.o. male with a medical diagnosis of Malignant neoplasm of ascending colon.  He presents lying in bed with blue pad wet with urine and pt not aware of bladder incontinence.   Pt is a bit paranoid and resistive to therapeutic activities during tx session.  Pt declining grooming tasks, bathing tasks and getting up to a chair.  Pt agreeable to sitting EOB for drinking liquid lunch but once he burped, he reported that because he burped, he should not put any more food/drink into his stomach.  Pt had drank ~6oz of fluid in total during tx session with Min A for task.  Pt asking several times if he is in the hospital for an ulcer of the stomach.  Pt needing Max A for hygiene after bladder incontinence and agreeable to perform sit to stand 3 times for toilet hygiene and for walking to [facility] [place] for repositioning prior to get back into bed.  Pt aware he is in a hospital in [place] only.  Performance deficits affecting function are weakness, impaired endurance, impaired self care skills, impaired functional mobilty, impaired balance, gait instability, decreased lower extremity function, decreased safety awareness, decreased ROM, impaired fine motor, impaired cardiopulmonary response to activity, impaired skin, impaired coordination.  Recommend SNF with OT, PT and SLP.    Rehab Prognosis:  Fair/Good to return to PLOF; patient would benefit from acute skilled OT services to address these deficits and reach maximum level of function.       Plan:     Patient to be seen 5 x/week to address the above listed problems via self-care/home management, therapeutic activities, therapeutic  exercises  · Plan of Care Expires: 06/23/20  · Plan of Care Reviewed with: patient    Subjective     Pain/Comfort:  · Pain Rating 1: 0/10  · Pain Rating Post-Intervention 1: 0/10    Objective:     Communicated with:  Nurse not available prior to tx session.  Patient found HOB elevated with O2 NC around his neck, peripheral IV, telemetry upon OT entry to room.    General Precautions: Standard, aspiration, fall   Orthopedic Precautions:N/A   Braces: N/A     Occupational Performance:     Bed Mobility:  Pt needing OT to flex his knees and guide his UEs for log roll technique  · Patient completed Rolling/Turning to Left with  minimum assistance  · Patient completed Rolling/Turning to Right with minimum assistance  · Patient completed Supine to Sit with minimum assistance  · Patient completed Sit to Supine with minimum assistance     Functional Mobility/Transfers:  · Patient completed Sit <> Stand Transfer with minimum assistance  with  rolling walker   · Functional Mobility: Pt able to take side steps with RW at CGA level with assist for navigation of RW and hand placement.  No LOB.  Pt was able to stand for 2 minutes x 3 for toilet hygiene and side stepping to HOB with RW.    Activities of Daily Living:  · Feeding:  Min A for drinking liquids.    · Toileting: Pt is incontinent of bladder; Pt needing Max A for toilet hygiene and encouragement to participate in task.        [facility] 6 Click ADL: 14    Treatment & Education:  Role of OT, POC, need to increase activity out of bed, safety with sit<>stand and ADL mobility, log roll technique to adhere to abdominal precautions during bed mobility.    Patient left HOB elevated with all lines intact, call button in reach, bed alarm on and nursePrashanth notifiedEducation:      GOALS:   Multidisciplinary Problems     Occupational Therapy Goals        Problem: Occupational Therapy Goal    Goal Priority Disciplines Outcome Interventions   Occupational Therapy Goal     OT, PT/OT Ongoing,  Progressing    Description:  Goals to be met by: 6/1/2020; Goals to be met by 6/12/20    Patient will increase functional independence with ADLs by performing:    Feeding with Set-up Assistance.  UE Dressing with Supervision.  Grooming while seated with Supervision.  Toileting from toilet with Supervision for hygiene and clothing management.   Supine to sit with Supervision.  Toilet transfer to toilet with Contact Guard Assistance.                       Time Tracking:     OT Date of Treatment: 06/04/20  OT Start Time: 1448  OT Stop Time: 1516  OT Total Time (min): 28 min    Billable Minutes:Self Care/Home Management 28    KRISTA Hernandez  6/4/2020

## 2020-06-04 NOTE — PLAN OF CARE
Pt remained free from falls or injuries this shift. Wedge used to reposition pt q2hrs. Pt sometimes refused to turn. No skin breakdown noticed. Pt rested well through the night. Refusing labs this am. Poor po intake. One episode of large loose BM this shift

## 2020-06-05 LAB
ANION GAP SERPL CALC-SCNC: 10 MMOL/L (ref 8–16)
ANISOCYTOSIS BLD QL SMEAR: ABNORMAL
BACTERIA BLD CULT: ABNORMAL
BASOPHILS # BLD AUTO: 0.03 K/UL (ref 0–0.2)
BASOPHILS NFR BLD: 0.4 % (ref 0–1.9)
BUN SERPL-MCNC: 8 MG/DL (ref 8–23)
CALCIUM SERPL-MCNC: 8 MG/DL (ref 8.7–10.5)
CEA SERPL-MCNC: 3 NG/ML (ref 0–5)
CHLORIDE SERPL-SCNC: 98 MMOL/L (ref 95–110)
CO2 SERPL-SCNC: 29 MMOL/L (ref 23–29)
CREAT SERPL-MCNC: 0.8 MG/DL (ref 0.5–1.4)
DACRYOCYTES BLD QL SMEAR: ABNORMAL
DIFFERENTIAL METHOD: ABNORMAL
EOSINOPHIL # BLD AUTO: 0.1 K/UL (ref 0–0.5)
EOSINOPHIL NFR BLD: 1.2 % (ref 0–8)
ERYTHROCYTE [DISTWIDTH] IN BLOOD BY AUTOMATED COUNT: 24.9 % (ref 11.5–14.5)
EST. GFR  (AFRICAN AMERICAN): >60 ML/MIN/1.73 M^2
EST. GFR  (NON AFRICAN AMERICAN): >60 ML/MIN/1.73 M^2
GLUCOSE SERPL-MCNC: 107 MG/DL (ref 70–110)
HCT VFR BLD AUTO: 26.3 % (ref 40–54)
HGB BLD-MCNC: 8.3 G/DL (ref 14–18)
HYPOCHROMIA BLD QL SMEAR: ABNORMAL
IMM GRANULOCYTES # BLD AUTO: 0.04 K/UL (ref 0–0.04)
IMM GRANULOCYTES NFR BLD AUTO: 0.5 % (ref 0–0.5)
LYMPHOCYTES # BLD AUTO: 1.3 K/UL (ref 1–4.8)
LYMPHOCYTES NFR BLD: 16.4 % (ref 18–48)
MAGNESIUM SERPL-MCNC: 2.2 MG/DL (ref 1.6–2.6)
MCH RBC QN AUTO: 24.1 PG (ref 27–31)
MCHC RBC AUTO-ENTMCNC: 31.6 G/DL (ref 32–36)
MCV RBC AUTO: 76 FL (ref 82–98)
MONOCYTES # BLD AUTO: 0.9 K/UL (ref 0.3–1)
MONOCYTES NFR BLD: 11 % (ref 4–15)
NEUTROPHILS # BLD AUTO: 5.5 K/UL (ref 1.8–7.7)
NEUTROPHILS NFR BLD: 70.5 % (ref 38–73)
NRBC BLD-RTO: 0 /100 WBC
OVALOCYTES BLD QL SMEAR: ABNORMAL
PHOSPHATE SERPL-MCNC: 2.3 MG/DL (ref 2.7–4.5)
PLATELET # BLD AUTO: 366 K/UL (ref 150–350)
PLATELET BLD QL SMEAR: ABNORMAL
PMV BLD AUTO: 8.3 FL (ref 9.2–12.9)
POLYCHROMASIA BLD QL SMEAR: ABNORMAL
POTASSIUM SERPL-SCNC: 2.9 MMOL/L (ref 3.5–5.1)
RBC # BLD AUTO: 3.45 M/UL (ref 4.6–6.2)
SCHISTOCYTES BLD QL SMEAR: PRESENT
SODIUM SERPL-SCNC: 137 MMOL/L (ref 136–145)
TARGETS BLD QL SMEAR: ABNORMAL
WBC # BLD AUTO: 7.82 K/UL (ref 3.9–12.7)

## 2020-06-05 PROCEDURE — 83735 ASSAY OF MAGNESIUM: CPT

## 2020-06-05 PROCEDURE — 25000003 PHARM REV CODE 250: Performed by: NURSE PRACTITIONER

## 2020-06-05 PROCEDURE — 99223 PR INITIAL HOSPITAL CARE,LEVL III: ICD-10-PCS | Mod: ,,, | Performed by: INTERNAL MEDICINE

## 2020-06-05 PROCEDURE — 11000001 HC ACUTE MED/SURG PRIVATE ROOM

## 2020-06-05 PROCEDURE — 63600175 PHARM REV CODE 636 W HCPCS: Performed by: HOSPITALIST

## 2020-06-05 PROCEDURE — C9113 INJ PANTOPRAZOLE SODIUM, VIA: HCPCS | Performed by: HOSPITALIST

## 2020-06-05 PROCEDURE — 97116 GAIT TRAINING THERAPY: CPT | Mod: CQ

## 2020-06-05 PROCEDURE — 25000003 PHARM REV CODE 250: Performed by: HOSPITALIST

## 2020-06-05 PROCEDURE — 25000003 PHARM REV CODE 250: Performed by: INTERNAL MEDICINE

## 2020-06-05 PROCEDURE — 82378 CARCINOEMBRYONIC ANTIGEN: CPT

## 2020-06-05 PROCEDURE — 99223 1ST HOSP IP/OBS HIGH 75: CPT | Mod: ,,, | Performed by: INTERNAL MEDICINE

## 2020-06-05 PROCEDURE — 99233 SBSQ HOSP IP/OBS HIGH 50: CPT | Mod: ,,, | Performed by: HOSPITALIST

## 2020-06-05 PROCEDURE — 97110 THERAPEUTIC EXERCISES: CPT | Mod: CQ

## 2020-06-05 PROCEDURE — 80048 BASIC METABOLIC PNL TOTAL CA: CPT

## 2020-06-05 PROCEDURE — 84100 ASSAY OF PHOSPHORUS: CPT

## 2020-06-05 PROCEDURE — 85025 COMPLETE CBC W/AUTO DIFF WBC: CPT

## 2020-06-05 PROCEDURE — 36415 COLL VENOUS BLD VENIPUNCTURE: CPT

## 2020-06-05 PROCEDURE — 99233 PR SUBSEQUENT HOSPITAL CARE,LEVL III: ICD-10-PCS | Mod: ,,, | Performed by: HOSPITALIST

## 2020-06-05 RX ADMIN — PHENYTOIN SODIUM 100 MG: 100 CAPSULE ORAL at 08:06

## 2020-06-05 RX ADMIN — PANTOPRAZOLE SODIUM 40 MG: 40 INJECTION, POWDER, LYOPHILIZED, FOR SOLUTION INTRAVENOUS at 08:06

## 2020-06-05 RX ADMIN — CEFTRIAXONE 1 G: 1 INJECTION, SOLUTION INTRAVENOUS at 12:06

## 2020-06-05 RX ADMIN — ATORVASTATIN CALCIUM 40 MG: 20 TABLET, FILM COATED ORAL at 08:06

## 2020-06-05 RX ADMIN — DIVALPROEX SODIUM 500 MG: 250 TABLET, DELAYED RELEASE ORAL at 08:06

## 2020-06-05 RX ADMIN — ENOXAPARIN SODIUM 40 MG: 100 INJECTION SUBCUTANEOUS at 04:06

## 2020-06-05 RX ADMIN — DORZOLAMIDE HYDROCHLORIDE 1 DROP: 20 SOLUTION/ DROPS OPHTHALMIC at 08:06

## 2020-06-05 RX ADMIN — ASPIRIN 81 MG: 81 TABLET, COATED ORAL at 08:06

## 2020-06-05 RX ADMIN — TAMSULOSIN HYDROCHLORIDE 0.4 MG: 0.4 CAPSULE ORAL at 08:06

## 2020-06-05 RX ADMIN — FLUOXETINE 20 MG: 20 CAPSULE ORAL at 08:06

## 2020-06-05 RX ADMIN — ACETAMINOPHEN 1000 MG: 500 TABLET ORAL at 08:06

## 2020-06-05 NOTE — HPI
Mr Soria is a 74 year old male, nursing home resident, who we are consulted on for newly diagnosed colon cancer. 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 He is currently a resident of the [facility]. He developed anemia requiring transfusion on April 9, 2020.  He was evaluated  At [facility] and found to be hemoccult positive and CT scan of abdomen was unremarkable. He was again sent to [facility] ED for melena and transferred to [facility]. EGD on 5/25/20 neg for bleeding source. Colonoscopy showed a Likely malignant tumor in the ascending colon.  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 He underwent a R colectomy on 5/26/20. Pathology showed a 4 cm moderately differentiated carcinoma of the proximal right colon.  Tumor extends through the muscularis with much involvement of pericolonic adipose tissue and with extension to immediately adjacent to the peripheral serosal surface. Therefore this carcinoma is classified  as involving the serosal surface. Neg for LVI and PNI. Fifteen lymph nodes examined. No LN involvement. Pathologic T4a N0.     Patient is feeling well now. Does have mild abdominal pain. Poor historian due to dementia.

## 2020-06-05 NOTE — PLAN OF CARE
Problem: Adult Inpatient Plan of Care  Goal: Absence of Hospital-Acquired Illness or Injury  Outcome: Ongoing, Progressing     Problem: Adult Inpatient Plan of Care  Goal: Optimal Comfort and Wellbeing  Outcome: Ongoing, Progressing

## 2020-06-05 NOTE — PROGRESS NOTES
"[facility] Hospital Medicine  Progress Note    Patient Name: Anjel Soria  MRN: [id number]  Patient Class: IP- Inpatient   Admission Date: 5/21/2020  Length of Stay: 13 days  Attending Physician: Nolan Segovia MD  Primary Care Provider: [facility]        Subjective:     Principal Problem:Malignant neoplasm of ascending colon        HPI:  Per Madiha Ann, NP:    "Anjel Soria is a 74 year old male who has medical history of anemia related to lower GI bleeding, coronary artery disease, vascular dementia, prior stroke with residual left sided weakness, 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  He is currently a resident of the [facility].   Per medical record, the patient with recent prior history of severe anemia requiring transfusion on April 9, 2020.  During that time, he was evaluated  At [facility] and found to be hemoccult positive and CT scan of abdomen was unremarkable.  He was subsequently discharged back to his nursing home with plan for outpatient follow up with GI.  However, the patient was unable to see GI as outpatient.   Today, patient presented to the [facility] ED where initial work up revealed stable hemoglobin/hematocrit 12.0/40.4. Otherwise, labs were unremarkable and COVID screen was negative. Of note, EKG in ED today shows sinus bradycardia and asymtpomatic with heart rate in upper 40's and low 50's.   He was to be discharged back to the nursing home, but patient had episode of melena and decision to transfer to [facility] for evaluation.     On arrival to the [facility], the patient had one small melanotic stool.  Given dementia, the patient is unable to provide detailed history.  Per nursing home medication reconciliation, the patient has not been on aspirin or Plavix recently and is not currently on any other type of blood thinners.  GI will be consulted " "and roge continue to trend hemoglobin/hematocrit."    Overview/Hospital Course:  Mr. Soria is a 74 year-old man with history of coronary artery disease, vascular dementia, stroke with residual left sided weakness, post-stroke seizures, benign prostatic hyperplasia, hypertension and gastroesophageal reflux disease who was admitted from his nursing home ([facility]) for evaluation of bradycardia and melena.  Gastroenterology service consulted and he underwent upper endoscopy on 5/22/2020 with no source of bleeding identified.  On 5/25/2020 he underwent colonoscopy which showed a fungating mass of ascending colon.  General surgery was consulted and took him to operating room for partial right hemicolectomy performed on 5/26/2020.  He has developed a mild post-operative ileus for which he has been treated conservatively. Patient with some improvement but continues to have poor oral intake.    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    Interval History:  No acute events overnight.  Patient had a bowel movement.    Review of Systems   Constitutional: Negative for chills and fever.   Respiratory: Negative for shortness of breath.    Cardiovascular: Negative for chest pain.   Gastrointestinal: Negative for abdominal pain, constipation, diarrhea, nausea and vomiting.   Psychiatric/Behavioral: Negative for confusion.     Objective:     Vital Signs (Most Recent):  Temp: 99.1 °F (37.3 °C) (06/04/20 2016)  Pulse: 74 (06/04/20 2016)  Resp: 16 (06/04/20 2016)  BP: 126/66 (06/04/20 2016)  SpO2: 100 % (06/04/20 2016) Vital Signs (24h Range):  Temp:  [97.8 °F (36.6 °C)-99.5 °F (37.5 °C)] 99.1 °F (37.3 °C)  Pulse:  [70-88] 74  Resp:  [16-20] 16  SpO2:  [89 %-100 %] 100 %  BP: (118-126)/(61-74) 126/66     Weight: 88.7 kg (195 lb 8.8 oz)  Body mass " index is 25.8 kg/m².    Intake/Output Summary (Last 24 hours) at 6/4/2020 2114  Last data filed at 6/4/2020 0100  Gross per 24 hour   Intake 150 ml   Output --   Net 150 ml      Physical Exam   Constitutional: He appears well-developed and well-nourished. No distress.   HENT:   Head: Normocephalic and atraumatic.   Eyes: Conjunctivae are normal.   Bilateral blindness.   Neck: Neck supple.   Cardiovascular: Normal rate and regular rhythm.   No murmur heard.  Pulmonary/Chest: Effort normal. No respiratory distress. He has no decreased breath sounds. He has no wheezes.   Abdominal: Bowel sounds are normal. He exhibits no distension. There is tenderness. There is no rigidity.   Mild tenderness around the incision.  Abdomen less tender.   Musculoskeletal: Normal range of motion. He exhibits no edema.   Neurological: He is alert. He has normal strength and normal reflexes.   Alert and less confused.   Skin: Skin is warm and dry.   Psychiatric: He has a normal mood and affect. His speech is normal and behavior is normal. Cognition and memory are impaired.       Significant Labs: All pertinent labs within the past 24 hours have been reviewed.    Significant Imaging: I have reviewed all pertinent imaging results/findings within the past 24 hours.      Assessment/Plan:      * Malignant neoplasm of ascending colon  Upper endoscopy on 5/22/2020 did not reveal source of bleeding however colonoscopy revealed fungating mass of the ascending colon and diverticulosis on 5/25/2020.  Biopsy from colonoscopy revealed invasive adenocarcinoma, moderately to poorly differentiated.  Patient underwent partial right hemicolectomy on 5/26/2020 performed by Dr. Von Jo Jr.  Hemoglobin stable.  Post-operative course complicated by ileus which appears to be resolving.  Patient had a bowel movement today.  Continue to advance diet as tolerated.  Discussed with Dr. Von Jo Jr.    Subcortical vascular dementia without behavioral  disturbance  Suspect patient likely close to baseline or mildly worse due to poor nutrition and stress from surgery.  Continue supportive care, nutrition as tolerated, and, and continue delirium precautions.  Mental status improved with discontinuing tramadol and donepezil.  Continue to monitor.    Urinary tract infection associated with indwelling urethral catheter  Patient low-grade fever last night..  Urinalysis positive for urinary tract infection.  Blood and urine culture positive for gram negative rods.  Clinically improving with ceftriaxone.  Will continue.    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  Continue atorvastatin 40 mg daily for secondary stroke prevention.  Antiplatelet therapy held due to gastrointestinal bleeding.  Restarted aspirin.  Continue with therapy.    Bipolar affective disorder in remission  Continue with divalproex and fluoxetine.  Discontinued trazodone due to somnolence yesterday.  Patent much more alert today.  Continue to monitor.    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  Continue home anti-seizure regimen.    Essential hypertension  Reasonably controlled with current regimen.  Will continue with current regimen and continue to monitor.    Benign prostatic hyperplasia  Continue with tamsulosin.  Discontinue Cormier catheter today.    Debility  Physical and occupational therapy consulted and recommended skilled nursing once stable for discharge.    Coronary artery disease involving native coronary artery of native heart without angina pectoris  Previously on aspirin and clopidogrel however per nursing home records patient has not been on antiplatelet therapy.  Continue with statin and metoprolol with hold parameters.    End-stage glaucoma  Legally blind.  Continue with home eye drops.      VTE Risk Mitigation (From admission, onward)         Ordered     enoxaparin injection 40 mg   Daily      06/02/20 1753     Reason for No Pharmacological VTE Prophylaxis  Once     Question:  Reasons:  Answer:  Active Bleeding    05/21/20 1613     IP VTE HIGH RISK PATIENT  Once      05/21/20 1613     Place sequential compression device  Until discontinued      05/21/20 1613     Place NICOLE hose  Until discontinued      05/21/20 1601                Nolan Segovia MD  Department of Hospital Medicine   [facility]

## 2020-06-05 NOTE — PT/OT/SLP PROGRESS
Physical Therapy Treatment    Patient Name:  Anjel Soria   MRN:  [id number]    Recommendations:     Discharge Recommendations:  nursing facility, skilled   Discharge Equipment Recommendations: (defer to SNF)   Barriers to discharge: Decreased caregiver support    Assessment:     Anjel Soria is a 74 y.o. male admitted with a medical diagnosis of Malignant neoplasm of ascending colon.  He presents with the following impairments/functional limitations:  weakness, impaired endurance, impaired self care skills, impaired functional mobilty, gait instability, decreased lower extremity function, decreased safety awareness, decreased ROM, impaired fine motor, impaired cardiopulmonary response to activity ,   Pt presented supine w/ HOB elevated upon arrival of PT. Pt agreeable to PT. Pt sit <> stand w/ No AD and CGA. Pt amb'd 6 steps w/ RW and CGA. Pt stood w/ RW and CGA for 3' .    Rehab Prognosis: Good; patient would benefit from acute skilled PT services to address these deficits and reach maximum level of function.    Recent Surgery: Procedure(s) (LRB):  LAPAROTOMY, EXPLORATORY (N/A)  COLECTOMY, PARTIAL - RIGHT COLECTOMY (Right) 10 Days Post-Op    Plan:     During this hospitalization, patient to be seen 5 x/week to address the identified rehab impairments via gait training, therapeutic activities, therapeutic exercises and progress toward the following goals:    · Plan of Care Expires:  06/29/20    Subjective     Chief Complaint: none stated  Patient/Family Comments/goals: I feel better  Pain/Comfort:  · Pain Rating 1: 0/10  · Location - Side 1: Right  · Location - Orientation 1: generalized  · Location 1: abdomen  · Pain Addressed 1: Reposition, Distraction, Cessation of Activity      Objective:     Communicated with ns(Yahaira) prior to session.  Patient found HOB elevated with oxygen, peripheral IV, telemetry upon PT entry to room.     General Precautions: Standard, aspiration, fall   Orthopedic Precautions:N/A   Braces:  N/A     Functional Mobility:  · Transfers:     · Sit to Stand:  contact guard assistance with hand-held assist  · Gait: 6 steps w/ RW and CGA      AM-PAC 6 CLICK MOBILITY  Turning over in bed (including adjusting bedclothes, sheets and blankets)?: 3  Sitting down on and standing up from a chair with arms (e.g., wheelchair, bedside commode, etc.): 3  Moving from lying on back to sitting on the side of the bed?: 3  Moving to and from a bed to a chair (including a wheelchair)?: 2  Need to walk in hospital room?: 3  Climbing 3-5 steps with a railing?: 2  Basic Mobility Total Score: 16       Therapeutic Activities and Exercises:   Pt performed seated therapeutic exercises including AP and LAQs x 10 reps with verbal and tactile cues.     Pt stood for 3 minutes w/ RW and CGA    Patient left HOB elevated with all lines intact, call button in reach and ns notified..    GOALS:   Multidisciplinary Problems     Physical Therapy Goals        Problem: Physical Therapy Goal    Goal Priority Disciplines Outcome Goal Variances Interventions   Physical Therapy Goal     PT, PT/OT Ongoing, Progressing     Description:  Goals to be met by: 6/25/2020    Patient will perform the following to increase strength, improve mobility, and return to prior level of function:    1. Supine <> sit with SBA.  2. Sit<>stand with SBA with least restrictive assistive device.  3. Gait x 150 feet with SBA with least restrictive assistive device.                    Time Tracking:     PT Received On: 06/05/20  PT Start Time: 1028     PT Stop Time: 1051  PT Total Time (min): 23 min     Billable Minutes: Gait Training 9 and Therapeutic Exercise 14    Treatment Type: Treatment  PT/PTA: PTA     PTA Visit Number: 2     Madan Schulte, PTA  06/05/2020

## 2020-06-05 NOTE — ASSESSMENT & PLAN NOTE
On 6/2/2020 patient developed fevers and leukocytosis along worsening mental status.  Urinalysis positive for urinary tract infection.  Cormier catheter discontinued.  Patient started intravenous ceftriaxone.  Urine culture positive for > 100,000 cfu/mL of both Escherichia coli and Klebsiella pneumoniae.  Blood culture also positive for Escherichia coli.  Sensitivities from the blood culture more pending however urine culture shows pansensitive Klebsiella pneumonia along with Escherichia coli that is resistant to fluoroquinolones and trimethoprim/sulfamethoxazole.  Clinically improving on intravenous ceftriaxone.  Repeat blood culture no growth to date.  Plan to continue course of intravenous treatment with ceftriaxone for 7 days followed by course of oral antibiotics such as cefalexin.

## 2020-06-05 NOTE — PROGRESS NOTES
"[facility] Hospital Medicine  Progress Note    Patient Name: Anjel Soria  MRN: [id number]  Patient Class: IP- Inpatient   Admission Date: 5/21/2020  Length of Stay: 14 days  Attending Physician: Nolan Segovia MD  Primary Care Provider: [facility]        Subjective:     Principal Problem:Malignant neoplasm of ascending colon        HPI:  Per Madiha Ann, NP:    "Anjel Soria is a 74 year old male who has medical history of anemia related to lower GI bleeding, coronary artery disease, vascular dementia, prior stroke with residual left sided weakness, 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  He is currently a resident of the [facility].   Per medical record, the patient with recent prior history of severe anemia requiring transfusion on April 9, 2020.  During that time, he was evaluated  At [facility] and found to be hemoccult positive and CT scan of abdomen was unremarkable.  He was subsequently discharged back to his nursing home with plan for outpatient follow up with GI.  However, the patient was unable to see GI as outpatient.   Today, patient presented to the [facility] ED where initial work up revealed stable hemoglobin/hematocrit 12.0/40.4. Otherwise, labs were unremarkable and COVID screen was negative. Of note, EKG in ED today shows sinus bradycardia and asymtpomatic with heart rate in upper 40's and low 50's.   He was to be discharged back to the nursing home, but patient had episode of melena and decision to transfer to [facility] for evaluation.     On arrival to the [facility], the patient had one small melanotic stool.  Given dementia, the patient is unable to provide detailed history.  Per nursing home medication reconciliation, the patient has not been on aspirin or Plavix recently and is not currently on any other type of blood thinners.  GI will be consulted " "and roge continue to trend hemoglobin/hematocrit."    Overview/Hospital Course:  Mr. Soria is a 74 year-old man with history of coronary artery disease, vascular dementia, stroke with residual left sided weakness, post-stroke seizures, benign prostatic hyperplasia, hypertension and gastroesophageal reflux disease who was admitted from his nursing home ([facility]) for evaluation of bradycardia and melena.  Gastroenterology service consulted and he underwent upper endoscopy on 5/22/2020 with no source of bleeding identified.  On 5/25/2020 he underwent colonoscopy which showed a fungating mass of ascending colon.  General surgery was consulted and took him to operating room for partial right hemicolectomy performed on 5/26/2020.  He has developed a mild post-operative ileus for which he has been treated conservatively. Patient with some improvement but continues to have poor oral intake.    On 6/2/2020 patient developed fevers and leukocytosis along worsening mental status.  Urinalysis positive for urinary tract infection.  Cormier catheter discontinued.  Patient started intravenous ceftriaxone.  Urine culture positive for > 100,000 cfu/mL of both Escherichia coli and Klebsiella pneumoniae.  Blood culture also positive for Escherichia coli.  Sensitivities from the blood culture more pending however urine culture shows pansensitive Klebsiella pneumonia along with Escherichia coli that is resistant to fluoroquinolones and trimethoprim/sulfamethoxazole.  Clinically improving on intravenous ceftriaxone.  Repeat blood culture no growth to date.  Plan to continue course of intravenous treatment with ceftriaxone for 7 days followed by course of oral antibiotics such as cefalexin.     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  " Hematology-Oncology service consulted for prognosis and treatment recommendations.    Interval History:  No acute events overnight.  Patient had a bowel movement.  Diffusely weak.  Poor oral intake but takes food by mouth with persistent encouragement.    Review of Systems   Constitutional: Negative for chills and fever.   Respiratory: Negative for shortness of breath.    Cardiovascular: Negative for chest pain.   Gastrointestinal: Negative for abdominal pain, constipation, diarrhea, nausea and vomiting.   Psychiatric/Behavioral: Negative for confusion.     Objective:     Vital Signs (Most Recent):  Temp: 99.6 °F (37.6 °C) (06/05/20 0702)  Pulse: 71 (06/05/20 0702)  Resp: 16 (06/05/20 0702)  BP: 128/71 (06/05/20 0702)  SpO2: 96 % (06/05/20 0702) Vital Signs (24h Range):  Temp:  [98.2 °F (36.8 °C)-99.6 °F (37.6 °C)] 99.6 °F (37.6 °C)  Pulse:  [70-83] 71  Resp:  [16-18] 16  SpO2:  [91 %-100 %] 96 %  BP: (118-138)/(62-78) 128/71     Weight: 88.7 kg (195 lb 8.8 oz)  Body mass index is 25.8 kg/m².    Intake/Output Summary (Last 24 hours) at 6/5/2020 0900  Last data filed at 6/5/2020 0500  Gross per 24 hour   Intake 120 ml   Output --   Net 120 ml      Physical Exam   Constitutional: He appears well-developed and well-nourished. No distress.   HENT:   Head: Normocephalic and atraumatic.   Eyes: Conjunctivae are normal.   Bilateral blindness.   Neck: Neck supple.   Cardiovascular: Normal rate and regular rhythm.   No murmur heard.  Pulmonary/Chest: Effort normal. No respiratory distress. He has no decreased breath sounds. He has no wheezes.   Abdominal: Bowel sounds are normal. He exhibits no distension. There is tenderness. There is no rigidity.   Mild tenderness around the incision.  Abdomen less tender.   Musculoskeletal: Normal range of motion. He exhibits no edema.   Neurological: He is alert. He has normal strength and normal reflexes.   Alert and less confused.   Skin: Skin is warm and dry.   Psychiatric: He has a  normal mood and affect. His speech is normal and behavior is normal. Cognition and memory are impaired.       Significant Labs: All pertinent labs within the past 24 hours have been reviewed.    Significant Imaging: I have reviewed all pertinent imaging results/findings within the past 24 hours.      Assessment/Plan:      * Malignant neoplasm of ascending colon  Upper endoscopy on 5/22/2020 did not reveal source of bleeding however colonoscopy revealed fungating mass of the ascending colon and diverticulosis on 5/25/2020.  Biopsy from colonoscopy revealed invasive adenocarcinoma, moderately to poorly differentiated.  Patient underwent partial right hemicolectomy on 5/26/2020 performed by Dr. Von Jo Jr.  Hemoglobin stable.  Post-operative course complicated by ileus which appears to be resolving.  Patient had a bowel movement yesterday.  Continue to advance diet as tolerated.  Continue current oral intake.  Consult hematology oncology for prognosis and treatment recommendations.  Despite dementia, patient and his daughter both expressed desire for cancer treatment if indicated with goal of improving survival.    Urinary tract infection associated with indwelling urethral catheter  On 6/2/2020 patient developed fevers and leukocytosis along worsening mental status.  Urinalysis positive for urinary tract infection.  Cormier catheter discontinued.  Patient started intravenous ceftriaxone.  Urine culture positive for > 100,000 cfu/mL of both Escherichia coli and Klebsiella pneumoniae.  Blood culture also positive for Escherichia coli.  Sensitivities from the blood culture more pending however urine culture shows pansensitive Klebsiella pneumonia along with Escherichia coli that is resistant to fluoroquinolones and trimethoprim/sulfamethoxazole.  Clinically improving on intravenous ceftriaxone.  Repeat blood culture no growth to date.  Plan to continue course of intravenous treatment with ceftriaxone for 7 days  followed by course of oral antibiotics such as cefalexin.     Subcortical vascular dementia without behavioral disturbance  Suspect patient likely close to baseline or mildly worse due to poor nutrition and stress from surgery.  Continue supportive care, nutrition as tolerated, and, and continue delirium precautions.  Mental status improved with discontinuing tramadol and donepezil.  Continue to monitor.    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  Continue atorvastatin 40 mg daily for secondary stroke prevention.  Antiplatelet therapy held due to gastrointestinal bleeding.  Restarted aspirin.  Continue with therapy.    Bipolar affective disorder in remission  Continue with divalproex and fluoxetine.  Discontinued trazodone due to somnolence yesterday.  Patent much more alert today.  Continue to monitor.    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  Continue home anti-seizure regimen.    Essential hypertension  Reasonably controlled with current regimen.  Will continue with current regimen and continue to monitor.    Benign prostatic hyperplasia  Continue with tamsulosin.  Discontinue Cormier catheter today.    Debility  Physical and occupational therapy consulted and recommended skilled nursing once stable for discharge.    Coronary artery disease involving native coronary artery of native heart without angina pectoris  Previously on aspirin and clopidogrel however per nursing home records patient has not been on antiplatelet therapy.  Continue with statin and metoprolol with hold parameters.    End-stage glaucoma  Legally blind.  Continue with home eye drops.      VTE Risk Mitigation (From admission, onward)         Ordered     enoxaparin injection 40 mg  Daily      06/02/20 175     Reason for No Pharmacological VTE Prophylaxis  Once     Question:  Reasons:  Answer:  Active Bleeding    05/21/20 1613     IP VTE HIGH  RISK PATIENT  Once      05/21/20 1613     Place sequential compression device  Until discontinued      05/21/20 1613     Place NICOLE hose  Until discontinued      05/21/20 1601                      Nolan Segovia MD  Department of Hospital Medicine   [facility]

## 2020-06-05 NOTE — PLAN OF CARE
Problem: Physical Therapy Goal  Goal: Physical Therapy Goal  Description  Goals to be met by: 6/25/2020    Patient will perform the following to increase strength, improve mobility, and return to prior level of function:    1. Supine <> sit with SBA.  2. Sit<>stand with SBA with least restrictive assistive device.  3. Gait x 150 feet with SBA with least restrictive assistive device.   Outcome: Ongoing, Progressing   Pt presented supine w/ HOB elevated upon arrival of PT. Pt agreeable to PT. Pt sit <> stand w/ No AD and CGA. Pt amb'd 6 steps w/ RW and CGA. Pt stood w/ RW and CGA for 3'

## 2020-06-05 NOTE — SUBJECTIVE & OBJECTIVE
Interval History:  No acute events overnight.  Patient had a bowel movement.    Review of Systems   Constitutional: Negative for chills and fever.   Respiratory: Negative for shortness of breath.    Cardiovascular: Negative for chest pain.   Gastrointestinal: Negative for abdominal pain, constipation, diarrhea, nausea and vomiting.   Psychiatric/Behavioral: Negative for confusion.     Objective:     Vital Signs (Most Recent):  Temp: 99.1 °F (37.3 °C) (06/04/20 2016)  Pulse: 74 (06/04/20 2016)  Resp: 16 (06/04/20 2016)  BP: 126/66 (06/04/20 2016)  SpO2: 100 % (06/04/20 2016) Vital Signs (24h Range):  Temp:  [97.8 °F (36.6 °C)-99.5 °F (37.5 °C)] 99.1 °F (37.3 °C)  Pulse:  [70-88] 74  Resp:  [16-20] 16  SpO2:  [89 %-100 %] 100 %  BP: (118-126)/(61-74) 126/66     Weight: 88.7 kg (195 lb 8.8 oz)  Body mass index is 25.8 kg/m².    Intake/Output Summary (Last 24 hours) at 6/4/2020 2114  Last data filed at 6/4/2020 0100  Gross per 24 hour   Intake 150 ml   Output --   Net 150 ml      Physical Exam   Constitutional: He appears well-developed and well-nourished. No distress.   HENT:   Head: Normocephalic and atraumatic.   Eyes: Conjunctivae are normal.   Bilateral blindness.   Neck: Neck supple.   Cardiovascular: Normal rate and regular rhythm.   No murmur heard.  Pulmonary/Chest: Effort normal. No respiratory distress. He has no decreased breath sounds. He has no wheezes.   Abdominal: Bowel sounds are normal. He exhibits no distension. There is tenderness. There is no rigidity.   Mild tenderness around the incision.  Abdomen less tender.   Musculoskeletal: Normal range of motion. He exhibits no edema.   Neurological: He is alert. He has normal strength and normal reflexes.   Alert and less confused.   Skin: Skin is warm and dry.   Psychiatric: He has a normal mood and affect. His speech is normal and behavior is normal. Cognition and memory are impaired.       Significant Labs: All pertinent labs within the past 24 hours  have been reviewed.    Significant Imaging: I have reviewed all pertinent imaging results/findings within the past 24 hours.
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
## 2020-06-05 NOTE — PROGRESS NOTES
Afebrile  Vital signs stable  Abdomen-soft, nontender, positive bowel sounds, small spot of bloody drainage from midportion of wound, no gross infection or hematoma

## 2020-06-05 NOTE — PROGRESS NOTES
[facility]  Cardiology  Progress Note    Patient Name: Anjel Soria  MRN: [id number]  Admission Date: 5/21/2020  Hospital Length of Stay: 13 days  Code Status: Full Code   Attending Physician: Nolan Segovia MD   Primary Care Physician: [facility]  Expected Discharge Date:   Principal Problem:Malignant neoplasm of ascending colon    Subjective:     Brief HPI:    Anjel Soria is a 74 y.o.male with hypertension and hypercholesterolemia. He has had a cerebrovascular accident in the past making him weak in the left side. He has vascular dementia. He stays at a Nursing Home in [place], [place]. According to the patient he is wheelchair bound due to that his legs are too weak to be able to ambulate. In 2018 he was seen at [facility] due to chest pain. He had an elevation in troponins. He appears to have been transferred to [facility] for further cardiac evaluation but am not able to find any records from the stay at [place].     He presented to [facility] on 5/21/2020 with melena. He was transferred to [facility] for evaluation. He underwent colonoscopy on 5/25/2020 revealing a cecal mass. The plan is partial colectomy on 5/26/2020. He has been noted to be bradycardic during the hospital stay with a heart rate in the 50's during the night 5/25/2020 - 5/26/2020. He has been receiving metoprolol 12.5 mg Q12. The patient denies any chest pain or shortness of breath. He is comfortable supine. I am asked to see him for his bradycardia and assess need for possible further cardiac evaluation prior to surgery.    Hospital Course:     5/26/2020: Partial colectomy.    Interval History:    He denies CP or SOB. Poor po intake. Still some abdominal pain. More alert.    Review of Systems   Constitution: Positive for malaise/fatigue.   Eyes: Positive for vision loss in left eye and vision loss in right eye.   Cardiovascular: Negative for chest pain.   Respiratory: Negative for  shortness of breath.    Skin: Negative for rash.   Musculoskeletal: Negative for myalgias.   Gastrointestinal: Positive for abdominal pain.   Neurological: Positive for focal weakness (left side) and weakness.   Psychiatric/Behavioral: Positive for memory loss. Negative for altered mental status. The patient is not nervous/anxious.    Allergic/Immunologic: Negative for persistent infections.     Objective:     Vital Signs (Most Recent):  Temp: 98.5 °F (36.9 °C) (06/04/20 1703)  Pulse: 81 (06/04/20 1900)  Resp: 18 (06/04/20 1703)  BP: 118/74 (06/04/20 1703)  SpO2: 98 % (06/04/20 1703) Vital Signs (24h Range):  Temp:  [97.8 °F (36.6 °C)-100.8 °F (38.2 °C)] 98.5 °F (36.9 °C)  Pulse:  [70-92] 81  Resp:  [16-20] 18  SpO2:  [89 %-98 %] 98 %  BP: (118-123)/(58-74) 118/74     Weight: 88.7 kg (195 lb 8.8 oz)  Body mass index is 25.8 kg/m².    SpO2: 98 %  O2 Device (Oxygen Therapy): nasal cannula      Intake/Output Summary (Last 24 hours) at 6/4/2020 1954  Last data filed at 6/4/2020 0100  Gross per 24 hour   Intake 270 ml   Output --   Net 270 ml       Lines/Drains/Airways     Peripheral Intravenous Line                 Midline Catheter Insertion/Assessment  - Single Lumen 05/25/20 0940 Left basilic vein (medial side of arm) 22g x 8cm 10 days                Physical Exam   Constitutional: He appears well-developed and well-nourished.   Cardiovascular: Normal rate, regular rhythm, S1 normal and S2 normal. Exam reveals gallop and S4.   Pulmonary/Chest: Effort normal and breath sounds normal.   Abdominal: Normal appearance.   Musculoskeletal:        Right ankle: He exhibits no swelling.        Left ankle: He exhibits no swelling.   Neurological: He is disoriented.   Psychiatric: Cognition and memory are impaired.       Current Medications:     aspirin  81 mg Oral Daily    atorvastatin  40 mg Oral QHS    cefTRIAXone (ROCEPHIN) IVPB  1 g Intravenous Q24H    divalproex  500 mg Oral Nightly    dorzolamide  1 drop Both Eyes BID     enoxaparin  40 mg Subcutaneous Daily    FLUoxetine  20 mg Oral Daily    pantoprazole  40 mg Intravenous Daily    phenytoin  100 mg Oral BID    tamsulosin  1 capsule Oral Daily    travoprost  1 drop Both Eyes QHS     Current Laboratory Results:    No results found for this or any previous visit (from the past 24 hour(s)).  Current Imaging Results:    X-Ray Abdomen Flat And Erect   Final Result   Abnormal      Ileus or developing small bowel obstruction is considered likely.  Serial radiograph or correlation with CT is recommended.      This report was flagged in Epic as abnormal.      Electronically signed by resident: Kaden Mei   Date:    06/02/2020   Time:    19:34      Electronically signed by: Tre Grimes MD   Date:    06/02/2020   Time:    19:44      X-Ray Abdomen AP 1 View   Final Result      As above.  Close radiographic follow-up recommended.         Electronically signed by: Robert Ortiz MD   Date:    05/30/2020   Time:    12:34      X-Ray Chest 1 View   Final Result      As above         Electronically signed by: Magy Malave MD   Date:    05/22/2020   Time:    09:57        5/26/2020: Echo:    Normal left ventricular systolic function. The estimated ejection fraction is 65%.  No wall motion abnormalities.  Grade I (mild) left ventricular diastolic dysfunction consistent with impaired relaxation.  Normal right ventricular systolic function.  Mild aortic valve sclerosis.  The estimated PA systolic pressure is 9 mmHg.  Normal central venous pressure (3 mmHg).      Assessment and Plan:     Problem List:    Active Diagnoses:    Diagnosis Date Noted POA    PRINCIPAL PROBLEM:  Malignant neoplasm of ascending colon [C18.2] 05/27/2020 Yes    Urinary tract infection associated with indwelling urethral catheter [T83.511A, N39.0] 06/03/2020 No    Debility [R53.81] 06/01/2020 Yes    Essential hypertension [I10] 05/21/2020 Yes    Bipolar affective disorder in remission [F31.70] 05/21/2020 Yes     Benign prostatic hyperplasia [N40.0] 05/09/2016 Yes    Coronary artery disease involving native coronary artery of native heart without angina pectoris [I25.10] 05/09/2016 Yes    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 [I69.398, G40.909] 05/09/2016 Not Applicable    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 [I69.90] 05/09/2016 Not Applicable    End-stage glaucoma [H40.9] 04/20/2015 Yes    Subcortical vascular dementia without behavioral disturbance [F01.50]  Yes      Problems Resolved During this Admission:    Diagnosis Date Noted Date Resolved POA    Ileus following gastrointestinal surgery [K91.89, K56.7] 05/31/2020 06/02/2020 No    Hiatal hernia [K44.9] 05/22/2020 06/02/2020 Yes    Gastrointestinal hemorrhage with melena [K92.1] 05/21/2020 06/02/2020 Yes    Gastroesophageal reflux disease [K21.9] 05/21/2020 06/02/2020 Yes    Bradycardia [R00.1] 05/21/2020 06/02/2020 Yes    Obstructive cardiomyopathy [I42.8] 05/21/2020 06/02/2020 Yes    Acute blood loss anemia [D62] 05/21/2020 06/02/2020 Yes     Assessment and Plan:     1. Coronary Artery Disease              2018: Appears to have had NSTEMI. Evaluation at [facility]. Unable to obtain information.   5/26/2020: Echo: Normal left ventricular size and systolic function. EF 65%. Mild aortic valve sclerosis.              According to NH log appears to have been on aspirin and clopidogrel until recently.              Appears stable.     2. Sinus Bradycardia              HR in 50's.              used to be on metoprolol 12.5 mg Q12.              5/26/2020: Metoprolol was discontinued.   Heart rate in 60-70 bpm range.     3. History of Cerebrovascular Accident              History of CVA causing weakness in left side.     4. Dementia              According to chart having vascular dementia.     5. Hypertension              Currently not on any antihypertensives.     6. Hypercholesterolemia              On  atorvastatin 40 mg Q24.     7. Gastrointestinal Bleeding               5/21/2020: Presented with melena.              5/25/2020: Colonoscopy: Cecal mass.               5/26/2020:  Partial colectomy.   Dr. Von Jo.     No need for me to follow,    Thanks    VTE Risk Mitigation (From admission, onward)         Ordered     enoxaparin injection 40 mg  Daily      06/02/20 1753     Reason for No Pharmacological VTE Prophylaxis  Once     Question:  Reasons:  Answer:  Active Bleeding    05/21/20 1613     IP VTE HIGH RISK PATIENT  Once      05/21/20 1613     Place sequential compression device  Until discontinued      05/21/20 1613     Place NICOLE hose  Until discontinued      05/21/20 1601                Richard Brown MD  Cardiology  [facility]

## 2020-06-05 NOTE — ASSESSMENT & PLAN NOTE
Upper endoscopy on 5/22/2020 did not reveal source of bleeding however colonoscopy revealed fungating mass of the ascending colon and diverticulosis on 5/25/2020.  Biopsy from colonoscopy revealed invasive adenocarcinoma, moderately to poorly differentiated.  Patient underwent partial right hemicolectomy on 5/26/2020 performed by Dr. Von oJ Jr.  Hemoglobin stable.  Post-operative course complicated by ileus which appears to be resolving.  Patient had a bowel movement yesterday.  Continue to advance diet as tolerated.  Continue current oral intake.  Consult hematology oncology for prognosis and treatment recommendations.  Despite dementia, patient and his daughter both expressed desire for cancer treatment if indicated with goal of improving survival.

## 2020-06-05 NOTE — ASSESSMENT & PLAN NOTE
- please get CT chest/abdomen/pelvis with contrast to complete staging  - Long discussion with son Mkii and daughter Nicolasa over the phone re the surgical findings. Discussed with them that if CT scan is negative for distant metastases, Mr Soria has high risk stage II colon cancer. Usually we recommend adjuvant chemotherapy x 6 months after surgery to reduce the risk of recurrence, but that depends how his performance status improved. Son and daughter understands. They want patient to get treatment at [facility]. Please schedule f/u appointment with medical oncology at [facility] ~4 weeks after surgery  - Their multiple questions were answered over the phone. Encouraged to call should further questions arise.
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
## 2020-06-05 NOTE — PHYSICIAN QUERY
PT Name: Anjel Soria  MR #: [id number]     Diagnosis Clarification      CDS: Lucio MIGUEL,RN        Contact information:[email or web address]    This form is a permanent document in the medical record.     Query Date: June 5, 2020    Dear Provider,  By submitting this query, we are merely seeking further clarification of documentation.  Please utilize your independent clinical judgment when addressing the question(s) below.     The medical record contains the following:    Supporting Clinical Information Location in Medical Record   WBC:6.79-->8.77-->6.21-->13.15-->8.14-->13.01-->9.43-->7.82  SARS-CoV-2RNA: Negative  Blood Culture, Routine Gram stain augustina bottle: Gram negative rods Blood Culture, Routine ESCHERICHIA COLI       KLEBSIELLA PNEUMONIAE   >100,000 cfu/ml     Urine Culture, Routine Abnormal    ESCHERICHIA COLI   > 100,000 cfu/ml     On 6/2/2020 patient developed fevers and leukocytosis along worsening mental status.  Urinalysis positive for urinary tract infection.  Cormier catheter discontinued.  Patient started intravenous ceftriaxone.  Urine culture positive for > 100,000 cfu/mL of both Escherichia coli and Klebsiella pneumoniae.  Blood culture also positive for Escherichia coli.  Sensitivities from the blood culture more pending however urine culture shows pansensitive Klebsiella pneumonia along with Escherichia coli that is resistant to fluoroquinolones and trimethoprim/sulfamethoxazole.  Clinically improving on intravenous ceftriaxone.  Repeat blood culture no growth to date.  Plan to continue course of intravenous treatment with ceftriaxone for 7 days followed by course of oral antibiotics such as cefalexin.    Urinary tract infection associated with indwelling urethral catheter    Ceftriaxone 1 g/ 50 ml D5W IVPB  Intravenous Every 24 hours over 30 minutes 5/21---->6/5 Labs  5/21/20 Lab  6/2/20 Blood Culture Lab        6/2/20 Urine Culture                6/5/20 Hospital Medicine Progress  Note                    6/3---->present MAR     Please clarify if the _Sepsis__________________________ diagnosis has been:    [ X ] Ruled In, mild sepsis    [  ] Ruled Out   [  ] Other/Clarification of findings (please specify)_______________    [   ] Clinically undetermined     Present on admission (POA) status:   [   ] Yes (Y)                           [   ] No (N)                                 Please document in your progress notes daily for the duration of treatment, until resolved, and include in your discharge summary.

## 2020-06-05 NOTE — SUBJECTIVE & OBJECTIVE
Interval History:  No acute events overnight.  Patient had a bowel movement.  Diffusely weak.  Poor oral intake but takes food by mouth with persistent encouragement.    Review of Systems   Constitutional: Negative for chills and fever.   Respiratory: Negative for shortness of breath.    Cardiovascular: Negative for chest pain.   Gastrointestinal: Negative for abdominal pain, constipation, diarrhea, nausea and vomiting.   Psychiatric/Behavioral: Negative for confusion.     Objective:     Vital Signs (Most Recent):  Temp: 99.6 °F (37.6 °C) (06/05/20 0702)  Pulse: 71 (06/05/20 0702)  Resp: 16 (06/05/20 0702)  BP: 128/71 (06/05/20 0702)  SpO2: 96 % (06/05/20 0702) Vital Signs (24h Range):  Temp:  [98.2 °F (36.8 °C)-99.6 °F (37.6 °C)] 99.6 °F (37.6 °C)  Pulse:  [70-83] 71  Resp:  [16-18] 16  SpO2:  [91 %-100 %] 96 %  BP: (118-138)/(62-78) 128/71     Weight: 88.7 kg (195 lb 8.8 oz)  Body mass index is 25.8 kg/m².    Intake/Output Summary (Last 24 hours) at 6/5/2020 0900  Last data filed at 6/5/2020 0500  Gross per 24 hour   Intake 120 ml   Output --   Net 120 ml      Physical Exam   Constitutional: He appears well-developed and well-nourished. No distress.   HENT:   Head: Normocephalic and atraumatic.   Eyes: Conjunctivae are normal.   Bilateral blindness.   Neck: Neck supple.   Cardiovascular: Normal rate and regular rhythm.   No murmur heard.  Pulmonary/Chest: Effort normal. No respiratory distress. He has no decreased breath sounds. He has no wheezes.   Abdominal: Bowel sounds are normal. He exhibits no distension. There is tenderness. There is no rigidity.   Mild tenderness around the incision.  Abdomen less tender.   Musculoskeletal: Normal range of motion. He exhibits no edema.   Neurological: He is alert. He has normal strength and normal reflexes.   Alert and less confused.   Skin: Skin is warm and dry.   Psychiatric: He has a normal mood and affect. His speech is normal and behavior is normal. Cognition and  memory are impaired.       Significant Labs: All pertinent labs within the past 24 hours have been reviewed.    Significant Imaging: I have reviewed all pertinent imaging results/findings within the past 24 hours.

## 2020-06-05 NOTE — ASSESSMENT & PLAN NOTE
Upper endoscopy on 5/22/2020 did not reveal source of bleeding however colonoscopy revealed fungating mass of the ascending colon and diverticulosis on 5/25/2020.  Biopsy from colonoscopy revealed invasive adenocarcinoma, moderately to poorly differentiated.  Patient underwent partial right hemicolectomy on 5/26/2020 performed by Dr. Von Jo Jr.  Hemoglobin stable.  Post-operative course complicated by ileus which appears to be resolving.  Patient had a bowel movement today.  Continue to advance diet as tolerated.  Discussed with Dr. Von Jo Jr.

## 2020-06-05 NOTE — ASSESSMENT & PLAN NOTE
Patient low-grade fever last night..  Urinalysis positive for urinary tract infection.  Blood and urine culture positive for gram negative rods.  Clinically improving with ceftriaxone.  Will continue.

## 2020-06-05 NOTE — PLAN OF CARE
Pt remained free from falls or injuries this shift. Pt turned q2hrs. No skin breakdown noticed. Pt rested well through the night.  Intermittently confused to situation. Abdomen . No po intake this shift

## 2020-06-06 PROBLEM — B96.20 E COLI BACTEREMIA: Status: ACTIVE | Noted: 2020-06-06

## 2020-06-06 PROBLEM — R78.81 E COLI BACTEREMIA: Status: ACTIVE | Noted: 2020-06-06

## 2020-06-06 LAB
ANION GAP SERPL CALC-SCNC: 15 MMOL/L (ref 8–16)
BASOPHILS # BLD AUTO: 0.02 K/UL (ref 0–0.2)
BASOPHILS NFR BLD: 0.3 % (ref 0–1.9)
BUN SERPL-MCNC: 9 MG/DL (ref 8–23)
CALCIUM SERPL-MCNC: 8.2 MG/DL (ref 8.7–10.5)
CHLORIDE SERPL-SCNC: 101 MMOL/L (ref 95–110)
CO2 SERPL-SCNC: 23 MMOL/L (ref 23–29)
CREAT SERPL-MCNC: 0.8 MG/DL (ref 0.5–1.4)
DIFFERENTIAL METHOD: ABNORMAL
EOSINOPHIL # BLD AUTO: 0.1 K/UL (ref 0–0.5)
EOSINOPHIL NFR BLD: 1 % (ref 0–8)
ERYTHROCYTE [DISTWIDTH] IN BLOOD BY AUTOMATED COUNT: 24.7 % (ref 11.5–14.5)
EST. GFR  (AFRICAN AMERICAN): >60 ML/MIN/1.73 M^2
EST. GFR  (NON AFRICAN AMERICAN): >60 ML/MIN/1.73 M^2
GLUCOSE SERPL-MCNC: 94 MG/DL (ref 70–110)
HCT VFR BLD AUTO: 28.6 % (ref 40–54)
HGB BLD-MCNC: 9 G/DL (ref 14–18)
IMM GRANULOCYTES # BLD AUTO: 0.06 K/UL (ref 0–0.04)
IMM GRANULOCYTES NFR BLD AUTO: 0.8 % (ref 0–0.5)
LYMPHOCYTES # BLD AUTO: 1.3 K/UL (ref 1–4.8)
LYMPHOCYTES NFR BLD: 16 % (ref 18–48)
MAGNESIUM SERPL-MCNC: 2.2 MG/DL (ref 1.6–2.6)
MCH RBC QN AUTO: 24.1 PG (ref 27–31)
MCHC RBC AUTO-ENTMCNC: 31.5 G/DL (ref 32–36)
MCV RBC AUTO: 77 FL (ref 82–98)
MONOCYTES # BLD AUTO: 0.6 K/UL (ref 0.3–1)
MONOCYTES NFR BLD: 7.4 % (ref 4–15)
NEUTROPHILS # BLD AUTO: 6 K/UL (ref 1.8–7.7)
NEUTROPHILS NFR BLD: 74.5 % (ref 38–73)
NRBC BLD-RTO: 0 /100 WBC
PHOSPHATE SERPL-MCNC: 2.5 MG/DL (ref 2.7–4.5)
PLATELET # BLD AUTO: 331 K/UL (ref 150–350)
PMV BLD AUTO: 8 FL (ref 9.2–12.9)
POTASSIUM SERPL-SCNC: 3 MMOL/L (ref 3.5–5.1)
RBC # BLD AUTO: 3.73 M/UL (ref 4.6–6.2)
SODIUM SERPL-SCNC: 139 MMOL/L (ref 136–145)
WBC # BLD AUTO: 8 K/UL (ref 3.9–12.7)

## 2020-06-06 PROCEDURE — C9113 INJ PANTOPRAZOLE SODIUM, VIA: HCPCS | Performed by: HOSPITALIST

## 2020-06-06 PROCEDURE — 99232 PR SUBSEQUENT HOSPITAL CARE,LEVL II: ICD-10-PCS | Mod: ,,, | Performed by: HOSPITALIST

## 2020-06-06 PROCEDURE — 63600175 PHARM REV CODE 636 W HCPCS: Performed by: HOSPITALIST

## 2020-06-06 PROCEDURE — 83735 ASSAY OF MAGNESIUM: CPT

## 2020-06-06 PROCEDURE — 25000003 PHARM REV CODE 250: Performed by: INTERNAL MEDICINE

## 2020-06-06 PROCEDURE — 36415 COLL VENOUS BLD VENIPUNCTURE: CPT

## 2020-06-06 PROCEDURE — 25000003 PHARM REV CODE 250: Performed by: HOSPITALIST

## 2020-06-06 PROCEDURE — 84100 ASSAY OF PHOSPHORUS: CPT

## 2020-06-06 PROCEDURE — 85025 COMPLETE CBC W/AUTO DIFF WBC: CPT

## 2020-06-06 PROCEDURE — 25000003 PHARM REV CODE 250: Performed by: NURSE PRACTITIONER

## 2020-06-06 PROCEDURE — 94761 N-INVAS EAR/PLS OXIMETRY MLT: CPT

## 2020-06-06 PROCEDURE — 11000001 HC ACUTE MED/SURG PRIVATE ROOM

## 2020-06-06 PROCEDURE — 80048 BASIC METABOLIC PNL TOTAL CA: CPT

## 2020-06-06 PROCEDURE — 63600175 PHARM REV CODE 636 W HCPCS: Performed by: INTERNAL MEDICINE

## 2020-06-06 PROCEDURE — 99232 SBSQ HOSP IP/OBS MODERATE 35: CPT | Mod: ,,, | Performed by: HOSPITALIST

## 2020-06-06 RX ORDER — METOPROLOL TARTRATE 25 MG/1
12.5 TABLET ORAL 2 TIMES DAILY
Status: DISCONTINUED | OUTPATIENT
Start: 2020-06-06 | End: 2020-06-06

## 2020-06-06 RX ORDER — POTASSIUM CHLORIDE 20 MEQ/1
40 TABLET, EXTENDED RELEASE ORAL ONCE
Status: COMPLETED | OUTPATIENT
Start: 2020-06-06 | End: 2020-06-06

## 2020-06-06 RX ORDER — POTASSIUM CHLORIDE 750 MG/1
10 TABLET, EXTENDED RELEASE ORAL ONCE
Status: DISCONTINUED | OUTPATIENT
Start: 2020-06-06 | End: 2020-06-06

## 2020-06-06 RX ADMIN — DORZOLAMIDE HYDROCHLORIDE 1 DROP: 20 SOLUTION/ DROPS OPHTHALMIC at 08:06

## 2020-06-06 RX ADMIN — PANTOPRAZOLE SODIUM 40 MG: 40 INJECTION, POWDER, LYOPHILIZED, FOR SOLUTION INTRAVENOUS at 08:06

## 2020-06-06 RX ADMIN — DIVALPROEX SODIUM 500 MG: 250 TABLET, DELAYED RELEASE ORAL at 09:06

## 2020-06-06 RX ADMIN — ATORVASTATIN CALCIUM 40 MG: 20 TABLET, FILM COATED ORAL at 09:06

## 2020-06-06 RX ADMIN — ENOXAPARIN SODIUM 40 MG: 100 INJECTION SUBCUTANEOUS at 04:06

## 2020-06-06 RX ADMIN — DORZOLAMIDE HYDROCHLORIDE 1 DROP: 20 SOLUTION/ DROPS OPHTHALMIC at 09:06

## 2020-06-06 RX ADMIN — POTASSIUM CHLORIDE 40 MEQ: 1500 TABLET, EXTENDED RELEASE ORAL at 09:06

## 2020-06-06 RX ADMIN — ACETAMINOPHEN 1000 MG: 500 TABLET ORAL at 11:06

## 2020-06-06 RX ADMIN — ACETAMINOPHEN 1000 MG: 500 TABLET ORAL at 04:06

## 2020-06-06 RX ADMIN — CEFTRIAXONE 2 G: 2 INJECTION, SOLUTION INTRAVENOUS at 12:06

## 2020-06-06 RX ADMIN — TAMSULOSIN HYDROCHLORIDE 0.4 MG: 0.4 CAPSULE ORAL at 08:06

## 2020-06-06 RX ADMIN — TRAVOPROST 1 DROP: 0.04 SOLUTION/ DROPS OPHTHALMIC at 09:06

## 2020-06-06 RX ADMIN — ASPIRIN 81 MG: 81 TABLET, COATED ORAL at 08:06

## 2020-06-06 RX ADMIN — PHENYTOIN SODIUM 100 MG: 100 CAPSULE ORAL at 08:06

## 2020-06-06 RX ADMIN — FLUOXETINE 20 MG: 20 CAPSULE ORAL at 08:06

## 2020-06-06 RX ADMIN — ONDANSETRON 4 MG: 2 INJECTION INTRAMUSCULAR; INTRAVENOUS at 04:06

## 2020-06-06 RX ADMIN — PHENYTOIN SODIUM 100 MG: 100 CAPSULE ORAL at 09:06

## 2020-06-06 NOTE — PLAN OF CARE
Problem: Adult Inpatient Plan of Care  Goal: Absence of Hospital-Acquired Illness or Injury  Outcome: Ongoing, Progressing  Goal: Optimal Comfort and Wellbeing  Outcome: Ongoing, Progressing     Problem: Fall Injury Risk  Goal: Absence of Fall and Fall-Related Injury  Outcome: Ongoing, Progressing     Problem: Infection  Goal: Infection Symptom Resolution  Outcome: Ongoing, Progressing     Problem: Coping Ineffective  Goal: Effective Coping  Outcome: Ongoing, Progressing

## 2020-06-06 NOTE — ASSESSMENT & PLAN NOTE
· Upper endoscopy on 5/22/2020 did not reveal source of bleeding however colonoscopy revealed fungating mass of the ascending colon and diverticulosis on 5/25/2020.    · Biopsy from colonoscopy revealed invasive adenocarcinoma, moderately to poorly differentiated.    · S/p partial right hemicolectomy on 5/26/2020 performed by Dr. Von Jo Jr complicated by post-op ileus  · [facility] for [facility] and [facility].  Despite dementia, patient and his daughter both expressed desire for cancer treatment if indicated with goal of improving survival.  Needs f/u with [facility] at [facility] 4 weeks after discharge

## 2020-06-06 NOTE — ASSESSMENT & PLAN NOTE
· Noted residual weakness and walks with walker at baseline.   · Continue Aspirin 81mg PO daily  · Continue Lipitor 40mg PO daily

## 2020-06-06 NOTE — ASSESSMENT & PLAN NOTE
· Blood cx 6/2 with E. Coli  · Repeat blood cx NGTD  · Continue Ceftriaxone with plans to transition to PO Augmentin to complete a 14 day course (end date 6/16)

## 2020-06-06 NOTE — ASSESSMENT & PLAN NOTE
· Previously on Aspirin and Clopidogrel however per nursing home records patient has not been on antiplatelet therapy.   · Continue Aspirin 81mg PO daily  · Continue Lipitor 40mg PO daily  · Was on Metoprolol 12.5mg BID - Holding with HR 60s

## 2020-06-06 NOTE — PROGRESS NOTES
POD#11  No complaints.  T 99.6  VSS  WBC 8.0  Hct 28.6  Abd distended but soft.  No BM recorded.  Cont liqs.

## 2020-06-06 NOTE — ASSESSMENT & PLAN NOTE
· On 6/2/2020 patient developed fevers and leukocytosis along worsening mental status.    · Cormier changed  · UA positive for E. Coli and Klebsiella  · Continue Ceftriaxone with plans to transition to PO Augmentin to complete a 14 day course (end date 6/16)

## 2020-06-06 NOTE — ASSESSMENT & PLAN NOTE
· Suspect patient likely close to baseline or mildly worse due to poor nutrition and stress from surgery.    · Continue supportive care, nutrition as tolerated, and, and continue delirium precautions.    · Mental status improved with discontinuing tramadol and donepezil.    · Continue to monitor.

## 2020-06-06 NOTE — ASSESSMENT & PLAN NOTE
· Chronic and stable  · Continue Depakote 500mg PO qHS  · Continue Fluoxetine 20mg PO daily  · Discontinued Trazodone due to somnolence with improvement in mental status  · Continue to monitor

## 2020-06-06 NOTE — NURSING
Patient appears to be sleeping in between nursing care. POC explained but uncooperative with care. Explanation provided. Patient refused his eye drops. Midline incision appears to be healing well. Free from injuries.

## 2020-06-06 NOTE — SUBJECTIVE & OBJECTIVE
Interval History: No acute events overnight. Resting comfortably.  Refusing lab draws.    Review of Systems   Constitutional: Negative for chills, fatigue and fever.   Respiratory: Negative for cough and shortness of breath.    Cardiovascular: Negative for chest pain, palpitations and leg swelling.   Gastrointestinal: Negative for abdominal pain, diarrhea, nausea and vomiting.   Genitourinary: Negative for dysuria and urgency.   Neurological: Negative for dizziness and headaches.   All other systems reviewed and are negative.    Objective:     Vital Signs (Most Recent):  Temp: 99 °F (37.2 °C) (06/06/20 0728)  Pulse: 69 (06/06/20 0800)  Resp: 17 (06/06/20 0728)  BP: (!) 143/75 (06/06/20 0728)  SpO2: 96 % (06/06/20 0728) Vital Signs (24h Range):  Temp:  [98.2 °F (36.8 °C)-99 °F (37.2 °C)] 99 °F (37.2 °C)  Pulse:  [66-96] 69  Resp:  [14-20] 17  SpO2:  [95 %-97 %] 96 %  BP: (118-143)/(60-89) 143/75     Weight: 88.7 kg (195 lb 8.8 oz)  Body mass index is 25.8 kg/m².  No intake or output data in the 24 hours ending 06/06/20 0958   Physical Exam   Constitutional: He appears well-developed and well-nourished.   HENT:   Head: Normocephalic and atraumatic.   Cardiovascular: Normal rate and regular rhythm.   No murmur heard.  Pulmonary/Chest: Effort normal and breath sounds normal. No respiratory distress. He has no wheezes. He has no rales.   Abdominal: Soft. He exhibits no distension. There is tenderness (Mild around incision site).   Musculoskeletal: He exhibits no edema or deformity.   Neurological: He is alert.   Conversant.  Less confused.   Skin: Skin is warm and dry. He is not diaphoretic.       Significant Labs:   Blood Culture: No results for input(s): LABBLOO in the last 48 hours.  CBC:   Recent Labs   Lab 06/04/20  1932 06/05/20  0737   WBC 9.43 7.82   HGB 8.4* 8.3*   HCT 27.6* 26.3*    366*     CMP:   Recent Labs   Lab 06/04/20 1932 06/05/20  0737    137   K 3.2* 2.9*   CL 99 98   CO2 29 29   GLU  124* 107   BUN 10 8   CREATININE 0.8 0.8   CALCIUM 8.3* 8.0*   ANIONGAP 8 10   EGFRNONAA >60 >60     Urine Culture: No results for input(s): LABURIN in the last 48 hours.    Significant Imaging: I have reviewed and interpreted all pertinent imaging results/findings within the past 24 hours.

## 2020-06-06 NOTE — PROGRESS NOTES
"[facility] Hospital Medicine  Progress Note    Patient Name: Anjel Soria  MRN: [id number]  Patient Class: IP- Inpatient   Admission Date: 5/21/2020  Length of Stay: 15 days  Attending Physician: Shereen Stack MD  Primary Care Provider: [facility]        Subjective:     Principal Problem:Malignant neoplasm of ascending colon        HPI:  Per Madiha Ann, NP:    "Anjel Soria is a 74 year old male who has medical history of anemia related to lower GI bleeding, coronary artery disease, vascular dementia, prior stroke with residual left sided weakness, 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  He is currently a resident of the [facility].   Per medical record, the patient with recent prior history of severe anemia requiring transfusion on April 9, 2020.  During that time, he was evaluated  At [facility] and found to be hemoccult positive and CT scan of abdomen was unremarkable.  He was subsequently discharged back to his nursing home with plan for outpatient follow up with GI.  However, the patient was unable to see GI as outpatient.   Today, patient presented to the [facility] ED where initial work up revealed stable hemoglobin/hematocrit 12.0/40.4. Otherwise, labs were unremarkable and COVID screen was negative. Of note, EKG in ED today shows sinus bradycardia and asymtpomatic with heart rate in upper 40's and low 50's.   He was to be discharged back to the nursing home, but patient had episode of melena and decision to transfer to [facility] for evaluation.     On arrival to the [facility], the patient had one small melanotic stool.  Given dementia, the patient is unable to provide detailed history.  Per nursing home medication reconciliation, the patient has not been on aspirin or Plavix recently and is not currently on any other type of blood thinners.  GI will be consulted " "and roge continue to trend hemoglobin/hematocrit."    Overview/Hospital Course:  Mr. Soria is a 74 year-old man with history of coronary artery disease, vascular dementia, stroke with residual left sided weakness, post-stroke seizures, benign prostatic hyperplasia, hypertension and gastroesophageal reflux disease who was admitted from his nursing home ([facility]) for evaluation of bradycardia and melena.  Gastroenterology service consulted and he underwent upper endoscopy on 5/22/2020 with no source of bleeding identified.  On 5/25/2020 he underwent colonoscopy which showed a fungating mass of ascending colon.  General Surgery was consulted and took him to operating room for partial right hemicolectomy performed on 5/26/2020, which was complicated by a post-op ileus.  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  Hematology-Oncology service consulted for prognosis and treatment recommendations and suggested f/u with [facility] 4 weeks after hopsital discharge - as family wants treatment.  On 6/2/2020 patient developed fevers and leukocytosis along worsening mental status, urinalysis was positive.  Cormier catheter was discontinued and he was started on IV Ceftriaxone.  Urine culture positive for > 100,000 cfu/mL of both Escherichia coli and Klebsiella pneumoniae.  Blood culture returned positive for Escherichia coli.  PT/OT was consulted, who suggest SNF.    Interval History: No acute events overnight. Resting comfortably.  Refusing lab draws.    Review of Systems   Constitutional: Negative for chills, fatigue and fever.   Respiratory: Negative for cough and shortness of breath.    Cardiovascular: Negative for chest pain, palpitations and leg swelling.   Gastrointestinal: Negative for abdominal pain, diarrhea, nausea and " vomiting.   Genitourinary: Negative for dysuria and urgency.   Neurological: Negative for dizziness and headaches.   All other systems reviewed and are negative.    Objective:     Vital Signs (Most Recent):  Temp: 99 °F (37.2 °C) (06/06/20 0728)  Pulse: 69 (06/06/20 0800)  Resp: 17 (06/06/20 0728)  BP: (!) 143/75 (06/06/20 0728)  SpO2: 96 % (06/06/20 0728) Vital Signs (24h Range):  Temp:  [98.2 °F (36.8 °C)-99 °F (37.2 °C)] 99 °F (37.2 °C)  Pulse:  [66-96] 69  Resp:  [14-20] 17  SpO2:  [95 %-97 %] 96 %  BP: (118-143)/(60-89) 143/75     Weight: 88.7 kg (195 lb 8.8 oz)  Body mass index is 25.8 kg/m².  No intake or output data in the 24 hours ending 06/06/20 0958   Physical Exam   Constitutional: He appears well-developed and well-nourished.   HENT:   Head: Normocephalic and atraumatic.   Cardiovascular: Normal rate and regular rhythm.   No murmur heard.  Pulmonary/Chest: Effort normal and breath sounds normal. No respiratory distress. He has no wheezes. He has no rales.   Abdominal: Soft. He exhibits no distension. There is tenderness (Mild around incision site).   Musculoskeletal: He exhibits no edema or deformity.   Neurological: He is alert.   Conversant.  Less confused.   Skin: Skin is warm and dry. He is not diaphoretic.       Significant Labs:   Blood Culture: No results for input(s): LABBLOO in the last 48 hours.  CBC:   Recent Labs   Lab 06/04/20 1932 06/05/20  0737   WBC 9.43 7.82   HGB 8.4* 8.3*   HCT 27.6* 26.3*    366*     CMP:   Recent Labs   Lab 06/04/20 1932 06/05/20  0737    137   K 3.2* 2.9*   CL 99 98   CO2 29 29   * 107   BUN 10 8   CREATININE 0.8 0.8   CALCIUM 8.3* 8.0*   ANIONGAP 8 10   EGFRNONAA >60 >60     Urine Culture: No results for input(s): LABURIN in the last 48 hours.    Significant Imaging: I have reviewed and interpreted all pertinent imaging results/findings within the past 24 hours.      Assessment/Plan:      * Malignant neoplasm of ascending colon  · Upper  endoscopy on 5/22/2020 did not reveal source of bleeding however colonoscopy revealed fungating mass of the ascending colon and diverticulosis on 5/25/2020.    · Biopsy from colonoscopy revealed invasive adenocarcinoma, moderately to poorly differentiated.    · S/p partial right hemicolectomy on 5/26/2020 performed by Dr. Von Jo Jr complicated by post-op ileus  · [facility] for prognosis and treatment recommendations.  Despite dementia, patient and his daughter both expressed desire for cancer treatment if indicated with goal of improving survival.  Needs f/u with [facility] 4 weeks after discharge    E coli bacteremia  · Blood cx 6/2 with E. Coli  · Repeat blood cx NGTD  · Continue Ceftriaxone with plans to transition to PO Augmentin to complete a 14 day course (end date 6/16)      Urinary tract infection associated with indwelling urethral catheter  · On 6/2/2020 patient developed fevers and leukocytosis along worsening mental status.    · Cormier changed  · UA positive for E. Coli and Klebsiella  · Continue Ceftriaxone with plans to transition to PO Augmentin to complete a 14 day course (end date 6/16)    Essential hypertension  · Chronic issue  · Not on medication  · Monitor    Coronary artery disease involving native coronary artery of native heart without angina pectoris  · Previously on Aspirin and Clopidogrel however per nursing home records patient has not been on antiplatelet therapy.   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   · Continue Aspirin 81mg PO daily  · Continue Lipitor 40mg PO daily    Subcortical vascular dementia without behavioral  disturbance  · Suspect patient likely close to baseline or mildly worse due to poor nutrition and stress from surgery.    · Continue supportive care, nutrition as tolerated, and, and continue delirium precautions.    · Mental status improved with discontinuing tramadol and donepezil.    · Continue to monitor.    Bipolar affective disorder in remission  · Chronic and stable  · Continue Depakote 500mg PO qHS  · Continue Fluoxetine 20mg PO daily  · Discontinued Trazodone due to somnolence with improvement in mental status  · Continue to monitor    Benign prostatic hyperplasia  · Chronic and stable  · Continue Flomax    Debility  · PT/OT consulted suggest SNF  · Resides at [facility]    End-stage glaucoma  · Legally blind.    · Continue with home eye drops.      VTE Risk Mitigation (From admission, onward)         Ordered     enoxaparin injection 40 mg  Daily      06/02/20 1753     Reason for No Pharmacological VTE Prophylaxis  Once     Question:  Reasons:  Answer:  Active Bleeding    05/21/20 1613     IP VTE HIGH RISK PATIENT  Once      05/21/20 1613     Place sequential compression device  Until discontinued      05/21/20 1613     Place NICOLE hose  Until discontinued      05/21/20 1601                Discharge Needs:  [facility] in 4 weeks  Dispo:  [facility] SNF if able        Shereen Stack MD  Department of Hospital Medicine   [facility]

## 2020-06-07 ENCOUNTER — NURSE TRIAGE (OUTPATIENT)
Dept: ADMINISTRATIVE | Facility: CLINIC | Age: 75
End: 2020-06-07

## 2020-06-07 LAB
ANION GAP SERPL CALC-SCNC: 14 MMOL/L (ref 8–16)
ANISOCYTOSIS BLD QL SMEAR: ABNORMAL
BASOPHILS # BLD AUTO: 0.02 K/UL (ref 0–0.2)
BASOPHILS NFR BLD: 0.3 % (ref 0–1.9)
BUN SERPL-MCNC: 11 MG/DL (ref 8–23)
CALCIUM SERPL-MCNC: 8.1 MG/DL (ref 8.7–10.5)
CHLORIDE SERPL-SCNC: 102 MMOL/L (ref 95–110)
CO2 SERPL-SCNC: 24 MMOL/L (ref 23–29)
CREAT SERPL-MCNC: 0.9 MG/DL (ref 0.5–1.4)
DIFFERENTIAL METHOD: ABNORMAL
EOSINOPHIL # BLD AUTO: 0.1 K/UL (ref 0–0.5)
EOSINOPHIL NFR BLD: 1.6 % (ref 0–8)
ERYTHROCYTE [DISTWIDTH] IN BLOOD BY AUTOMATED COUNT: 24.7 % (ref 11.5–14.5)
EST. GFR  (AFRICAN AMERICAN): >60 ML/MIN/1.73 M^2
EST. GFR  (NON AFRICAN AMERICAN): >60 ML/MIN/1.73 M^2
GLUCOSE SERPL-MCNC: 81 MG/DL (ref 70–110)
HCT VFR BLD AUTO: 26.7 % (ref 40–54)
HGB BLD-MCNC: 8.1 G/DL (ref 14–18)
HYPOCHROMIA BLD QL SMEAR: ABNORMAL
IMM GRANULOCYTES # BLD AUTO: 0.05 K/UL (ref 0–0.04)
IMM GRANULOCYTES NFR BLD AUTO: 0.7 % (ref 0–0.5)
LYMPHOCYTES # BLD AUTO: 1.5 K/UL (ref 1–4.8)
LYMPHOCYTES NFR BLD: 20.2 % (ref 18–48)
MAGNESIUM SERPL-MCNC: 2 MG/DL (ref 1.6–2.6)
MCH RBC QN AUTO: 23.4 PG (ref 27–31)
MCHC RBC AUTO-ENTMCNC: 30.3 G/DL (ref 32–36)
MCV RBC AUTO: 77 FL (ref 82–98)
MONOCYTES # BLD AUTO: 0.5 K/UL (ref 0.3–1)
MONOCYTES NFR BLD: 7.1 % (ref 4–15)
NEUTROPHILS # BLD AUTO: 5.1 K/UL (ref 1.8–7.7)
NEUTROPHILS NFR BLD: 70.1 % (ref 38–73)
NRBC BLD-RTO: 0 /100 WBC
OVALOCYTES BLD QL SMEAR: ABNORMAL
PHOSPHATE SERPL-MCNC: 2.2 MG/DL (ref 2.7–4.5)
PLATELET # BLD AUTO: 319 K/UL (ref 150–350)
PLATELET BLD QL SMEAR: ABNORMAL
PMV BLD AUTO: 7.8 FL (ref 9.2–12.9)
POIKILOCYTOSIS BLD QL SMEAR: SLIGHT
POLYCHROMASIA BLD QL SMEAR: ABNORMAL
POTASSIUM SERPL-SCNC: 3.3 MMOL/L (ref 3.5–5.1)
RBC # BLD AUTO: 3.46 M/UL (ref 4.6–6.2)
SCHISTOCYTES BLD QL SMEAR: ABNORMAL
SCHISTOCYTES BLD QL SMEAR: PRESENT
SODIUM SERPL-SCNC: 140 MMOL/L (ref 136–145)
TOXIC GRANULES BLD QL SMEAR: PRESENT
WBC # BLD AUTO: 7.29 K/UL (ref 3.9–12.7)

## 2020-06-07 PROCEDURE — 84100 ASSAY OF PHOSPHORUS: CPT

## 2020-06-07 PROCEDURE — 97535 SELF CARE MNGMENT TRAINING: CPT

## 2020-06-07 PROCEDURE — 63600175 PHARM REV CODE 636 W HCPCS: Performed by: NURSE PRACTITIONER

## 2020-06-07 PROCEDURE — 83735 ASSAY OF MAGNESIUM: CPT

## 2020-06-07 PROCEDURE — 92526 ORAL FUNCTION THERAPY: CPT

## 2020-06-07 PROCEDURE — 63600175 PHARM REV CODE 636 W HCPCS: Performed by: HOSPITALIST

## 2020-06-07 PROCEDURE — 94799 UNLISTED PULMONARY SVC/PX: CPT

## 2020-06-07 PROCEDURE — 94761 N-INVAS EAR/PLS OXIMETRY MLT: CPT

## 2020-06-07 PROCEDURE — 36415 COLL VENOUS BLD VENIPUNCTURE: CPT

## 2020-06-07 PROCEDURE — 25000003 PHARM REV CODE 250: Performed by: NURSE PRACTITIONER

## 2020-06-07 PROCEDURE — 80048 BASIC METABOLIC PNL TOTAL CA: CPT

## 2020-06-07 PROCEDURE — 25000003 PHARM REV CODE 250: Performed by: INTERNAL MEDICINE

## 2020-06-07 PROCEDURE — 97530 THERAPEUTIC ACTIVITIES: CPT

## 2020-06-07 PROCEDURE — 99231 PR SUBSEQUENT HOSPITAL CARE,LEVL I: ICD-10-PCS | Mod: ,,, | Performed by: HOSPITALIST

## 2020-06-07 PROCEDURE — 85025 COMPLETE CBC W/AUTO DIFF WBC: CPT

## 2020-06-07 PROCEDURE — 25000003 PHARM REV CODE 250: Performed by: HOSPITALIST

## 2020-06-07 PROCEDURE — C9113 INJ PANTOPRAZOLE SODIUM, VIA: HCPCS | Performed by: HOSPITALIST

## 2020-06-07 PROCEDURE — 99231 SBSQ HOSP IP/OBS SF/LOW 25: CPT | Mod: ,,, | Performed by: HOSPITALIST

## 2020-06-07 PROCEDURE — 11000001 HC ACUTE MED/SURG PRIVATE ROOM

## 2020-06-07 RX ORDER — POTASSIUM CHLORIDE 20 MEQ/1
40 TABLET, EXTENDED RELEASE ORAL ONCE
Status: COMPLETED | OUTPATIENT
Start: 2020-06-07 | End: 2020-06-07

## 2020-06-07 RX ORDER — MORPHINE SULFATE 2 MG/ML
2 INJECTION, SOLUTION INTRAMUSCULAR; INTRAVENOUS ONCE
Status: COMPLETED | OUTPATIENT
Start: 2020-06-07 | End: 2020-06-07

## 2020-06-07 RX ORDER — MORPHINE SULFATE 15 MG/1
15 TABLET ORAL EVERY 4 HOURS PRN
Status: DISCONTINUED | OUTPATIENT
Start: 2020-06-07 | End: 2020-06-09 | Stop reason: HOSPADM

## 2020-06-07 RX ORDER — MORPHINE SULFATE 2 MG/ML
6 INJECTION, SOLUTION INTRAMUSCULAR; INTRAVENOUS EVERY 4 HOURS PRN
Status: DISCONTINUED | OUTPATIENT
Start: 2020-06-07 | End: 2020-06-08

## 2020-06-07 RX ADMIN — PANTOPRAZOLE SODIUM 40 MG: 40 INJECTION, POWDER, LYOPHILIZED, FOR SOLUTION INTRAVENOUS at 08:06

## 2020-06-07 RX ADMIN — MORPHINE SULFATE 2 MG: 2 INJECTION, SOLUTION INTRAMUSCULAR; INTRAVENOUS at 07:06

## 2020-06-07 RX ADMIN — ENOXAPARIN SODIUM 40 MG: 100 INJECTION SUBCUTANEOUS at 04:06

## 2020-06-07 RX ADMIN — FLUOXETINE 20 MG: 20 CAPSULE ORAL at 08:06

## 2020-06-07 RX ADMIN — DORZOLAMIDE HYDROCHLORIDE 1 DROP: 20 SOLUTION/ DROPS OPHTHALMIC at 08:06

## 2020-06-07 RX ADMIN — DIVALPROEX SODIUM 500 MG: 250 TABLET, DELAYED RELEASE ORAL at 08:06

## 2020-06-07 RX ADMIN — ACETAMINOPHEN 1000 MG: 500 TABLET ORAL at 08:06

## 2020-06-07 RX ADMIN — POTASSIUM CHLORIDE 40 MEQ: 1500 TABLET, EXTENDED RELEASE ORAL at 08:06

## 2020-06-07 RX ADMIN — ATORVASTATIN CALCIUM 40 MG: 20 TABLET, FILM COATED ORAL at 08:06

## 2020-06-07 RX ADMIN — CEFTRIAXONE 2 G: 2 INJECTION, SOLUTION INTRAVENOUS at 11:06

## 2020-06-07 RX ADMIN — ASPIRIN 81 MG: 81 TABLET, COATED ORAL at 08:06

## 2020-06-07 RX ADMIN — PHENYTOIN SODIUM 100 MG: 100 CAPSULE ORAL at 08:06

## 2020-06-07 RX ADMIN — TAMSULOSIN HYDROCHLORIDE 0.4 MG: 0.4 CAPSULE ORAL at 08:06

## 2020-06-07 RX ADMIN — TRAVOPROST 1 DROP: 0.04 SOLUTION/ DROPS OPHTHALMIC at 08:06

## 2020-06-07 NOTE — SUBJECTIVE & OBJECTIVE
Interval History: No acute events overnight. Resting comfortably.  Endorses some occasional abdominal pain relieved with Tylenol.    Review of Systems   Constitutional: Negative for chills, fatigue and fever.   Respiratory: Negative for cough and shortness of breath.    Cardiovascular: Negative for chest pain, palpitations and leg swelling.   Gastrointestinal: Positive for abdominal pain. Negative for diarrhea, nausea and vomiting.   Genitourinary: Negative for dysuria and urgency.   Neurological: Negative for dizziness and headaches.   All other systems reviewed and are negative.    Objective:     Vital Signs (Most Recent):  Temp: 98.7 °F (37.1 °C) (06/07/20 0724)  Pulse: 65 (06/07/20 0724)  Resp: 18 (06/07/20 0724)  BP: 138/65 (06/07/20 0724)  SpO2: 96 % (06/07/20 0724) Vital Signs (24h Range):  Temp:  [97.1 °F (36.2 °C)-98.8 °F (37.1 °C)] 98.7 °F (37.1 °C)  Pulse:  [58-84] 65  Resp:  [16-19] 18  SpO2:  [96 %-97 %] 96 %  BP: (109-139)/(61-76) 138/65     Weight: 88.7 kg (195 lb 8.8 oz)  Body mass index is 25.8 kg/m².    Intake/Output Summary (Last 24 hours) at 6/7/2020 0819  Last data filed at 6/6/2020 2330  Gross per 24 hour   Intake 270 ml   Output --   Net 270 ml      Physical Exam   Constitutional: He appears well-developed and well-nourished.   HENT:   Head: Normocephalic and atraumatic.   Cardiovascular: Normal rate and regular rhythm.   No murmur heard.  Pulmonary/Chest: Effort normal and breath sounds normal. No respiratory distress. He has no wheezes. He has no rales.   Abdominal: Soft. He exhibits no distension. There is tenderness (Mild around incision site).   Musculoskeletal: He exhibits no edema or deformity.   Neurological: He is alert.   Conversant.  Less confused.   Skin: Skin is warm and dry. He is not diaphoretic.       Significant Labs:   Blood Culture: No results for input(s): LABBLOO in the last 48 hours.  CBC:   Recent Labs   Lab 06/06/20  1115   WBC 8.00   HGB 9.0*   HCT 28.6*         CMP:   Recent Labs   Lab 06/06/20  1115      K 3.0*      CO2 23   GLU 94   BUN 9   CREATININE 0.8   CALCIUM 8.2*   ANIONGAP 15   EGFRNONAA >60     Urine Culture: No results for input(s): LABURIN in the last 48 hours.    Significant Imaging: I have reviewed and interpreted all pertinent imaging results/findings within the past 24 hours.

## 2020-06-07 NOTE — PLAN OF CARE
Problem: SLP Goal  Goal: SLP Goal  Description  1. Pt will be able to consume 75% of mechanical soft solids and thin liquids meal with no overt s/s of airway threat or aspiration. (Goal Modified 6/7 secondary to patient is on full liquids from MD order)    2. Pt will be able to consume full liquids with no overt s/s of airway threat or aspiration.     3. Pt will tolerate diet upgrades (as cleared by physician) with no overt s/s of airway threat or aspiration.   Outcome: Ongoing, Progressing   Patient seen on this date for diet tolerance of full liquids

## 2020-06-07 NOTE — PT/OT/SLP PROGRESS
Occupational Therapy   Treatment    Name: Anjel Soria  MRN: [id number]  Admitting Diagnosis:  Malignant neoplasm of ascending colon  12 Days Post-Op    Recommendations:     Discharge Recommendations: nursing facility, skilled  Discharge Equipment Recommendations:  (defer to SNF)  Barriers to discharge:       Assessment:     Anjel Soria is a 74 y.o. male with a medical diagnosis of Malignant neoplasm of ascending colon.  He presents with the following Performance deficits affecting function: weakness, impaired functional mobilty, impaired cognition, decreased safety awareness, impaired endurance, gait instability, decreased coordination, impaired balance, decreased upper extremity function, impaired self care skills, visual deficits.     Rehab Prognosis:  Good; patient would benefit from acute skilled OT services to address these deficits and reach maximum level of function.       Plan:     Patient to be seen 5 x/week to address the above listed problems via self-care/home management, therapeutic activities, therapeutic exercises  · Plan of Care Expires: 06/23/20  · Plan of Care Reviewed with: patient    Subjective     Pain/Comfort:  · Pain Rating 1: 0/10  · Pain Rating Post-Intervention 1: 0/10    Objective:     Communicated with: KENNY Goss prior to session.  Patient found right sidelying with peripheral IV, telemetry upon OT entry to room.    General Precautions: Standard, aspiration, fall   Orthopedic Precautions:N/A   Braces: N/A     Occupational Performance:     Bed Mobility:    · Patient completed Supine to Sit with supervision  · Patient completed Sit to Supine with supervision     Functional Mobility/Transfers:  · Patient completed Sit <> Stand Transfer with contact guard assistance  with  rolling walker   · Patient completed Toilet Transfer Step Transfer technique with moderate assistance with  rolling walker and grab bars  · Functional Mobility: Min A RW within room d/t visual deficits and fearful      Activities of Daily Living:  · Grooming: supervision following setup for oral care & washing face seated EOB  · Upper Body Dressing: minimum assistance change gown seated EOB  · Toileting: moderate assistance julita care in standing       [facility] 6 Click ADL: 16    Treatment & Education:  Pt participated in ADLS & functional mobility as documented above.     Education provided on role of OT including safe performance of self-care tasks, mobility techniques, safe use of DME, and encouragement of mobilization during hospitalization to prevent/limit deconditioning as well as discharge recommendations     Patient left supine with all lines intact, call button in reach, bed alarm on and RN Wolfgang notifiedEducation:      GOALS:   Multidisciplinary Problems     Occupational Therapy Goals        Problem: Occupational Therapy Goal    Goal Priority Disciplines Outcome Interventions   Occupational Therapy Goal     OT, PT/OT Ongoing, Progressing    Description:  Goals to be met by: 6/1/2020; Goals to be met by 6/12/20    Patient will increase functional independence with ADLs by performing:    Feeding with Set-up Assistance.  UE Dressing with Supervision.  Grooming while seated with Supervision.  Toileting from toilet with Supervision for hygiene and clothing management.   Supine to sit with Supervision.- MET 6/7/2020   Toilet transfer to toilet with Contact Guard Assistance.                       Time Tracking:     OT Date of Treatment: 06/07/20  OT Start Time: 1053  OT Stop Time: 1120  OT Total Time (min): 27 min    Billable Minutes:Self Care/Home Management 15  Therapeutic Activity 12    Lelia Wallace, BLAYNE  6/7/2020

## 2020-06-07 NOTE — TELEPHONE ENCOUNTER
Patient current admitted as inpatient.   No contact protocol, day 13 post procedure.   Reason for Disposition   Patient already left for the hospital/clinic.    Additional Information   Negative: Caller is angry or rude (e.g., hangs up, verbally abusive, yelling)   Negative: Caller hangs up   Negative: Caller has already spoken with the PCP and has no further questions.   Negative: Caller has already spoken with another triager and has no further questions.   Negative: Caller has already spoken with another triager or PCP AND has further questions AND triager able to answer questions.   Negative: Busy signal.  First attempt to contact caller.  Follow-up call scheduled within 15 minutes.   Negative: No answer.  First attempt to contact caller.  Follow-up call scheduled within 15 minutes.   Negative: Message left on identified voice mail   Negative: Message left on unidentified voice mail.  Phone number verified.   Negative: Message left with person in household.   Negative: Wrong number reached.  Phone number verified.   Negative: Second attempt to contact family AND no contact made.  Phone number verified.   Negative: Cell phone out of range.  Phone number verified.   Negative: Pager number given.  Answering service notified.    Protocols used: NO CONTACT OR DUPLICATE CONTACT CALL-A-

## 2020-06-07 NOTE — PLAN OF CARE
Pt remained free of falls and injuries throughout shift. AAOx3, needs orienting to time intermittently due to pt blindness. Pt calm and cooperative. Purposeful hourly rounding performed. Pt swallows meds whole. Midline flushed and saline locked. Midline abdominal incision CDI, open to air, staples intact, edges well approximated. Pt c/o of 8/10 pain to midline abdominal incision, managed with PRN Tylenol. Pt ambulates using walker with assistance. Pt turned q2h with wedge. Telemetry maintained on NSR. VSS on room air. PT resting comfortably in bed, denies pain, denies needs at this time. Bed low and locked, call light in reach. Side rails up x2. Will continue to monitor.

## 2020-06-07 NOTE — PROGRESS NOTES
"[facility] Hospital Medicine  Progress Note    Patient Name: Anjel Soria  MRN: [id number]  Patient Class: IP- Inpatient   Admission Date: 5/21/2020  Length of Stay: 16 days  Attending Physician: Shereen Stack MD  Primary Care Provider: [facility]        Subjective:     Principal Problem:Malignant neoplasm of ascending colon        HPI:  Per Madiha Ann, NP:    "Anjel Soria is a 74 year old male who has medical history of anemia related to lower GI bleeding, coronary artery disease, vascular dementia, prior stroke with residual left sided weakness, 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  He is currently a resident of the [facility].   Per medical record, the patient with recent prior history of severe anemia requiring transfusion on April 9, 2020.  During that time, he was evaluated  At [facility] and found to be hemoccult positive and CT scan of abdomen was unremarkable.  He was subsequently discharged back to his nursing home with plan for outpatient follow up with GI.  However, the patient was unable to see GI as outpatient.   Today, patient presented to the [facility] ED where initial work up revealed stable hemoglobin/hematocrit 12.0/40.4. Otherwise, labs were unremarkable and COVID screen was negative. Of note, EKG in ED today shows sinus bradycardia and asymtpomatic with heart rate in upper 40's and low 50's.   He was to be discharged back to the nursing home, but patient had episode of melena and decision to transfer to [facility] for evaluation.     On arrival to the [facility], the patient had one small melanotic stool.  Given dementia, the patient is unable to provide detailed history.  Per nursing home medication reconciliation, the patient has not been on aspirin or Plavix recently and is not currently on any other type of blood thinners.  GI will be consulted " "and roge continue to trend hemoglobin/hematocrit."    Overview/Hospital Course:  Mr. Soria is a 74 year-old man with history of coronary artery disease, vascular dementia, stroke with residual left sided weakness, post-stroke seizures, benign prostatic hyperplasia, hypertension and gastroesophageal reflux disease who was admitted from his nursing home ([facility]) for evaluation of bradycardia and melena.  Gastroenterology service consulted and he underwent upper endoscopy on 5/22/2020 with no source of bleeding identified.  On 5/25/2020 he underwent colonoscopy which showed a fungating mass of ascending colon.  General Surgery was consulted and took him to operating room for partial right hemicolectomy performed on 5/26/2020, which was complicated by a post-op ileus.  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  Hematology-Oncology service consulted for prognosis and treatment recommendations and suggested f/u with [facility] 4 weeks after hopsital discharge - as family wants treatment.  On 6/2/2020 patient developed fevers and leukocytosis along worsening mental status, urinalysis was positive.  Cormier catheter was discontinued and he was started on IV Ceftriaxone.  Urine culture positive for > 100,000 cfu/mL of both Escherichia coli and Klebsiella pneumoniae.  Blood culture returned positive for Escherichia coli.  PT/OT was consulted, who suggest SNF.    Interval History: No acute events overnight. Resting comfortably.  Endorses some occasional abdominal pain relieved with Tylenol.    Review of Systems   Constitutional: Negative for chills, fatigue and fever.   Respiratory: Negative for cough and shortness of breath.    Cardiovascular: Negative for chest pain, palpitations and leg swelling.   Gastrointestinal: Positive for " abdominal pain. Negative for diarrhea, nausea and vomiting.   Genitourinary: Negative for dysuria and urgency.   Neurological: Negative for dizziness and headaches.   All other systems reviewed and are negative.    Objective:     Vital Signs (Most Recent):  Temp: 98.7 °F (37.1 °C) (06/07/20 0724)  Pulse: 65 (06/07/20 0724)  Resp: 18 (06/07/20 0724)  BP: 138/65 (06/07/20 0724)  SpO2: 96 % (06/07/20 0724) Vital Signs (24h Range):  Temp:  [97.1 °F (36.2 °C)-98.8 °F (37.1 °C)] 98.7 °F (37.1 °C)  Pulse:  [58-84] 65  Resp:  [16-19] 18  SpO2:  [96 %-97 %] 96 %  BP: (109-139)/(61-76) 138/65     Weight: 88.7 kg (195 lb 8.8 oz)  Body mass index is 25.8 kg/m².    Intake/Output Summary (Last 24 hours) at 6/7/2020 0819  Last data filed at 6/6/2020 2330  Gross per 24 hour   Intake 270 ml   Output --   Net 270 ml      Physical Exam   Constitutional: He appears well-developed and well-nourished.   HENT:   Head: Normocephalic and atraumatic.   Cardiovascular: Normal rate and regular rhythm.   No murmur heard.  Pulmonary/Chest: Effort normal and breath sounds normal. No respiratory distress. He has no wheezes. He has no rales.   Abdominal: Soft. He exhibits no distension. There is tenderness (Mild around incision site).   Musculoskeletal: He exhibits no edema or deformity.   Neurological: He is alert.   Conversant.  Less confused.   Skin: Skin is warm and dry. He is not diaphoretic.       Significant Labs:   Blood Culture: No results for input(s): LABBLOO in the last 48 hours.  CBC:   Recent Labs   Lab 06/06/20  1115   WBC 8.00   HGB 9.0*   HCT 28.6*        CMP:   Recent Labs   Lab 06/06/20  1115      K 3.0*      CO2 23   GLU 94   BUN 9   CREATININE 0.8   CALCIUM 8.2*   ANIONGAP 15   EGFRNONAA >60     Urine Culture: No results for input(s): LABURIN in the last 48 hours.    Significant Imaging: I have reviewed and interpreted all pertinent imaging results/findings within the past 24 hours.      Assessment/Plan:       * Malignant neoplasm of ascending colon  · Upper endoscopy on 5/22/2020 did not reveal source of bleeding however colonoscopy revealed fungating mass of the ascending colon and diverticulosis on 5/25/2020.    · Biopsy from colonoscopy revealed invasive adenocarcinoma, moderately to poorly differentiated.    · S/p partial right hemicolectomy on 5/26/2020 performed by Dr. Von Jo Jr complicated by post-op ileus  · [facility].  Despite dementia, patient and his daughter both expressed desire for cancer treatment if indicated with goal of improving survival.  Needs f/u with [facility] at [facility] 4 weeks after discharge  · Continue full liquids per Gen Surg recs    E coli bacteremia  · Blood cx 6/2 with E. Coli  · Repeat blood cx NGTD  · Antibiotics as above      Urinary tract infection associated with indwelling urethral catheter  · On 6/2/2020 patient developed fevers and leukocytosis along worsening mental status.    · Cormier changed  · UA positive for E. Coli and Klebsiella  · Continue Ceftriaxone (day 5) with plans to transition to PO Augmentin to complete a 14 day course (end date 6/16)    Essential hypertension  · Chronic issue  · Not on medication  · Monitor    Coronary artery disease involving native coronary artery of native heart without angina pectoris  · Previously on Aspirin and Clopidogrel however per nursing home records patient has not been on antiplatelet therapy.   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   · Continue Aspirin 81mg PO daily  · Continue Lipitor 40mg PO daily    Subcortical vascular dementia without  behavioral disturbance  · Suspect patient likely close to baseline or mildly worse due to poor nutrition and stress from surgery.    · Continue supportive care, nutrition as tolerated, and, and continue delirium precautions.    · Mental status improved with discontinuing tramadol and donepezil.    · Continue to monitor.    Bipolar affective disorder in remission  · Chronic and stable  · Continue Depakote 500mg PO qHS  · Continue Fluoxetine 20mg PO daily  · Discontinued Trazodone due to somnolence with improvement in mental status  · Continue to monitor    Benign prostatic hyperplasia  · Chronic and stable  · Continue Flomax    Debility  · PT/OT consulted suggest SNF  · Resides at [facility] - Working on SNF approval    End-stage glaucoma  · Legally blind.    · Continue with home eye drops.      VTE Risk Mitigation (From admission, onward)         Ordered     enoxaparin injection 40 mg  Daily      06/02/20 1753     Reason for No Pharmacological VTE Prophylaxis  Once     Question:  Reasons:  Answer:  Active Bleeding    05/21/20 1613     IP VTE HIGH RISK PATIENT  Once      05/21/20 1613     Place sequential compression device  Until discontinued      05/21/20 1613     Place NICOLE hose  Until discontinued      05/21/20 1601                Discharge Needs:  [facility] at [facility] in 4 weeks  Dispo:  [facility] SNF if able      Shereen Stack MD  Department of Hospital Medicine   [facility]

## 2020-06-07 NOTE — PLAN OF CARE
Problem: Occupational Therapy Goal  Goal: Occupational Therapy Goal  Description  Goals to be met by: 6/1/2020; Goals to be met by 6/12/20    Patient will increase functional independence with ADLs by performing:    Feeding with Set-up Assistance.  UE Dressing with Supervision.  Grooming while seated with Supervision.  Toileting from toilet with Supervision for hygiene and clothing management.   Supine to sit with Supervision.- MET 6/7/2020   Toilet transfer to toilet with Contact Guard Assistance.      Outcome: Ongoing, Progressing   Pt making steady progress towards goals, POC remains appropriate.  Continues to require assist for all ADLS & mobility but with improving overall independence during tasks. Benefits from continued OT services with SNF to maximize safe return to PLOF

## 2020-06-07 NOTE — PT/OT/SLP PROGRESS
Speech Language Pathology Treatment    Patient Name:  Anjel Soria   MRN:  [id number]  Admitting Diagnosis: Malignant neoplasm of ascending colon    Recommendations:                 General Recommendations:     1. SLP to follow 1-2x to monitor diet tolerance     Diet recommendations: Patient currently on Full liquids per physician order   Once cleared by Physician, Patient is appropriate for:   Solids:  Mechanical soft, Finely chopped meat, Liquids: Thin      Aspiration Precautions: 1 bite/sip at a time, Assistance with meals, Eliminate distractions, Feed only when awake/alert, HOB to 90 degrees and Small bites/sips      General Precautions: Standard, aspiration, fall, blind, seizure      Communication strategies: pt is blind    Subjective   Pt presents alert, awake, reclined at bedside. RN Wolfgang reports that patient has had limited oral intake of full liquids ordered by Dr. Esteves 6/7/2020. Pt is responsive to therapist and agreeable to oral trials for diet tolerance.     Patient goals: I don't want anymore      Pain/Comfort:  · Pain Rating 1: 0/10  · Pain Rating Post-Intervention 1: 0/10    Objective:     Has the patient been evaluated by SLP for swallowing?   Yes  Keep patient NPO? No   Current Respiratory Status: nasal cannula      Cognitive Communication Status: Pt is responsive and cooperative. Pt is oriented to name and location. Not oriented to month, day or year. Pt is able to sustain attention for approximately 1 minute. Pt answers simple yes/no questions without difficulty, limited participation in conversation.     Swallow Function:  Oral Phase: adequate labial seal with straw and spoon. Timely a-p transit of lemonade and jello. No oral residuals with trials of lemonade and jello. Solids were not attempted on this date as patient has been placed on full liquids.     Pharyngeal phase: delayed swallow initiation. No overt s/s of airway threat or aspiration noted during trials of lemonade and jello. No  coughing or changes in vocal quality. No belching or burping following intake.     Assessment:     Anjel Soria is a 74 y.o. male with an SLP diagnosis of functional oral and pharyngeal swallow. Patient is now on full liquid diet ordered by Dr. Esteves 6/7/2020. Skilled speech intervention indicated for diet tolerance and ongoing assessment with diet upgrades.     Goals:   Multidisciplinary Problems     SLP Goals        Problem: SLP Goal    Goal Priority Disciplines Outcome   SLP Goal     SLP Ongoing, Progressing   Description:  1. Pt will be able to consume 75% of mechanical soft solids and thin liquids meal with no overt s/s of airway threat or aspiration. (Goal Modified 6/7 secondary to patient is on full liquids from MD order)    2. Pt will be able to consume full liquids with no overt s/s of airway threat or aspiration.     3. Pt will tolerate diet upgrades (as cleared by physician) with no overt s/s of airway threat or aspiration.                    Plan:     · Patient to be seen:  2 x/week, 3 x/week   · Plan of Care expires:  06/10/20  · Plan of Care reviewed with:  patient   · SLP Follow-Up:  Yes       Discharge recommendations:  nursing facility, skilled       Time Tracking:     SLP Treatment Date:   06/07/20  Speech Start Time:  1228  Speech Stop Time:  1241     Speech Total Time (min):  13 min    Billable Minutes: Treatment Swallowing Dysfunction 13 min    Shaista Mccormack CCC-SLP  06/07/2020

## 2020-06-07 NOTE — ASSESSMENT & PLAN NOTE
· Upper endoscopy on 5/22/2020 did not reveal source of bleeding however colonoscopy revealed fungating mass of the ascending colon and diverticulosis on 5/25/2020.    · Biopsy from colonoscopy revealed invasive adenocarcinoma, moderately to poorly differentiated.    · S/p partial right hemicolectomy on 5/26/2020 performed by Dr. Von Jo Jr complicated by post-op ileus  · [facility].  Despite dementia, patient and his daughter both expressed desire for cancer treatment if indicated with goal of improving survival.  [facility]  · Continue full liquids per Gen Surg recs

## 2020-06-07 NOTE — NURSING
"Pt stated he would like pain medication, pain 6/10. Scanned pt and asked for pt identifiers. Explain to pt what the pain medication is for, pt then stated "yeah okay, I want the medicine." Medication scanned, pt then stated "Oh no I do not want the morphine". Wasted morphine with KENNY Mendoza, in [facility] [place]. Will continue to monitor.    "

## 2020-06-07 NOTE — PLAN OF CARE
Pt remained free of falls and injuries throughout shift. Purposeful hourly rounding performed. Disoriented to time. Midline flushed, blood return noted, and saline locked. Managing pain with PRN medications. OT at bedside, up to toilet, tolerated well. Per OT, small BM noted. SLP at bedside, tolerated well. VSS on room air. Incontinent x2. Bed low and locked, bed alarm on, call light within reach, side rails up X2. Will continue to monitor.

## 2020-06-07 NOTE — ASSESSMENT & PLAN NOTE
· On 6/2/2020 patient developed fevers and leukocytosis along worsening mental status.    · Cormier changed  · UA positive for E. Coli and Klebsiella  · Continue Ceftriaxone (day 5) with plans to transition to PO Augmentin to complete a 14 day course (end date 6/16)

## 2020-06-08 LAB — BACTERIA BLD CULT: NORMAL

## 2020-06-08 PROCEDURE — 63600175 PHARM REV CODE 636 W HCPCS: Performed by: HOSPITALIST

## 2020-06-08 PROCEDURE — 25000003 PHARM REV CODE 250: Performed by: NURSE PRACTITIONER

## 2020-06-08 PROCEDURE — 94761 N-INVAS EAR/PLS OXIMETRY MLT: CPT

## 2020-06-08 PROCEDURE — 99231 SBSQ HOSP IP/OBS SF/LOW 25: CPT | Mod: ,,, | Performed by: HOSPITALIST

## 2020-06-08 PROCEDURE — 94799 UNLISTED PULMONARY SVC/PX: CPT

## 2020-06-08 PROCEDURE — 11000001 HC ACUTE MED/SURG PRIVATE ROOM

## 2020-06-08 PROCEDURE — C9113 INJ PANTOPRAZOLE SODIUM, VIA: HCPCS | Performed by: HOSPITALIST

## 2020-06-08 PROCEDURE — 25000003 PHARM REV CODE 250: Performed by: INTERNAL MEDICINE

## 2020-06-08 PROCEDURE — 25000003 PHARM REV CODE 250: Performed by: HOSPITALIST

## 2020-06-08 PROCEDURE — 99231 PR SUBSEQUENT HOSPITAL CARE,LEVL I: ICD-10-PCS | Mod: ,,, | Performed by: HOSPITALIST

## 2020-06-08 PROCEDURE — 97116 GAIT TRAINING THERAPY: CPT | Mod: CQ

## 2020-06-08 PROCEDURE — 97110 THERAPEUTIC EXERCISES: CPT | Mod: CQ

## 2020-06-08 PROCEDURE — 97803 MED NUTRITION INDIV SUBSEQ: CPT

## 2020-06-08 RX ORDER — PANTOPRAZOLE SODIUM 40 MG/1
40 TABLET, DELAYED RELEASE ORAL DAILY
Status: DISCONTINUED | OUTPATIENT
Start: 2020-06-08 | End: 2020-06-08

## 2020-06-08 RX ORDER — AMOXICILLIN AND CLAVULANATE POTASSIUM 875; 125 MG/1; MG/1
1 TABLET, FILM COATED ORAL EVERY 12 HOURS
Qty: 16 TABLET | Refills: 0
Start: 2020-06-09 | End: 2020-06-17

## 2020-06-08 RX ORDER — PANTOPRAZOLE SODIUM 40 MG/1
40 TABLET, DELAYED RELEASE ORAL DAILY
Status: DISCONTINUED | OUTPATIENT
Start: 2020-06-09 | End: 2020-06-09 | Stop reason: HOSPADM

## 2020-06-08 RX ORDER — AMOXICILLIN AND CLAVULANATE POTASSIUM 875; 125 MG/1; MG/1
1 TABLET, FILM COATED ORAL EVERY 12 HOURS
Status: DISCONTINUED | OUTPATIENT
Start: 2020-06-09 | End: 2020-06-09 | Stop reason: HOSPADM

## 2020-06-08 RX ORDER — MORPHINE SULFATE 4 MG/ML
4 INJECTION, SOLUTION INTRAMUSCULAR; INTRAVENOUS EVERY 4 HOURS PRN
Status: DISCONTINUED | OUTPATIENT
Start: 2020-06-08 | End: 2020-06-09 | Stop reason: HOSPADM

## 2020-06-08 RX ADMIN — ENOXAPARIN SODIUM 40 MG: 100 INJECTION SUBCUTANEOUS at 04:06

## 2020-06-08 RX ADMIN — DIVALPROEX SODIUM 500 MG: 250 TABLET, DELAYED RELEASE ORAL at 08:06

## 2020-06-08 RX ADMIN — ATORVASTATIN CALCIUM 40 MG: 20 TABLET, FILM COATED ORAL at 08:06

## 2020-06-08 RX ADMIN — TRAVOPROST 1 DROP: 0.04 SOLUTION/ DROPS OPHTHALMIC at 08:06

## 2020-06-08 RX ADMIN — DORZOLAMIDE HYDROCHLORIDE 1 DROP: 20 SOLUTION/ DROPS OPHTHALMIC at 09:06

## 2020-06-08 RX ADMIN — TAMSULOSIN HYDROCHLORIDE 0.4 MG: 0.4 CAPSULE ORAL at 09:06

## 2020-06-08 RX ADMIN — DEXTROSE MONOHYDRATE 15 MMOL: 50 INJECTION, SOLUTION INTRAVENOUS at 09:06

## 2020-06-08 RX ADMIN — PHENYTOIN SODIUM 100 MG: 100 CAPSULE ORAL at 08:06

## 2020-06-08 RX ADMIN — MORPHINE SULFATE 4 MG: 4 INJECTION, SOLUTION INTRAMUSCULAR; INTRAVENOUS at 11:06

## 2020-06-08 RX ADMIN — CEFTRIAXONE 2 G: 2 INJECTION, SOLUTION INTRAVENOUS at 11:06

## 2020-06-08 RX ADMIN — PANTOPRAZOLE SODIUM 40 MG: 40 INJECTION, POWDER, LYOPHILIZED, FOR SOLUTION INTRAVENOUS at 09:06

## 2020-06-08 RX ADMIN — ASPIRIN 81 MG: 81 TABLET, COATED ORAL at 09:06

## 2020-06-08 RX ADMIN — FLUOXETINE 20 MG: 20 CAPSULE ORAL at 09:06

## 2020-06-08 RX ADMIN — MORPHINE SULFATE 15 MG: 15 TABLET ORAL at 03:06

## 2020-06-08 RX ADMIN — DORZOLAMIDE HYDROCHLORIDE 1 DROP: 20 SOLUTION/ DROPS OPHTHALMIC at 08:06

## 2020-06-08 RX ADMIN — PHENYTOIN SODIUM 100 MG: 100 CAPSULE ORAL at 09:06

## 2020-06-08 NOTE — PLAN OF CARE
Recommendations    1. Upgraded diet to Mechanical soft as medically able.   2. Encourage good po intake.  Goals: Pt to meet >75% of EEN/EPN.  Nutrition Goal Status: goal not met

## 2020-06-08 NOTE — SUBJECTIVE & OBJECTIVE
Interval History: Reports a BM overnight.  Otherwise has no complaints.  Wants to have a better diet.    Review of Systems   Constitutional: Negative for chills, fatigue and fever.   Respiratory: Negative for cough and shortness of breath.    Cardiovascular: Negative for chest pain, palpitations and leg swelling.   Gastrointestinal: Positive for abdominal pain. Negative for diarrhea, nausea and vomiting.   Genitourinary: Negative for dysuria and urgency.   Neurological: Negative for dizziness and headaches.   All other systems reviewed and are negative.    Objective:     Vital Signs (Most Recent):  Temp: 98.4 °F (36.9 °C) (06/08/20 0738)  Pulse: 78 (06/08/20 1000)  Resp: 16 (06/08/20 0738)  BP: 114/66 (06/08/20 0738)  SpO2: (!) 93 % (06/08/20 0738) Vital Signs (24h Range):  Temp:  [97.9 °F (36.6 °C)-99 °F (37.2 °C)] 98.4 °F (36.9 °C)  Pulse:  [62-82] 78  Resp:  [16-20] 16  SpO2:  [92 %-98 %] 93 %  BP: (112-134)/(58-86) 114/66     Weight: 88.7 kg (195 lb 8.8 oz)  Body mass index is 25.8 kg/m².    Intake/Output Summary (Last 24 hours) at 6/8/2020 1046  Last data filed at 6/7/2020 2100  Gross per 24 hour   Intake 360 ml   Output --   Net 360 ml      Physical Exam   Constitutional: He appears well-developed and well-nourished.   HENT:   Head: Normocephalic and atraumatic.   Cardiovascular: Normal rate and regular rhythm.   No murmur heard.  Pulmonary/Chest: Effort normal and breath sounds normal. No respiratory distress. He has no wheezes. He has no rales.   Abdominal: Soft. He exhibits no distension. There is tenderness (Mild around incision site).   Musculoskeletal: He exhibits no edema or deformity.   Neurological: He is alert.   Conversant.   Skin: Skin is warm and dry. He is not diaphoretic.       Significant Labs:   Blood Culture: No results for input(s): LABBLOO in the last 48 hours.  CBC:   Recent Labs   Lab 06/06/20  1115 06/07/20  0954   WBC 8.00 7.29   HGB 9.0* 8.1*   HCT 28.6* 26.7*    319     CMP:    Recent Labs   Lab 06/06/20  1115 06/07/20  0954    140   K 3.0* 3.3*    102   CO2 23 24   GLU 94 81   BUN 9 11   CREATININE 0.8 0.9   CALCIUM 8.2* 8.1*   ANIONGAP 15 14   EGFRNONAA >60 >60     Urine Culture: No results for input(s): LABURIN in the last 48 hours.    Significant Imaging: I have reviewed and interpreted all pertinent imaging results/findings within the past 24 hours.

## 2020-06-08 NOTE — PLAN OF CARE
[facility]     Department of Hospital Medicine     [address] [place] [address]     [phone] after hours  [phone] fax       NURSING HOME ORDERS    06/08/2020    Admit to Nursing Home:  Skilled Bed                                                Diagnoses:  Active Hospital Problems    Diagnosis  POA    *Malignant neoplasm of ascending colon [C18.2]  Yes     Priority: 1 - High    E coli bacteremia [R78.81]  No     Priority: 2     Urinary tract infection associated with indwelling urethral catheter [T83.511A, N39.0]  No     Priority: 2     Essential hypertension [I10]  Yes     Priority: 3     Coronary artery disease involving native coronary artery of native heart without angina pectoris [I25.10]  Yes     Priority: 3     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 [I69.398, G40.909]  Not Applicable     Priority: 4     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 [I69.90]  Not Applicable     Priority: 4     Subcortical vascular dementia without behavioral disturbance [F01.50]  Yes     Priority: 4     Bipolar affective disorder in remission [F31.70]  Yes     Priority: 5     Benign prostatic hyperplasia [N40.0]  Yes     Priority: 6     Debility [R53.81]  Yes    End-stage glaucoma [H40.9]  Yes      Resolved Hospital Problems    Diagnosis Date Resolved POA    Ileus following gastrointestinal surgery [K91.89, K56.7] 06/02/2020 No    Hiatal hernia [K44.9] 06/02/2020 Yes    Gastrointestinal hemorrhage with melena [K92.1] 06/02/2020 Yes    Gastroesophageal reflux disease [K21.9] 06/02/2020 Yes    Bradycardia [R00.1] 06/02/2020 Yes    Obstructive cardiomyopathy [I42.8] 06/02/2020 Yes    Acute blood loss anemia [D62] 06/02/2020 Yes       Patient is homebound due to:  Malignant neoplasm of ascending colon    Allergies:  Review of patient's allergies indicates:   Allergen Reactions    Tubersol [tuberculin ppd]        Vitals:   Every shift (Skilled Nursing patients)    Diet: Mechanical Soft      Acitivities:     - Up in a chair each morning as tolerated   - Ambulate with assistance to bathroom   - Scheduled walks once each shift (every 8 hours)   - May ambulate independently   - May use walker, cane, or self-propelled wheelchair    LABS:  Per facility protocol   CMP, CBC each month for 3 months   Pre-albumin each month for 3 months   TSH every year   Dilantin level in 1 month and every 3 months      Nursing Precautions:    - Aspiration precautions:             - Total assistance with meals            -  Upright 90 degrees befor during and after meals             -  Suction at bedside          - Fall precautions per nursing home protocol   - Seizure precaution per prison protocol   - Decubitus precautions:        -  for positioning   - Pressure reducing foam mattress   - Turn patient every two hours. Use wedge pillows to anchor patient    CONSULTS:     Physical Therapy to evaluate and treat     Occupational Therapy to evaluate and treat     Speech Therapy  to evaluate and treat     Nutrition to evaluate and recommend diet     Psychiatry to evaluate and follow patients for delirium        Medications: Discontinue all previous medication orders, if any. See new list below.   Anjel Soria   Home Medication Instructions KARTIHKEYAN:[id number]    Printed on:06/08/20 1041   Medication Information                      amoxicillin-clavulanate 875-125mg (AUGMENTIN) 875-125 mg per tablet  Take 1 tablet by mouth every 12 (twelve) hours. for 8 days, end date 6/16/2020             aspirin (ECOTRIN) 81 MG EC tablet  Take 1 tablet (81 mg total) by mouth once daily.             atorvastatin (LIPITOR) 20 MG tablet  Take 40 mg by mouth every evening.              cholecalciferol, vitamin D3, (VITAMIN D3) 2,000 unit Cap  Take 1 capsule by mouth once daily.             divalproex (DEPAKOTE) 250 MG EC tablet  Take 500 mg by mouth nightly.               dorzolamide (TRUSOPT) 2 % ophthalmic solution  Place 1 drop into both eyes 2 (two) times daily.             fluoxetine (PROZAC) 20 MG capsule  Take 20 mg by mouth once daily.             pantoprazole (PROTONIX) 40 MG tablet  Take 40 mg by mouth once daily.             phenytoin (DILANTIN) 100 MG ER capsule  Take 100 mg by mouth 2 (two) times daily.             tamsulosin (FLOMAX) 0.4 mg Cp24  Take 1 capsule by mouth Daily.             travoprost (TRAVATAN Z) 0.004 % Drop  Place 1 drop into both eyes every evening.                 Other:  Needs to establish care with Medical Oncology at [facility] in 4 weeks (around beginning of July 2020)      _________________________________  Shereen Stack MD  06/08/2020

## 2020-06-08 NOTE — PROGRESS NOTES
"[facility]  Adult Nutrition  Progress Note    SUMMARY       Recommendations    1. Upgraded diet to Mechanical soft as medically able.   2. Encourage good po intake.  Goals: Pt to meet >75% of EEN/EPN.  Nutrition Goal Status: goal not met  Communication of RD Recs: (plan of care)    Reason for Assessment    Reason For Assessment: Follow up  Diagnosis: gastrointestinal disease (gastrointestinal hemorrhage with melena)  Relevant Medical History: CVA, CAD, HTN, GERD, HLD, prostate ca  General Information Comments:   6.8.20-SLP recs mechanical soft. Currently on Full liquid diet. Pt reports no appetite. RN reports pt with difficulty swallowing large pill and coughing.  6/3/20: Diet upgraded to full liquids. RN reports pt not interested in eating breakfast. Will add Boost to meals.  6/1/20: 74 y.o. male with HTN and hypercholesterolemia. Presented with CVA t in the past making him weak in the left side. He has vascular dementia. He stays at a Nursing Home in [place], [place]. According to the patient he is wheelchair bound due to that his legs are too weak to be able to ambulate.  Presented to [facility] on 5/21 with melena. He was transferred to [facility] for evaluation. Colonoscopy on 5/25 revealing a cecal mass. Partial colectomy on 5/26/2020. Pt currently with fatigue and abdominal pain. Diet switched to Clear Liquids to assist with BM. NFPE performed on 6/1/20. Pt with signs of mild to moderate muscle wasting or fat depletion.   Nutrition Discharge Planning: Pending medical course    Nutrition Risk Screen    Nutrition Risk Screen: no indicators present    Nutrition/Diet History    Spiritual, Cultural Beliefs, Gnosticist Practices, Values that Affect Care: no  Factors Affecting Nutritional Intake: decreased appetite, depression, altered gastrointestinal function    Anthropometrics    Temp: 98.4 °F (36.9 °C)  Height Method: Stated  Height: 6' 1" (185.4 cm)  Height (inches): 73 in  Weight Method: Bed " Scale  Weight: 88.7 kg (195 lb 8.8 oz)  Weight (lb): 195.55 lb  Ideal Body Weight (IBW), Male: 184 lb  % Ideal Body Weight, Male (lb): 106.28 %  BMI (Calculated): 25.8  BMI Grade: 25 - 29.9 - overweight     Lab/Procedures/Meds    Pertinent Labs Reviewed: reviewed  Pertinent Labs Comments: K 3.3, Dani 8.1, Phos 2.2  Pertinent Medications Reviewed: reviewed  Pertinent Medications Comments: statin, pantoprazole    Physical Findings/Assessment    Angel Luis score 15     Estimated/Assessed Needs    Weight Used For Calorie Calculations: 88.7 kg (195 lb 8.8 oz)  Energy Calorie Requirements (kcal): 2112 kcal daily  Energy Need Method: [place]-St Jeor(x 1.25 PAL)  Protein Requirements:  gm daily  Weight Used For Protein Calculations: 88.7 kg (195 lb 8.8 oz)(1.0-1.2 gm/kg)  Fluid Requirements (mL): 1 mL/kcal   Estimated Fluid Requirement Method: RDA Method  RDA Method (mL): 2112  CHO Requirement: 264 gm daily    Nutrition Prescription Ordered    Current Diet Order: Full Liquids  Nutrition supplement: Boost Plus TID    Evaluation of Received Nutrient/Fluid Intake    Comments: LBM 6/5  % Intake of Estimated Energy Needs: 0 - 25 %  % Meal Intake: 0 - 25 %    Nutrition Risk    Level of Risk/Frequency of Follow-up: moderate - high     Assessment and Plan    Nutrition Problem  Inadequate oral intake     Related to (etiology):   Decreased appetite     Signs and Symptoms (as evidenced by):   Full liquids diet intake <0% @ breakfast     Interventions (treatment strategy):  Collaboration with other providers     Nutrition Diagnosis Status:   Continues    Monitor and Evaluation    Food and Nutrient Intake: energy intake  Food and Nutrient Adminstration: diet order  Knowledge/Beliefs/Attitudes: food and nutrition knowledge/skill  Anthropometric Measurements: weight, weight change  Biochemical Data, Medical Tests and Procedures: lipid profile, electrolyte and renal panel, gastrointestinal profile, glucose/endocrine profile, inflammatory  profile     Malnutrition Assessment    Orbital Region (Subcutaneous Fat Loss): moderate depletion  Upper Arm Region (Subcutaneous Fat Loss): moderate depletion   Confucianist Region (Muscle Loss): mild depletion  Clavicle Bone Region (Muscle Loss): moderate depletion  Patellar Region (Muscle Loss): moderate depletion  Anterior Thigh Region (Muscle Loss): moderate depletion  Posterior Calf Region (Muscle Loss): moderate depletion   Edema (Fluid Accumulation): 0-->no edema present   Subcutaneous Fat Loss (Final Summary): moderate protein-calorie malnutrition  Muscle Loss Evaluation (Final Summary): moderate protein-calorie malnutrition       Nutrition Follow-Up    RD Follow-up?: Yes

## 2020-06-08 NOTE — ASSESSMENT & PLAN NOTE
· PT/OT consulted suggest SNF - Orders done 6/8  · Resides at [facility] - Working on SNF approval

## 2020-06-08 NOTE — PLAN OF CARE
Problem: Physical Therapy Goal  Goal: Physical Therapy Goal  Description  Goals to be met by: 6/25/2020    Patient will perform the following to increase strength, improve mobility, and return to prior level of function:    1. Supine <> sit with SBA.  2. Sit<>stand with SBA with least restrictive assistive device.  3. Gait x 150 feet with SBA with least restrictive assistive device.   Outcome: Ongoing, Progressing   Pt presented w/ HOB elevated upon arrival of PT. Pt agreeable to PT. Pt sit <> stand w/ RW And CGA. Pt amb'd 10' w/ RW and CGA. With in the room.

## 2020-06-08 NOTE — NURSING
Unable to get blood return from midline for labs. Pt refusing blood draw from phlebotomy. Will pass along to am nurse.

## 2020-06-08 NOTE — PLAN OF CARE
CM called [facility] hem, spoke to Addie.  Pt will need order in [facility] for consult, they will then arrange apt and notify pt's facility.    CM added ambulatory referral to hemoc at [facility], either Dr Canseco or Dr. Toro,  Pt information and phone number to facility added to referral, [facility] in [place], [phone].

## 2020-06-08 NOTE — PLAN OF CARE
Patient resting in NAD. VSS on RA. Pain managed with PRN medication x1. Staples to midline abdominal incision intact. Weight shift assistance provided. Incontinence care provided. 1 BM this shift. Safety measures maintained. No needs expressed at this time. Will continue to monitor.

## 2020-06-08 NOTE — ASSESSMENT & PLAN NOTE
· Upper endoscopy on 5/22/2020 did not reveal source of bleeding however colonoscopy revealed fungating mass of the ascending colon and diverticulosis on 5/25/2020.    · Biopsy from colonoscopy revealed invasive adenocarcinoma, moderately to poorly differentiated.    · S/p partial right hemicolectomy on 5/26/2020 performed by Dr. Von Jo Jr complicated by post-op ileus  · [facility].  Despite dementia, patient and his daughter both expressed desire for cancer treatment if indicated with goal of improving survival.  [facility]  · Continue full liquids per Gen Surg recs.  Had a BM on 6/7, so might be able to advance diet to mechanical soft when able to eat.

## 2020-06-08 NOTE — PT/OT/SLP PROGRESS
Physical Therapy Treatment    Patient Name:  Anjel Soria   MRN:  [id number]    Recommendations:     Discharge Recommendations:  nursing facility, skilled   Discharge Equipment Recommendations: (defer to SNF)   Barriers to discharge: Decreased caregiver support    Assessment:     Anjel Soria is a 74 y.o. male admitted with a medical diagnosis of Malignant neoplasm of ascending colon.  He presents with the following impairments/functional limitations:  weakness, impaired functional mobilty, gait instability, impaired balance, visual deficits, impaired endurance, decreased coordination, decreased upper extremity function, decreased safety awareness, impaired self care skills ,   Pt presented w/ HOB elevated upon arrival of PT. Pt agreeable to PT. Pt sit <> stand w/ RW And CGA. Pt amb'd 10' w/ RW and CGA. With in the room. .    Rehab Prognosis: Good; patient would benefit from acute skilled PT services to address these deficits and reach maximum level of function.    Recent Surgery: Procedure(s) (LRB):  LAPAROTOMY, EXPLORATORY (N/A)  COLECTOMY, PARTIAL - RIGHT COLECTOMY (Right) 13 Days Post-Op    Plan:     During this hospitalization, patient to be seen 5 x/week to address the identified rehab impairments via gait training, therapeutic activities, therapeutic exercises and progress toward the following goals:    · Plan of Care Expires:  06/29/20    Subjective     Chief Complaint: none stated  Patient/Family Comments/goals:  I want to shave  Pain/Comfort:  · Pain Rating 1: 0/10      Objective:     Communicated with ns(Home) prior to session.  Patient found HOB elevated with bed alarm, peripheral IV, telemetry upon PT entry to room.     General Precautions: Standard, aspiration, fall   Orthopedic Precautions:N/A   Braces: N/A     Functional Mobility:  · Transfers:     · Sit to Stand:  contact guard assistance with rolling walker  · Gait: 14' w/ RW and CGA. Pt fearful of falling.      AM-PAC 6 CLICK MOBILITY  Turning over in  bed (including adjusting bedclothes, sheets and blankets)?: 3  Sitting down on and standing up from a chair with arms (e.g., wheelchair, bedside commode, etc.): 3  Moving from lying on back to sitting on the side of the bed?: 3  Moving to and from a bed to a chair (including a wheelchair)?: 2  Need to walk in hospital room?: 3  Climbing 3-5 steps with a railing?: 2  Basic Mobility Total Score: 16       Therapeutic Activities and Exercises:   Pt performed seated therapeutic exercises including hip flexion. AP and LAQs x 10 reps with verbal and tactile cues.      Patient left HOB elevated with all lines intact, call button in reach, bed alarm on and ns notified..    GOALS:   Multidisciplinary Problems     Physical Therapy Goals        Problem: Physical Therapy Goal    Goal Priority Disciplines Outcome Goal Variances Interventions   Physical Therapy Goal     PT, PT/OT Ongoing, Progressing     Description:  Goals to be met by: 6/25/2020    Patient will perform the following to increase strength, improve mobility, and return to prior level of function:    1. Supine <> sit with SBA.  2. Sit<>stand with SBA with least restrictive assistive device.  3. Gait x 150 feet with SBA with least restrictive assistive device.                    Time Tracking:     PT Received On: 06/08/20  PT Start Time: 1305     PT Stop Time: 1330  PT Total Time (min): 25 min     Billable Minutes: Gait Training 13 and Therapeutic Exercise 12    Treatment Type: Treatment  PT/PTA: PTA     PTA Visit Number: 3     Madan Schulte, PTA  06/08/2020

## 2020-06-08 NOTE — PROGRESS NOTES
"[facility] Hospital Medicine  Progress Note    Patient Name: Anjel Soria  MRN: [id number]  Patient Class: IP- Inpatient   Admission Date: 5/21/2020  Length of Stay: 17 days  Attending Physician: Shereen Stack MD  Primary Care Provider: [facility]        Subjective:     Principal Problem:Malignant neoplasm of ascending colon        HPI:  Per Madiha Ann, NP:    "Anjel Soria is a 74 year old male who has medical history of anemia related to lower GI bleeding, coronary artery disease, vascular dementia, prior stroke with residual left sided weakness, 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  He is currently a resident of the [facility].   Per medical record, the patient with recent prior history of severe anemia requiring transfusion on April 9, 2020.  During that time, he was evaluated  At [facility] and found to be hemoccult positive and CT scan of abdomen was unremarkable.  He was subsequently discharged back to his nursing home with plan for outpatient follow up with GI.  However, the patient was unable to see GI as outpatient.   Today, patient presented to the [facility] ED where initial work up revealed stable hemoglobin/hematocrit 12.0/40.4. Otherwise, labs were unremarkable and COVID screen was negative. Of note, EKG in ED today shows sinus bradycardia and asymtpomatic with heart rate in upper 40's and low 50's.   He was to be discharged back to the nursing home, but patient had episode of melena and decision to transfer to [facility] for evaluation.     On arrival to the [facility], the patient had one small melanotic stool.  Given dementia, the patient is unable to provide detailed history.  Per nursing home medication reconciliation, the patient has not been on aspirin or Plavix recently and is not currently on any other type of blood thinners.  GI will be consulted " "and roge continue to trend hemoglobin/hematocrit."    Overview/Hospital Course:  Mr. Soria is a 74 year-old man with history of coronary artery disease, vascular dementia, stroke with residual left sided weakness, post-stroke seizures, benign prostatic hyperplasia, hypertension and gastroesophageal reflux disease who was admitted from his nursing home ([facility]) for evaluation of bradycardia and melena.  Gastroenterology service consulted and he underwent upper endoscopy on 5/22/2020 with no source of bleeding identified.  On 5/25/2020 he underwent colonoscopy which showed a fungating mass of ascending colon.  General Surgery was consulted and took him to operating room for partial right hemicolectomy performed on 5/26/2020, which was complicated by a post-op ileus.  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  Hematology-Oncology service consulted for prognosis and treatment recommendations and suggested f/u with [facility] 4 weeks after hopsital discharge - as family wants treatment.  On 6/2/2020 patient developed fevers and leukocytosis along worsening mental status, urinalysis was positive.  Cormier catheter was discontinued and he was started on IV Ceftriaxone.  Urine culture positive for > 100,000 cfu/mL of both Escherichia coli and Klebsiella pneumoniae.  Blood culture returned positive for Escherichia coli.  He was started on Ceftriaxone, and transitioned to PO Augmentin to complete a 14 day course, end date 6/16.  PT/OT was consulted, who suggest SNF.    Interval History: Reports a BM overnight.  Otherwise has no complaints.  Wants to have a better diet.    Review of Systems   Constitutional: Negative for chills, fatigue and fever.   Respiratory: Negative for cough and shortness of breath.    Cardiovascular: " Negative for chest pain, palpitations and leg swelling.   Gastrointestinal: Positive for abdominal pain. Negative for diarrhea, nausea and vomiting.   Genitourinary: Negative for dysuria and urgency.   Neurological: Negative for dizziness and headaches.   All other systems reviewed and are negative.    Objective:     Vital Signs (Most Recent):  Temp: 98.4 °F (36.9 °C) (06/08/20 0738)  Pulse: 78 (06/08/20 1000)  Resp: 16 (06/08/20 0738)  BP: 114/66 (06/08/20 0738)  SpO2: (!) 93 % (06/08/20 0738) Vital Signs (24h Range):  Temp:  [97.9 °F (36.6 °C)-99 °F (37.2 °C)] 98.4 °F (36.9 °C)  Pulse:  [62-82] 78  Resp:  [16-20] 16  SpO2:  [92 %-98 %] 93 %  BP: (112-134)/(58-86) 114/66     Weight: 88.7 kg (195 lb 8.8 oz)  Body mass index is 25.8 kg/m².    Intake/Output Summary (Last 24 hours) at 6/8/2020 1046  Last data filed at 6/7/2020 2100  Gross per 24 hour   Intake 360 ml   Output --   Net 360 ml      Physical Exam   Constitutional: He appears well-developed and well-nourished.   HENT:   Head: Normocephalic and atraumatic.   Cardiovascular: Normal rate and regular rhythm.   No murmur heard.  Pulmonary/Chest: Effort normal and breath sounds normal. No respiratory distress. He has no wheezes. He has no rales.   Abdominal: Soft. He exhibits no distension. There is tenderness (Mild around incision site).   Musculoskeletal: He exhibits no edema or deformity.   Neurological: He is alert.   Conversant.   Skin: Skin is warm and dry. He is not diaphoretic.       Significant Labs:   Blood Culture: No results for input(s): LABBLOO in the last 48 hours.  CBC:   Recent Labs   Lab 06/06/20  1115 06/07/20  0954   WBC 8.00 7.29   HGB 9.0* 8.1*   HCT 28.6* 26.7*    319     CMP:   Recent Labs   Lab 06/06/20  1115 06/07/20  0954    140   K 3.0* 3.3*    102   CO2 23 24   GLU 94 81   BUN 9 11   CREATININE 0.8 0.9   CALCIUM 8.2* 8.1*   ANIONGAP 15 14   EGFRNONAA >60 >60     Urine Culture: No results for input(s): LABURIN in the  last 48 hours.    Significant Imaging: I have reviewed and interpreted all pertinent imaging results/findings within the past 24 hours.      Assessment/Plan:      * Malignant neoplasm of ascending colon  · Upper endoscopy on 5/22/2020 did not reveal source of bleeding however colonoscopy revealed fungating mass of the ascending colon and diverticulosis on 5/25/2020.    · Biopsy from colonoscopy revealed invasive adenocarcinoma, moderately to poorly differentiated.    · S/p partial right hemicolectomy on 5/26/2020 performed by Dr. Von oJ Jr complicated by post-op ileus  · [facility] for prognosis and treatment recommendations.  Despite dementia, patient and his daughter both expressed desire for cancer treatment if indicated with goal of improving survival.  [facility]  · Continue full liquids per Gen Surg recs.  Had a BM on 6/7, so might be able to advance diet to mechanical soft when able to eat.    E coli bacteremia  · Blood cx 6/2 with E. Coli  · Repeat blood cx NGTD  · Antibiotics as above      Urinary tract infection associated with indwelling urethral catheter  · On 6/2/2020 patient developed fevers and leukocytosis along worsening mental status.    · Cormier changed  · UA positive for E. Coli and Klebsiella  · Continue Ceftriaxone (day 6) with plans to transition to PO Augmentin 6/9 to complete a 14 day course (end date 6/16)    Essential hypertension  · Chronic issue  · Not on medication  · Monitor    Coronary artery disease involving native coronary artery of native heart without angina pectoris  · Previously on Aspirin and Clopidogrel however per nursing home records patient has not been on antiplatelet therapy.   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   · Continue Aspirin 81mg PO daily  · Continue Lipitor 40mg PO daily    Subcortical vascular dementia without behavioral disturbance  · Suspect patient likely close to baseline or mildly worse due to poor nutrition and stress from surgery.    · Continue supportive care, nutrition as tolerated, and, and continue delirium precautions.    · Mental status improved with discontinuing tramadol and donepezil.    · Continue to monitor.    Bipolar affective disorder in remission  · Chronic and stable  · Continue Depakote 500mg PO qHS  · Continue Fluoxetine 20mg PO daily  · Discontinued Trazodone due to somnolence with improvement in mental status  · Continue to monitor    Benign prostatic hyperplasia  · Chronic and stable  · Continue Flomax    Debility  · PT/OT consulted suggest SNF - Orders done 6/8  · Resides at [facility] - Working on SNF approval    End-stage glaucoma  · Legally blind.    · Continue with home eye drops.      VTE Risk Mitigation (From admission, onward)         Ordered     enoxaparin injection 40 mg  Daily      06/02/20 1753     Reason for No Pharmacological VTE Prophylaxis  Once     Question:  Reasons:  Answer:  Active Bleeding    05/21/20 1613     IP VTE HIGH RISK PATIENT  Once      05/21/20 1613     Place sequential compression device  Until discontinued      05/21/20 1613     Place NICOLE hose  Until discontinued      05/21/20 1601                Discharge Needs:  [facility] in 4 weeks  Dispo:  [facility] SNF if able      Shereen Stack MD  Department of Hospital Medicine   [facility]

## 2020-06-08 NOTE — NURSING
Patient had difficulty swallowing large pill which followed with prolonged episode of coughing. Pt was instructed to use pillow as splint. Staples to abdominal incision intact. Will continue to monitor.

## 2020-06-08 NOTE — ASSESSMENT & PLAN NOTE
· On 6/2/2020 patient developed fevers and leukocytosis along worsening mental status.    · Cormier changed  · UA positive for E. Coli and Klebsiella  · Continue Ceftriaxone (day 6) with plans to transition to PO Augmentin 6/9 to complete a 14 day course (end date 6/16)

## 2020-06-09 VITALS
DIASTOLIC BLOOD PRESSURE: 56 MMHG | WEIGHT: 195.56 LBS | SYSTOLIC BLOOD PRESSURE: 118 MMHG | HEIGHT: 73 IN | BODY MASS INDEX: 25.92 KG/M2 | HEART RATE: 80 BPM | TEMPERATURE: 98 F | RESPIRATION RATE: 18 BRPM | OXYGEN SATURATION: 96 %

## 2020-06-09 LAB
ANION GAP SERPL CALC-SCNC: 12 MMOL/L (ref 8–16)
ANISOCYTOSIS BLD QL SMEAR: ABNORMAL
BASOPHILS # BLD AUTO: 0.04 K/UL (ref 0–0.2)
BASOPHILS NFR BLD: 0.5 % (ref 0–1.9)
BUN SERPL-MCNC: 8 MG/DL (ref 8–23)
CALCIUM SERPL-MCNC: 8.5 MG/DL (ref 8.7–10.5)
CHLORIDE SERPL-SCNC: 104 MMOL/L (ref 95–110)
CO2 SERPL-SCNC: 23 MMOL/L (ref 23–29)
CREAT SERPL-MCNC: 0.8 MG/DL (ref 0.5–1.4)
DACRYOCYTES BLD QL SMEAR: ABNORMAL
DIFFERENTIAL METHOD: ABNORMAL
EOSINOPHIL # BLD AUTO: 0.2 K/UL (ref 0–0.5)
EOSINOPHIL NFR BLD: 2.3 % (ref 0–8)
ERYTHROCYTE [DISTWIDTH] IN BLOOD BY AUTOMATED COUNT: 24.6 % (ref 11.5–14.5)
EST. GFR  (AFRICAN AMERICAN): >60 ML/MIN/1.73 M^2
EST. GFR  (NON AFRICAN AMERICAN): >60 ML/MIN/1.73 M^2
GIANT PLATELETS BLD QL SMEAR: PRESENT
GLUCOSE SERPL-MCNC: 91 MG/DL (ref 70–110)
HCT VFR BLD AUTO: 28.4 % (ref 40–54)
HGB BLD-MCNC: 8.6 G/DL (ref 14–18)
HYPOCHROMIA BLD QL SMEAR: ABNORMAL
IMM GRANULOCYTES # BLD AUTO: 0.02 K/UL (ref 0–0.04)
IMM GRANULOCYTES NFR BLD AUTO: 0.2 % (ref 0–0.5)
LYMPHOCYTES # BLD AUTO: 1.9 K/UL (ref 1–4.8)
LYMPHOCYTES NFR BLD: 22.5 % (ref 18–48)
MAGNESIUM SERPL-MCNC: 1.8 MG/DL (ref 1.6–2.6)
MCH RBC QN AUTO: 23.4 PG (ref 27–31)
MCHC RBC AUTO-ENTMCNC: 30.3 G/DL (ref 32–36)
MCV RBC AUTO: 77 FL (ref 82–98)
MONOCYTES # BLD AUTO: 0.6 K/UL (ref 0.3–1)
MONOCYTES NFR BLD: 7.4 % (ref 4–15)
NEUTROPHILS # BLD AUTO: 5.7 K/UL (ref 1.8–7.7)
NEUTROPHILS NFR BLD: 67.1 % (ref 38–73)
NRBC BLD-RTO: 0 /100 WBC
PHOSPHATE SERPL-MCNC: 2.9 MG/DL (ref 2.7–4.5)
PLATELET # BLD AUTO: 494 K/UL (ref 150–350)
PLATELET BLD QL SMEAR: ABNORMAL
PMV BLD AUTO: 8.1 FL (ref 9.2–12.9)
POIKILOCYTOSIS BLD QL SMEAR: SLIGHT
POLYCHROMASIA BLD QL SMEAR: ABNORMAL
POTASSIUM SERPL-SCNC: 3.7 MMOL/L (ref 3.5–5.1)
RBC # BLD AUTO: 3.67 M/UL (ref 4.6–6.2)
SCHISTOCYTES BLD QL SMEAR: ABNORMAL
SCHISTOCYTES BLD QL SMEAR: PRESENT
SODIUM SERPL-SCNC: 139 MMOL/L (ref 136–145)
TARGETS BLD QL SMEAR: ABNORMAL
WBC # BLD AUTO: 8.56 K/UL (ref 3.9–12.7)

## 2020-06-09 PROCEDURE — 36415 COLL VENOUS BLD VENIPUNCTURE: CPT

## 2020-06-09 PROCEDURE — 80048 BASIC METABOLIC PNL TOTAL CA: CPT

## 2020-06-09 PROCEDURE — 25000003 PHARM REV CODE 250: Performed by: HOSPITALIST

## 2020-06-09 PROCEDURE — 99239 PR HOSPITAL DISCHARGE DAY,>30 MIN: ICD-10-PCS | Mod: ,,, | Performed by: INTERNAL MEDICINE

## 2020-06-09 PROCEDURE — 99239 HOSP IP/OBS DSCHRG MGMT >30: CPT | Mod: ,,, | Performed by: INTERNAL MEDICINE

## 2020-06-09 PROCEDURE — 25000003 PHARM REV CODE 250: Performed by: INTERNAL MEDICINE

## 2020-06-09 PROCEDURE — 85025 COMPLETE CBC W/AUTO DIFF WBC: CPT

## 2020-06-09 PROCEDURE — 84100 ASSAY OF PHOSPHORUS: CPT

## 2020-06-09 PROCEDURE — 94761 N-INVAS EAR/PLS OXIMETRY MLT: CPT

## 2020-06-09 PROCEDURE — 97535 SELF CARE MNGMENT TRAINING: CPT

## 2020-06-09 PROCEDURE — 83735 ASSAY OF MAGNESIUM: CPT

## 2020-06-09 PROCEDURE — 97116 GAIT TRAINING THERAPY: CPT | Mod: CQ

## 2020-06-09 PROCEDURE — 97110 THERAPEUTIC EXERCISES: CPT | Mod: CQ

## 2020-06-09 PROCEDURE — 25000003 PHARM REV CODE 250: Performed by: NURSE PRACTITIONER

## 2020-06-09 PROCEDURE — 94799 UNLISTED PULMONARY SVC/PX: CPT

## 2020-06-09 RX ADMIN — PHENYTOIN SODIUM 100 MG: 100 CAPSULE ORAL at 09:06

## 2020-06-09 RX ADMIN — PANTOPRAZOLE SODIUM 40 MG: 40 TABLET, DELAYED RELEASE ORAL at 09:06

## 2020-06-09 RX ADMIN — ASPIRIN 81 MG: 81 TABLET, COATED ORAL at 09:06

## 2020-06-09 RX ADMIN — TAMSULOSIN HYDROCHLORIDE 0.4 MG: 0.4 CAPSULE ORAL at 09:06

## 2020-06-09 RX ADMIN — DORZOLAMIDE HYDROCHLORIDE 1 DROP: 20 SOLUTION/ DROPS OPHTHALMIC at 09:06

## 2020-06-09 RX ADMIN — AMOXICILLIN AND CLAVULANATE POTASSIUM 1 TABLET: 875; 125 TABLET, FILM COATED ORAL at 09:06

## 2020-06-09 RX ADMIN — FLUOXETINE 20 MG: 20 CAPSULE ORAL at 09:06

## 2020-06-09 NOTE — DISCHARGE SUMMARY
"[facility] Hospital Medicine  Discharge Summary      Patient Name: Anjel Soria  MRN: [id number]  Admission Date: 5/21/2020  Hospital Length of Stay: 18 days  Discharge Date and Time:  06/09/2020 11:48 AM  Attending Physician: Doug Lantigua MD   Discharging Provider: Doug Lantigua MD  Primary Care Provider: [facility]      HPI:   Anthony Ann, NP:    "Anjel Soria is a 74 year old male who has medical history of anemia related to lower GI bleeding, coronary artery disease, vascular dementia, prior stroke with residual left sided weakness, 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  He is currently a resident of the [facility].   Per medical record, the patient with recent prior history of severe anemia requiring transfusion on April 9, 2020.  During that time, he was evaluated  At [facility] and found to be hemoccult positive and CT scan of abdomen was unremarkable.  He was subsequently discharged back to his nursing home with plan for outpatient follow up with GI.  However, the patient was unable to see GI as outpatient.   Today, patient presented to the [facility] ED where initial work up revealed stable hemoglobin/hematocrit 12.0/40.4. Otherwise, labs were unremarkable and COVID screen was negative. Of note, EKG in ED today shows sinus bradycardia and asymtpomatic with heart rate in upper 40's and low 50's.   He was to be discharged back to the nursing home, but patient had episode of melena and decision to transfer to [facility] for evaluation.     On arrival to the [facility], the patient had one small melanotic stool.  Given dementia, the patient is unable to provide detailed history.  Per nursing home medication reconciliation, the patient has not been on aspirin or Plavix recently and is not currently on any other type of blood thinners.  GI will be consulted and " "roge continue to trend hemoglobin/hematocrit."    Procedure(s) (LRB):  LAPAROTOMY, EXPLORATORY (N/A)  COLECTOMY, PARTIAL - RIGHT COLECTOMY (Right)      Hospital Course:   Mr. Soria is a 74 year-old man with history of coronary artery disease, vascular dementia, stroke with residual left sided weakness, post-stroke seizures, benign prostatic hyperplasia, hypertension and gastroesophageal reflux disease who was admitted from his nursing home ([facility] [place]) for evaluation of bradycardia and melena.  Gastroenterology service consulted and he underwent upper endoscopy on 5/22/2020 with no source of bleeding identified.  On 5/25/2020 he underwent colonoscopy which showed a fungating mass of ascending colon.  General Surgery was consulted and took him to operating room for partial right hemicolectomy performed on 5/26/2020, which was complicated by a post-op ileus.  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  Hematology-Oncology service consulted for prognosis and treatment recommendations and suggested f/u with [facility] 4 weeks after hopsital discharge - as family wants treatment.  On 6/2/2020 patient developed fevers and leukocytosis along worsening mental status, urinalysis was positive.  Cormier catheter was discontinued and he was started on IV Ceftriaxone.  Urine culture positive for > 100,000 cfu/mL of both Escherichia coli and Klebsiella pneumoniae.  Blood culture returned positive for Escherichia coli.  He was started on Ceftriaxone, and transitioned to PO Augmentin to complete a 14 day course, end date 6/16.  PT/OT was consulted, who suggest SNF.     Consults:   Consults (From admission, onward)        Status Ordering Provider     Inpatient consult to Cardiology  Once     Provider:  Richard Brown MD    Completed COLIN, " MAMTA MENEZES.     Inpatient consult to Gastroenterology  Once     Provider:  Nikolai Sepulveda MD    Completed MARY PICHARDO     Inpatient consult to Hematology/Oncology  Once     Provider:  Ryan Oviedo MD    Completed SHAE ROMO     Inpatient consult to Palliative Care  Once     Provider:  Ana Rosa Vidales, RN    Completed MAMTA SHAW.          * Malignant neoplasm of ascending colon  · Upper endoscopy on 5/22/2020 did not reveal source of bleeding however colonoscopy revealed fungating mass of the ascending colon and diverticulosis on 5/25/2020.    · Biopsy from colonoscopy revealed invasive adenocarcinoma, moderately to poorly differentiated.    · S/p partial right hemicolectomy on 5/26/2020 performed by Dr. Von Jo Jr complicated by post-op ileus  · [facility] for prognosis and treatment recommendations.  Despite dementia, patient and his daughter both expressed desire for cancer treatment if indicated with goal of improving survival.  Needs f/u with [facility] at [facility] 4 weeks after discharge  · Diet Advanced to regular    Gastrointestinal hemorrhage with melena  -Mr. Soria was admitted to inpatient status  -GI consulted and performed EGD 5/22/2020 without evidence for source of bleeding.  Colonoscopy performed on 5/25/2020 which showed a fungating mass of ascending colon and diverticulosis.  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  -Hgb stable at 8.6  -Diet Advanced with resolved post-op ileus    Acute blood loss anemia  -Believed to be due to colon cancer as above.  -No known episodes of melena or hematochezia since 5/22/2020    -On admit Hb 10.6.  Trended down but stable today and  remains above transfusion threshold and no obvious bleeding.      Urinary tract infection associated with indwelling urethral catheter  · On 6/2/2020 patient developed  fevers and leukocytosis along worsening mental status.    · Cormier changed  · UA positive for E. Coli and Klebsiella  · Continue Ceftriaxone (day 7) with plans to transition to PO Augmentin 6/9 to complete a 14 day course (end date 6/16)    E coli bacteremia  · Blood cx 6/2 with E. Coli  · Repeat blood cx NGTD  · Antibiotics as above      Debility  · PT/OT consulted suggest SNF - Orders done 6/8  · Resides at [facility] - discharge today    Bipolar affective disorder in remission  · Chronic and stable  · Continue Depakote 500mg PO qHS  · Continue Fluoxetine 20mg PO daily  · Discontinued Trazodone due to somnolence with improvement in mental status  · Continue to monitor    Essential hypertension  · Chronic issue  · Not on medication  · Monitor    Benign prostatic hyperplasia  · Chronic and stable  · Continue Flomax    Coronary artery disease involving native coronary artery of native heart without angina pectoris  · Previously on Aspirin and Clopidogrel however per nursing home records patient has not been on antiplatelet therapy.   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   · Continue Aspirin 81mg PO daily  · Continue Lipitor 40mg PO daily    End-stage glaucoma  · Legally blind.    · Continue with home eye drops.    Subcortical vascular dementia without behavioral disturbance  · Suspect patient likely close to baseline or mildly worse due to poor nutrition and stress from surgery.    · Continue supportive care, nutrition as tolerated, and, and continue delirium precautions.    · Mental status improved with discontinuing tramadol and donepezil.    · Continue to  monitor.      Final Active Diagnoses:    Diagnosis Date Noted POA    PRINCIPAL PROBLEM:  Malignant neoplasm of ascending colon [C18.2] 05/27/2020 Yes    Gastrointestinal hemorrhage with melena [K92.1] 05/21/2020 Yes    Acute blood loss anemia [D62] 05/21/2020 Yes    Urinary tract infection associated with indwelling urethral catheter [T83.511A, N39.0] 06/03/2020 No    E coli bacteremia [R78.81] 06/06/2020 No    Debility [R53.81] 06/01/2020 Yes    Essential hypertension [I10] 05/21/2020 Yes    Bipolar affective disorder in remission [F31.70] 05/21/2020 Yes    Benign prostatic hyperplasia [N40.0] 05/09/2016 Yes    Coronary artery disease involving native coronary artery of native heart without angina pectoris [I25.10] 05/09/2016 Yes    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 [I69.398, G40.909] 05/09/2016 Not Applicable    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 [I69.90] 05/09/2016 Not Applicable    End-stage glaucoma [H40.9] 04/20/2015 Yes    Subcortical vascular dementia without behavioral disturbance [F01.50]  Yes      Problems Resolved During this Admission:    Diagnosis Date Noted Date Resolved POA    Ileus following gastrointestinal surgery [K91.89, K56.7] 05/31/2020 06/02/2020 No    Hiatal hernia [K44.9] 05/22/2020 06/02/2020 Yes    Gastroesophageal reflux disease [K21.9] 05/21/2020 06/02/2020 Yes    Bradycardia [R00.1] 05/21/2020 06/02/2020 Yes    Obstructive cardiomyopathy [I42.8] 05/21/2020 06/02/2020 Yes       Discharged Condition: fair    Disposition: SNF    Follow Up:  Follow-up Information     Go to [facility].    Contact information:  [address] [place] [id number] [phone]                 Patient Instructions:      Ambulatory referral/consult to Hematology / Oncology   Standing Status: Future   Referral Priority: Routine Referral Type: Consultation   Referral Reason: Specialty Services Required   Requested  Specialty: Hematology and Oncology   Number of Visits Requested: 1       Significant Diagnostic Studies: Labs:   CMP   Recent Labs   Lab 06/09/20  0510      K 3.7      CO2 23   GLU 91   BUN 8   CREATININE 0.8   CALCIUM 8.5*   ANIONGAP 12   ESTGFRAFRICA >60   EGFRNONAA >60    and CBC   Recent Labs   Lab 06/09/20  0510   WBC 8.56   HGB 8.6*   HCT 28.4*   *     Microbiology:   Blood Culture   Lab Results   Component Value Date    LABBLOO No growth after 5 days. 06/02/2020    and Urine Culture    Lab Results   Component Value Date    LABURIN KLEBSIELLA PNEUMONIAE  >100,000 cfu/ml   (A) 06/02/2020    LABURIN ESCHERICHIA COLI  > 100,000 cfu/ml   (A) 06/02/2020       Pending Diagnostic Studies:     None         Medications:  Reconciled Home Medications:      Medication List      START taking these medications    amoxicillin-clavulanate 875-125mg 875-125 mg per tablet  Commonly known as:  AUGMENTIN  Take 1 tablet by mouth every 12 (twelve) hours. for 8 days        CONTINUE taking these medications    aspirin 81 MG EC tablet  Commonly known as:  ECOTRIN  Take 1 tablet (81 mg total) by mouth once daily.     atorvastatin 20 MG tablet  Commonly known as:  LIPITOR  Take 40 mg by mouth every evening.     divalproex 250 MG EC tablet  Commonly known as:  DEPAKOTE  Take 500 mg by mouth nightly.     dorzolamide 2 % ophthalmic solution  Commonly known as:  TRUSOPT  Place 1 drop into both eyes 2 (two) times daily.     FLUoxetine 20 MG capsule  Take 20 mg by mouth once daily.     pantoprazole 40 MG tablet  Commonly known as:  PROTONIX  Take 40 mg by mouth once daily.     phenytoin 100 MG ER capsule  Commonly known as:  DILANTIN  Take 100 mg by mouth 2 (two) times daily.     tamsulosin 0.4 mg Cap  Commonly known as:  FLOMAX  Take 1 capsule by mouth Daily.     TRAVATAN Z 0.004 % ophthalmic solution  Generic drug:  travoprost  Place 1 drop into both eyes every evening.     VITAMIN D3 50 mcg (2,000 unit) Cap  Generic  drug:  cholecalciferol (vitamin D3)  Take 1 capsule by mouth once daily.        STOP taking these medications    clopidogreL 75 mg tablet  Commonly known as:  PLAVIX     cyanocobalamin 1,000 mcg/mL injection  Commonly known as:  VITAMIN B-12     donepeziL 10 MG tablet  Commonly known as:  ARICEPT     ferrous sulfate 325 (65 FE) MG EC tablet     fish oil-omega-3 fatty acids 300-1,000 mg capsule     HYDROcodone-acetaminophen 5-325 mg per tablet  Commonly known as:  NORCO     metoprolol tartrate 25 MG tablet  Commonly known as:  LOPRESSOR     traZODone 100 MG tablet  Commonly known as:  DESYREL            Indwelling Lines/Drains at time of discharge:   Lines/Drains/Airways     None                 Time spent on the discharge of patient: 35 minutes  Patient was seen and examined on the date of discharge and determined to be suitable for discharge.         Doug Lantigua MD  Department of Hospital Medicine  [facility]

## 2020-06-09 NOTE — ASSESSMENT & PLAN NOTE
· On 6/2/2020 patient developed fevers and leukocytosis along worsening mental status.    · Cormier changed  · UA positive for E. Coli and Klebsiella  · Continue Ceftriaxone (day 7) with plans to transition to PO Augmentin 6/9 to complete a 14 day course (end date 6/16)

## 2020-06-09 NOTE — ASSESSMENT & PLAN NOTE
-Mr. Soria was admitted to inpatient status  -GI consulted and performed EGD 5/22/2020 without evidence for source of bleeding.  Colonoscopy performed on 5/25/2020 which showed a fungating mass of ascending colon and diverticulosis.  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  -Hgb stable at 8.6  -Diet Advanced with resolved post-op ileus

## 2020-06-09 NOTE — ASSESSMENT & PLAN NOTE
· Upper endoscopy on 5/22/2020 did not reveal source of bleeding however colonoscopy revealed fungating mass of the ascending colon and diverticulosis on 5/25/2020.    · Biopsy from colonoscopy revealed invasive adenocarcinoma, moderately to poorly differentiated.    · S/p partial right hemicolectomy on 5/26/2020 performed by Dr. Von Jo Jr complicated by post-op ileus  · [facility].  Despite dementia, patient and his daughter both expressed desire for cancer treatment if indicated with goal of improving survival.  Needs f/u with [facility] 4 weeks after discharge  · Diet Advanced to regular

## 2020-06-09 NOTE — PLAN OF CARE
Problem: Occupational Therapy Goal  Goal: Occupational Therapy Goal  Description  Goals to be met by: 6/1/2020; Goals to be met by 6/12/20    Patient will increase functional independence with ADLs by performing:    Feeding with Set-up Assistance.  UE Dressing with Supervision.  Grooming while seated with Supervision.  Toileting from toilet with Supervision for hygiene and clothing management.   Supine to sit with Supervision.- MET 6/7/2020   Toilet transfer to toilet with Contact Guard Assistance.      Outcome: Ongoing, Progressing   Pt seen today for ADL mobility and dressing.  Pt is being discharged to SNF with OT and PT today.

## 2020-06-09 NOTE — ASSESSMENT & PLAN NOTE
-Believed to be due to colon cancer as above.  -No known episodes of melena or hematochezia since 5/22/2020    -On admit Hb 10.6.  Trended down but stable today and  remains above transfusion threshold and no obvious bleeding.

## 2020-06-09 NOTE — PLAN OF CARE
Problem: Physical Therapy Goal  Goal: Physical Therapy Goal  Description  Goals to be met by: 6/25/2020    Patient will perform the following to increase strength, improve mobility, and return to prior level of function:    1. Supine <> sit with SBA.  2. Sit<>stand with SBA with least restrictive assistive device.  3. Gait x 150 feet with SBA with least restrictive assistive device.   Outcome: Ongoing, Progressing   Pt presented w/ HOB elevated upon arrival of PT. Pt agreeable to PT. Pt sit <>Stand w/ RW and CGA. Pt amb'd 30' w/ RW and CGA. WC management with ambulation.

## 2020-06-09 NOTE — PROGRESS NOTES
Midline removed.  Telemetry box removed and returned.  Pt prepared for transport.  Pt dressed in own clothes.  Pt belongings gathered.  OT assisted in readying patient and moving patient into wheelchair.  AVS printed and given to Zachariah yanez.  Vitals assessed.  Midline incision assessed and found to be intact without discharge.  Site where midline was removed assessed and dressing found to be CDI.  Pt escorted from unit in wheelchair by Zachariah yanez.

## 2020-06-09 NOTE — PHYSICIAN QUERY
PT Name: Anjel Soria  MR #: [id number]    Nutrition Clarification     CDS: Lucio MIGUEL,RN        Contact information: [email or web address]    This form is a permanent document in the medical record.     Query Date: June 9, 2020    By submitting this query, we are merely seeking further clarification of documentation.. Please utilize your independent clinical judgment when addressing the question(s) below.    The medical record contains the following:   Indicators  Supporting Clinical Findings Location in Medical Record    % of Estimated Energy Intake over a time frame from p.o., TF, or TPN      Weight Status over a time frame     x Subcutaneous Fat and/or Muscle Loss Orbital Region (Subcutaneous Fat Loss): moderate depletion  Upper Arm Region (Subcutaneous Fat Loss): moderate depletion   [place] Region (Muscle Loss): mild depletion  Clavicle Bone Region (Muscle Loss): moderate depletion  Patellar Region (Muscle Loss): moderate depletion  Anterior Thigh Region (Muscle Loss): moderate depletion  Posterior Calf Region (Muscle Loss): moderate depletion, CVA,         6/1/20 Adult Nutrition Progress Note   x Fluid Accumulation or Edema Edema (Fluid Accumulation): 0-->no edema present  6/1/20 Adult Nutrition Progress Note   x Wt/BMI/Usual Body Weight WT:195.55 lb  BMI:25.8 6/1/20 Adult Nutrition Progress Note    Delayed Wound Healing/Failure to Thrive     x Acute or Chronic Illness gastrointestinal disease(gastrointestinal hemorrhage with melena), vascular dementia, Constipation and abdominal pain, moderate protein-calorie malnutrition       6/1/20 Adult Nutrition Progress Note    Medication     x Treatment If diet unable to be upgraded consider TPN.   -Recommend Clinimix-E 5/20 @ 75ml/hr with 250ml 20% lipids.   -Provides 1584 kcal (88% EEN) and 90gm protein (100% EPN).   clear liquids diet 6/1/20 Adult Nutrition Progress Note   x Other poor appetite  Colonoscopy on 5/25 revealing a cecal mass 6/1/20 Adult  Nutrition Progress Note     AND / ASPEN Clinical Characteristics (October 2011)  A minimum of two characteristics is recommended for diagnosing either moderate or severe malnutrition   Mild Malnutrition Moderate Malnutrition Severe Malnutrition   Energy Intake from p.o., TF or TPN. < 75% intake of estimated energy needs for less than 7 days < 75% intake of estimated energy needs for greater than 7 days < 50% intake of estimated energy needs for > 5 days   Weight Loss 1-2% in 1 month  5% in 3 months  7.5% in 6 months  10% in 1 year 1-2 % in 1 week  5% in 1 month  7.5% in 3 months  10% in 6 months  20% in 1 year > 2% in 1 week  > 5% in 1 month  > 7.5% in 3 months  > 10% in 6 months  > 20% in 1 year   Physical Findings     None *Mild subcutaneous fat and/or muscle loss  *Mild fluid accumulation  *Stage II decubitus  *Surgical wound or non-healing wound *Mod/severe subcutaneous fat and/or muscle loss  *Mod/severe fluid accumulation  *Stage III or IV decubitus  *Non-healing surgical wound     Provider, please specify diagnosis or diagnoses associated with above clinical findings.    [  ] Mild Protein-Calorie Malnutrition   [ x] Moderate Protein-Calorie Malnutrition   [  ] Severe Protein-Calorie Malnutrition   [  ] Malnutrition, Unspecified degree   [  ] Cachexia   [  ] Anorexia   [  ] Abnormal Weight Loss   [  ] Underweight   [  ] Other Nutritional Diagnosis (please specify): _______   [  ] Malnutrition ruled out   [  ] Clinically Undetermined     Present on admission (POA) status:   [   ] Yes (Y)                          [  ] Clinically Undetermined (W)              [   ] No (N)                            [   ] Documentation insufficient to determine if condition is POA (U)     Please document in your progress notes daily for the duration of treatment until resolved and include in your discharge summary.

## 2020-06-09 NOTE — PT/OT/SLP PROGRESS
Occupational Therapy   Treatment and Discharge    Name: Anjel Soria  MRN: [id number]  Admitting Diagnosis:  Malignant neoplasm of ascending colon  14 Days Post-Op    Recommendations:     Discharge Recommendations: nursing facility, skilled  Discharge Equipment Recommendations:  (Defer to SNF)  Barriers to discharge:  None    Assessment:     Anjel Soria is a 74 y.o. male with a medical diagnosis of Malignant neoplasm of ascending colon.  He presents sitting on EOB with nurse assisting pt with dressing for discharge.  Pt requiring verbal cues for hand placement during sit<>stand with RW for LB dressing and ADL transfers.  Pt was Total A with donning incontinence brief and slippers.  Pt performed ambulation and bed to wheelchair transfer at Min A level with OT having to verbally instruct pt with navigation as well as manually assist with navigation of RW.  Pt needed CGA for balance during bed to wheelchair transfer and ambulation in room.  Pt has severe visual deficits which greatly effects his safety.  Performance deficits affecting function are weakness, impaired endurance, visual deficits, impaired self care skills, impaired functional mobilty, gait instability, impaired balance, impaired cognition, decreased upper extremity function, decreased lower extremity function, decreased ROM, impaired fine motor, impaired cardiopulmonary response to activity, decreased safety awareness.  Pt discharging to SNF with OT and PT.     Rehab Prognosis:  Fair; patient would benefit from acute skilled OT services to address these deficits and reach maximum level of function.       Plan:     Patient to be seen (N/A - pt discharging to SNF) to address the above listed problems via self-care/home management, therapeutic activities, therapeutic exercises  · Plan of Care Expires: 06/23/20  · Plan of Care Reviewed with: patient    Subjective     Pain/Comfort:  · Pain Rating 1: 0/10  · Pain Rating Post-Intervention 1: 0/10    Objective:  "    Communicated with: Bijal case prior to session.  Patient found sitting EOB with nurse with peripheral IV upon OT entry to room.    General Precautions: Standard, aspiration, fall   Orthopedic Precautions:N/A   Braces: N/A     Occupational Performance:       Functional Mobility/Transfers:  · Patient completed Sit <> Stand Transfer with contact guard assistance  with  rolling walker and cues for hand placement   · Patient completed Bed <>wheelchair Transfer using Step Transfer technique with minimum assistance and verbal cues due to visual impairment with rolling walker  · Functional Mobility: Pt needing assist with all transfers and mobility due to decreased safety awareness, decreased activity tolerance and decreased balance.    Activities of Daily Living:  · Upper Body Dressing: moderate assistance    · Lower Body Dressing: maximal assistance        [facility] 6 Click ADL: 16    Treatment & Education:  Role of OT, ADL mobility, discharge recs.  Once pt sat in wheelchair from transportation company, OT asked if pt could rest his feet on the floor of vehicle due to the leg rests being short and pt having a long ride to SNF with extreme knee flexion and his feet in awkward position, which transportation staff answered, "Yes".    Patient left in transport wheelchair with trunk belt with nurseBijal presentEducation:      GOALS:   Multidisciplinary Problems     Occupational Therapy Goals        Problem: Occupational Therapy Goal    Goal Priority Disciplines Outcome Interventions   Occupational Therapy Goal     OT, PT/OT Ongoing, Progressing    Description:  Goals to be met by: 6/1/2020; Goals to be met by 6/12/20    Patient will increase functional independence with ADLs by performing:    Feeding with Set-up Assistance.  UE Dressing with Supervision.  Grooming while seated with Supervision.  Toileting from toilet with Supervision for hygiene and clothing management.   Supine to sit with Supervision.- MET 6/7/2020 "   Toilet transfer to toilet with Contact Guard Assistance.                       Time Tracking:     OT Date of Treatment: 06/09/20  OT Start Time: 1340  OT Stop Time: 1350  OT Total Time (min): 10 min    Billable Minutes:Self Care/Home Management 10    KRISTA Hernandez  6/9/2020

## 2020-06-09 NOTE — PLAN OF CARE
Patient resting in NAD. VSS on RA. Pain managed with PRN medication x1. Pt refused dinner this shift. Midline incision staples intact with no drainage to site. No needs expressed by patient at this time. Safety measures maintained. Will continue to monitor.

## 2020-06-09 NOTE — PHYSICIAN QUERY
PT Name: Anjel Soria  MR #: [id number]     Physician Query Form - Documentation Clarification      CDS: Lucio MIGUEL,RN        Contact information: [email or web address]  This form is a permanent document in the medical record.     Query Date: June 9, 2020    By submitting this query, we are merely seeking further clarification of documentation. Please utilize your independent clinical judgment when addressing the question(s) below.    The Medical record reflects the following:    Supporting Clinical Findings Location in Medical Record     Ruled In, mild sepsis         6/5/20 Physician response to Query    Blood Culture, Routine Gram stain augustina bottle: Gram negative rods    Blood Culture, Routine Gram stain aer bottle: Gram negative rods 06/03/2020  09:13   Blood Culture, Routine ESCHERICHIA COLI    KLEBSIELLA PNEUMONIAE   >100,000 cfu/ml     Urine Culture, Routine ESCHERICHIA COLI   > 100,000 cfu/ml     Amoxicillin-clavulanate 875-125 mg per tablet 1 tablet oral Every  12 hours after 16 doses   ceftriaxone 1 g/50 ml D5W IVPB  1g Intravenous Every 24 hours over 30 minutes  Ceftriaxone 2g/50 ml D5W IVPB  2g Intravenous Every 24 hours over 60 minutes 3 doses given         6/2/20 Blood Culture               6/2/20 Urine Culture           6/9---->6/17 MAR      6/3---->6/6 MAR    6/6---->6/8 MAR                                                                            Doctor, please specify the correlation between sepsis and the following organism(s):    Provider Use Only     [    ] Sepsis due to klebsiella pneumoniae organism     [ x   ] Sepsis due to escherichia coli organism     [    ]  Sepsis due to all of the above organisms     [    ]  Other ( please specify)_______________________________                                                                                                             [  ] Clinically Undetermined

## 2020-06-09 NOTE — PT/OT/SLP PROGRESS
Physical Therapy Treatment    Patient Name:  Anjel Soria   MRN:  [id number]    Recommendations:     Discharge Recommendations:  nursing facility, skilled   Discharge Equipment Recommendations: (defer to SNF)   Barriers to discharge: Decreased caregiver support    Assessment:     Anjel Soria is a 74 y.o. male admitted with a medical diagnosis of Malignant neoplasm of ascending colon.  He presents with the following impairments/functional limitations:  weakness, impaired functional mobilty, gait instability, impaired balance, visual deficits, impaired endurance, decreased coordination, decreased upper extremity function, decreased safety awareness, impaired self care skills ,  Pt presented w/ HOB elevated upon arrival of PT. Pt agreeable to PT. Pt sit <>Stand w/ RW and CGA. Pt amb'd 30' w/ RW and CGA. WC management with ambulation.    Rehab Prognosis: Good ; patient would benefit from acute skilled PT services to address these deficits and reach maximum level of function.    Recent Surgery: Procedure(s) (LRB):  LAPAROTOMY, EXPLORATORY (N/A)  COLECTOMY, PARTIAL - RIGHT COLECTOMY (Right) 14 Days Post-Op    Plan:     During this hospitalization, patient to be seen 5 x/week to address the identified rehab impairments via gait training, therapeutic activities, therapeutic exercises and progress toward the following goals:    · Plan of Care Expires:  06/29/20    Subjective     Chief Complaint: none stated   Patient/Family Comments/goals: I want to go home  Pain/Comfort:  · Pain Rating 1: 0/10      Objective:     Communicated with ns(Bijal) prior to session.  Patient found HOB elevated with bed alarm, peripheral IV, telemetry upon PT entry to room.     General Precautions: Standard, aspiration, fall   Orthopedic Precautions:N/A   Braces: N/A     Functional Mobility:  · Transfers:     · Sit to Stand:  contact guard assistance with rolling walker  · Gait: 30' w/ Rw and CGA      AM-PAC 6 CLICK MOBILITY  Sitting down on and  standing up from a chair with arms (e.g., wheelchair, bedside commode, etc.): 3  Moving from lying on back to sitting on the side of the bed?: 3  Moving to and from a bed to a chair (including a wheelchair)?: 2  Need to walk in hospital room?: 3  Climbing 3-5 steps with a railing?: 2       Therapeutic Activities and Exercises:   Pt performed seated therapeutic exercises including hip flexion, AP and LAQs x 10 reps with verbal and tactile cues.      Patient left HOB elevated with all lines intact, call button in reach, bed alarm on and ns notified..    GOALS:   Multidisciplinary Problems     Physical Therapy Goals        Problem: Physical Therapy Goal    Goal Priority Disciplines Outcome Goal Variances Interventions   Physical Therapy Goal     PT, PT/OT Ongoing, Progressing     Description:  Goals to be met by: 6/25/2020    Patient will perform the following to increase strength, improve mobility, and return to prior level of function:    1. Supine <> sit with SBA.  2. Sit<>stand with SBA with least restrictive assistive device.  3. Gait x 150 feet with SBA with least restrictive assistive device.                    Time Tracking:     PT Received On: 06/09/20  PT Start Time: 1123     PT Stop Time: 1156  PT Total Time (min): 33 min     Billable Minutes: Gait Training 15 and Therapeutic Exercise 18    Treatment Type: Treatment  PT/PTA: PTA     PTA Visit Number: 4     Madan Schulte PTA  06/09/2020

## 2020-06-09 NOTE — PLAN OF CARE
Chart reviewed  Final note:    SNF today.  [facility],  [address]  GHAZAL Gastelum  Report, [phone]  Will forward updated clinicals to facility via R care  adt 30 completed   between 1:30 and 2:30 today       06/09/20 1222   Final Note   Assessment Type Final Discharge Note   Anticipated Discharge Disposition SNF   Hospital Follow Up  Appt(s) scheduled? Yes   Discharge plans and expectations educations in teach back method with documentation complete? Yes   Right Care Referral Info   Post Acute Recommendation SNF / Sub-Acute Rehab   Post-Acute Status   Post-Acute Authorization Placement   Post-Acute Placement Status Set-up Complete   Discharge Delays None known at this time

## 2020-06-24 PROBLEM — Z01.810 PRE-OPERATIVE CARDIOVASCULAR EXAMINATION, BRADYCARDIA: Status: ACTIVE | Noted: 2020-05-21

## 2020-08-03 ENCOUNTER — LAB VISIT (OUTPATIENT)
Dept: LAB | Facility: HOSPITAL | Age: 75
End: 2020-08-03
Attending: HOSPITALIST
Payer: MEDICARE

## 2020-08-03 DIAGNOSIS — N39.0 UTI (URINARY TRACT INFECTION): Primary | ICD-10-CM

## 2020-08-03 PROCEDURE — 81003 URINALYSIS AUTO W/O SCOPE: CPT

## 2020-08-03 PROCEDURE — 87086 URINE CULTURE/COLONY COUNT: CPT

## 2020-08-04 LAB — BACTERIA UR CULT: NO GROWTH

## 2020-10-20 ENCOUNTER — HOSPITAL ENCOUNTER (INPATIENT)
Facility: HOSPITAL | Age: 75
LOS: 1 days | Discharge: HOME | DRG: 389 | End: 2020-10-22
Attending: SURGERY | Admitting: STUDENT IN AN ORGANIZED HEALTH CARE EDUCATION/TRAINING PROGRAM
Payer: MEDICARE

## 2020-10-20 DIAGNOSIS — K56.609 SBO (SMALL BOWEL OBSTRUCTION): Primary | ICD-10-CM

## 2020-10-20 LAB
ALBUMIN SERPL BCP-MCNC: 3.5 G/DL (ref 3.5–5.2)
ALP SERPL-CCNC: 95 U/L (ref 55–135)
ALT SERPL W/O P-5'-P-CCNC: 14 U/L (ref 10–44)
ANION GAP SERPL CALC-SCNC: 8 MMOL/L (ref 8–16)
AST SERPL-CCNC: 17 U/L (ref 10–40)
BASOPHILS # BLD AUTO: 0.04 K/UL (ref 0–0.2)
BASOPHILS NFR BLD: 0.4 % (ref 0–1.9)
BILIRUB SERPL-MCNC: 0.2 MG/DL (ref 0.1–1)
BUN SERPL-MCNC: 9 MG/DL (ref 8–23)
CALCIUM SERPL-MCNC: 9 MG/DL (ref 8.7–10.5)
CHLORIDE SERPL-SCNC: 102 MMOL/L (ref 95–110)
CO2 SERPL-SCNC: 26 MMOL/L (ref 23–29)
CREAT SERPL-MCNC: 0.9 MG/DL (ref 0.5–1.4)
DIFFERENTIAL METHOD: ABNORMAL
EOSINOPHIL # BLD AUTO: 0.1 K/UL (ref 0–0.5)
EOSINOPHIL NFR BLD: 1.3 % (ref 0–8)
ERYTHROCYTE [DISTWIDTH] IN BLOOD BY AUTOMATED COUNT: 13.8 % (ref 11.5–14.5)
EST. GFR  (AFRICAN AMERICAN): >60 ML/MIN/1.73 M^2
EST. GFR  (NON AFRICAN AMERICAN): >60 ML/MIN/1.73 M^2
GLUCOSE SERPL-MCNC: 106 MG/DL (ref 70–110)
HCT VFR BLD AUTO: 48.3 % (ref 40–54)
HGB BLD-MCNC: 15.5 G/DL (ref 14–18)
IMM GRANULOCYTES # BLD AUTO: 0.02 K/UL (ref 0–0.04)
IMM GRANULOCYTES NFR BLD AUTO: 0.2 % (ref 0–0.5)
LIPASE SERPL-CCNC: 58 U/L (ref 4–60)
LYMPHOCYTES # BLD AUTO: 1.7 K/UL (ref 1–4.8)
LYMPHOCYTES NFR BLD: 18.4 % (ref 18–48)
MCH RBC QN AUTO: 26.8 PG (ref 27–31)
MCHC RBC AUTO-ENTMCNC: 32.1 G/DL (ref 32–36)
MCV RBC AUTO: 84 FL (ref 82–98)
MONOCYTES # BLD AUTO: 0.7 K/UL (ref 0.3–1)
MONOCYTES NFR BLD: 8.2 % (ref 4–15)
NEUTROPHILS # BLD AUTO: 6.5 K/UL (ref 1.8–7.7)
NEUTROPHILS NFR BLD: 71.5 % (ref 38–73)
NRBC BLD-RTO: 0 /100 WBC
PLATELET # BLD AUTO: 220 K/UL (ref 150–350)
PMV BLD AUTO: 9.1 FL (ref 9.2–12.9)
POTASSIUM SERPL-SCNC: 4 MMOL/L (ref 3.5–5.1)
PROT SERPL-MCNC: 7.4 G/DL (ref 6–8.4)
RBC # BLD AUTO: 5.78 M/UL (ref 4.6–6.2)
SARS-COV-2 RDRP RESP QL NAA+PROBE: NEGATIVE
SODIUM SERPL-SCNC: 136 MMOL/L (ref 136–145)
WBC # BLD AUTO: 9.03 K/UL (ref 3.9–12.7)

## 2020-10-20 PROCEDURE — 25000003 PHARM REV CODE 250: Performed by: SURGERY

## 2020-10-20 PROCEDURE — G0378 HOSPITAL OBSERVATION PER HR: HCPCS | Mod: CS

## 2020-10-20 PROCEDURE — 80053 COMPREHEN METABOLIC PANEL: CPT

## 2020-10-20 PROCEDURE — 36415 COLL VENOUS BLD VENIPUNCTURE: CPT

## 2020-10-20 PROCEDURE — U0002 COVID-19 LAB TEST NON-CDC: HCPCS

## 2020-10-20 PROCEDURE — 25500020 PHARM REV CODE 255: Performed by: SURGERY

## 2020-10-20 PROCEDURE — 99285 EMERGENCY DEPT VISIT HI MDM: CPT | Mod: 25

## 2020-10-20 PROCEDURE — 85025 COMPLETE CBC W/AUTO DIFF WBC: CPT

## 2020-10-20 PROCEDURE — G0378 HOSPITAL OBSERVATION PER HR: HCPCS

## 2020-10-20 PROCEDURE — 83690 ASSAY OF LIPASE: CPT

## 2020-10-20 PROCEDURE — 96361 HYDRATE IV INFUSION ADD-ON: CPT

## 2020-10-20 RX ORDER — SODIUM CHLORIDE 0.9 % (FLUSH) 0.9 %
10 SYRINGE (ML) INJECTION
Status: DISCONTINUED | OUTPATIENT
Start: 2020-10-20 | End: 2020-10-22 | Stop reason: HOSPADM

## 2020-10-20 RX ORDER — HYDROMORPHONE HYDROCHLORIDE 1 MG/ML
0.5 INJECTION, SOLUTION INTRAMUSCULAR; INTRAVENOUS; SUBCUTANEOUS EVERY 4 HOURS PRN
Status: DISCONTINUED | OUTPATIENT
Start: 2020-10-20 | End: 2020-10-22 | Stop reason: HOSPADM

## 2020-10-20 RX ORDER — SODIUM CHLORIDE 9 MG/ML
1000 INJECTION, SOLUTION INTRAVENOUS
Status: COMPLETED | OUTPATIENT
Start: 2020-10-20 | End: 2020-10-20

## 2020-10-20 RX ORDER — ONDANSETRON 2 MG/ML
4 INJECTION INTRAMUSCULAR; INTRAVENOUS EVERY 8 HOURS PRN
Status: DISCONTINUED | OUTPATIENT
Start: 2020-10-20 | End: 2020-10-22 | Stop reason: HOSPADM

## 2020-10-20 RX ADMIN — IOHEXOL 30 ML: 350 INJECTION, SOLUTION INTRAVENOUS at 03:10

## 2020-10-20 RX ADMIN — SODIUM CHLORIDE 1000 ML: 0.9 INJECTION, SOLUTION INTRAVENOUS at 07:10

## 2020-10-20 RX ADMIN — IOHEXOL 100 ML: 350 INJECTION, SOLUTION INTRAVENOUS at 03:10
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
## 2020-10-21 LAB
ALBUMIN SERPL BCP-MCNC: 3 G/DL (ref 3.5–5.2)
ALP SERPL-CCNC: 82 U/L (ref 55–135)
ALT SERPL W/O P-5'-P-CCNC: 12 U/L (ref 10–44)
ANION GAP SERPL CALC-SCNC: 7 MMOL/L (ref 8–16)
AST SERPL-CCNC: 14 U/L (ref 10–40)
BASOPHILS # BLD AUTO: 0.02 K/UL (ref 0–0.2)
BASOPHILS NFR BLD: 0.3 % (ref 0–1.9)
BILIRUB SERPL-MCNC: 0.4 MG/DL (ref 0.1–1)
BUN SERPL-MCNC: 7 MG/DL (ref 8–23)
CALCIUM SERPL-MCNC: 8.4 MG/DL (ref 8.7–10.5)
CHLORIDE SERPL-SCNC: 105 MMOL/L (ref 95–110)
CO2 SERPL-SCNC: 26 MMOL/L (ref 23–29)
CREAT SERPL-MCNC: 0.8 MG/DL (ref 0.5–1.4)
DIFFERENTIAL METHOD: NORMAL
EOSINOPHIL # BLD AUTO: 0.1 K/UL (ref 0–0.5)
EOSINOPHIL NFR BLD: 1.3 % (ref 0–8)
ERYTHROCYTE [DISTWIDTH] IN BLOOD BY AUTOMATED COUNT: 13.8 % (ref 11.5–14.5)
EST. GFR  (AFRICAN AMERICAN): >60 ML/MIN/1.73 M^2
EST. GFR  (NON AFRICAN AMERICAN): >60 ML/MIN/1.73 M^2
GLUCOSE SERPL-MCNC: 86 MG/DL (ref 70–110)
HCT VFR BLD AUTO: 43.5 % (ref 40–54)
HGB BLD-MCNC: 14.1 G/DL (ref 14–18)
IMM GRANULOCYTES # BLD AUTO: 0.01 K/UL (ref 0–0.04)
IMM GRANULOCYTES NFR BLD AUTO: 0.1 % (ref 0–0.5)
LYMPHOCYTES # BLD AUTO: 1.6 K/UL (ref 1–4.8)
LYMPHOCYTES NFR BLD: 22.1 % (ref 18–48)
MCH RBC QN AUTO: 27.1 PG (ref 27–31)
MCHC RBC AUTO-ENTMCNC: 32.4 G/DL (ref 32–36)
MCV RBC AUTO: 84 FL (ref 82–98)
MONOCYTES # BLD AUTO: 0.7 K/UL (ref 0.3–1)
MONOCYTES NFR BLD: 8.9 % (ref 4–15)
NEUTROPHILS # BLD AUTO: 5 K/UL (ref 1.8–7.7)
NEUTROPHILS NFR BLD: 67.3 % (ref 38–73)
NRBC BLD-RTO: 0 /100 WBC
PLATELET # BLD AUTO: 202 K/UL (ref 150–350)
PMV BLD AUTO: 9.9 FL (ref 9.2–12.9)
POTASSIUM SERPL-SCNC: 4.2 MMOL/L (ref 3.5–5.1)
PROT SERPL-MCNC: 6.5 G/DL (ref 6–8.4)
RBC # BLD AUTO: 5.2 M/UL (ref 4.6–6.2)
SODIUM SERPL-SCNC: 138 MMOL/L (ref 136–145)
WBC # BLD AUTO: 7.42 K/UL (ref 3.9–12.7)

## 2020-10-21 PROCEDURE — 11000001 HC ACUTE MED/SURG PRIVATE ROOM

## 2020-10-21 PROCEDURE — 99231 SBSQ HOSP IP/OBS SF/LOW 25: CPT | Mod: ,,, | Performed by: SURGERY

## 2020-10-21 PROCEDURE — 96361 HYDRATE IV INFUSION ADD-ON: CPT

## 2020-10-21 PROCEDURE — 99222 PR INITIAL HOSPITAL CARE,LEVL II: ICD-10-PCS | Mod: ,,, | Performed by: INTERNAL MEDICINE

## 2020-10-21 PROCEDURE — 99231 PR SUBSEQUENT HOSPITAL CARE,LEVL I: ICD-10-PCS | Mod: ,,, | Performed by: SURGERY

## 2020-10-21 PROCEDURE — 25000003 PHARM REV CODE 250: Performed by: NURSE PRACTITIONER

## 2020-10-21 PROCEDURE — 36415 COLL VENOUS BLD VENIPUNCTURE: CPT

## 2020-10-21 PROCEDURE — 80053 COMPREHEN METABOLIC PANEL: CPT

## 2020-10-21 PROCEDURE — 96374 THER/PROPH/DIAG INJ IV PUSH: CPT

## 2020-10-21 PROCEDURE — 99222 1ST HOSP IP/OBS MODERATE 55: CPT | Mod: ,,, | Performed by: INTERNAL MEDICINE

## 2020-10-21 PROCEDURE — 97116 GAIT TRAINING THERAPY: CPT

## 2020-10-21 PROCEDURE — C9113 INJ PANTOPRAZOLE SODIUM, VIA: HCPCS | Performed by: NURSE PRACTITIONER

## 2020-10-21 PROCEDURE — 85025 COMPLETE CBC W/AUTO DIFF WBC: CPT

## 2020-10-21 PROCEDURE — 97161 PT EVAL LOW COMPLEX 20 MIN: CPT

## 2020-10-21 PROCEDURE — 63600175 PHARM REV CODE 636 W HCPCS: Performed by: NURSE PRACTITIONER

## 2020-10-21 RX ORDER — DORZOLAMIDE HCL 20 MG/ML
1 SOLUTION/ DROPS OPHTHALMIC 2 TIMES DAILY
Status: DISCONTINUED | OUTPATIENT
Start: 2020-10-21 | End: 2020-10-22 | Stop reason: HOSPADM

## 2020-10-21 RX ORDER — ASPIRIN 81 MG/1
81 TABLET ORAL DAILY
Status: DISCONTINUED | OUTPATIENT
Start: 2020-10-21 | End: 2020-10-22 | Stop reason: HOSPADM

## 2020-10-21 RX ORDER — PHENYTOIN SODIUM 100 MG/1
100 CAPSULE, EXTENDED RELEASE ORAL 2 TIMES DAILY
Status: DISCONTINUED | OUTPATIENT
Start: 2020-10-21 | End: 2020-10-22 | Stop reason: HOSPADM

## 2020-10-21 RX ORDER — TAMSULOSIN HYDROCHLORIDE 0.4 MG/1
0.4 CAPSULE ORAL DAILY
Status: DISCONTINUED | OUTPATIENT
Start: 2020-10-21 | End: 2020-10-22 | Stop reason: HOSPADM

## 2020-10-21 RX ORDER — DIVALPROEX SODIUM 500 MG/1
500 TABLET, DELAYED RELEASE ORAL NIGHTLY
Status: DISCONTINUED | OUTPATIENT
Start: 2020-10-21 | End: 2020-10-22 | Stop reason: HOSPADM

## 2020-10-21 RX ORDER — ATORVASTATIN CALCIUM 40 MG/1
40 TABLET, FILM COATED ORAL NIGHTLY
Status: DISCONTINUED | OUTPATIENT
Start: 2020-10-21 | End: 2020-10-22 | Stop reason: HOSPADM

## 2020-10-21 RX ORDER — SODIUM CHLORIDE 9 MG/ML
INJECTION, SOLUTION INTRAVENOUS CONTINUOUS
Status: DISCONTINUED | OUTPATIENT
Start: 2020-10-21 | End: 2020-10-22 | Stop reason: HOSPADM

## 2020-10-21 RX ORDER — FLUOXETINE HYDROCHLORIDE 20 MG/1
20 CAPSULE ORAL DAILY
Status: DISCONTINUED | OUTPATIENT
Start: 2020-10-21 | End: 2020-10-22 | Stop reason: HOSPADM

## 2020-10-21 RX ORDER — PANTOPRAZOLE SODIUM 40 MG/10ML
40 INJECTION, POWDER, LYOPHILIZED, FOR SOLUTION INTRAVENOUS DAILY
Status: DISCONTINUED | OUTPATIENT
Start: 2020-10-21 | End: 2020-10-22 | Stop reason: HOSPADM

## 2020-10-21 RX ORDER — TRAVOPROST OPHTHALMIC SOLUTION 0.04 MG/ML
1 SOLUTION OPHTHALMIC NIGHTLY
Status: DISCONTINUED | OUTPATIENT
Start: 2020-10-21 | End: 2020-10-22 | Stop reason: HOSPADM

## 2020-10-21 RX ADMIN — TRAVOPROST 1 DROP: 0.04 SOLUTION/ DROPS OPHTHALMIC at 09:10

## 2020-10-21 RX ADMIN — PHENYTOIN SODIUM 100 MG: 100 CAPSULE ORAL at 01:10

## 2020-10-21 RX ADMIN — SODIUM CHLORIDE: 0.9 INJECTION, SOLUTION INTRAVENOUS at 09:10

## 2020-10-21 RX ADMIN — TAMSULOSIN HYDROCHLORIDE 0.4 MG: 0.4 CAPSULE ORAL at 01:10

## 2020-10-21 RX ADMIN — ATORVASTATIN CALCIUM 40 MG: 40 TABLET, FILM COATED ORAL at 09:10

## 2020-10-21 RX ADMIN — DORZOLAMIDE HYDROCHLORIDE 1 DROP: 20 SOLUTION/ DROPS OPHTHALMIC at 01:10

## 2020-10-21 RX ADMIN — PHENYTOIN SODIUM 100 MG: 100 CAPSULE ORAL at 09:10

## 2020-10-21 RX ADMIN — DORZOLAMIDE HYDROCHLORIDE 1 DROP: 20 SOLUTION/ DROPS OPHTHALMIC at 09:10

## 2020-10-21 RX ADMIN — PANTOPRAZOLE SODIUM 40 MG: 40 INJECTION, POWDER, LYOPHILIZED, FOR SOLUTION INTRAVENOUS at 09:10

## 2020-10-21 RX ADMIN — FLUOXETINE HYDROCHLORIDE 20 MG: 20 CAPSULE ORAL at 01:10

## 2020-10-21 RX ADMIN — ASPIRIN 81 MG: 81 TABLET, COATED ORAL at 01:10

## 2020-10-21 RX ADMIN — DIVALPROEX SODIUM 500 MG: 500 TABLET, DELAYED RELEASE ORAL at 09:10

## 2020-10-21 RX ADMIN — SODIUM CHLORIDE: 0.9 INJECTION, SOLUTION INTRAVENOUS at 11:10

## 2020-10-21 NOTE — ED NOTES
Attempted to insert ngt   Patient uncooperative and refuses ngt..  Dr. Li attempts to insert ngt and patient combative and refuses tube   Patient has not vomited in the ed all day and is not nauseated at present

## 2020-10-21 NOTE — HPI
75-year-old nursing home patient who was brought to the emergency room for evaluation due to abdominal distention.  Patient also reported to have abdominal pain.  Patient suffers from dementia and history is very limited.  Patient has been in the emergency room for several hours and by evaluation there is concern for bowel obstruction.  No reported vomiting by nursing home or during stay in the emergency department.  Patient is denying abdominal pain at this time.  He says he is nauseated but has not vomited.  Patient states he had a normal bowel movement today.  Patient's past surgical history significant for right hemicolectomy due to colon cancer.  Colon surgery was in May of 2020

## 2020-10-21 NOTE — PLAN OF CARE
10/21/20 1048   Discharge Assessment   Assessment Type Discharge Planning Assessment   Confirmed/corrected address and phone number on facesheet? Yes   Assessment information obtained from? Patient   Prior to hospitilization cognitive status: Alert/Oriented   Prior to hospitalization functional status: Needs Assistance   Current cognitive status: Alert/Oriented   Current Functional Status: Needs Assistance   Facility Arrived From: [facility]   Lives With facility resident   Able to Return to Prior Arrangements yes   Is patient able to care for self after discharge? No   Who are your caregiver(s) and their phone number(s)? Harriet Stone (Daughter) [phone]   Patient's perception of discharge disposition nursing home   Readmission Within the Last 30 Days no previous admission in last 30 days   Patient currently being followed by outpatient case management? No   Patient currently receives any other outside agency services? No   Equipment Currently Used at Home cane, straight;walker, rolling;hospital bed   Do you have any problems affording any of your prescribed medications? No   Does the patient have transportation home? Yes   Transportation Anticipated agency   Discharge Plan A Return to nursing home   DME Needed Upon Discharge  none   Patient/Family in Agreement with Plan yes       Discharge assessment completed with patient. Patient is a resident of [facility] and has lived there for years. Patient plans to discharge back to [facility] at discharge. SW to remain available as needed for discharge planning.     Aishwarya Cazares LMSW

## 2020-10-21 NOTE — ASSESSMENT & PLAN NOTE
ngt attempts unsuccessful  NPO  NO N/V this am  General surgery consulted  Repeat KUB this am  VSS/afebrile  PT to ambulate today and start clears  Echo shows 65% EF with diastolic dysfunction, Will start minimal IVF till able to tolerate po

## 2020-10-21 NOTE — ASSESSMENT & PLAN NOTE
Patient presented with abdominal distension and nausea but no vomiting.  He denies any abdominal pain and has had bowel movement today as well as yesterday.  Imaging could certainly be consistent with the small intestine obstruction however clinically he does not appear to be obstructed.  Patient may simply be experiencing poor intestinal motility secondary to immobility.  I would recommend advancing diet and as long as he tolerates his diet with continued bowel movements then no intervention is needed.  Patient is certainly at high risk for any surgical intervention.  MiraLax daily with also be advisable.

## 2020-10-21 NOTE — ASSESSMENT & PLAN NOTE
diovan imdur and coreg held while npo  bp 125/66 this am   Was not taking at home    VSS without meds- cont without 108/55 hr 57

## 2020-10-21 NOTE — PROGRESS NOTES
Nursing Notes  Bedside handoff report received from KENNY Larson. Pt franc in bed AAOx3. No subjective complaints. Bowel sounds normoactive x4. Abd firm to palpation. No pain or nausea noted. Bed in lowest position with call light in reach, will continue   to monitor.       Huddle Comments

## 2020-10-21 NOTE — PROGRESS NOTES
[facility]  General Surgery  Progress Note    Subjective:     History of Present Illness:  75-year-old nursing home patient who was brought to the emergency room for evaluation due to abdominal distention.  Patient also reported to have abdominal pain.  Patient suffers from dementia and history is very limited.  Patient has been in the emergency room for several hours and by evaluation there is concern for bowel obstruction.  No reported vomiting by nursing home or during stay in the emergency department.  Patient is denying abdominal pain at this time.  He says he is nauseated but has not vomited.  Patient states he had a normal bowel movement today.  Patient's past surgical history significant for right hemicolectomy due to colon cancer.  Colon surgery was in May of 2020    Post-Op Info:  * No surgery found *         Interval History:  Patient denies any pain.  He has not had any nausea vomiting today.  He had a moderate size soft bowel movement today.    Medications:  Continuous Infusions:   sodium chloride 0.9% 75 mL/hr at 10/21/20 0940     Scheduled Meds:   aspirin  81 mg Oral Daily    atorvastatin  40 mg Oral QHS    divalproex  500 mg Oral Nightly    dorzolamide  1 drop Both Eyes BID    FLUoxetine  20 mg Oral Daily    pantoprazole  40 mg Intravenous Daily    phenytoin  100 mg Oral BID    tamsulosin  0.4 mg Oral Daily    travoprost  1 drop Both Eyes QHS     PRN Meds:HYDROmorphone, ondansetron, promethazine (PHENERGAN) IVPB, sodium chloride 0.9%     Review of patient's allergies indicates:   Allergen Reactions    Tubersol [tuberculin ppd]      Objective:     Vital Signs (Most Recent):  Temp: 97.7 °F (36.5 °C) (10/21/20 1617)  Pulse: 60 (10/21/20 1617)  Resp: 19 (10/21/20 1617)  BP: (!) 114/58 (10/21/20 1617)  SpO2: (!) 94 % (10/21/20 1617) Vital Signs (24h Range):  Temp:  [96.2 °F (35.7 °C)-98.9 °F (37.2 °C)] 97.7 °F (36.5 °C)  Pulse:  [53-65] 60  Resp:  [15-19] 19  SpO2:  [93 %-97  %] 94 %  BP: (114-142)/(58-80) 114/58     Weight: 92.8 kg (204 lb 9.4 oz)  Body mass index is 28.53 kg/m².    Intake/Output - Last 3 Shifts       10/19 0700 - 10/20 0659 10/20 0700 - 10/21 0659 10/21 0700 - 10/22 0659    I.V. (mL/kg)  1000 (10.8)     Total Intake(mL/kg)  1000 (10.8)     Urine (mL/kg/hr)  700     Total Output  700     Net  +300            Urine Occurrence   1 x    Stool Occurrence   1 x          Physical Exam  Constitutional:       Appearance: He is obese.   Abdominal:      General: Bowel sounds are normal. There is distension (Moderate with tympany).      Palpations: Abdomen is soft. There is no mass.      Tenderness: There is no abdominal tenderness. There is no guarding.      Hernia: No hernia is present.      Comments: Long midline scar   Neurological:      Mental Status: He is alert.         Significant Labs:  CBC:   Recent Labs   Lab 10/21/20  0545   WBC 7.42   RBC 5.20   HGB 14.1   HCT 43.5      MCV 84   MCH 27.1   MCHC 32.4     BMP:   Recent Labs   Lab 10/21/20  0545   GLU 86      K 4.2      CO2 26   BUN 7*   CREATININE 0.8   CALCIUM 8.4*       Significant Diagnostics:  I have reviewed all pertinent imaging results/findings within the past 24 hours.   Patient does have repair to be some loops of dilated small bowel however contrast has made its way through the small bowel into the colon.  There is no definitive finding of obstruction.    Assessment/Plan:     * SBO (small bowel obstruction)  Patient presented with abdominal distension and nausea but no vomiting.  He denies any abdominal pain and has had bowel movement today as well as yesterday.  Imaging could certainly be consistent with the small intestine obstruction however clinically he does not appear to be obstructed.  Patient may simply be experiencing poor intestinal motility secondary to immobility.  I would recommend advancing diet and as long as he tolerates his diet with continued bowel movements then no  intervention is needed.  Patient is certainly at high risk for any surgical intervention.  MiraLax daily with also be advisable.        Miki Anderson MD  General Surgery  [facility]

## 2020-10-21 NOTE — PLAN OF CARE
Goals to be met by: 2 visits    Patient will increase functional independence with mobility by performin. Supine to sit with Supervision or Set-up Assistance  2. Sit to supine with Supervision or Set-up Assistance  3. Bed to chair transfer with Supervision or Set-up Assistancewith or without rolling walker using Step Transfer TECHNIQUE  4. Gait  x ~150  feet with Supervision or Set-up Assistance with or without rolling walker  5. Lower extremity exercise program x10 reps per handout, with assistance as needed

## 2020-10-21 NOTE — ASSESSMENT & PLAN NOTE
Resume home meds dilantin and depakote as soon as possible . See if can tolerate PO this AM  Tolerated meds yesterday- cont
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
## 2020-10-21 NOTE — HPI
74yo male patient with hx of CVA, CAD, depression/bipolar, HTN, HLD, macular degeneration, prostate ca, sz d/o, and dementia lives at [facility] home and brought to ER for eval of abd distention and pain.  Reports vomiting at nursing home. + nausea. Patient states he had a normal bowel movement today.  Patient's past surgical history significant for right hemicolectomy due to colon cancer.  Colon surgery was in May of 2020. Patient suffers from dementia and history is very limited. He had ct in ER that was suspicious for SBO. General surgery was consulted. NGT attempts were unsuccessful. NPO. No c/o n/v. Repeat films ordered for this am of abd. Has contrast material in colon this am from CT last night. Still with dilated loops concerning for partial small bowel obstruction /ileus. He denies any pain this am. Denies nausea this am. Reports LBM was yesterday loose.  + gas

## 2020-10-21 NOTE — SUBJECTIVE & OBJECTIVE
Interval History:  Patient denies any pain.  He has not had any nausea vomiting today.  He had a moderate size soft bowel movement today.    Medications:  Continuous Infusions:   sodium chloride 0.9% 75 mL/hr at 10/21/20 0940     Scheduled Meds:   aspirin  81 mg Oral Daily    atorvastatin  40 mg Oral QHS    divalproex  500 mg Oral Nightly    dorzolamide  1 drop Both Eyes BID    FLUoxetine  20 mg Oral Daily    pantoprazole  40 mg Intravenous Daily    phenytoin  100 mg Oral BID    tamsulosin  0.4 mg Oral Daily    travoprost  1 drop Both Eyes QHS     PRN Meds:HYDROmorphone, ondansetron, promethazine (PHENERGAN) IVPB, sodium chloride 0.9%     Review of patient's allergies indicates:   Allergen Reactions    Tubersol [tuberculin ppd]      Objective:     Vital Signs (Most Recent):  Temp: 97.7 °F (36.5 °C) (10/21/20 1617)  Pulse: 60 (10/21/20 1617)  Resp: 19 (10/21/20 1617)  BP: (!) 114/58 (10/21/20 1617)  SpO2: (!) 94 % (10/21/20 1617) Vital Signs (24h Range):  Temp:  [96.2 °F (35.7 °C)-98.9 °F (37.2 °C)] 97.7 °F (36.5 °C)  Pulse:  [53-65] 60  Resp:  [15-19] 19  SpO2:  [93 %-97 %] 94 %  BP: (114-142)/(58-80) 114/58     Weight: 92.8 kg (204 lb 9.4 oz)  Body mass index is 28.53 kg/m².    Intake/Output - Last 3 Shifts       10/19 0700 - 10/20 0659 10/20 0700 - 10/21 0659 10/21 0700 - 10/22 0659    I.V. (mL/kg)  1000 (10.8)     Total Intake(mL/kg)  1000 (10.8)     Urine (mL/kg/hr)  700     Total Output  700     Net  +300            Urine Occurrence   1 x    Stool Occurrence   1 x          Physical Exam  Constitutional:       Appearance: He is obese.   Abdominal:      General: Bowel sounds are normal. There is distension (Moderate with tympany).      Palpations: Abdomen is soft. There is no mass.      Tenderness: There is no abdominal tenderness. There is no guarding.      Hernia: No hernia is present.      Comments: Long midline scar   Neurological:      Mental Status: He is alert.         Significant Labs:  CBC:    Recent Labs   Lab 10/21/20  0545   WBC 7.42   RBC 5.20   HGB 14.1   HCT 43.5      MCV 84   MCH 27.1   MCHC 32.4     BMP:   Recent Labs   Lab 10/21/20  0545   GLU 86      K 4.2      CO2 26   BUN 7*   CREATININE 0.8   CALCIUM 8.4*       Significant Diagnostics:  I have reviewed all pertinent imaging results/findings within the past 24 hours.   Patient does have repair to be some loops of dilated small bowel however contrast has made its way through the small bowel into the colon.  There is no definitive finding of obstruction.
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
## 2020-10-21 NOTE — PLAN OF CARE
A/ox3, Fall precautions in place. NPO. Abdomen firm, round and distended. Hyperactive bowel sounds RUQ/RLQ, absent on the left. Denies nausea vomiting. Surgery consulted per ED. Skin tear right outer great toe covered with bandaid. NSR on the monitor.

## 2020-10-21 NOTE — PLAN OF CARE
Mr Soria is here with abd pain and nausea. He lives at the [facility] and plans to return there at discharge. He has a history of dementia and sera collectomy in May 2020 for colon cancer. Post acute care needs to be provided by the [place].

## 2020-10-21 NOTE — ASSESSMENT & PLAN NOTE
NGT attempts unsuccessful  NPO  NO N/V this am>> start clear liquids  General surgery consulted  Repeat KUB this am  VSS/afebrile  PT to ambulate today and start clears  Echo shows 65% EF with diastolic dysfunction, Will start minimal IVF till able to tolerate po      D/c fluids as he is tolerating diet and had 2 BM yesterday Repeat KUB today and plan to d/c back to nursing home

## 2020-10-22 VITALS
HEIGHT: 71 IN | WEIGHT: 204.56 LBS | SYSTOLIC BLOOD PRESSURE: 128 MMHG | RESPIRATION RATE: 20 BRPM | BODY MASS INDEX: 28.64 KG/M2 | TEMPERATURE: 99 F | DIASTOLIC BLOOD PRESSURE: 67 MMHG | OXYGEN SATURATION: 91 % | HEART RATE: 56 BPM

## 2020-10-22 PROCEDURE — 99231 SBSQ HOSP IP/OBS SF/LOW 25: CPT | Mod: ,,, | Performed by: SURGERY

## 2020-10-22 PROCEDURE — 25000003 PHARM REV CODE 250: Performed by: NURSE PRACTITIONER

## 2020-10-22 PROCEDURE — 63600175 PHARM REV CODE 636 W HCPCS: Performed by: NURSE PRACTITIONER

## 2020-10-22 PROCEDURE — C9113 INJ PANTOPRAZOLE SODIUM, VIA: HCPCS | Performed by: NURSE PRACTITIONER

## 2020-10-22 PROCEDURE — 99232 PR SUBSEQUENT HOSPITAL CARE,LEVL II: ICD-10-PCS | Mod: ,,, | Performed by: INTERNAL MEDICINE

## 2020-10-22 PROCEDURE — 99232 SBSQ HOSP IP/OBS MODERATE 35: CPT | Mod: ,,, | Performed by: INTERNAL MEDICINE

## 2020-10-22 PROCEDURE — 99231 PR SUBSEQUENT HOSPITAL CARE,LEVL I: ICD-10-PCS | Mod: ,,, | Performed by: SURGERY

## 2020-10-22 RX ADMIN — ASPIRIN 81 MG: 81 TABLET, COATED ORAL at 08:10

## 2020-10-22 RX ADMIN — FLUOXETINE HYDROCHLORIDE 20 MG: 20 CAPSULE ORAL at 08:10

## 2020-10-22 RX ADMIN — TAMSULOSIN HYDROCHLORIDE 0.4 MG: 0.4 CAPSULE ORAL at 08:10

## 2020-10-22 RX ADMIN — DORZOLAMIDE HYDROCHLORIDE 1 DROP: 20 SOLUTION/ DROPS OPHTHALMIC at 08:10

## 2020-10-22 RX ADMIN — PHENYTOIN SODIUM 100 MG: 100 CAPSULE ORAL at 08:10

## 2020-10-22 RX ADMIN — PANTOPRAZOLE SODIUM 40 MG: 40 INJECTION, POWDER, LYOPHILIZED, FOR SOLUTION INTRAVENOUS at 08:10

## 2020-10-22 NOTE — PT/OT/SLP DISCHARGE
Physical Therapy Discharge Summary    Name: Anjel Soria  MRN: [id number]   Principal Problem: SBO (small bowel obstruction)     Patient Discharged from acute Physical Therapy on 10/22/20.  Please refer to prior PT noted date on 10/21/20 for functional status.     Assessment:     Patient was discharged unexpectedly.  Information required to complete an accurate discharge summary is unknown.  Refer to therapy initial evaluation and last progress note for initial and most recent functional status and goal achievement.  Recommendations made may be found in medical record.    Objective:     GOALS:   Multidisciplinary Problems     Physical Therapy Goals        Problem: Physical Therapy Goal    Goal Priority Disciplines Outcome Goal Variances Interventions   Physical Therapy Goal     PT, PT/OT      Description: Goals to be met by: 2 visits    Patient will increase functional independence with mobility by performin. Supine to sit with Supervision or Set-up Assistance  2. Sit to supine with Supervision or Set-up Assistance  3. Bed to chair transfer with Supervision or Set-up Assistancewith or without rolling walker using Step Transfer TECHNIQUE  4. Gait  x ~150  feet with Supervision or Set-up Assistance with or without rolling walker  5. Lower extremity exercise program x10 reps per handout, with assistance as needed                      Reasons for Discontinuation of Therapy Services  Transfer to alternate level of care.      Plan:     Patient Discharged to: [facility] for long term care..    Emigdio Brooks, PT  10/22/2020

## 2020-10-22 NOTE — PROGRESS NOTES
Nursing Notes  Bedside hand off report received from KENNY Nicholson. Pt lying in bed. No subjective complaints. Pt denies any n/v. Pt with BM overnight. Pt abdomen continues to be firm and rounded. Bowel sounds present x4. Jimenez in reach for pt to ring   when he needs something. Pts room door left open. Will continue to monitor.       Huddle Comments

## 2020-10-22 NOTE — PLAN OF CARE
10/22/20 1105   Final Note   Assessment Type Final Discharge Note   Anticipated Discharge Disposition Home   What phone number can be called within the next 1-3 days to see how you are doing after discharge? [phone]   Hospital Follow Up  Appt(s) scheduled? Yes   Discharge plans and expectations educations in teach back method with documentation complete? Yes   Post-Acute Status   Post-Acute Authorization Placement   Post-Acute Placement Status Set-up Complete   Discharge Delays None known at this time       Patient discharging back to [facility] where he is a long-term resident prior to this admit.     Aishwarya Cazares, MAI

## 2020-10-22 NOTE — PROGRESS NOTES
The patient is much improved.  He has no complaints of any abdominal discomfort.  Tolerating liquids.  NG tube is already out.  Abdomen is soft but distended patient says is clinically like this.  Large midline scar noted.  Nontender.  Impression is ileus resolving.  Plan advance diet and discharge.  No need for follow-up with surgery.

## 2020-10-22 NOTE — PLAN OF CARE
Mr Soria will discharge to the [facility]. He will use their van transportation.

## 2020-10-22 NOTE — NURSING
Report called to [place] at [facility]. Updated on plan of care. All questions answered. [facility] will provide pt transport in wheelchair van. Will continue to monitor pt until d/c.

## 2020-10-22 NOTE — PROGRESS NOTES
Nursing Notes  Bedside report received from KENNY Miguel. Patient AAOx4. Denies pain. Denies N/V. POC discussed. Asked to call for assistance.       Huddle Comments

## 2020-10-22 NOTE — PLAN OF CARE
10/22/20 0906   Medicare Message   Important Message from Medicare regarding Discharge Appeal Rights Given to patient/caregiver;Explained to patient/caregiver;Signed/date by patient/caregiver   Date IMM was signed 10/22/20   Time IMM was signed 0855     Signed for discharge

## 2020-10-22 NOTE — PT/OT/SLP PROGRESS
"Physical Therapy      Patient Name:  Anjel Soria   MRN:  [id number]    Patient not seen today secondary to Patient unwilling to participate(Patient refused to do Physical Therapy tx today. Patient stated, " I'm ready to go back to [facility] today".)    Emigdio Brooks, PT    "

## 2020-10-22 NOTE — DISCHARGE INSTRUCTIONS
Constipation (Adult)  Constipation means that you have bowel movements that are less frequent than usual. Stools often become very hard and difficult to pass.  Constipation is very common. At some point in life it affects almost everyone. Since everyone's bowel habits are different, what is constipation to one person may not be to another. Your healthcare provider may do tests to diagnose constipation. It depends on what he or she finds when evaluating you.    Symptoms of constipation include:  · Abdominal pain  · Bloating  · Vomiting  · Painful bowel movements  · Itching, swelling, bleeding, or pain around the anus  Causes  Constipation can have many causes. These include:  · Diet low in fiber  · Too much dairy  · Not drinking enough liquids  · Lack of exercise or physical activity. This is especially true for older adults.  · Changes in lifestyle or daily routine, including pregnancy, aging, work, and travel  · Frequent use or misuse of laxatives  · Ignoring the urge to have a bowel movement or delaying it until later  · Medicines, such as certain prescription pain medicines, iron supplements, antacids, certain antidepressants, and calcium supplements  · Diseases like irritable bowel syndrome, bowel obstructions, stroke, diabetes, thyroid disease, Parkinson disease, hemorrhoids, and colon cancer  Complications  Potential complications of constipation can include:  · Hemorrhoids  · Rectal bleeding from hemorrhoids or anal fissures (skin tears)  · Hernias  · Dependency on laxatives  · Chronic constipation  · Fecal impaction  · Bowel obstruction or perforation  Home care  All treatment should be done after talking with your healthcare provider. This is especially true if you have another medical problems, are taking prescription medicines, or are an older adult. Treatment most often involves lifestyle changes. You may also need medicines. Your healthcare provider will tell you which will work best for you. Follow  the advice below to help avoid this problem in the future.  Lifestyle changes  These lifestyle changes can help prevent constipation:  · Diet. Eat a high-fiber diet, with fresh fruit and vegetables, and reduce dairy intake, meats, and processed foods  · Fluids. It's important to get enough fluids each day. Drink plenty of water when you eat more fiber. If you are on diet that limits the amount of fluid you can have, talk about this with your healthcare provider.  · Regular exercise. Check with your healthcare provider first.  Medications  Take any medicines as directed. Some laxatives are safe to use only every now and then. Others can be taken on a regular basis. Talk with your doctor or pharmacist if you have questions.  Prescription pain medicines can cause constipation. If you are taking this kind of medicine, ask your healthcare provider if you should also take a stool softener.  Medicines you may take to treat constipation include:  · Fiber supplements  · Stool softeners  · Laxatives  · Enemas  · Rectal suppositories  Follow-up care  Follow up with your healthcare provider if symptoms don't get better in the next few days. You may need to have more tests or see a specialist.  Call 911  Call 911 if any of these occur:  · Trouble breathing  · Stiff, rigid abdomen that is severely painful to touch  · Confusion  · Fainting or loss of consciousness  · Rapid heart rate  · Chest pain  When to seek medical advice  Call your healthcare provider right away if any of these occur:  · Fever over 100.4°F (38°C)  · Failure to resume normal bowel movements  · Pain in your abdomen or back gets worse  · Nausea or vomiting  · Swelling in your abdomen  · Blood in the stool  · Black, tarry stool  · Involuntary weight loss  · Weakness  Date Last Reviewed: 12/30/2015  © 4410-7248 [employer]. [address] [place] [address], [place] [address]. All rights reserved. This information is not intended as a substitute for  professional medical care. Always follow your healthcare professional's instructions.        Ileus     The digestive tract.   Ileus occurs when there is a problem with motility in the stomach and small or large intestine (bowel). Motility is the movement of food and waste through the digestive tract. Ileus is not caused by a physical blockage (obstruction).    Normally, muscles in the bowel walls contract to move waste along. Signals from nerves tell the muscles when to contract. With ileus, this movement slows down or stops completely. As a result, waste cant move through the bowels and out of the body. This can cause belly (abdominal) pain and other symptoms. Treatment is needed to restore normal movement and ease symptoms.  Causes of ileus  Ileus can be caused by the following:  · Abdominal surgery  · Abdominal infection  · Injury to blood vessels that supply blood to the abdomen  · Low levels of sodium or potassium (electrolyte imbalance)  · Certain medicines, such as opioid pain medicines  · Certain kidney or lung diseases  · Certain health problems, such as cystic fibrosis and diabetes   Symptoms of ileus  Common symptoms of ileus include:  · Belly swelling or bloating  · Upset stomach (nausea) and vomiting  · Belly cramps  · Constipation or diarrhea   · Loss of appetite  · Not able to keep food down  · Not able to pass stool or gas  Diagnosing ileus  Your healthcare provider will ask about your symptoms and health history. Youll also have a physical exam. If your provider thinks you may have ileus, tests may be done to confirm the problem. These can include:  · Imaging tests. These provide pictures of the bowels. Common tests include X-rays and a CT scan.  · Blood tests. These are done to c heck for infection and other problems, such as fluid loss (dehydration).  · Upper GI (gastrointestinal) series. This test takes X-rays of the upper digestive tract, from the mouth to the small intestine. An X-ray dye  (contrast fluid) is used. The dye coats the inside of the upper digestive tract so that it will show up clearly on X-rays.  Treating ileus  In most cases, ileus goes away by itself when the main cause clears up. The goal is to manage symptoms until movement in the digestive tract returns to normal. Treatment takes place in a hospital. As part of your care, the following may be done:  · No food or drink is given by mouth. This allows your bowels to rest.  · An IV (intravenous) line is placed in a vein in your arm or hand. The IV line is used to give fluids and nutrition. It may also be used to give medicines. These may be needed to improve movement in your digestive tract, or to ease pain. They may also be needed to treat any underlying infections or conditions you have.  · A soft, thin, flexible tube (nasogastric tube) is inserted through your nose and into your stomach. The tube is used to remove extra gas and fluid in your stomach and bowels. This helps to ease symptoms such as pain and swelling.  · Youll be watched closely in the hospital until your symptoms get better. Your provider will tell you when youre well enough to go home. This is usually within a few days.  · In rare cases, problems may occur. Other treatments, such as surgery, may then be done. Your provider will tell you more about other treatments, if needed.  Long-term concerns   After treatment, most people recover completely. In some cases, you may need to see your provider for a follow-up appointment.  When to call your provider  Call your healthcare provider right away if you have any of the following:  · Fever of 100.4°F (38°C) or higher, or as advised by your provider  · Belly swelling or pain that wont go away  · Not able to pass stool or gas  · Nausea and vomiting  · Getting full very easily with only small amounts of food or drink  · Bleeding from the rectum  · Black, tarry stool   Date Last Reviewed: 7/1/2016  © 2567-2043 [employer]. [address] [place] [address], [place] [address]. All rights reserved. This information is not intended as a substitute for professional medical care. Always follow your healthcare professional's instructions.        Small Bowel Obstruction     Small bowel obstruction can lead to tissue damage and even tissue death.   A small bowel obstruction occurs when part or all of the small intestine (bowel) is blocked. As a result, digestive contents cant move through the bowel properly and out of the body. Treatment is needed right away to remove the blockage. This can ease painful symptoms. It can also prevent serious problems, such as tissue death or bursting (rupture) of the small bowel. Without treatment, a small bowel obstruction can be fatal.  Causes of small bowel obstruction  A small bowel obstruction can be caused by:  · Scar tissue (adhesions). These may form after belly (abdominal) surgery or an infection.  · Hernia. A hernia is when an organ pushes through a weak spot or tear in the abdomen wall. Part of the small bowel can push out and be seen as a bulge under the belly. Hernias can also occur internally.  · Certain health problems. These include when part of the bowel slides inside another part (intussusception). Other causes include irritable bowel disease such as Crohns disease, and inflammation and sores in the intestine (ulcerative colitis).  · Abnormal tissue growths (tumors). These can form on the inside or outside of the small bowel. They are usually due to cancer.  Symptoms of small bowel obstruction  Common symptoms include:  · Belly cramping and pain  · Belly swelling and bloating  · Upset stomach (nausea) and vomiting  · Can't  pass gas  · Can't pass stool (constipation)  · Diarrhea  Diagnosing small bowel obstruction  Your provider will ask about your symptoms and health history. Youll also have a physical exam. Tests may also be done to confirm the problem. These can include:  · Imaging  tests. These provide pictures of the small bowel. Common tests include X-rays and a CT scan.  · Blood tests. These check for infection and other problems, such as excess fluid loss (dehydration).  · Upper GI (gastrointestinal) series with a small bowel follow-through. This test takes X-rays of the upper digestive tract from the mouth through the small bowel. An X-ray dye (contrast fluid) is used. The dye coats the inside of your upper digestive tract so it will show up clearly on X-rays.  Treating small bowel obstruction  Treatment takes place in a hospital. As part of your care, the following may be done:  · No food or drink is given by mouth. This allows your bowels to rest.  · An IV (intravenous) line is placed in a vein in your arm or hand. The IV line is used to give fluids. It may also be used to give medicines. These may be needed to ease pain, nausea, and other symptoms. They may also be needed to treat or prevent infections.  · A soft, thin, flexible tube (nasogastric tube) is inserted through your nose and into your stomach. The tube is used to remove extra gas and fluid in your stomach and bowels. This helps to ease symptoms such as pain and swelling.  · In severe cases, surgery is done. This may be needed if the small bowel is almost or totally blocked, or there is a hole in the bowel (bowel perforation). During surgery, the blockage is removed. Parts of the bowel may also be removed if there is tissue death. Other repair may be done as well, depending on what caused the blockage. Your healthcare provider will give you more information about surgery, if needed.  · Youll be watched closely in the hospital until your symptoms improve. Your provider will tell you when you can go home.  Long-term concerns   After treatment, most people recover with no lasting effects. If a long part of the bowel is removed, there is a greater chance for lifelong digestive problems. Bowel movements may become irregular. Work  with your provider to learn the best ways to manage any symptoms you may have, and to protect your health.  When to call your healthcare provider  Call your provider right away if you have any of the following:  · Severe pain (Call 911)  · Belly swelling or cramping that wont go away  · Cant pass stool or gas  · Nausea or vomiting (especially if the vomit looks or smells like stool)   Date Last Reviewed: 7/1/2016  © 0517-4782 [employer]. [address] [place] [address], [place] [address]. All rights reserved. This information is not intended as a substitute for professional medical care. Always follow your healthcare professional's instructions.

## 2020-10-22 NOTE — SUBJECTIVE & OBJECTIVE
Review of Systems   Constitutional: Negative for activity change, fatigue, fever and unexpected weight change.   HENT: Negative for congestion, ear pain, hearing loss, rhinorrhea and sore throat.    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 Negative for redness.   Respiratory: Negative for cough, shortness of breath and wheezing.    Cardiovascular: Negative for chest pain, palpitations and leg swelling.   Gastrointestinal: Negative for abdominal distention, abdominal pain, constipation, diarrhea, nausea and vomiting.   Genitourinary: Negative for decreased urine volume, dysuria, frequency and urgency.   Musculoskeletal: Negative for back pain, joint swelling and neck pain.   Skin: Negative for color change, rash and wound.   Neurological: Negative for dizziness, tremors, weakness, light-headedness and headaches.     Objective:     Vital Signs (Most Recent):  Temp: 98.1 °F (36.7 °C) (10/22/20 0428)  Pulse: (!) 57 (10/22/20 0600)  Resp: 18 (10/22/20 0428)  BP: (!) 108/55 (10/22/20 0428)  SpO2: 98 % (10/22/20 0428) Vital Signs (24h Range):  Temp:  [97.7 °F (36.5 °C)-99 °F (37.2 °C)] 98.1 °F (36.7 °C)  Pulse:  [51-60] 57  Resp:  [15-19] 18  SpO2:  [93 %-98 %] 98 %  BP: (108-127)/(55-68) 108/55     Weight: 92.8 kg (204 lb 9.4 oz)  Body mass index is 28.53 kg/m².    Physical Exam  Vitals signs and nursing note reviewed.   Constitutional:       General: He is not in acute distress.     Appearance: He is well-developed.   HENT:      Head: Normocephalic and atraumatic.      Right Ear: External ear normal.      Left Ear: External ear normal.      Nose: Nose normal.      Mouth/Throat:      Mouth: Mucous membranes are moist.      Pharynx: Oropharynx is clear.   Eyes:      General:         Right eye: No discharge.         Left eye: No discharge.      Extraocular Movements: Extraocular movements intact.      Conjunctiva/sclera: Conjunctivae normal.      Pupils: Pupils are equal, round, and reactive to  light.   Neck:      Musculoskeletal: Neck supple.      Thyroid: No thyromegaly.   Cardiovascular:      Rate and Rhythm: Normal rate and regular rhythm.      Heart sounds: No murmur.   Pulmonary:      Effort: Pulmonary effort is normal. No respiratory distress.      Breath sounds: Normal breath sounds. No wheezing or rales.   Abdominal:      General: Bowel sounds are normal. There is distension (mild gaseous).      Palpations: Abdomen is soft.      Tenderness: There is no abdominal tenderness. There is no guarding or rebound.   Lymphadenopathy:      Cervical: No cervical adenopathy.   Skin:     General: Skin is warm and dry.   Neurological:      Mental Status: He is alert and oriented to person, place, and time.      Cranial Nerves: No cranial nerve deficit.   Psychiatric:         Behavior: Behavior normal.           CRANIAL NERVES     CN III, IV, VI   Pupils are equal, round, and reactive to light.       Significant Labs:   CBC:   Recent Labs   Lab 10/20/20  1359 10/21/20  0545   WBC 9.03 7.42   HGB 15.5 14.1   HCT 48.3 43.5    202     CMP:   Recent Labs   Lab 10/20/20  1359 10/21/20  0545    138   K 4.0 4.2    105   CO2 26 26    86   BUN 9 7*   CREATININE 0.9 0.8   CALCIUM 9.0 8.4*   PROT 7.4 6.5   ALBUMIN 3.5 3.0*   BILITOT 0.2 0.4   ALKPHOS 95 82   AST 17 14   ALT 14 12   ANIONGAP 8 7*   EGFRNONAA >60 >60     Lipase:   Recent Labs   Lab 10/20/20  1359   LIPASE 58     covid screen negative    Significant Imaging:     10/20 CT abd and pelvis Small hiatal hernia.  Small duodenal diverticulum along the 3/4 portion duodenum.  Dilated small bowel loops are seen throughout the mid and lower abdomen with transition within the pelvis concerning for small bowel obstruction thickening of the GE junction could reflect reflux disease.  Consider endoscopy.  No evidence of bowel obstruction or inflammation.  Scattered diverticulosis within the descending and sigmoid colon.  Patient appears to be status  post right partial hemicolectomy.    10/21 KUB Imaging findings concerning for a ileus versus a partial small bowel obstruction.       10/22 KUB

## 2020-10-22 NOTE — DISCHARGE SUMMARY
[facility] Hospital Medicine  Discharge Summary      Patient Name: Anjel Soria  MRN: [id number]  Admission Date: 10/20/2020  Hospital Length of Stay: 1 days  Discharge Date and Time:  10/22/2020 10:32 AM  Attending Physician: Eren Nava MD   Discharging Provider: Venita Hall NP  Primary Care Provider: [facility]      HPI:   76yo male patient with hx of CVA, CAD, depression/bipolar, HTN, HLD, macular degeneration, prostate ca, sz d/o, and dementia lives at [facility] home and brought to ER for eval of abd distention and pain.  Reports vomiting at nursing home. + nausea. Patient states he had a normal bowel movement today.  Patient's past surgical history significant for right hemicolectomy due to colon cancer.  Colon surgery was in May of 2020. Patient suffers from dementia and history is very limited. He had ct in ER that was suspicious for SBO. General surgery was consulted. NGT attempts were unsuccessful. NPO. No c/o n/v. Repeat films ordered for this am of abd. Has contrast material in colon this am from CT last night. Still with dilated loops concerning for partial small bowel obstruction /ileus. He denies any pain this am. Denies nausea this am. Reports LBM was yesterday loose.  + gas         * No surgery found *      Hospital Course:   Pt did well with lilquids, Progressed to reg diet last night. Did well. NO Abd pain, N/V. 2 BM yesterday. VSS/afebrile. KUB pending this am      Consults:   Consults (From admission, onward)        Status Ordering Provider     Inpatient consult to General surgery  Once     Provider:  Tre Calderon MD    Acknowledged PALLAVI SEGOVIA          * SBO (small bowel obstruction)  NGT attempts unsuccessful  NPO  NO N/V this am>> start clear liquids  General surgery consulted  Repeat KUB this am  VSS/afebrile  PT to ambulate today and start clears  Echo shows 65% EF with diastolic dysfunction, Will start minimal IVF till  able to tolerate po      D/c fluids as he is tolerating diet and had 2 BM yesterday Repeat KUB today and plan to d/c back to nursing home    Bradycardia  Noted HR 53-59  Cont telemetry  Monitor electrolytes      Gastrointestinal hemorrhage with melena  protonix iv  Can resume po PPI on d/c     Essential hypertension  diovan imdur and coreg held while npo  bp 125/66 this am   Was not taking at home    VSS without meds- cont without 108/55 hr 57    Hyperlipidemia  Statin and asa on hold while NPO  Resumed    Benign prostatic hyperplasia  flomax on hold- resume when taking po   Resumed yesterday    Depression  prozac  on hold while NPO- resume once taking po  Tolerated po yesterday- cont    Coronary artery disease involving native coronary artery of native heart without angina pectoris  Asa, lipitor      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  Resume home meds dilantin and depakote as soon as possible . 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  Not on home meds        Final Active Diagnoses:    Diagnosis Date Noted POA    PRINCIPAL PROBLEM:  SBO (small bowel obstruction) [K56.609] 10/20/2020 Yes    Hyperlipidemia [E78.5] 05/21/2020 Yes    Essential hypertension [I10] 05/21/2020 Yes    Gastrointestinal hemorrhage with melena [K92.1] 05/21/2020 Yes    Bradycardia [R00.1] 05/21/2020 Yes    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 [I69.90] 05/09/2016 Not Applicable    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 [I69.398, G40.909] 05/09/2016 Not Applicable    Coronary artery disease involving native coronary artery of native heart without angina pectoris [I25.10] 05/09/2016 Yes    Depression [F32.9] 05/09/2016 Yes    Benign prostatic hyperplasia [N40.0]  05/09/2016 Yes    End-stage glaucoma [H40.9] 04/20/2015 Yes    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    Contact information:  [address] [address]  [place] [address]  [phone]                 Patient Instructions:   No discharge procedures on file.    Significant Diagnostic Studies:     Significant Labs:   CBC:        Recent Labs   Lab 10/20/20  1359 10/21/20  0545   WBC 9.03 7.42   HGB 15.5 14.1   HCT 48.3 43.5    202      CMP:        Recent Labs   Lab 10/20/20  1359 10/21/20  0545    138   K 4.0 4.2    105   CO2 26 26    86   BUN 9 7*   CREATININE 0.9 0.8   CALCIUM 9.0 8.4*   PROT 7.4 6.5   ALBUMIN 3.5 3.0*   BILITOT 0.2 0.4   ALKPHOS 95 82   AST 17 14   ALT 14 12   ANIONGAP 8 7*   EGFRNONAA >60 >60      Lipase:       Recent Labs   Lab 10/20/20  1359   LIPASE 58      covid screen negative     Significant Imaging:      10/20 CT abd and pelvis Small hiatal hernia.  Small duodenal diverticulum along the 3/4 portion duodenum.  Dilated small bowel loops are seen throughout the mid and lower abdomen with transition within the pelvis concerning for small bowel obstruction thickening of the GE junction could reflect reflux disease.  Consider endoscopy.  No evidence of bowel obstruction or inflammation.  Scattered diverticulosis within the descending and sigmoid colon.  Patient appears to be status post right partial hemicolectomy.     10/21 KUB Imaging findings concerning for a ileus versus a partial small bowel obstruction.              Pending Diagnostic Studies:     Procedure Component Value Units Date/Time    X-Ray Abdomen Flat And Erect [[id number]]     Order Status: Sent Lab Status: No result          Medications:  Reconciled Home Medications:      Medication  List      CONTINUE taking these medications    aspirin 81 MG EC tablet  Commonly known as: ECOTRIN  Take 1 tablet (81 mg total) by mouth once daily.     atorvastatin 20 MG tablet  Commonly known as: LIPITOR  Take 40 mg by mouth every evening.     divalproex 250 MG EC tablet  Commonly known as: DEPAKOTE  Take 500 mg by mouth nightly.     dorzolamide 2 % ophthalmic solution  Commonly known as: TRUSOPT  Place 1 drop into both eyes 2 (two) times daily.     FLUoxetine 20 MG capsule  Take 20 mg by mouth once daily.     pantoprazole 40 MG tablet  Commonly known as: PROTONIX  Take 40 mg by mouth once daily.     phenytoin 100 MG ER capsule  Commonly known as: DILANTIN  Take 100 mg by mouth 2 (two) times daily.     tamsulosin 0.4 mg Cap  Commonly known as: FLOMAX  Take 1 capsule by mouth Daily.     TRAVATAN Z 0.004 % ophthalmic solution  Generic drug: travoprost  Place 1 drop into both eyes every evening.     VITAMIN D3 50 mcg (2,000 unit) Cap  Generic drug: cholecalciferol (vitamin D3)  Take 1 capsule by mouth once daily.        STOP taking these medications    aluminum-magnesium hydroxide-simethicone 200-200-20 mg/5 mL Susp  Commonly known as: MAALOX     brimonidine 0.2% 0.2 % Drop  Commonly known as: ALPHAGAN     carvediloL 3.125 MG tablet  Commonly known as: COREG     clopidogreL 75 mg tablet  Commonly known as: PLAVIX     COMBIGAN 0.2-0.5 % Drop  Generic drug: brimonidine-timoloL     donepeziL 10 MG tablet  Commonly known as: ARICEPT     dorzolamide-timolol 2-0.5% 22.3-6.8 mg/mL ophthalmic solution  Commonly known as: COSOPT     gabapentin 300 MG capsule  Commonly known as: NEURONTIN     isosorbide mononitrate 60 MG 24 hr tablet  Commonly known as: IMDUR     LASTACAFT 0.25 % Drop  Generic drug: alcaftadine     levoFLOXacin 500 MG tablet  Commonly known as: LEVAQUIN     MYRBETRIQ 25 mg Tb24 ER tablet  Generic drug: mirabegron     potassium chloride SA 10 MEQ tablet  Commonly known as: K-DUR,KLOR-CON     pravastatin 20 MG  tablet  Commonly known as: PRAVACHOL     risperiDONE 3 MG Tab  Commonly known as: RISPERDAL     sucralfate 1 gram tablet  Commonly known as: CARAFATE     traZODone 100 MG tablet  Commonly known as: DESYREL     valsartan 80 MG tablet  Commonly known as: DIOVAN            Indwelling Lines/Drains at time of discharge:   Lines/Drains/Airways     None                 Time spent on the discharge of patient: 20 minutes  Patient was seen and examined on the date of discharge and determined to be suitable for discharge.         Venita Hall NP  Department of Hospital Medicine  [facility]

## 2020-10-22 NOTE — PROGRESS NOTES
[facility] Medicine  Progress Note    Patient Name: Anjel Soria  MRN: [id number]  Patient Class: IP- Inpatient   Admission Date: 10/20/2020  Length of Stay: 1 days  Attending Physician: Eren Nava MD  Primary Care Provider: [facility]        Subjective:     Principal Problem:SBO (small bowel obstruction)        HPI:  74yo male patient with hx of CVA, CAD, depression/bipolar, HTN, HLD, macular degeneration, prostate ca, sz d/o, and dementia lives at [facility] and brought to ER for eval of abd distention and pain.  Reports vomiting at nursing home. + nausea. Patient states he had a normal bowel movement today.  Patient's past surgical history significant for right hemicolectomy due to colon cancer.  Colon surgery was in May of 2020. Patient suffers from dementia and history is very limited. He had ct in ER that was suspicious for SBO. General surgery was consulted. NGT attempts were unsuccessful. NPO. No c/o n/v. Repeat films ordered for this am of abd. Has contrast material in colon this am from CT last night. Still with dilated loops concerning for partial small bowel obstruction /ileus. He denies any pain this am. Denies nausea this am. Reports LBM was yesterday loose.  + gas         Overview/Hospital Course:  Pt did well with lilquids, Progressed to reg diet last night. Did well. NO Abd pain, N/V. 2 BM yesterday. VSS/afebrile. KUB pending this am       Review of Systems   Constitutional: Negative for activity change, fatigue, fever and unexpected weight change.   HENT: Negative for congestion, ear pain, hearing loss, rhinorrhea and sore throat.    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 Negative for redness.   Respiratory: Negative for cough, shortness of breath and wheezing.    Cardiovascular: Negative for chest pain, palpitations and leg swelling.   Gastrointestinal: Negative for abdominal distention, abdominal pain,  constipation, diarrhea, nausea and vomiting.   Genitourinary: Negative for decreased urine volume, dysuria, frequency and urgency.   Musculoskeletal: Negative for back pain, joint swelling and neck pain.   Skin: Negative for color change, rash and wound.   Neurological: Negative for dizziness, tremors, weakness, light-headedness and headaches.     Objective:     Vital Signs (Most Recent):  Temp: 98.1 °F (36.7 °C) (10/22/20 0428)  Pulse: (!) 57 (10/22/20 0600)  Resp: 18 (10/22/20 0428)  BP: (!) 108/55 (10/22/20 0428)  SpO2: 98 % (10/22/20 0428) Vital Signs (24h Range):  Temp:  [97.7 °F (36.5 °C)-99 °F (37.2 °C)] 98.1 °F (36.7 °C)  Pulse:  [51-60] 57  Resp:  [15-19] 18  SpO2:  [93 %-98 %] 98 %  BP: (108-127)/(55-68) 108/55     Weight: 92.8 kg (204 lb 9.4 oz)  Body mass index is 28.53 kg/m².    Physical Exam  Vitals signs and nursing note reviewed.   Constitutional:       General: He is not in acute distress.     Appearance: He is well-developed.   HENT:      Head: Normocephalic and atraumatic.      Right Ear: External ear normal.      Left Ear: External ear normal.      Nose: Nose normal.      Mouth/Throat:      Mouth: Mucous membranes are moist.      Pharynx: Oropharynx is clear.   Eyes:      General:         Right eye: No discharge.         Left eye: No discharge.      Extraocular Movements: Extraocular movements intact.      Conjunctiva/sclera: Conjunctivae normal.      Pupils: Pupils are equal, round, and reactive to light.   Neck:      Musculoskeletal: Neck supple.      Thyroid: No thyromegaly.   Cardiovascular:      Rate and Rhythm: Normal rate and regular rhythm.      Heart sounds: No murmur.   Pulmonary:      Effort: Pulmonary effort is normal. No respiratory distress.      Breath sounds: Normal breath sounds. No wheezing or rales.   Abdominal:      General: Bowel sounds are normal. There is distension (mild gaseous).      Palpations: Abdomen is soft.      Tenderness: There is no abdominal tenderness. There is  no guarding or rebound.   Lymphadenopathy:      Cervical: No cervical adenopathy.   Skin:     General: Skin is warm and dry.   Neurological:      Mental Status: He is alert and oriented to person, place, and time.      Cranial Nerves: No cranial nerve deficit.   Psychiatric:         Behavior: Behavior normal.           CRANIAL NERVES     CN III, IV, VI   Pupils are equal, round, and reactive to light.       Significant Labs:   CBC:   Recent Labs   Lab 10/20/20  1359 10/21/20  0545   WBC 9.03 7.42   HGB 15.5 14.1   HCT 48.3 43.5    202     CMP:   Recent Labs   Lab 10/20/20  1359 10/21/20  0545    138   K 4.0 4.2    105   CO2 26 26    86   BUN 9 7*   CREATININE 0.9 0.8   CALCIUM 9.0 8.4*   PROT 7.4 6.5   ALBUMIN 3.5 3.0*   BILITOT 0.2 0.4   ALKPHOS 95 82   AST 17 14   ALT 14 12   ANIONGAP 8 7*   EGFRNONAA >60 >60     Lipase:   Recent Labs   Lab 10/20/20  1359   LIPASE 58     covid screen negative    Significant Imaging:     10/20 CT abd and pelvis Small hiatal hernia.  Small duodenal diverticulum along the 3/4 portion duodenum.  Dilated small bowel loops are seen throughout the mid and lower abdomen with transition within the pelvis concerning for small bowel obstruction thickening of the GE junction could reflect reflux disease.  Consider endoscopy.  No evidence of bowel obstruction or inflammation.  Scattered diverticulosis within the descending and sigmoid colon.  Patient appears to be status post right partial hemicolectomy.    10/21 KUB Imaging findings concerning for a ileus versus a partial small bowel obstruction.       10/22 KUB       Assessment/Plan:      * SBO (small bowel obstruction)  NGT attempts unsuccessful  NPO  NO N/V this am>> start clear liquids  General surgery consulted  Repeat KUB this am  VSS/afebrile  PT to ambulate today and start clears  Echo shows 65% EF with diastolic dysfunction, Will start minimal IVF till able to tolerate po      D/c fluids as he is tolerating  diet and had 2 BM yesterday Repeat KUB today and plan to d/c back to nursing home    Bradycardia  Noted HR 53-59  Cont telemetry  Monitor electrolytes      Gastrointestinal hemorrhage with melena  protonix iv  Can resume po PPI on d/c     Essential hypertension  diovan imdur and coreg held while npo  bp 125/66 this am   Was not taking at home    VSS without meds- cont without 108/55 hr 57    Hyperlipidemia  Statin and asa on hold while NPO  Resumed    Benign prostatic hyperplasia  flomax on hold- resume when taking po   Resumed yesterday    Depression  prozac  on hold while NPO- resume once taking po  Tolerated po yesterday- cont    Coronary artery disease involving native coronary artery of native heart without angina pectoris  Asa, lipitor      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  Resume home meds dilantin and depakote as soon as possible . 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

## 2020-10-22 NOTE — NURSING
Tried to return call to patient's family to answer family questions. Call was forwarded; no answer.

## 2020-10-22 NOTE — HOSPITAL COURSE
Pt did well with lilquids, Progressed to reg diet last night. Did well. NO Abd pain, N/V. 2 BM yesterday. VSS/afebrile. KUB pending this am

## 2020-10-22 NOTE — PLAN OF CARE
Pt remains safe and free of falls. Pt with visual deficits, can only see shadows. VSS. AAOx3. Pt denies any N/V or abdominal pain. Pt tolerating liquids, advanced to reg diet in the evening. Abd xray this am concerning for ileus vs SBO. Pt refused NG tube in ED. Pt with moderate sized brown BM today per PCT. Pt maintained on NS at 75cc/hr. Pt with + BS x4. General surgery consulted. Pt is high risk for surgery. Continue conservative management. Plan of care discussed with pt, verbalized understanding.

## 2020-10-22 NOTE — PLAN OF CARE
Problem: Fall Injury Risk  Goal: Absence of Fall and Fall-Related Injury  Outcome: Ongoing, Progressing     Problem: Adult Inpatient Plan of Care  Goal: Plan of Care Review  Outcome: Ongoing, Progressing  Goal: Patient-Specific Goal (Individualization)  Outcome: Ongoing, Progressing  Goal: Absence of Hospital-Acquired Illness or Injury  Outcome: Ongoing, Progressing  Goal: Optimal Comfort and Wellbeing  Outcome: Ongoing, Progressing  Goal: Readiness for Transition of Care  Outcome: Ongoing, Progressing  Goal: Rounds/Family Conference  Outcome: Ongoing, Progressing     Problem: Skin Injury Risk Increased  Goal: Skin Health and Integrity  Outcome: Ongoing, Progressing     Problem: Fluid Deficit (Intestinal Obstruction)  Goal: Fluid Balance  Outcome: Ongoing, Progressing     Problem: Infection (Intestinal Obstruction)  Goal: Absence of Infection Signs/Symptoms  Outcome: Ongoing, Progressing     Problem: Nausea and Vomiting (Intestinal Obstruction)  Goal: Nausea and Vomiting Relief  Outcome: Ongoing, Progressing     Problem: Pain (Intestinal Obstruction)  Goal: Acceptable Pain Control  Outcome: Ongoing, Progressing     Patient advanced to a regular diet. Tolerated medication with sips of water. Did not eat clear liquid dinner tray. Denies n/v and pain. Active bowel sounds. Loose BM 10/21. Abdomen hard, distended, yet nontender. NaCl fluids infusing @ 75ml/hr.

## 2020-10-22 NOTE — NURSING
Pts daughter, Harriet, updated on plan of care via phone. Notified her that pt is being discharged back to nursing home sometime today. All questions answered.

## 2020-12-08 NOTE — ASSESSMENT & PLAN NOTE
Dear Team Member,      You may return to work with no restrictions.        You must continue to adhere to vigilant use of appropriate personal protective equipment (PPE) while in the workplace.    Cleared to return to work: Off work end date: 12/08/20       Continue to self-monitor for symptoms. If you should develop symptoms, do not report to work, notify your leader, and complete an exposure evaluation tool found  on the [employer] or call the employee COVID hotline at [phone].     Thank you,    [facility] COVID Team    [phone]    Hours: M-F [phone] Saturday - please answer your phone if an outside line is calling, regardless of hours we work 7days a week and will follow up as appropriate    [email or web address]        Cc: Manager       - Per medical record  - GI consulted and EGD with normal stomach, duodenum and noted LE ring  - PPI daily

## 2021-01-20 ENCOUNTER — HOSPITAL ENCOUNTER (EMERGENCY)
Facility: HOSPITAL | Age: 76
Discharge: NURSING, REHAB OR LONG-TERM CARE | End: 2021-01-21
Attending: SURGERY
Payer: MEDICARE

## 2021-01-20 DIAGNOSIS — S09.90XA INJURY OF HEAD, INITIAL ENCOUNTER: Primary | ICD-10-CM

## 2021-01-20 PROCEDURE — 99284 EMERGENCY DEPT VISIT MOD MDM: CPT | Mod: 25

## 2021-01-20 RX ORDER — LISINOPRIL 10 MG/1
10 TABLET ORAL DAILY
COMMUNITY

## 2021-01-21 VITALS
OXYGEN SATURATION: 99 % | DIASTOLIC BLOOD PRESSURE: 78 MMHG | TEMPERATURE: 99 F | SYSTOLIC BLOOD PRESSURE: 146 MMHG | RESPIRATION RATE: 18 BRPM | HEART RATE: 65 BPM

## 2021-07-20 ENCOUNTER — HOSPITAL ENCOUNTER (EMERGENCY)
Facility: HOSPITAL | Age: 76
Discharge: HOME | End: 2021-07-21
Attending: FAMILY MEDICINE
Payer: MEDICARE

## 2021-07-20 DIAGNOSIS — R07.9 CHEST PAIN: ICD-10-CM

## 2021-07-20 DIAGNOSIS — J18.9 PNEUMONIA OF LEFT LOWER LOBE DUE TO INFECTIOUS ORGANISM: Primary | ICD-10-CM

## 2021-07-20 PROCEDURE — 93010 EKG 12-LEAD: ICD-10-PCS | Mod: ,,, | Performed by: INTERNAL MEDICINE

## 2021-07-20 PROCEDURE — U0002 COVID-19 LAB TEST NON-CDC: HCPCS | Performed by: FAMILY MEDICINE

## 2021-07-20 PROCEDURE — 93005 ELECTROCARDIOGRAM TRACING: CPT

## 2021-07-20 PROCEDURE — 96365 THER/PROPH/DIAG IV INF INIT: CPT

## 2021-07-20 PROCEDURE — 93010 ELECTROCARDIOGRAM REPORT: CPT | Mod: ,,, | Performed by: INTERNAL MEDICINE

## 2021-07-20 PROCEDURE — 99285 EMERGENCY DEPT VISIT HI MDM: CPT | Mod: 25

## 2021-07-20 RX ORDER — IBUPROFEN 800 MG/1
800 TABLET ORAL
Status: COMPLETED | OUTPATIENT
Start: 2021-07-21 | End: 2021-07-20

## 2021-07-20 RX ORDER — ACETAMINOPHEN 500 MG
1000 TABLET ORAL
Status: COMPLETED | OUTPATIENT
Start: 2021-07-21 | End: 2021-07-20

## 2021-07-20 RX ADMIN — ACETAMINOPHEN 1000 MG: 500 TABLET ORAL at 11:07

## 2021-07-20 RX ADMIN — IBUPROFEN 800 MG: 800 TABLET ORAL at 11:07

## 2021-07-21 VITALS
BODY MASS INDEX: 30.48 KG/M2 | HEIGHT: 73 IN | SYSTOLIC BLOOD PRESSURE: 116 MMHG | HEART RATE: 58 BPM | OXYGEN SATURATION: 98 % | TEMPERATURE: 101 F | WEIGHT: 230 LBS | DIASTOLIC BLOOD PRESSURE: 79 MMHG | RESPIRATION RATE: 13 BRPM

## 2021-07-21 LAB
ALBUMIN SERPL BCP-MCNC: 3.3 G/DL (ref 3.5–5.2)
ALP SERPL-CCNC: 91 U/L (ref 55–135)
ALT SERPL W/O P-5'-P-CCNC: 15 U/L (ref 10–44)
ANION GAP SERPL CALC-SCNC: 10 MMOL/L (ref 8–16)
AST SERPL-CCNC: 17 U/L (ref 10–40)
BASOPHILS # BLD AUTO: 0.05 K/UL (ref 0–0.2)
BASOPHILS NFR BLD: 0.6 % (ref 0–1.9)
BILIRUB SERPL-MCNC: 0.4 MG/DL (ref 0.1–1)
BNP SERPL-MCNC: 14 PG/ML (ref 0–99)
BUN SERPL-MCNC: 13 MG/DL (ref 8–23)
CALCIUM SERPL-MCNC: 9 MG/DL (ref 8.7–10.5)
CHLORIDE SERPL-SCNC: 99 MMOL/L (ref 95–110)
CO2 SERPL-SCNC: 27 MMOL/L (ref 23–29)
CREAT SERPL-MCNC: 1.1 MG/DL (ref 0.5–1.4)
DIFFERENTIAL METHOD: ABNORMAL
EOSINOPHIL # BLD AUTO: 0.3 K/UL (ref 0–0.5)
EOSINOPHIL NFR BLD: 3.2 % (ref 0–8)
ERYTHROCYTE [DISTWIDTH] IN BLOOD BY AUTOMATED COUNT: 14.6 % (ref 11.5–14.5)
EST. GFR  (AFRICAN AMERICAN): >60 ML/MIN/1.73 M^2
EST. GFR  (NON AFRICAN AMERICAN): >60 ML/MIN/1.73 M^2
GLUCOSE SERPL-MCNC: 123 MG/DL (ref 70–110)
HCT VFR BLD AUTO: 42.2 % (ref 40–54)
HGB BLD-MCNC: 14 G/DL (ref 14–18)
IMM GRANULOCYTES # BLD AUTO: 0.02 K/UL (ref 0–0.04)
IMM GRANULOCYTES NFR BLD AUTO: 0.2 % (ref 0–0.5)
INR PPP: 1.1 (ref 0.8–1.2)
LYMPHOCYTES # BLD AUTO: 1.7 K/UL (ref 1–4.8)
LYMPHOCYTES NFR BLD: 19.9 % (ref 18–48)
MCH RBC QN AUTO: 27 PG (ref 27–31)
MCHC RBC AUTO-ENTMCNC: 33.2 G/DL (ref 32–36)
MCV RBC AUTO: 82 FL (ref 82–98)
MONOCYTES # BLD AUTO: 0.9 K/UL (ref 0.3–1)
MONOCYTES NFR BLD: 11 % (ref 4–15)
NEUTROPHILS # BLD AUTO: 5.5 K/UL (ref 1.8–7.7)
NEUTROPHILS NFR BLD: 65.1 % (ref 38–73)
NRBC BLD-RTO: 0 /100 WBC
PLATELET # BLD AUTO: 156 K/UL (ref 150–450)
PMV BLD AUTO: 8.9 FL (ref 9.2–12.9)
POTASSIUM SERPL-SCNC: 3.9 MMOL/L (ref 3.5–5.1)
PROT SERPL-MCNC: 7.1 G/DL (ref 6–8.4)
PROTHROMBIN TIME: 11.2 SEC (ref 9–12.5)
RBC # BLD AUTO: 5.18 M/UL (ref 4.6–6.2)
SARS-COV-2 RDRP RESP QL NAA+PROBE: NEGATIVE
SODIUM SERPL-SCNC: 136 MMOL/L (ref 136–145)
TROPONIN I SERPL DL<=0.01 NG/ML-MCNC: 0.02 NG/ML (ref 0–0.03)
WBC # BLD AUTO: 8.38 K/UL (ref 3.9–12.7)

## 2021-07-21 PROCEDURE — 84484 ASSAY OF TROPONIN QUANT: CPT | Performed by: FAMILY MEDICINE

## 2021-07-21 PROCEDURE — 83880 ASSAY OF NATRIURETIC PEPTIDE: CPT | Performed by: FAMILY MEDICINE

## 2021-07-21 PROCEDURE — 63700000 PHARM REV CODE 250 ALT 637 W/O HCPCS: Performed by: FAMILY MEDICINE

## 2021-07-21 PROCEDURE — 85610 PROTHROMBIN TIME: CPT | Performed by: FAMILY MEDICINE

## 2021-07-21 PROCEDURE — 63600175 PHARM REV CODE 636 W HCPCS: Performed by: FAMILY MEDICINE

## 2021-07-21 PROCEDURE — 25000003 PHARM REV CODE 250: Performed by: FAMILY MEDICINE

## 2021-07-21 PROCEDURE — 80053 COMPREHEN METABOLIC PANEL: CPT | Performed by: FAMILY MEDICINE

## 2021-07-21 PROCEDURE — 85025 COMPLETE CBC W/AUTO DIFF WBC: CPT | Performed by: FAMILY MEDICINE

## 2021-07-21 RX ORDER — AZITHROMYCIN 250 MG/1
500 TABLET, FILM COATED ORAL
Status: COMPLETED | OUTPATIENT
Start: 2021-07-21 | End: 2021-07-21

## 2021-07-21 RX ORDER — PROPOFOL 10 MG/ML
0-50 INJECTION, EMULSION INTRAVENOUS CONTINUOUS
Status: DISCONTINUED | OUTPATIENT
Start: 2021-07-21 | End: 2021-07-21 | Stop reason: CLARIF

## 2021-07-21 RX ORDER — AZITHROMYCIN 250 MG/1
250 TABLET, FILM COATED ORAL DAILY
Qty: 6 TABLET | Refills: 0 | Status: SHIPPED | OUTPATIENT
Start: 2021-07-21 | End: 2022-01-01

## 2021-07-21 RX ADMIN — CEFTRIAXONE 1 G: 1 INJECTION, SOLUTION INTRAVENOUS at 03:07

## 2021-07-21 RX ADMIN — AZITHROMYCIN MONOHYDRATE 500 MG: 250 TABLET ORAL at 03:07

## 2022-02-16 ENCOUNTER — HOSPITAL ENCOUNTER (OUTPATIENT)
Dept: RADIOLOGY | Facility: HOSPITAL | Age: 77
Discharge: HOME | End: 2022-02-16
Attending: HOSPITALIST
Payer: MEDICARE

## 2022-02-16 DIAGNOSIS — R52 PAIN: ICD-10-CM

## 2022-02-16 PROCEDURE — 72141 MRI CERVICAL SPINE WITHOUT CONTRAST: ICD-10-PCS | Mod: 26,,, | Performed by: RADIOLOGY

## 2022-02-16 PROCEDURE — 72141 MRI NECK SPINE W/O DYE: CPT | Mod: TC

## 2022-02-16 PROCEDURE — 72141 MRI NECK SPINE W/O DYE: CPT | Mod: 26,,, | Performed by: RADIOLOGY

## 2022-03-10 ENCOUNTER — HOSPITAL ENCOUNTER (OUTPATIENT)
Dept: RADIOLOGY | Facility: HOSPITAL | Age: 77
Discharge: HOME | End: 2022-03-10
Attending: INTERNAL MEDICINE
Payer: MEDICARE

## 2022-03-10 DIAGNOSIS — S12.121A OTHER NONDISPLACED DENS FRACTURE, INITIAL ENCOUNTER FOR CLOSED FRACTURE: ICD-10-CM

## 2022-03-10 PROCEDURE — 72125 CT CERVICAL SPINE WITHOUT CONTRAST: ICD-10-PCS | Mod: 26,,, | Performed by: RADIOLOGY

## 2022-03-10 PROCEDURE — 72125 CT NECK SPINE W/O DYE: CPT | Mod: TC

## 2022-03-10 PROCEDURE — 72125 CT NECK SPINE W/O DYE: CPT | Mod: 26,,, | Performed by: RADIOLOGY

## 2023-01-01 ENCOUNTER — HOSPITAL ENCOUNTER (EMERGENCY)
Facility: HOSPITAL | Age: 78
End: 2023-03-24
Attending: EMERGENCY MEDICINE
Payer: MEDICARE

## 2023-01-01 DIAGNOSIS — T17.908A ASPIRATION INTO AIRWAY, INITIAL ENCOUNTER: ICD-10-CM

## 2023-01-01 DIAGNOSIS — K56.609 BOWEL OBSTRUCTION: ICD-10-CM

## 2023-01-01 DIAGNOSIS — I46.9 CARDIAC ARREST: ICD-10-CM

## 2023-01-01 DIAGNOSIS — R06.03 RESPIRATORY DISTRESS: ICD-10-CM

## 2023-01-01 DIAGNOSIS — R11.10 VOMITING: ICD-10-CM

## 2023-01-01 DIAGNOSIS — K92.0 GASTROINTESTINAL HEMORRHAGE WITH HEMATEMESIS: Primary | ICD-10-CM

## 2023-01-01 LAB
ABO + RH BLD: NORMAL
ALBUMIN SERPL BCP-MCNC: 3.8 G/DL (ref 3.5–5.2)
ALP SERPL-CCNC: 103 U/L (ref 55–135)
ALT SERPL W/O P-5'-P-CCNC: 18 U/L (ref 10–44)
ANION GAP SERPL CALC-SCNC: 20 MMOL/L (ref 8–16)
AST SERPL-CCNC: 21 U/L (ref 10–40)
BASOPHILS # BLD AUTO: 0.03 K/UL (ref 0–0.2)
BASOPHILS # BLD AUTO: ABNORMAL K/UL (ref 0–0.2)
BASOPHILS NFR BLD: 0 % (ref 0–1.9)
BASOPHILS NFR BLD: 0.1 % (ref 0–1.9)
BILIRUB SERPL-MCNC: 0.4 MG/DL (ref 0.1–1)
BLD GP AB SCN CELLS X3 SERPL QL: NORMAL
BLD PROD TYP BPU: NORMAL
BLOOD UNIT EXPIRATION DATE: NORMAL
BLOOD UNIT TYPE CODE: 5100
BLOOD UNIT TYPE: NORMAL
BUN SERPL-MCNC: 33 MG/DL (ref 8–23)
CALCIUM SERPL-MCNC: 9.9 MG/DL (ref 8.7–10.5)
CHLORIDE SERPL-SCNC: 95 MMOL/L (ref 95–110)
CK SERPL-CCNC: 124 U/L (ref 20–200)
CO2 SERPL-SCNC: 21 MMOL/L (ref 23–29)
CODING SYSTEM: NORMAL
CREAT SERPL-MCNC: 1.8 MG/DL (ref 0.5–1.4)
CROSSMATCH INTERPRETATION: NORMAL
DIFFERENTIAL METHOD: ABNORMAL
DIFFERENTIAL METHOD: ABNORMAL
DISPENSE STATUS: NORMAL
EOSINOPHIL # BLD AUTO: 0 K/UL (ref 0–0.5)
EOSINOPHIL # BLD AUTO: ABNORMAL K/UL (ref 0–0.5)
EOSINOPHIL NFR BLD: 0 % (ref 0–8)
EOSINOPHIL NFR BLD: 1 % (ref 0–8)
ERYTHROCYTE [DISTWIDTH] IN BLOOD BY AUTOMATED COUNT: 14.8 % (ref 11.5–14.5)
ERYTHROCYTE [DISTWIDTH] IN BLOOD BY AUTOMATED COUNT: 14.8 % (ref 11.5–14.5)
EST. GFR  (NO RACE VARIABLE): 39 ML/MIN/1.73 M^2
GLUCOSE SERPL-MCNC: 212 MG/DL (ref 70–110)
HCT VFR BLD AUTO: 42.8 % (ref 40–54)
HCT VFR BLD AUTO: 49.5 % (ref 40–54)
HGB BLD-MCNC: 12.6 G/DL (ref 14–18)
HGB BLD-MCNC: 15.8 G/DL (ref 14–18)
IMM GRANULOCYTES # BLD AUTO: 0.08 K/UL (ref 0–0.04)
IMM GRANULOCYTES # BLD AUTO: ABNORMAL K/UL (ref 0–0.04)
IMM GRANULOCYTES NFR BLD AUTO: 0.4 % (ref 0–0.5)
IMM GRANULOCYTES NFR BLD AUTO: ABNORMAL % (ref 0–0.5)
LACTATE SERPL-SCNC: 11.8 MMOL/L (ref 0.5–2.2)
LACTATE SERPL-SCNC: >12 MMOL/L (ref 0.5–2.2)
LYMPHOCYTES # BLD AUTO: 1.2 K/UL (ref 1–4.8)
LYMPHOCYTES # BLD AUTO: ABNORMAL K/UL (ref 1–4.8)
LYMPHOCYTES NFR BLD: 29 % (ref 18–48)
LYMPHOCYTES NFR BLD: 5.6 % (ref 18–48)
MCH RBC QN AUTO: 26.6 PG (ref 27–31)
MCH RBC QN AUTO: 27 PG (ref 27–31)
MCHC RBC AUTO-ENTMCNC: 29.4 G/DL (ref 32–36)
MCHC RBC AUTO-ENTMCNC: 31.9 G/DL (ref 32–36)
MCV RBC AUTO: 83 FL (ref 82–98)
MCV RBC AUTO: 92 FL (ref 82–98)
METAMYELOCYTES NFR BLD MANUAL: 6 %
MONOCYTES # BLD AUTO: 1.2 K/UL (ref 0.3–1)
MONOCYTES # BLD AUTO: ABNORMAL K/UL (ref 0.3–1)
MONOCYTES NFR BLD: 5.7 % (ref 4–15)
MONOCYTES NFR BLD: 6 % (ref 4–15)
MYELOCYTES NFR BLD MANUAL: 6 %
NEUTROPHILS # BLD AUTO: 18.9 K/UL (ref 1.8–7.7)
NEUTROPHILS NFR BLD: 35 % (ref 38–73)
NEUTROPHILS NFR BLD: 88.2 % (ref 38–73)
NEUTS BAND NFR BLD MANUAL: 17 %
NRBC BLD-RTO: 0 /100 WBC
NRBC BLD-RTO: 0 /100 WBC
NUM UNITS TRANS PACKED RBC: NORMAL
PLATELET # BLD AUTO: 119 K/UL (ref 150–450)
PLATELET # BLD AUTO: 235 K/UL (ref 150–450)
PLATELET BLD QL SMEAR: ABNORMAL
PMV BLD AUTO: 9.3 FL (ref 9.2–12.9)
PMV BLD AUTO: 9.4 FL (ref 9.2–12.9)
POC PH VENOUS: 7
POTASSIUM SERPL-SCNC: 4 MMOL/L (ref 3.5–5.1)
PROT SERPL-MCNC: 8.7 G/DL (ref 6–8.4)
RBC # BLD AUTO: 4.66 M/UL (ref 4.6–6.2)
RBC # BLD AUTO: 5.94 M/UL (ref 4.6–6.2)
SODIUM SERPL-SCNC: 136 MMOL/L (ref 136–145)
SPECIMEN OUTDATE: NORMAL
TROPONIN I SERPL DL<=0.01 NG/ML-MCNC: 0.02 NG/ML (ref 0–0.03)
TROPONIN I SERPL DL<=0.01 NG/ML-MCNC: <0.006 NG/ML (ref 0–0.03)
WBC # BLD AUTO: 21.4 K/UL (ref 3.9–12.7)
WBC # BLD AUTO: 5.41 K/UL (ref 3.9–12.7)

## 2023-01-01 PROCEDURE — C9113 INJ PANTOPRAZOLE SODIUM, VIA: HCPCS | Performed by: EMERGENCY MEDICINE

## 2023-01-01 PROCEDURE — 84484 ASSAY OF TROPONIN QUANT: CPT | Performed by: EMERGENCY MEDICINE

## 2023-01-01 PROCEDURE — 27100108

## 2023-01-01 PROCEDURE — 25000003 PHARM REV CODE 250: Performed by: EMERGENCY MEDICINE

## 2023-01-01 PROCEDURE — 87040 BLOOD CULTURE FOR BACTERIA: CPT | Mod: 59 | Performed by: EMERGENCY MEDICINE

## 2023-01-01 PROCEDURE — 96367 TX/PROPH/DG ADDL SEQ IV INF: CPT | Mod: 59

## 2023-01-01 PROCEDURE — 94761 N-INVAS EAR/PLS OXIMETRY MLT: CPT

## 2023-01-01 PROCEDURE — 85025 COMPLETE CBC W/AUTO DIFF WBC: CPT | Performed by: EMERGENCY MEDICINE

## 2023-01-01 PROCEDURE — 94002 VENT MGMT INPAT INIT DAY: CPT

## 2023-01-01 PROCEDURE — 83605 ASSAY OF LACTIC ACID: CPT | Performed by: EMERGENCY MEDICINE

## 2023-01-01 PROCEDURE — 93010 ELECTROCARDIOGRAM REPORT: CPT | Mod: ,,, | Performed by: INTERNAL MEDICINE

## 2023-01-01 PROCEDURE — 80053 COMPREHEN METABOLIC PANEL: CPT | Performed by: EMERGENCY MEDICINE

## 2023-01-01 PROCEDURE — 99292 CRITICAL CARE ADDL 30 MIN: CPT | Mod: 59

## 2023-01-01 PROCEDURE — 99291 CRITICAL CARE FIRST HOUR: CPT

## 2023-01-01 PROCEDURE — 36430 TRANSFUSION BLD/BLD COMPNT: CPT | Mod: 59

## 2023-01-01 PROCEDURE — 25000003 PHARM REV CODE 250

## 2023-01-01 PROCEDURE — 85007 BL SMEAR W/DIFF WBC COUNT: CPT | Performed by: EMERGENCY MEDICINE

## 2023-01-01 PROCEDURE — 96368 THER/DIAG CONCURRENT INF: CPT

## 2023-01-01 PROCEDURE — 96375 TX/PRO/DX INJ NEW DRUG ADDON: CPT

## 2023-01-01 PROCEDURE — 86900 BLOOD TYPING SEROLOGIC ABO: CPT | Performed by: EMERGENCY MEDICINE

## 2023-01-01 PROCEDURE — 94003 VENT MGMT INPAT SUBQ DAY: CPT

## 2023-01-01 PROCEDURE — 27200966 HC CLOSED SUCTION SYSTEM

## 2023-01-01 PROCEDURE — 96365 THER/PROPH/DIAG IV INF INIT: CPT | Mod: 59

## 2023-01-01 PROCEDURE — 82550 ASSAY OF CK (CPK): CPT | Performed by: EMERGENCY MEDICINE

## 2023-01-01 PROCEDURE — 99900035 HC TECH TIME PER 15 MIN (STAT)

## 2023-01-01 PROCEDURE — 36415 COLL VENOUS BLD VENIPUNCTURE: CPT | Performed by: EMERGENCY MEDICINE

## 2023-01-01 PROCEDURE — 93005 ELECTROCARDIOGRAM TRACING: CPT

## 2023-01-01 PROCEDURE — 96366 THER/PROPH/DIAG IV INF ADDON: CPT | Mod: 59

## 2023-01-01 PROCEDURE — 31500 INSERT EMERGENCY AIRWAY: CPT | Mod: 59

## 2023-01-01 PROCEDURE — 93010 EKG 12-LEAD: ICD-10-PCS | Mod: ,,, | Performed by: INTERNAL MEDICINE

## 2023-01-01 PROCEDURE — 27000221 HC OXYGEN, UP TO 24 HOURS

## 2023-01-01 PROCEDURE — 99900031 HC PATIENT EDUCATION (STAT)

## 2023-01-01 PROCEDURE — 36556 INSERT NON-TUNNEL CV CATH: CPT | Mod: 59

## 2023-01-01 PROCEDURE — 85027 COMPLETE CBC AUTOMATED: CPT | Performed by: EMERGENCY MEDICINE

## 2023-01-01 PROCEDURE — P9016 RBC LEUKOCYTES REDUCED: HCPCS | Performed by: EMERGENCY MEDICINE

## 2023-01-01 PROCEDURE — 27100080 HC AIRWAY ADAPTER-END TIDAL CO2

## 2023-01-01 PROCEDURE — 63600175 PHARM REV CODE 636 W HCPCS: Performed by: EMERGENCY MEDICINE

## 2023-01-01 PROCEDURE — 86920 COMPATIBILITY TEST SPIN: CPT | Performed by: EMERGENCY MEDICINE

## 2023-01-01 PROCEDURE — 82800 BLOOD PH: CPT | Performed by: EMERGENCY MEDICINE

## 2023-01-01 RX ORDER — CALCIUM GLUCONATE 20 MG/ML
1 INJECTION, SOLUTION INTRAVENOUS
Status: CANCELLED | OUTPATIENT
Start: 2023-01-01

## 2023-01-01 RX ORDER — MAGNESIUM SULFATE HEPTAHYDRATE 40 MG/ML
4 INJECTION, SOLUTION INTRAVENOUS
Status: CANCELLED | OUTPATIENT
Start: 2023-01-01

## 2023-01-01 RX ORDER — CALCIUM GLUCONATE 20 MG/ML
2 INJECTION, SOLUTION INTRAVENOUS
Status: CANCELLED | OUTPATIENT
Start: 2023-01-01

## 2023-01-01 RX ORDER — SUCCINYLCHOLINE CHLORIDE 20 MG/ML
INJECTION INTRAMUSCULAR; INTRAVENOUS
Status: DISCONTINUED
Start: 2023-01-01 | End: 2023-01-01 | Stop reason: WASHOUT

## 2023-01-01 RX ORDER — SODIUM CHLORIDE 9 MG/ML
1000 INJECTION, SOLUTION INTRAVENOUS
Status: COMPLETED | OUTPATIENT
Start: 2023-01-01 | End: 2023-01-01

## 2023-01-01 RX ORDER — ONDANSETRON 2 MG/ML
4 INJECTION INTRAMUSCULAR; INTRAVENOUS
Status: COMPLETED | OUTPATIENT
Start: 2023-01-01 | End: 2023-01-01

## 2023-01-01 RX ORDER — NOREPINEPHRINE BITARTRATE/D5W 4MG/250ML
PLASTIC BAG, INJECTION (ML) INTRAVENOUS
Status: COMPLETED
Start: 2023-01-01 | End: 2023-01-01

## 2023-01-01 RX ORDER — ROCURONIUM BROMIDE 10 MG/ML
INJECTION, SOLUTION INTRAVENOUS
Status: DISCONTINUED
Start: 2023-01-01 | End: 2023-01-01 | Stop reason: HOSPADM

## 2023-01-01 RX ORDER — NOREPINEPHRINE BITARTRATE/D5W 4MG/250ML
0-3 PLASTIC BAG, INJECTION (ML) INTRAVENOUS CONTINUOUS
Status: DISCONTINUED | OUTPATIENT
Start: 2023-01-01 | End: 2023-01-01

## 2023-01-01 RX ORDER — ONDANSETRON 2 MG/ML
4 INJECTION INTRAMUSCULAR; INTRAVENOUS EVERY 8 HOURS PRN
Status: CANCELLED | OUTPATIENT
Start: 2023-01-01

## 2023-01-01 RX ORDER — ETOMIDATE 2 MG/ML
INJECTION INTRAVENOUS
Status: DISCONTINUED
Start: 2023-01-01 | End: 2023-01-01 | Stop reason: WASHOUT

## 2023-01-01 RX ORDER — PROPOFOL 10 MG/ML
INJECTION, EMULSION INTRAVENOUS
Status: DISCONTINUED
Start: 2023-01-01 | End: 2023-01-01 | Stop reason: WASHOUT

## 2023-01-01 RX ORDER — EPINEPHRINE 0.1 MG/ML
INJECTION INTRAVENOUS CODE/TRAUMA/SEDATION MEDICATION
Status: DISCONTINUED | OUTPATIENT
Start: 2023-01-01 | End: 2023-01-01 | Stop reason: HOSPADM

## 2023-01-01 RX ORDER — ROCURONIUM BROMIDE 10 MG/ML
INJECTION, SOLUTION INTRAVENOUS CODE/TRAUMA/SEDATION MEDICATION
Status: DISCONTINUED | OUTPATIENT
Start: 2023-01-01 | End: 2023-01-01 | Stop reason: HOSPADM

## 2023-01-01 RX ORDER — MAGNESIUM SULFATE HEPTAHYDRATE 40 MG/ML
2 INJECTION, SOLUTION INTRAVENOUS
Status: CANCELLED | OUTPATIENT
Start: 2023-01-01

## 2023-01-01 RX ORDER — PROCHLORPERAZINE EDISYLATE 5 MG/ML
5 INJECTION INTRAMUSCULAR; INTRAVENOUS EVERY 6 HOURS PRN
Status: CANCELLED | OUTPATIENT
Start: 2023-01-01

## 2023-01-01 RX ORDER — POTASSIUM CHLORIDE 7.45 MG/ML
40 INJECTION INTRAVENOUS
Status: CANCELLED | OUTPATIENT
Start: 2023-01-01

## 2023-01-01 RX ORDER — MORPHINE SULFATE 2 MG/ML
4 INJECTION, SOLUTION INTRAMUSCULAR; INTRAVENOUS
Status: COMPLETED | OUTPATIENT
Start: 2023-01-01 | End: 2023-01-01

## 2023-01-01 RX ORDER — ONDANSETRON HYDROCHLORIDE 4 MG/5ML
4 SOLUTION ORAL ONCE
Status: DISCONTINUED | OUTPATIENT
Start: 2023-01-01 | End: 2023-01-01

## 2023-01-01 RX ORDER — PANTOPRAZOLE SODIUM 40 MG/10ML
80 INJECTION, POWDER, LYOPHILIZED, FOR SOLUTION INTRAVENOUS
Status: COMPLETED | OUTPATIENT
Start: 2023-01-01 | End: 2023-01-01

## 2023-01-01 RX ORDER — VASOPRESSIN 20 [USP'U]/ML
INJECTION, SOLUTION INTRAMUSCULAR; SUBCUTANEOUS
Status: COMPLETED
Start: 2023-01-01 | End: 2023-01-01

## 2023-01-01 RX ORDER — CALCIUM GLUCONATE 20 MG/ML
3 INJECTION, SOLUTION INTRAVENOUS
Status: CANCELLED | OUTPATIENT
Start: 2023-01-01

## 2023-01-01 RX ORDER — SODIUM CHLORIDE 0.9 % (FLUSH) 0.9 %
10 SYRINGE (ML) INJECTION
Status: CANCELLED | OUTPATIENT
Start: 2023-01-01

## 2023-01-01 RX ORDER — HYDROCODONE BITARTRATE AND ACETAMINOPHEN 500; 5 MG/1; MG/1
TABLET ORAL
Status: DISCONTINUED | OUTPATIENT
Start: 2023-01-01 | End: 2023-01-01 | Stop reason: HOSPADM

## 2023-01-01 RX ADMIN — NOREPINEPHRINE BITARTRATE 1.5 MCG/KG/MIN: 1 INJECTION, SOLUTION, CONCENTRATE INTRAVENOUS at 01:03

## 2023-01-01 RX ADMIN — NOREPINEPHRINE BITARTRATE 0.02 MCG/KG/MIN: 4 INJECTION, SOLUTION INTRAVENOUS at 09:03

## 2023-01-01 RX ADMIN — VANCOMYCIN HYDROCHLORIDE 1500 MG: 1.5 INJECTION, POWDER, LYOPHILIZED, FOR SOLUTION INTRAVENOUS at 12:03

## 2023-01-01 RX ADMIN — MORPHINE SULFATE 4 MG: 2 INJECTION, SOLUTION INTRAMUSCULAR; INTRAVENOUS at 07:03

## 2023-01-01 RX ADMIN — ROCURONIUM BROMIDE 50 MG: 10 INJECTION, SOLUTION INTRAVENOUS at 09:03

## 2023-01-01 RX ADMIN — SODIUM CHLORIDE 1000 ML: 9 INJECTION, SOLUTION INTRAVENOUS at 10:03

## 2023-01-01 RX ADMIN — EPINEPHRINE 1 MG: 0.1 INJECTION INTRACARDIAC; INTRAVENOUS at 09:03

## 2023-01-01 RX ADMIN — PIPERACILLIN AND TAZOBACTAM 4.5 G: 4; .5 INJECTION, POWDER, LYOPHILIZED, FOR SOLUTION INTRAVENOUS; PARENTERAL at 12:03

## 2023-01-01 RX ADMIN — ONDANSETRON HYDROCHLORIDE 4 MG: 2 SOLUTION INTRAMUSCULAR; INTRAVENOUS at 08:03

## 2023-01-01 RX ADMIN — SODIUM CHLORIDE 1000 ML: 9 INJECTION, SOLUTION INTRAVENOUS at 11:03

## 2023-01-01 RX ADMIN — Medication 0.02 MCG/KG/MIN: at 09:03

## 2023-01-01 RX ADMIN — PANTOPRAZOLE SODIUM 8 MG/HR: 40 INJECTION, POWDER, FOR SOLUTION INTRAVENOUS at 10:03

## 2023-01-01 RX ADMIN — PANTOPRAZOLE SODIUM 80 MG: 40 INJECTION, POWDER, FOR SOLUTION INTRAVENOUS at 10:03

## 2023-01-01 RX ADMIN — EPINEPHRINE 0.02 MCG/KG/MIN: 1 INJECTION INTRAMUSCULAR; INTRAVENOUS; SUBCUTANEOUS at 01:03

## 2023-01-01 RX ADMIN — NOREPINEPHRINE BITARTRATE 0.08 MCG/KG/MIN: 4 INJECTION, SOLUTION INTRAVENOUS at 10:03

## 2023-01-01 RX ADMIN — VASOPRESSIN 0.04 UNITS/MIN: 20 INJECTION PARENTERAL at 11:03

## 2023-03-24 NOTE — HPI
NOTE: 77-year-old M with PMH CAD, HTN, CVA, and colon cancer presented to [facility] with a one-week h/o nausea, vomiting, abd pain and distention.  A few minutes after arrival the patient vomited a large amount of bright red blood and had a pulseless arrest with ROSC after a few minutes of CPR    VS (1/20/42 h) , BP 70/51, RR 18, SpO2 100% on a ventilator.  Labs WBC 21.4, Hb 15.8, Plts 235, Na 136, K 4.0, CO2 21, Cr 1.8, troponin < 0.006.  X-ray abdomen (AP) pos for nonspecific nonobstructive pattern bowel glass with no free air detected postoperative changes.  CXR pos for bibasilar atelectasis (suboptimal inspiration).    Patient given IVF, Protonix gtt and will be given blood pending transfer.  Transfer requested for higher level of care.  Transfer diagnosis upper GIB, cardiac arrest, abd pain and distention. This is a level 1 transfer.

## 2023-03-27 VITALS
HEIGHT: 73 IN | WEIGHT: 245 LBS | TEMPERATURE: 98 F | DIASTOLIC BLOOD PRESSURE: 40 MMHG | BODY MASS INDEX: 32.47 KG/M2 | SYSTOLIC BLOOD PRESSURE: 63 MMHG

## 2023-03-29 LAB
BACTERIA BLD CULT: NORMAL
BACTERIA BLD CULT: NORMAL

## 2024-03-25 NOTE — ANESTHESIA PREPROCEDURE EVALUATION
05/25/2020  Anjel Soria is a 74 y.o., male.    Pre-op Assessment    I have reviewed the Patient Summary Reports.     I have reviewed the Nursing Notes.   I have reviewed the Medications.     Review of Systems  Anesthesia Hx:  No problems with previous Anesthesia  Denies Family Hx of Anesthesia complications.   Denies Personal Hx of Anesthesia complications.   Social:  Non-Smoker    Hematology/Oncology:  Hematology Normal       -- Cancer in past history:  Oncology Comments: prostate     EENT/Dental:   chronic allergic rhinitis Glaucoma.   Cardiovascular:   Exercise tolerance: good Hypertension CAD      Pulmonary:  Pulmonary Normal    Renal/:   BPH    Hepatic/GI:   Hiatal Hernia, GERD    Musculoskeletal:  Musculoskeletal Normal    Neurological:   CVA, residual symptoms Seizures    Endocrine:  Endocrine Normal    Dermatological:  Skin Normal    Psych:   Psychiatric History depression Bipolar. Dementia. Psychotic Disorder and Bipolar disorder.          Physical Exam  General:  Well nourished    Airway/Jaw/Neck:  Airway Findings: Mouth Opening: Normal Tongue: Normal  General Airway Assessment: Adult  Mallampati: I  TM Distance: Normal, at least 6 cm  Jaw/Neck Findings:  Neck ROM: Normal ROM      Dental:  Dental Findings: Upper Dentures              Anesthesia Plan  Type of Anesthesia, risks & benefits discussed:  Anesthesia Type:  general  Patient's Preference:   Intra-op Monitoring Plan: standard ASA monitors  Intra-op Monitoring Plan Comments:   Post Op Pain Control Plan: per primary service following discharge from PACU  Post Op Pain Control Plan Comments:   Induction:    Beta Blocker:         Informed Consent: Patient representative understands risks and agrees with Anesthesia plan.  Questions answered. Anesthesia consent signed with patient representative.  ASA Score: 3  emergent   Day of Surgery Review of  Ambulated with ED tech. 97%-98% on room air. Patient reported dizziness. ED PA aware.    History & Physical:    H&P update referred to the provider.     Anesthesia Plan Notes: Pt will us recently for EGD, and did very well. Returns today for colonoscopy.        Ready For Surgery From Anesthesia Perspective.

## (undated) DEVICE — GLOVE BIOGEL SKINSENSE PI 6.0

## (undated) DEVICE — SUT VICRYL PLUS 3-0 SH 18IN

## (undated) DEVICE — GOWN SMART IMP BREATHABLE XXLG

## (undated) DEVICE — SEE MEDLINE ITEM 157148

## (undated) DEVICE — GLOVE BIOGEL SKINSENSE PI 8.0

## (undated) DEVICE — DRESSING ABSRBNT ISLAND 3.6X8

## (undated) DEVICE — APPLICATOR CHLORAPREP ORN 26ML

## (undated) DEVICE — ELECTRODE BLADE TEFLON 6

## (undated) DEVICE — ELECTRODE REM PLYHSV RETURN 9

## (undated) DEVICE — STAPLER SKIN PROXIMATE WIDE

## (undated) DEVICE — Device

## (undated) DEVICE — CLIPPER BLADE MOD 4406 (CAREF)

## (undated) DEVICE — RELOAD PROXIMATE CUT BLUE 75MM

## (undated) DEVICE — TRAY FOLEY 16FR INFECTION CONT

## (undated) DEVICE — SPONGE LAP 18X18 PREWASHED

## (undated) DEVICE — SEE MEDLINE ITEM 152622

## (undated) DEVICE — SOL 9P NACL IRR PIC IL

## (undated) DEVICE — DRESSING MEPORE 3.6 X 10

## (undated) DEVICE — SUT 3-0 12-18IN SILK

## (undated) DEVICE — SUT 1 48IN PDS II VIO MONO

## (undated) DEVICE — SUT 2-0 12-18IN SILK

## (undated) DEVICE — SEALER LIGASURE CRV 18.8CM

## (undated) DEVICE — GLOVE BIOGEL SKINSENSE PI 6.5

## (undated) DEVICE — SUT SILK 3-0 SH 18IN BLACK

## (undated) DEVICE — APPLIER LIGACLIP MED 11IN

## (undated) DEVICE — CUTTER PROXIMATE BLUE 75MM

## (undated) DEVICE — SEE MEDLINE ITEM 153151